# Patient Record
Sex: FEMALE | Race: WHITE
[De-identification: names, ages, dates, MRNs, and addresses within clinical notes are randomized per-mention and may not be internally consistent; named-entity substitution may affect disease eponyms.]

---

## 2017-03-07 ENCOUNTER — HOSPITAL ENCOUNTER (OUTPATIENT)
Dept: HOSPITAL 23 - CCSC | Age: 52
Discharge: HOME | End: 2017-03-07
Payer: MEDICARE

## 2017-03-07 DIAGNOSIS — E11.9: ICD-10-CM

## 2017-03-07 DIAGNOSIS — I10: ICD-10-CM

## 2017-03-07 DIAGNOSIS — M51.06: ICD-10-CM

## 2017-03-07 DIAGNOSIS — Z98.1: ICD-10-CM

## 2017-03-07 DIAGNOSIS — Z79.4: ICD-10-CM

## 2017-03-07 DIAGNOSIS — M96.1: Primary | ICD-10-CM

## 2017-03-07 DIAGNOSIS — M51.16: ICD-10-CM

## 2017-03-14 ENCOUNTER — HOSPITAL ENCOUNTER (OUTPATIENT)
Dept: HOSPITAL 23 - CCSC | Age: 52
Discharge: HOME | End: 2017-03-14
Payer: MEDICARE

## 2017-03-14 DIAGNOSIS — M96.1: Primary | ICD-10-CM

## 2017-03-14 DIAGNOSIS — M51.06: ICD-10-CM

## 2017-03-14 DIAGNOSIS — Z79.4: ICD-10-CM

## 2017-03-14 DIAGNOSIS — M51.16: ICD-10-CM

## 2017-03-14 DIAGNOSIS — Z98.1: ICD-10-CM

## 2017-03-14 DIAGNOSIS — I10: ICD-10-CM

## 2017-03-14 DIAGNOSIS — E11.9: ICD-10-CM

## 2017-03-29 ENCOUNTER — LAB (OUTPATIENT)
Dept: ONCOLOGY | Facility: CLINIC | Age: 52
End: 2017-03-29

## 2017-03-29 ENCOUNTER — OFFICE VISIT (OUTPATIENT)
Dept: ONCOLOGY | Facility: CLINIC | Age: 52
End: 2017-03-29

## 2017-03-29 VITALS
SYSTOLIC BLOOD PRESSURE: 128 MMHG | OXYGEN SATURATION: 97 % | HEART RATE: 68 BPM | RESPIRATION RATE: 18 BRPM | WEIGHT: 145.2 LBS | TEMPERATURE: 98.1 F | DIASTOLIC BLOOD PRESSURE: 53 MMHG

## 2017-03-29 DIAGNOSIS — R11.0 NAUSEA: ICD-10-CM

## 2017-03-29 DIAGNOSIS — C91.10 CLL (CHRONIC LYMPHOCYTIC LEUKEMIA) (HCC): Primary | ICD-10-CM

## 2017-03-29 DIAGNOSIS — K59.03 DRUG-INDUCED CONSTIPATION: ICD-10-CM

## 2017-03-29 DIAGNOSIS — C91.10 LEUKEMIA, CHRONIC LYMPHOCYTIC (HCC): ICD-10-CM

## 2017-03-29 LAB
ALBUMIN SERPL-MCNC: 4.6 G/DL (ref 3.5–5)
ALBUMIN/GLOB SERPL: 2 G/DL (ref 1.5–2.5)
ALP SERPL-CCNC: 97 U/L (ref 35–104)
ALT SERPL W P-5'-P-CCNC: 21 U/L (ref 10–36)
ANION GAP SERPL CALCULATED.3IONS-SCNC: 4.4 MMOL/L (ref 3.6–11.2)
AST SERPL-CCNC: 16 U/L (ref 10–30)
BASOPHILS # BLD AUTO: 0.01 10*3/MM3 (ref 0–0.3)
BASOPHILS NFR BLD AUTO: 0.2 % (ref 0–2)
BILIRUB SERPL-MCNC: 0.4 MG/DL (ref 0.2–1.8)
BUN BLD-MCNC: 12 MG/DL (ref 7–21)
BUN/CREAT SERPL: 16.4 (ref 7–25)
CALCIUM SPEC-SCNC: 9.2 MG/DL (ref 7.7–10)
CHLORIDE SERPL-SCNC: 106 MMOL/L (ref 99–112)
CO2 SERPL-SCNC: 26.6 MMOL/L (ref 24.3–31.9)
CREAT BLD-MCNC: 0.73 MG/DL (ref 0.43–1.29)
DEPRECATED RDW RBC AUTO: 42.3 FL (ref 37–54)
EOSINOPHIL # BLD AUTO: 0.05 10*3/MM3 (ref 0–0.7)
EOSINOPHIL NFR BLD AUTO: 0.8 % (ref 0–5)
ERYTHROCYTE [DISTWIDTH] IN BLOOD BY AUTOMATED COUNT: 12 % (ref 11.5–14.5)
GFR SERPL CREATININE-BSD FRML MDRD: 83 ML/MIN/1.73
GLOBULIN UR ELPH-MCNC: 2.3 GM/DL
GLUCOSE BLD-MCNC: 257 MG/DL (ref 70–110)
HCT VFR BLD AUTO: 45.6 % (ref 37–47)
HGB BLD-MCNC: 15.5 G/DL (ref 12–16)
IMM GRANULOCYTES # BLD: 0.01 10*3/MM3 (ref 0–0.03)
IMM GRANULOCYTES NFR BLD: 0.2 % (ref 0–0.5)
LDH SERPL-CCNC: 136 U/L (ref 135–225)
LYMPHOCYTES # BLD AUTO: 1.87 10*3/MM3 (ref 1–3)
LYMPHOCYTES NFR BLD AUTO: 30.5 % (ref 21–51)
MCH RBC QN AUTO: 33.4 PG (ref 27–33)
MCHC RBC AUTO-ENTMCNC: 34 G/DL (ref 33–37)
MCV RBC AUTO: 98.3 FL (ref 80–94)
MONOCYTES # BLD AUTO: 0.2 10*3/MM3 (ref 0.1–0.9)
MONOCYTES NFR BLD AUTO: 3.3 % (ref 0–10)
NEUTROPHILS # BLD AUTO: 3.99 10*3/MM3 (ref 1.4–6.5)
NEUTROPHILS NFR BLD AUTO: 65 % (ref 30–70)
OSMOLALITY SERPL CALC.SUM OF ELEC: 282.4 MOSM/KG (ref 273–305)
PLATELET # BLD AUTO: 168 10*3/MM3 (ref 130–400)
PMV BLD AUTO: 9.8 FL (ref 6–10)
POTASSIUM BLD-SCNC: 4.4 MMOL/L (ref 3.5–5.3)
PROT SERPL-MCNC: 6.9 G/DL (ref 6–8)
RBC # BLD AUTO: 4.64 10*6/MM3 (ref 4.2–5.4)
SODIUM BLD-SCNC: 137 MMOL/L (ref 135–153)
WBC NRBC COR # BLD: 6.13 10*3/MM3 (ref 4.5–12.5)

## 2017-03-29 PROCEDURE — 85025 COMPLETE CBC W/AUTO DIFF WBC: CPT | Performed by: INTERNAL MEDICINE

## 2017-03-29 PROCEDURE — 80053 COMPREHEN METABOLIC PANEL: CPT | Performed by: INTERNAL MEDICINE

## 2017-03-29 PROCEDURE — 99214 OFFICE O/P EST MOD 30 MIN: CPT | Performed by: INTERNAL MEDICINE

## 2017-03-29 PROCEDURE — 36415 COLL VENOUS BLD VENIPUNCTURE: CPT | Performed by: INTERNAL MEDICINE

## 2017-03-29 PROCEDURE — 83615 LACTATE (LD) (LDH) ENZYME: CPT | Performed by: INTERNAL MEDICINE

## 2017-03-29 RX ORDER — OXYCODONE AND ACETAMINOPHEN 10; 325 MG/1; MG/1
1 TABLET ORAL EVERY 4 HOURS PRN
COMMUNITY
Start: 2017-02-27 | End: 2018-02-27

## 2017-03-29 RX ORDER — LEVETIRACETAM 1000 MG/1
1000 TABLET ORAL 2 TIMES DAILY
COMMUNITY
End: 2020-06-11

## 2017-03-29 NOTE — PROGRESS NOTES
DATE:  3/29/2017    DIAGNOSIS:  SLL - initially diagnosed with [place] Stage II Disease in May 2012, based on excisiounal biopsy of a L supraclavicular LN. Bone marrow was involved.     CHIEF COMPLAINT:  CLL / SLL      TREATMENT HISTORY:  1. Received 6 cycles of Treanda without Rituxan between 10-24-12 and 3-21-13 because of anaphylactic reaction to Rituximab with her 1st cycle of treatment . Received Neulasta support. (Delivered by Dr. Koehler)       HISTORY OF PRESENT ILLNESS:   Ms. Mae was referred by Gillian Harrison for evaluation and treatment of CLL / SLL. She was reportedly diagnosed in May of 2012 when she saw Dr. Carbajal for biopsy of a L supraclavicular LN. At that time she had excessive fatigue & a 40 lb weight loss. She saw Dr. Koehler at  at time who performed bone marrow exam (bone marrow was involved) and started treatment with Bendamustine / Rituximab. Unfortunately with her 1st treatment, she had an anaphylactic reaction to Rituximab so this was not given with subsequent cycles of therapy. Her last cycle of treatment was 3-21-13. Per Dr. Koehler' s records, she had a complete response to treatment. She never had a significant leukocytosis or lymphocytosis, but had predominantly a lymphomatous presentation of her disease.       INTERVAL HISTORY:  Ms. Mae is here for follow up of CLL/SLL. She reports fatigue at baseline and pain to her back and legs. She is being seen by a physician in [place] who gave 2 injections intended for pain relief. She is not sure of the name, but she doesn't t feel they were helpful. She also reports chronic cold intolerance and hot flashes, but denies fevers, recent infections or abnormal weight loss. She is curious to hear the outcome of her testing. She sometimes gets pain in the RLQ of her abdomen.  She says this has been present for about 7-8 months.  She is s/p complete hysterectomy.  She denies fever or chills.  She denies diarrhea.  She struggles chronically  with constipation for which she takes Lactulose.   Finally, she asks for a prescription for Zofran because she says she struggles with nausea for unknown reasons.  She says her doctor thought Metformin was to blame but this has been discontinued and her nausea remains.  She is otherwise well and without complaint.        PAST MEDICAL HISTORY:  Past Medical History:   Diagnosis Date   • Asthma    • Chronic back pain    • CLL (chronic lymphocytic leukemia)     OCTOBER OF 2012   • Degenerative arthritis    • Depression    • Diabetes mellitus     type 2   • Hyperlipidemia    • Hypertension    • Lymphadenopathy        PAST SURGICAL HISTORY:  Past Surgical History:   Procedure Laterality Date   • APPENDECTOMY     • BACK SURGERY      2002 (work accident) c-spine surgery 4/14   • CHOLECYSTECTOMY     • HYSTERECTOMY      for endometriosis/ovarian bleed   • OTHER SURGICAL HISTORY      pac insertion and removal   • TONSILLECTOMY         FAMILY HISTORY:  Family History   Problem Relation Age of Onset   • Lung cancer Father 72   • Other Brother 46     MI   • Breast cancer Paternal Aunt    • Colon cancer Maternal Grandfather    • Other Cousin      CLL (dx 46 yo)     Social History     Social History   • Marital status:      Spouse name: N/A   • Number of children: N/A   • Years of education: N/A     Occupational History   • Not on file.     Social History Main Topics   • Smoking status: Current Every Day Smoker   • Smokeless tobacco: Not on file   • Alcohol use No   • Drug use: No   • Sexual activity: Not on file     Other Topics Concern   • Not on file     Social History Narrative     REVIEW OF SYSTEMS:    A comprehensive 14 point review of systems was performed and was negative except as mentioned    MEDICATIONS:  The current medication list was reviewed in the EMR    Current Outpatient Prescriptions:   •  ALBUTEROL IN, Inhale as needed., Disp: , Rfl:   •  ALPRAZolam (XANAX) 0.25 MG tablet, Take 0.25 mg by mouth 3  (three) times a day as needed for anxiety., Disp: , Rfl:   •  Fluticasone-Salmeterol (ADVAIR HFA IN), Inhale as needed., Disp: , Rfl:   •  gabapentin (NEURONTIN) 600 MG tablet, Take 600 mg by mouth daily., Disp: , Rfl:   •  Hydrocodone-Acetaminophen (NORCO PO), Take 10 mg by mouth daily., Disp: , Rfl:   •  Ibuprofen (ADVIL PO), Take  by mouth as needed., Disp: , Rfl:   •  insulin aspart (NovoLOG) 100 UNIT/ML injection, Inject 10 Units under the skin 3 (three) times a day., Disp: , Rfl:   •  Insulin Human powder, as needed., Disp: , Rfl:   •  lisinopril (PRINIVIL,ZESTRIL) 20 MG tablet, Take 20 mg by mouth daily., Disp: , Rfl:   •  metFORMIN (GLUCOPHAGE) 850 MG tablet, Take 850 mg by mouth 2 (two) times a day., Disp: , Rfl:   •  Mirtazapine (REMERON PO), Take 7.5 mg by mouth every evening., Disp: , Rfl:   •  Ondansetron HCl (ZOFRAN PO), Take  by mouth 2 (two) times a day., Disp: , Rfl:   •  OxyCODONE HCl ER (OxyCONTIN) 30 MG tablet extended-release 12 hour, Take 30 mg by mouth 2 (two) times a day., Disp: , Rfl:   •  Promethazine HCl (PHENERGAN PO), Take  by mouth as needed., Disp: , Rfl:   •  Sertraline HCl (ZOLOFT PO), Take 150 mg by mouth., Disp: , Rfl:     ALLERGIES:    Allergies   Allergen Reactions   • Codeine    • Other      PCN   • Rituxan [Rituximab]        PHYSICAL EXAM:  /53  Pulse 68  Temp 98.1 °F (36.7 °C) (Oral)   Resp 18  Wt 145 lb 3.2 oz (65.9 kg)  SpO2 97%   General: Alert and oriented.  Appears well   HEENT: Pupils are equal, round and reactive to light and accommodation, Extraocular movements full, oropharynx clear   Neck: no jvd or thyromegaly   Cardiovascular: regular rate and rhythm without murmurs, rubs or gallops   Pulmonary: clear to auscultation bilaterally   Abdomen: soft, non-tender, non-distended, normal active bowel sounds present, no organomegaly   Extremities: No clubbing, cyanosis or edema   Lymph: + cj post cervical LN present R>L (These are sl larger than previously with  the largest LN measuring about 2 cm, but most remain small 1-1.5 cm), and + R axillary LN (approx 1.5 cm ). She also has shoddy carl inguinal adenopathy (LNs approx 1.5)   Neurologic: Mental status as above, CN II-XII intact, grossly non-focal exam     PATHOLOGY:  5-25-12 L Supraclavicular LN Biopsy:   - Extensive small lymphocytic lymphoma / chronic lymphocytic leukemia involvement.   Flow cytometry shows monoclonal Kappa B-cell population which coexpresses CD5 and CD23 representing 30% of gated cells. There is dim surface light chain and dim CD20 expression. No CD38 expression. Neoplastic cells are positive for CD20 (dim), PAX-5, CCD5, CD23, CD43. B cells are + for CD3. BCL1 stain negative.    BM Biopsy 6-29-12 ([facility])   - Limited BM specimen with scattered hematopoietic cells.   - Flow cytometry revealed peripheral blood with minute CLL/SLL population (0.5%) with normal  FISH studies.     Cervical Disectomy 04-18-14:   - Fragments of fibrohyaline cartilage, no acute inflammation or metastatic disease identified.     IMAGING:  PET-CT 6-19-12:   - Mild to moderate bilateral axillary LAD and mediastinal LAD with minimal associated hypermetabolism.     PET-CT 10-09-12:   - Multiple areas of abnormal hypermetabolism in the neck bilaterally, new from the prior study. R posterior trangle focus with SUV 3.1. L supraclavicular focus SUV 5.0 (LN 1.8 cm, prior 1.2 cm)   - Carl axillary LAD - L axilla 2.8 cm LN (prior 1.6 cm) with SUV 3.6, R axillary ln new 2.0 cm, SUV 2.6   - Mediastinal LAD noted. R prevascular LN 1.5 cm with SUV 2.1   - Carl external iliac LNs present ( R 3.2 cm with SUV 3.1)   - Overall worsening areas of hypermetabolism corresponding to enlarging nodes c/w worsening neoplastic involvement.     PET-CT 1-16-13:   - No PET-CT findings of active disease related to the patient's lymphoma. Interval resolution of hypermetabolic adenopathy described on previous examination. Spleen is normal.     PET-CT 1-21-14:    - No abnormal hypermetabolism to suggest neoplastic involvement. No mass or adenopathy noted. Spleen is normal.     04-03-14 MRI Spine:   - Cervical: Degenerative disk disease in the cervical disk spaces at the level of C4-C7. At 4-5, there is a disk herniation associated with bulging. There was spinal stenosis at C5-6 but no disk herniation. At C6-7, the disk bulging is not felt to be significant. The postcontrasted scans in the cervical spine showed no areas of abnormal enhancement.   - Thoracic: negative MRI examination of the thoracic spine. A source of the patient's t horacic spine pain was not demonstrated.     Chest Xray 04-17-15:   - The lungs are clear, the heart is normal. There is no active disease in the chest.     CT CAP/neck w/ contrast 05-13-14:   - No neck lymphadenopathy by CT size criteria   - No intrathoracic or abdominal LAD     CTAP, neck w/ contrast 03-04-15:   - No enlarged lymph nodes were demonstrated. The bile ducts in the liver and the extrahepatic biliary tree is slightly dilated.   - Clinical correlation with laboratory evaluation for obstruction suggested. This, however, is not significantly changed from a previous CT from 05/2014. No retroperitoneal lymphadenopathy is identified.   - Negative CT of the of the soft tissues of the neck. No abnormally enlarged lymph nodes are demonstrated.     CT Neck, CAP 3-15-16:   - There is a change in the CT scan. There are prominent lymph nodes in the neck bilaterally. The largest are in the supraclavicular area and measure greater than a centimeter. Most of the nodes are in the 1cm size range.   - In the chest, most of the nodes are in the subcen timeter size range. The largest nodes are in the pretacheal area and measure 15 mm. There were no enlarged hilar nodes. On the lung windows, there are no pleural effusions. No pulmonary or parenchymal lung nodules or masses were demonstrated. .   There are m ore prominent lymph nodes in the  retroperitoneum some of which are enlarged today consistent with recurrence. Again, most of these are in the 1 cm size range, but a few measure up to 17-18 mm. No mesenteric adenopathy demonstrated.     CTCAP, Neck 05-10-16:   - Chest: Persistent stable adenopathy in the mediastinum and axillary regions. The lungs remain clear.   - A/P: The lymph nodes are stable in comparing with the previous CT done in March.   - Neck: Persistent but stable adenopathy throughout the jugular lymph node chains in both sides of the neck.     CTCAP neck 08-16-16:  - Stable mediastinal enlarged lymph nodes including an AP window lymph node measuring 9 mm  - A left axillary region lymph node measures 1.9 cm and was 1.9 cm. Stable right axillary region lymph adenopathy.   - Stable retroperitoneal and mesenteric lymphadenopathy   - persistent lymphadenopathy throughout the neck that is stable in size.     RECENT LABS:  Lab Results   Component Value Date    WBC 6.13 03/29/2017    HGB 15.5 03/29/2017    HCT 45.6 03/29/2017    MCV 98.3 (H) 03/29/2017    RDW 12.0 03/29/2017     03/29/2017    NEUTRORELPCT 65.0 03/29/2017    LYMPHORELPCT 30.5 03/29/2017    MONORELPCT 3.3 03/29/2017    EOSRELPCT 0.8 03/29/2017    BASORELPCT 0.2 03/29/2017    NEUTROABS 3.99 03/29/2017    LYMPHSABS 1.87 03/29/2017       Lab Results   Component Value Date     03/29/2017    K 4.4 03/29/2017    CO2 26.6 03/29/2017     03/29/2017    BUN 12 03/29/2017    CREATININE 0.73 03/29/2017    GLUCOSE 257 (H) 03/29/2017    CALCIUM 9.2 03/29/2017    ALKPHOS 97 03/29/2017    AST 16 03/29/2017    ALT 21 03/29/2017    BILITOT 0.4 03/29/2017    ALBUMIN 4.60 03/29/2017    PROTEINTOT 6.9 03/29/2017       Lab Results   Component Value Date     (L) 08/29/2016    URICACID 3.9 06/25/2015     Date  SPEP/MONE Mspike Free K Free L  K/L Ratio   02-12-15 MONE normal Not observed   06-25-15 MONE normal Not observed 22.10  14.93  1.48  11-17-15 MONE normal Not  observed 17.17  14.41  1.19    Date  IgG IgA IgM  02-12-15 744 110 43  06-25-15 776 94 39  11-17-15 812 99 41  03-29-17 pending      ASSESSMENT & PLAN:  Ms. Mae is a 52 y.o.  female with a history of Stage II SLL diagnosed in May of 2012 by L supraclavicular LN biopsy.     1. Small Lymphocytic Lymphoma:   - Treated as above with 6 cycles of Bendamustine (after anaphylactic reaction to Rituximab with 1st cycle of treatment) with complete radiographic response.   - She does have shoddy adenopathy in the cj cervical, R axillary and cj inguinal areas c/w CLL/SLL. Prior CT imaging also demonstrated small mediastinal LNs and enlarged retroperitoneal LNS, but no evidence of bulky disease.  She has no concerning exam findings and no symptoms related to her disease at this time.   -  Will plan for repeat exam in 3 months with labwork and repeat CT Neck, CAP prior.  She will call in the interim if she should have needs prior.     2.  Nausea:  -  Uncertain etiology.  I recommended to Ms. Mae that she return to her PCP for further evaluation as to the etiology of her nausea, because I am concerned that treating the symptom without evaluating its etiology could lead to worsening underlying disease.    3.  Intermittent RLQ pain:  She has no tenderness, even with very deep palpation on exam.  She is s/p hysterectomy.  She denies dysuria or hematuria.  She denies diarrhea. She does struggle with chronic constipation for which she has to take Lactulose.  Perhaps constipation is to blame.  Recommended she stop taking Zofran which can be constipating.      This note was scribed for Thea Sorto MD by Violeta Fong RN.    I, Thea Sorto MD, personally performed the services described in this documentation as scribed by the above named individual in my presence, and it is both accurate and complete.  03/29/17 6:13 PM     I spent 25 minutes with Ms. Mae today. More than 50% of the time was spent in  counseling / coordination of care for the above mentioned problems.       Electronically Signed by: Thea Sorto MD      CC:   BRANDON Panchal Dr.

## 2017-03-30 LAB
IGA SERPL-MCNC: 103 MG/DL (ref 87–352)
IGG SERPL-MCNC: 771 MG/DL (ref 700–1600)
IGM SERPL-MCNC: 34 MG/DL (ref 26–217)

## 2017-06-07 ENCOUNTER — HOSPITAL ENCOUNTER (OUTPATIENT)
Dept: CT IMAGING | Facility: HOSPITAL | Age: 52
Discharge: HOME | End: 2017-06-07
Attending: INTERNAL MEDICINE

## 2017-06-07 ENCOUNTER — HOSPITAL ENCOUNTER (OUTPATIENT)
Dept: CT IMAGING | Facility: HOSPITAL | Age: 52
Discharge: HOME | End: 2017-06-07
Attending: INTERNAL MEDICINE | Admitting: INTERNAL MEDICINE

## 2017-06-07 DIAGNOSIS — C91.10 CLL (CHRONIC LYMPHOCYTIC LEUKEMIA) (HCC): ICD-10-CM

## 2017-06-07 DIAGNOSIS — C91.10 CLL (CHRONIC LYMPHOCYTIC LEUKEMIA) (HCC): Primary | ICD-10-CM

## 2017-06-07 LAB — CREAT BLDA-MCNC: 0.7 MG/DL (ref 0.6–1.3)

## 2017-06-07 PROCEDURE — 0 IOPAMIDOL 61 % SOLUTION: Performed by: INTERNAL MEDICINE

## 2017-06-07 PROCEDURE — 74177 CT ABD & PELVIS W/CONTRAST: CPT

## 2017-06-07 PROCEDURE — 71260 CT THORAX DX C+: CPT | Performed by: RADIOLOGY

## 2017-06-07 PROCEDURE — 70491 CT SOFT TISSUE NECK W/DYE: CPT

## 2017-06-07 PROCEDURE — 82565 ASSAY OF CREATININE: CPT

## 2017-06-07 PROCEDURE — 70491 CT SOFT TISSUE NECK W/DYE: CPT | Performed by: RADIOLOGY

## 2017-06-07 PROCEDURE — 71260 CT THORAX DX C+: CPT

## 2017-06-07 PROCEDURE — 74177 CT ABD & PELVIS W/CONTRAST: CPT | Performed by: RADIOLOGY

## 2017-06-07 RX ADMIN — IOPAMIDOL 100 ML: 612 INJECTION, SOLUTION INTRAVENOUS at 13:47

## 2017-06-20 DIAGNOSIS — I99.9 VASCULAR ABNORMALITY: Primary | ICD-10-CM

## 2017-07-06 ENCOUNTER — OFFICE VISIT (OUTPATIENT)
Dept: SURGERY | Facility: CLINIC | Age: 52
End: 2017-07-06

## 2017-07-06 VITALS
HEIGHT: 65 IN | WEIGHT: 144.2 LBS | BODY MASS INDEX: 24.03 KG/M2 | DIASTOLIC BLOOD PRESSURE: 56 MMHG | HEART RATE: 66 BPM | TEMPERATURE: 98.2 F | SYSTOLIC BLOOD PRESSURE: 123 MMHG | RESPIRATION RATE: 18 BRPM

## 2017-07-06 DIAGNOSIS — Z71.9 ENCOUNTER FOR CONSULTATION: ICD-10-CM

## 2017-07-06 DIAGNOSIS — R09.89 BILATERAL CAROTID BRUITS: ICD-10-CM

## 2017-07-06 DIAGNOSIS — I73.9 PERIPHERAL VASCULAR DISEASE OF EXTREMITY (HCC): Primary | ICD-10-CM

## 2017-07-06 PROCEDURE — 99205 OFFICE O/P NEW HI 60 MIN: CPT | Performed by: SURGERY

## 2017-07-06 RX ORDER — INSULIN ASPART 100 [IU]/ML
INJECTION, SOLUTION INTRAVENOUS; SUBCUTANEOUS
Status: ON HOLD | COMMUNITY
Start: 2017-06-13 | End: 2017-08-23 | Stop reason: SDUPTHER

## 2017-07-06 RX ORDER — QUETIAPINE FUMARATE 50 MG/1
TABLET, FILM COATED ORAL
Status: ON HOLD | COMMUNITY
Start: 2017-06-13 | End: 2017-08-23 | Stop reason: ALTCHOICE

## 2017-07-06 RX ORDER — SERTRALINE HYDROCHLORIDE 100 MG/1
100 TABLET, FILM COATED ORAL NIGHTLY
COMMUNITY
Start: 2017-06-13

## 2017-07-06 RX ORDER — FLURBIPROFEN SODIUM 0.3 MG/ML
SOLUTION/ DROPS OPHTHALMIC
COMMUNITY
Start: 2017-06-14 | End: 2021-09-15

## 2017-07-06 NOTE — PROGRESS NOTES
7/6/2017    Patient Information  Melania Mae  [address] [place] [id number]  1965  [phone] (home)     Chief Complaint   Patient presents with   • Circulatory Problem     Vascular Abnormality         HPI  Patient is a 52-year-old white female who has chronic lymphocytic leukemia and non-Hodgkin's lymphoma in remission.  As part of her workup she had a CAT scan performed recently.  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  Patient says both feet feel numb at times.  She can only walk approximately 50 yards before she has severe pain in her legs.  Father had severe peripheral vascular disease as well as coronary artery disease.  She is also diabetic.    ROS reviewed and confirmed.        Review of Systems:    General: fever, chills  Integumentary: negative  Eyes: eyesight problems, glasses  ENT: negative  Respiratory: shortness of breath and wheezing  Gastrointestinal: nausea/vomiting  Cardiovascular: chest pain and palpitations  Neurological: numbness  Psychiatric: anxiety, depression and insomnia  Hematologic/Lymphatic: swollen glands and history of blood clots  Genitourinary: frequent urination  Musculoskeletal: sore muscles and back pain  Endocrine: hot flashes  Breasts: negative      There is no problem list on file for this patient.        Past Medical History:   Diagnosis Date   • Asthma    • Chronic back pain    • CLL (chronic lymphocytic leukemia)     OCTOBER OF 2012   • COPD (chronic obstructive pulmonary disease)    • Degenerative arthritis    • Depression    • Diabetes mellitus     type 2   • Hyperlipidemia    • Hypertension    • Lymphadenopathy    • Non Hodgkin's lymphoma          Past Surgical History:   Procedure Laterality Date   • APPENDECTOMY     • BACK SURGERY      2002 (work accident) c-spine surgery 4/14   • CHOLECYSTECTOMY     • HYSTERECTOMY      for endometriosis/ovarian bleed   • NECK SURGERY     • OTHER SURGICAL HISTORY      pac insertion and removal   •  TONSILLECTOMY           Family History   Problem Relation Age of Onset   • Lung cancer Father 72   • Hypertension Father    • Heart disease Father    • Cancer Father    • Diabetes Father    • Other Brother 46     MI   • Heart disease Brother    • Breast cancer Paternal Aunt    • Colon cancer Maternal Grandfather    • Other Cousin      CLL (dx 46 yo)   • Hypertension Mother          Social History   Substance Use Topics   • Smoking status: Current Every Day Smoker   • Smokeless tobacco: Not on file   • Alcohol use No       Current Outpatient Prescriptions   Medication Sig Dispense Refill   • ALBUTEROL IN Inhale as needed.     • ALPRAZolam (XANAX) 0.25 MG tablet Take 0.25 mg by mouth 3 (three) times a day as needed for anxiety.     • B-D UF III MINI PEN NEEDLES 31G X 5 MM misc      • Fluticasone-Salmeterol (ADVAIR HFA IN) Inhale as needed.     • gabapentin (NEURONTIN) 600 MG tablet Take 800 mg by mouth 4 (Four) Times a Day.     • Ibuprofen (ADVIL PO) Take  by mouth 4 (Four) Times a Day.     • insulin aspart (NovoLOG) 100 UNIT/ML injection Inject 10 Units under the skin 3 (three) times a day.     • insulin detemir (LEVEMIR) 100 UNIT/ML injection Inject 10 Units under the skin Every Night.     • levETIRAcetam (KEPPRA) 1000 MG tablet Take 1,000 mg by mouth 2 (Two) Times a Day.     • lisinopril (PRINIVIL,ZESTRIL) 20 MG tablet Take 20 mg by mouth daily.     • NOVOLOG FLEXPEN 100 UNIT/ML solution pen-injector sc pen      • Ondansetron HCl (ZOFRAN PO) Take  by mouth 2 (two) times a day.     • oxyCODONE-acetaminophen (PERCOCET)  MG per tablet      • Promethazine HCl (PHENERGAN PO) Take  by mouth as needed.     • QUEtiapine (SEROquel) 50 MG tablet      • sertraline (ZOLOFT) 100 MG tablet      • Sertraline HCl (ZOLOFT PO) Take 50 mg by mouth.       No current facility-administered medications for this visit.          Allergies  Codeine; Other; and Rituxan [rituximab]      Physical Exam   Constitutional: She is oriented to  "person, place, and time. She appears well-developed and well-nourished. No distress.   HENT:   Head: Normocephalic.   Right Ear: External ear normal.   Left Ear: External ear normal.   Nose: Nose normal.   Mouth/Throat: Oropharynx is clear and moist.   Eyes: Conjunctivae and EOM are normal. Right eye exhibits no discharge. Left eye exhibits no discharge.   Neck: Normal range of motion. No JVD present. No tracheal deviation present. No thyromegaly present.   Cardiovascular: Normal rate, regular rhythm, normal heart sounds and intact distal pulses.  Exam reveals no gallop and no friction rub.    No murmur heard.  Very weak femoral pulses.  No palpable popliteal, dorsalis pedis, posterior tibial pulses bilaterally.    Bilateral carotid bruits.   Pulmonary/Chest: Effort normal and breath sounds normal. No stridor. No respiratory distress. She has no wheezes. She has no rales. She exhibits no tenderness.   Abdominal: Soft. Bowel sounds are normal. She exhibits no distension and no mass. There is no tenderness. There is no rebound and no guarding. No hernia.   Genitourinary: Rectal exam shows guaiac negative stool.   Musculoskeletal: Normal range of motion. She exhibits no edema, tenderness or deformity.   Lymphadenopathy:     She has no cervical adenopathy.   Neurological: She is alert and oriented to person, place, and time. She has normal reflexes. She displays normal reflexes. No cranial nerve deficit. She exhibits normal muscle tone. Coordination normal.   Skin: Skin is warm and dry. No rash noted. She is not diaphoretic. No erythema. No pallor.   Psychiatric: She has a normal mood and affect. Her behavior is normal. Judgment and thought content normal.   Nursing note and vitals reviewed.        /56  Pulse 66  Temp 98.2 °F (36.8 °C)  Resp 18  Ht 65\" (165.1 cm)  Wt 144 lb 3.2 oz (65.4 kg)  BMI 24 kg/m2      ASSESSMENT  Significant peripheral vascular disease with possible carotid occlusive disease as " well.        PLAN CTA and ankle-brachial indexes and go from there.  I'll also get carotid Dopplers studies    Benefits large amount time discussing pathophysiology of peripheral vascular disease.  Anatomical illustrations were used to explain the disease and how bypass procedures are performed if indeed this is what she needs.        Kobi Amaral MD

## 2017-07-13 ENCOUNTER — HOSPITAL ENCOUNTER (OUTPATIENT)
Dept: CARDIOLOGY | Facility: HOSPITAL | Age: 52
Discharge: HOME | End: 2017-07-13
Attending: SURGERY

## 2017-07-13 ENCOUNTER — HOSPITAL ENCOUNTER (OUTPATIENT)
Dept: CT IMAGING | Facility: HOSPITAL | Age: 52
Discharge: HOME | End: 2017-07-13
Attending: SURGERY | Admitting: SURGERY

## 2017-07-13 DIAGNOSIS — Z71.9 ENCOUNTER FOR CONSULTATION: ICD-10-CM

## 2017-07-13 DIAGNOSIS — I73.9 PERIPHERAL VASCULAR DISEASE OF EXTREMITY (HCC): ICD-10-CM

## 2017-07-13 DIAGNOSIS — R09.89 BILATERAL CAROTID BRUITS: ICD-10-CM

## 2017-07-13 PROCEDURE — 93880 EXTRACRANIAL BILAT STUDY: CPT | Performed by: RADIOLOGY

## 2017-07-13 PROCEDURE — 82565 ASSAY OF CREATININE: CPT

## 2017-07-13 PROCEDURE — 75635 CT ANGIO ABDOMINAL ARTERIES: CPT | Performed by: RADIOLOGY

## 2017-07-13 PROCEDURE — 0 IOPAMIDOL 61 % SOLUTION: Performed by: SURGERY

## 2017-07-13 PROCEDURE — 75635 CT ANGIO ABDOMINAL ARTERIES: CPT

## 2017-07-13 PROCEDURE — 93880 EXTRACRANIAL BILAT STUDY: CPT

## 2017-07-13 RX ADMIN — IOPAMIDOL 100 ML: 612 INJECTION, SOLUTION INTRAVENOUS at 13:51

## 2017-07-17 LAB — CREAT BLDA-MCNC: 0.7 MG/DL (ref 0.6–1.3)

## 2017-07-18 ENCOUNTER — OFFICE VISIT (OUTPATIENT)
Dept: SURGERY | Facility: CLINIC | Age: 52
End: 2017-07-18

## 2017-07-18 VITALS
DIASTOLIC BLOOD PRESSURE: 72 MMHG | SYSTOLIC BLOOD PRESSURE: 128 MMHG | HEART RATE: 63 BPM | TEMPERATURE: 97.7 F | BODY MASS INDEX: 24.26 KG/M2 | RESPIRATION RATE: 18 BRPM | HEIGHT: 65 IN | WEIGHT: 145.6 LBS

## 2017-07-18 DIAGNOSIS — I73.9 PERIPHERAL VASCULAR DISEASE OF EXTREMITY (HCC): Primary | ICD-10-CM

## 2017-07-18 DIAGNOSIS — R09.89 BILATERAL CAROTID BRUITS: ICD-10-CM

## 2017-07-18 DIAGNOSIS — I77.9 BILATERAL CAROTID ARTERY DISEASE (HCC): ICD-10-CM

## 2017-07-18 PROCEDURE — 99214 OFFICE O/P EST MOD 30 MIN: CPT | Performed by: SURGERY

## 2017-07-18 NOTE — PROGRESS NOTES
7/18/2017    Patient Information  Melania Mae  [address] [place] [id number]  1965  [phone] (home)     Chief Complaint   Patient presents with   • Follow-up     FU CTA         HPI  Patient is a 51-year-old white female here for follow-up for CTA for peripheral vascular disease and carotid ultrasounds for bilateral bruits.  Her CTA shows stenoses in her aorta and iliacs but they are not hemodynamically significant.  Carotid Doppler study of her carotids significant for a 70-99% stenosis of right internal carotid artery.     ROS reviewed and confirmed.   Review of Systems:    General: fever, chills  Integumentary: negative  Eyes: eyesight problems, glasses  ENT: negative  Respiratory: shortness of breath and wheezing  Gastrointestinal: nausea/vomiting  Cardiovascular: chest pain and palpitations  Neurological: numbness  Psychiatric: anxiety, depression and insomnia  Hematologic/Lymphatic: swollen glands and history of blood clots  Genitourinary: frequent urination  Musculoskeletal: sore muscles and back pain  Endocrine: hot flashes  Breasts: negative    Patient Active Problem List   Diagnosis   • Encounter for consultation         Past Medical History:   Diagnosis Date   • Asthma    • Chronic back pain    • CLL (chronic lymphocytic leukemia)     OCTOBER OF 2012   • COPD (chronic obstructive pulmonary disease)    • Degenerative arthritis    • Depression    • Diabetes mellitus     type 2   • Hyperlipidemia    • Hypertension    • Lymphadenopathy    • Non Hodgkin's lymphoma          Past Surgical History:   Procedure Laterality Date   • APPENDECTOMY     • BACK SURGERY      2002 (work accident) c-spine surgery 4/14   • CHOLECYSTECTOMY     • HYSTERECTOMY      for endometriosis/ovarian bleed   • NECK SURGERY     • OTHER SURGICAL HISTORY      pac insertion and removal   • TONSILLECTOMY           Family History   Problem Relation Age of Onset   • Lung cancer Father 72   • Hypertension Father    •  "Heart disease Father    • Cancer Father    • Diabetes Father    • Other Brother 46     MI   • Heart disease Brother    • Breast cancer Paternal Aunt    • Colon cancer Maternal Grandfather    • Other Cousin      CLL (dx 48 yo)   • Hypertension Mother          Social History   Substance Use Topics   • Smoking status: Current Every Day Smoker   • Smokeless tobacco: Not on file   • Alcohol use No       Current Outpatient Prescriptions   Medication Sig Dispense Refill   • ALBUTEROL IN Inhale as needed.     • ALPRAZolam (XANAX) 0.25 MG tablet Take 0.25 mg by mouth 3 (three) times a day as needed for anxiety.     • B-D UF III MINI PEN NEEDLES 31G X 5 MM misc      • Fluticasone-Salmeterol (ADVAIR HFA IN) Inhale as needed.     • gabapentin (NEURONTIN) 600 MG tablet Take 800 mg by mouth 4 (Four) Times a Day.     • Ibuprofen (ADVIL PO) Take  by mouth 4 (Four) Times a Day.     • insulin aspart (NovoLOG) 100 UNIT/ML injection Inject 10 Units under the skin 3 (three) times a day.     • insulin detemir (LEVEMIR) 100 UNIT/ML injection Inject 10 Units under the skin Every Night.     • levETIRAcetam (KEPPRA) 1000 MG tablet Take 1,000 mg by mouth 2 (Two) Times a Day.     • lisinopril (PRINIVIL,ZESTRIL) 20 MG tablet Take 20 mg by mouth daily.     • NOVOLOG FLEXPEN 100 UNIT/ML solution pen-injector sc pen      • Ondansetron HCl (ZOFRAN PO) Take  by mouth 2 (two) times a day.     • oxyCODONE-acetaminophen (PERCOCET)  MG per tablet      • Promethazine HCl (PHENERGAN PO) Take  by mouth as needed.     • QUEtiapine (SEROquel) 50 MG tablet      • sertraline (ZOLOFT) 100 MG tablet      • Sertraline HCl (ZOLOFT PO) Take 50 mg by mouth.       No current facility-administered medications for this visit.          Allergies  Codeine; Other; and Rituxan [rituximab]      Physical Exam  Still not able to palpate pedal pulses    /72  Pulse 63  Temp 97.7 °F (36.5 °C)  Resp 18  Ht 65\" (165.1 cm)  Wt 145 lb 9.6 oz (66 kg)  BMI 24.23 " kg/m2      ASSESSMENT  Significant aortoiliac disease with hemodynamic significant occlusion of right internal carotid artery        PLAN    We will get a CTA of carotids and get ankle-brachial indexes.  Reviewed x-rays in detail with patient and daughter and actually showed him where the issues are on the computer.  Also counseled her on stop smoking.     Kobi Amaral MD

## 2017-07-25 ENCOUNTER — TELEPHONE (OUTPATIENT)
Dept: SURGERY | Facility: CLINIC | Age: 52
End: 2017-07-25

## 2017-07-25 DIAGNOSIS — R93.89 ABNORMAL CAROTID ULTRASOUND: Primary | ICD-10-CM

## 2017-07-25 DIAGNOSIS — R09.89 OTHER SPECIFIED SYMPTOMS AND SIGNS INVOLVING THE CIRCULATORY AND RESPIRATORY SYSTEMS: ICD-10-CM

## 2017-07-25 NOTE — TELEPHONE ENCOUNTER
I spoke to the patient's boyfriend. The patient was in the background and he is on the HIP[employer]. I told them she can call the hospital and ask for scheduling and they can go ahead and schedule these tests since no auth will be required. He asked how many more tests she will have to get and why we cant just fix this. I told him I do not know as to if there are anymore tests she will have to get and what the plans are for her. I told him these are questions he will need to ask Dr. Amaral at the follow up appointment.

## 2017-07-28 ENCOUNTER — HOSPITAL ENCOUNTER (OUTPATIENT)
Dept: GENERAL RADIOLOGY | Facility: HOSPITAL | Age: 52
Discharge: HOME | End: 2017-07-28
Admitting: NURSE PRACTITIONER

## 2017-07-28 ENCOUNTER — HOSPITAL ENCOUNTER (OUTPATIENT)
Dept: CT IMAGING | Facility: HOSPITAL | Age: 52
Discharge: HOME | End: 2017-07-28
Attending: SURGERY | Admitting: SURGERY

## 2017-07-28 ENCOUNTER — HOSPITAL ENCOUNTER (OUTPATIENT)
Dept: ULTRASOUND IMAGING | Facility: HOSPITAL | Age: 52
Discharge: HOME | End: 2017-07-28
Attending: SURGERY

## 2017-07-28 ENCOUNTER — TRANSCRIBE ORDERS (OUTPATIENT)
Dept: ADMINISTRATIVE | Facility: HOSPITAL | Age: 52
End: 2017-07-28

## 2017-07-28 ENCOUNTER — LAB (OUTPATIENT)
Dept: LAB | Facility: HOSPITAL | Age: 52
End: 2017-07-28

## 2017-07-28 DIAGNOSIS — R53.83 FATIGUE, UNSPECIFIED TYPE: ICD-10-CM

## 2017-07-28 DIAGNOSIS — R93.89 ABNORMAL CAROTID ULTRASOUND: ICD-10-CM

## 2017-07-28 DIAGNOSIS — M06.361: ICD-10-CM

## 2017-07-28 DIAGNOSIS — R09.89 OTHER SPECIFIED SYMPTOMS AND SIGNS INVOLVING THE CIRCULATORY AND RESPIRATORY SYSTEMS: ICD-10-CM

## 2017-07-28 DIAGNOSIS — E78.5 HYPERLIPIDEMIA, UNSPECIFIED HYPERLIPIDEMIA TYPE: ICD-10-CM

## 2017-07-28 DIAGNOSIS — E11.9 DIABETES MELLITUS WITHOUT COMPLICATION (HCC): Primary | ICD-10-CM

## 2017-07-28 DIAGNOSIS — E11.9 DIABETES MELLITUS WITHOUT COMPLICATION (HCC): ICD-10-CM

## 2017-07-28 LAB
ALBUMIN SERPL-MCNC: 4.4 G/DL (ref 3.5–5)
ALBUMIN UR-MCNC: 1 MG/L
ALBUMIN/GLOB SERPL: 1.6 G/DL (ref 1.5–2.5)
ALP SERPL-CCNC: 103 U/L (ref 35–104)
ALT SERPL W P-5'-P-CCNC: 33 U/L (ref 10–36)
ANION GAP SERPL CALCULATED.3IONS-SCNC: 1.7 MMOL/L (ref 3.6–11.2)
AST SERPL-CCNC: 22 U/L (ref 10–30)
BASOPHILS # BLD AUTO: 0.01 10*3/MM3 (ref 0–0.3)
BASOPHILS NFR BLD AUTO: 0.2 % (ref 0–2)
BILIRUB SERPL-MCNC: 0.5 MG/DL (ref 0.2–1.8)
BILIRUB UR QL STRIP: NEGATIVE
BUN BLD-MCNC: 8 MG/DL (ref 7–21)
BUN/CREAT SERPL: 11 (ref 7–25)
CALCIUM SPEC-SCNC: 9.3 MG/DL (ref 7.7–10)
CHLORIDE SERPL-SCNC: 109 MMOL/L (ref 99–112)
CHOLEST SERPL-MCNC: 217 MG/DL (ref 0–200)
CLARITY UR: CLEAR
CO2 SERPL-SCNC: 27.3 MMOL/L (ref 24.3–31.9)
COLOR UR: YELLOW
CREAT BLD-MCNC: 0.73 MG/DL (ref 0.43–1.29)
CRP SERPL-MCNC: 2.35 MG/DL (ref 0–0.99)
DEPRECATED RDW RBC AUTO: 42.8 FL (ref 37–54)
EOSINOPHIL # BLD AUTO: 0.06 10*3/MM3 (ref 0–0.7)
EOSINOPHIL NFR BLD AUTO: 1 % (ref 0–5)
ERYTHROCYTE [DISTWIDTH] IN BLOOD BY AUTOMATED COUNT: 11.9 % (ref 11.5–14.5)
ERYTHROCYTE [SEDIMENTATION RATE] IN BLOOD: 5 MM/HR (ref 0–30)
GFR SERPL CREATININE-BSD FRML MDRD: 83 ML/MIN/1.73
GLOBULIN UR ELPH-MCNC: 2.7 GM/DL
GLUCOSE BLD-MCNC: 117 MG/DL (ref 70–110)
GLUCOSE UR STRIP-MCNC: NEGATIVE MG/DL
HBA1C MFR BLD: 6.8 % (ref 4.5–5.7)
HCT VFR BLD AUTO: 40.9 % (ref 37–47)
HDLC SERPL-MCNC: 48 MG/DL (ref 60–100)
HGB BLD-MCNC: 13.9 G/DL (ref 12–16)
HGB UR QL STRIP.AUTO: ABNORMAL
IMM GRANULOCYTES # BLD: 0.01 10*3/MM3 (ref 0–0.03)
IMM GRANULOCYTES NFR BLD: 0.2 % (ref 0–0.5)
KETONES UR QL STRIP: NEGATIVE
LDLC SERPL CALC-MCNC: 146 MG/DL (ref 0–100)
LDLC/HDLC SERPL: 3.05 {RATIO}
LEUKOCYTE ESTERASE UR QL STRIP.AUTO: NEGATIVE
LYMPHOCYTES # BLD AUTO: 2.57 10*3/MM3 (ref 1–3)
LYMPHOCYTES NFR BLD AUTO: 42.5 % (ref 21–51)
MCH RBC QN AUTO: 33.9 PG (ref 27–33)
MCHC RBC AUTO-ENTMCNC: 34 G/DL (ref 33–37)
MCV RBC AUTO: 99.8 FL (ref 80–94)
MONOCYTES # BLD AUTO: 0.17 10*3/MM3 (ref 0.1–0.9)
MONOCYTES NFR BLD AUTO: 2.8 % (ref 0–10)
NEUTROPHILS # BLD AUTO: 3.22 10*3/MM3 (ref 1.4–6.5)
NEUTROPHILS NFR BLD AUTO: 53.3 % (ref 30–70)
NITRITE UR QL STRIP: NEGATIVE
OSMOLALITY SERPL CALC.SUM OF ELEC: 275 MOSM/KG (ref 273–305)
PH UR STRIP.AUTO: <=5 [PH] (ref 5–8)
PLATELET # BLD AUTO: 134 10*3/MM3 (ref 130–400)
PMV BLD AUTO: 10 FL (ref 6–10)
POTASSIUM BLD-SCNC: 4.2 MMOL/L (ref 3.5–5.3)
PROT SERPL-MCNC: 7.1 G/DL (ref 6–8)
PROT UR QL STRIP: NEGATIVE
RBC # BLD AUTO: 4.1 10*6/MM3 (ref 4.2–5.4)
SODIUM BLD-SCNC: 138 MMOL/L (ref 135–153)
SP GR UR STRIP: 1.01 (ref 1–1.03)
T3RU NFR SERPL: 37.1 % (ref 22.5–37)
T4 SERPL-MCNC: 8.4 MCG/DL (ref 4.5–10.9)
TRIGL SERPL-MCNC: 113 MG/DL (ref 0–150)
TSH SERPL DL<=0.05 MIU/L-ACNC: 1.31 MIU/ML (ref 0.55–4.78)
UROBILINOGEN UR QL STRIP: ABNORMAL
VLDLC SERPL-MCNC: 22.6 MG/DL
WBC NRBC COR # BLD: 6.04 10*3/MM3 (ref 4.5–12.5)

## 2017-07-28 PROCEDURE — 80061 LIPID PANEL: CPT | Performed by: NURSE PRACTITIONER

## 2017-07-28 PROCEDURE — 93922 UPR/L XTREMITY ART 2 LEVELS: CPT

## 2017-07-28 PROCEDURE — 85652 RBC SED RATE AUTOMATED: CPT | Performed by: NURSE PRACTITIONER

## 2017-07-28 PROCEDURE — 93922 UPR/L XTREMITY ART 2 LEVELS: CPT | Performed by: RADIOLOGY

## 2017-07-28 PROCEDURE — 85025 COMPLETE CBC W/AUTO DIFF WBC: CPT | Performed by: NURSE PRACTITIONER

## 2017-07-28 PROCEDURE — 83036 HEMOGLOBIN GLYCOSYLATED A1C: CPT | Performed by: NURSE PRACTITIONER

## 2017-07-28 PROCEDURE — 81003 URINALYSIS AUTO W/O SCOPE: CPT | Performed by: NURSE PRACTITIONER

## 2017-07-28 PROCEDURE — 84443 ASSAY THYROID STIM HORMONE: CPT | Performed by: NURSE PRACTITIONER

## 2017-07-28 PROCEDURE — 73564 X-RAY EXAM KNEE 4 OR MORE: CPT

## 2017-07-28 PROCEDURE — 0 IOPAMIDOL PER 1 ML

## 2017-07-28 PROCEDURE — 70498 CT ANGIOGRAPHY NECK: CPT | Performed by: RADIOLOGY

## 2017-07-28 PROCEDURE — 36415 COLL VENOUS BLD VENIPUNCTURE: CPT

## 2017-07-28 PROCEDURE — 84479 ASSAY OF THYROID (T3 OR T4): CPT | Performed by: NURSE PRACTITIONER

## 2017-07-28 PROCEDURE — 84436 ASSAY OF TOTAL THYROXINE: CPT | Performed by: NURSE PRACTITIONER

## 2017-07-28 PROCEDURE — 70498 CT ANGIOGRAPHY NECK: CPT

## 2017-07-28 PROCEDURE — 73564 X-RAY EXAM KNEE 4 OR MORE: CPT | Performed by: RADIOLOGY

## 2017-07-28 PROCEDURE — 80053 COMPREHEN METABOLIC PANEL: CPT | Performed by: NURSE PRACTITIONER

## 2017-07-28 PROCEDURE — 86140 C-REACTIVE PROTEIN: CPT | Performed by: NURSE PRACTITIONER

## 2017-07-28 PROCEDURE — 82043 UR ALBUMIN QUANTITATIVE: CPT | Performed by: NURSE PRACTITIONER

## 2017-08-03 ENCOUNTER — TELEPHONE (OUTPATIENT)
Dept: SURGERY | Facility: CLINIC | Age: 52
End: 2017-08-03

## 2017-08-03 ENCOUNTER — OFFICE VISIT (OUTPATIENT)
Dept: SURGERY | Facility: CLINIC | Age: 52
End: 2017-08-03

## 2017-08-03 VITALS
SYSTOLIC BLOOD PRESSURE: 168 MMHG | DIASTOLIC BLOOD PRESSURE: 82 MMHG | TEMPERATURE: 97.8 F | WEIGHT: 148.2 LBS | HEART RATE: 65 BPM | HEIGHT: 65 IN | RESPIRATION RATE: 18 BRPM | BODY MASS INDEX: 24.69 KG/M2

## 2017-08-03 DIAGNOSIS — R93.89 ABNORMAL CAROTID ULTRASOUND: Primary | ICD-10-CM

## 2017-08-03 DIAGNOSIS — I77.9 BILATERAL CAROTID ARTERY DISEASE (HCC): ICD-10-CM

## 2017-08-03 DIAGNOSIS — I67.1 SUPRACLINOID CAROTID ARTERY ANEURYSM, SMALL: ICD-10-CM

## 2017-08-03 DIAGNOSIS — I73.9 PERIPHERAL VASCULAR DISEASE OF EXTREMITY (HCC): ICD-10-CM

## 2017-08-03 DIAGNOSIS — R09.89 BILATERAL CAROTID BRUITS: ICD-10-CM

## 2017-08-03 PROCEDURE — 99215 OFFICE O/P EST HI 40 MIN: CPT | Performed by: SURGERY

## 2017-08-03 RX ORDER — LORATADINE 10 MG/1
10 TABLET ORAL NIGHTLY
COMMUNITY

## 2017-08-03 NOTE — PROGRESS NOTES
8/3/2017    Patient Information  Melania Mae  [address] [place] [id number]  1965  [phone] (home)     Chief Complaint   Patient presents with   • Follow-up     FU CTA/ABIs       HPI   patient is a 52-year-old white female here for follow-up on CTA of her carotids and ankle-brachial indexes.  She has known claudication, short distance.  CTA shows evidence of aorto iliac occlusive disease.  ABIs bilaterally are 0.6.  She is not having any rest pain or nonhealing wounds on her lower extremities.  However the CTA shows evidence of an 80%is in her right carotid artery and a 5.4 mm left clinoid carotid artery in the left aspect of the sella.    Review of Systems:  The Review of Systems has been reviewed and confirmed.    General: fever, chills  Integumentary: negative  Eyes: eyesight problems, glasses  ENT: negative  Respiratory: shortness of breath and wheezing  Gastrointestinal: nausea/vomiting  Cardiovascular: chest pain and palpitations  Neurological: numbness  Psychiatric: anxiety, depression and insomnia  Hematologic/Lymphatic: swollen glands and history of blood clots  Genitourinary: frequent urination  Musculoskeletal: sore muscles and back pain  Endocrine: hot flashes  Breasts: negative    Patient Active Problem List   Diagnosis   • Encounter for consultation         Past Medical History:   Diagnosis Date   • Asthma    • Chronic back pain    • CLL (chronic lymphocytic leukemia)     OCTOBER OF 2012   • COPD (chronic obstructive pulmonary disease)    • Degenerative arthritis    • Depression    • Diabetes mellitus     type 2   • Hyperlipidemia    • Hypertension    • Lymphadenopathy    • Non Hodgkin's lymphoma          Past Surgical History:   Procedure Laterality Date   • APPENDECTOMY     • BACK SURGERY      2002 (work accident) c-spine surgery 4/14   • CHOLECYSTECTOMY     • HYSTERECTOMY      for endometriosis/ovarian bleed   • NECK SURGERY     • OTHER SURGICAL HISTORY      pac insertion  and removal   • TONSILLECTOMY           Family History   Problem Relation Age of Onset   • Lung cancer Father 72   • Hypertension Father    • Heart disease Father    • Cancer Father    • Diabetes Father    • Other Brother 46     MI   • Heart disease Brother    • Breast cancer Paternal Aunt    • Colon cancer Maternal Grandfather    • Other Cousin      CLL (dx 48 yo)   • Hypertension Mother          Social History   Substance Use Topics   • Smoking status: Current Every Day Smoker   • Smokeless tobacco: Not on file   • Alcohol use No       Current Outpatient Prescriptions   Medication Sig Dispense Refill   • ALBUTEROL IN Inhale as needed.     • ALPRAZolam (XANAX) 0.25 MG tablet Take 0.25 mg by mouth 3 (three) times a day as needed for anxiety.     • B-D UF III MINI PEN NEEDLES 31G X 5 MM misc      • Fluticasone-Salmeterol (ADVAIR HFA IN) Inhale as needed.     • gabapentin (NEURONTIN) 600 MG tablet Take 800 mg by mouth 4 (Four) Times a Day.     • Ibuprofen (ADVIL PO) Take  by mouth 4 (Four) Times a Day.     • insulin aspart (NovoLOG) 100 UNIT/ML injection Inject 10 Units under the skin 3 (three) times a day.     • insulin detemir (LEVEMIR) 100 UNIT/ML injection Inject 10 Units under the skin Every Night.     • levETIRAcetam (KEPPRA) 1000 MG tablet Take 1,000 mg by mouth 2 (Two) Times a Day.     • lisinopril (PRINIVIL,ZESTRIL) 20 MG tablet Take 20 mg by mouth daily.     • NOVOLOG FLEXPEN 100 UNIT/ML solution pen-injector sc pen      • Ondansetron HCl (ZOFRAN PO) Take  by mouth 2 (two) times a day.     • oxyCODONE-acetaminophen (PERCOCET)  MG per tablet      • Promethazine HCl (PHENERGAN PO) Take  by mouth as needed.     • QUEtiapine (SEROquel) 50 MG tablet      • sertraline (ZOLOFT) 100 MG tablet      • Sertraline HCl (ZOLOFT PO) Take 50 mg by mouth.       No current facility-administered medications for this visit.          Allergies  Codeine; Other; and Rituxan [rituximab]      Physical Exam   Gen. 52-year-old  "white female in no distress  HEENT unremarkable  Chest clear  Heart regular rate and rhythm  Abdomen soft  Extremities both are cool    /82  Pulse 65  Temp 97.8 °F (36.6 °C)  Resp 18  Ht 65\" (165.1 cm)  Wt 148 lb 3.2 oz (67.2 kg)  BMI 24.66 kg/m2        ASSESSMENT    Right carotid 80% stenotic lesion.  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  Severe aortoiliac occlusive disease with severe claudication.  Coronary artery disease.        PLAN      I have made arrangements for Dr. Xie, neurosurgeon [place] to see this patient for the carotid stenosis and carotid aneurysm.  I have instructed the patient to stop smoking.  She may need a aorto bifemoral bypass graft the future.  Kobi Amaral MD    "

## 2017-08-03 NOTE — TELEPHONE ENCOUNTER
Called to let patient know that I have her an appointment with Dr. William Cornejo in [place] on Aug 8 at 2:00 but that she needs to be there at 1:30 to fill out paper work. I also let her know that she will need to go by the [facility] center here in [place] and  her CTA results on a disk that I have already called Anthony about and he was going to go ahead and put it on a CD for her. She said if she had any problems she would give us a call back and let us know. All paperwork will be faxed to Dr. William Cornejo's office as soon as Dr. Amaral has finished with her note from her appointment that she had today.

## 2017-08-04 ENCOUNTER — LAB (OUTPATIENT)
Dept: ONCOLOGY | Facility: CLINIC | Age: 52
End: 2017-08-04

## 2017-08-04 ENCOUNTER — OFFICE VISIT (OUTPATIENT)
Dept: ONCOLOGY | Facility: CLINIC | Age: 52
End: 2017-08-04

## 2017-08-04 VITALS
OXYGEN SATURATION: 99 % | WEIGHT: 146.3 LBS | DIASTOLIC BLOOD PRESSURE: 70 MMHG | BODY MASS INDEX: 24.37 KG/M2 | TEMPERATURE: 97.7 F | HEIGHT: 65 IN | RESPIRATION RATE: 20 BRPM | HEART RATE: 67 BPM | SYSTOLIC BLOOD PRESSURE: 150 MMHG

## 2017-08-04 DIAGNOSIS — C91.10 CLL (CHRONIC LYMPHOCYTIC LEUKEMIA) (HCC): ICD-10-CM

## 2017-08-04 DIAGNOSIS — I72.9 ANEURYSM (HCC): ICD-10-CM

## 2017-08-04 DIAGNOSIS — I99.9 VASCULAR ABNORMALITY: ICD-10-CM

## 2017-08-04 DIAGNOSIS — C91.10 CLL (CHRONIC LYMPHOCYTIC LEUKEMIA) (HCC): Primary | ICD-10-CM

## 2017-08-04 LAB
ALBUMIN SERPL-MCNC: 4.7 G/DL (ref 3.5–5)
ALBUMIN/GLOB SERPL: 1.9 G/DL (ref 1.5–2.5)
ALP SERPL-CCNC: 102 U/L (ref 35–104)
ALT SERPL W P-5'-P-CCNC: 29 U/L (ref 10–36)
ANION GAP SERPL CALCULATED.3IONS-SCNC: 5.2 MMOL/L (ref 3.6–11.2)
AST SERPL-CCNC: 19 U/L (ref 10–30)
BASOPHILS # BLD AUTO: 0.01 10*3/MM3 (ref 0–0.3)
BASOPHILS NFR BLD AUTO: 0.2 % (ref 0–2)
BILIRUB SERPL-MCNC: 0.3 MG/DL (ref 0.2–1.8)
BUN BLD-MCNC: 10 MG/DL (ref 7–21)
BUN/CREAT SERPL: 13.2 (ref 7–25)
CALCIUM SPEC-SCNC: 9.5 MG/DL (ref 7.7–10)
CHLORIDE SERPL-SCNC: 107 MMOL/L (ref 99–112)
CO2 SERPL-SCNC: 26.8 MMOL/L (ref 24.3–31.9)
CREAT BLD-MCNC: 0.76 MG/DL (ref 0.43–1.29)
DEPRECATED RDW RBC AUTO: 43.6 FL (ref 37–54)
EOSINOPHIL # BLD AUTO: 0.07 10*3/MM3 (ref 0–0.7)
EOSINOPHIL NFR BLD AUTO: 1.5 % (ref 0–5)
ERYTHROCYTE [DISTWIDTH] IN BLOOD BY AUTOMATED COUNT: 12 % (ref 11.5–14.5)
GFR SERPL CREATININE-BSD FRML MDRD: 80 ML/MIN/1.73
GLOBULIN UR ELPH-MCNC: 2.5 GM/DL
GLUCOSE BLD-MCNC: 131 MG/DL (ref 70–110)
HCT VFR BLD AUTO: 44.8 % (ref 37–47)
HGB BLD-MCNC: 15.1 G/DL (ref 12–16)
IMM GRANULOCYTES # BLD: 0.01 10*3/MM3 (ref 0–0.03)
IMM GRANULOCYTES NFR BLD: 0.2 % (ref 0–0.5)
LDH SERPL-CCNC: 201 U/L (ref 135–225)
LYMPHOCYTES # BLD AUTO: 1.91 10*3/MM3 (ref 1–3)
LYMPHOCYTES NFR BLD AUTO: 40 % (ref 21–51)
MCH RBC QN AUTO: 34 PG (ref 27–33)
MCHC RBC AUTO-ENTMCNC: 33.7 G/DL (ref 33–37)
MCV RBC AUTO: 100.9 FL (ref 80–94)
MONOCYTES # BLD AUTO: 0.17 10*3/MM3 (ref 0.1–0.9)
MONOCYTES NFR BLD AUTO: 3.6 % (ref 0–10)
NEUTROPHILS # BLD AUTO: 2.61 10*3/MM3 (ref 1.4–6.5)
NEUTROPHILS NFR BLD AUTO: 54.5 % (ref 30–70)
OSMOLALITY SERPL CALC.SUM OF ELEC: 278.4 MOSM/KG (ref 273–305)
PLATELET # BLD AUTO: 134 10*3/MM3 (ref 130–400)
PMV BLD AUTO: 10.1 FL (ref 6–10)
POTASSIUM BLD-SCNC: 4.4 MMOL/L (ref 3.5–5.3)
PROT SERPL-MCNC: 7.2 G/DL (ref 6–8)
RBC # BLD AUTO: 4.44 10*6/MM3 (ref 4.2–5.4)
SODIUM BLD-SCNC: 139 MMOL/L (ref 135–153)
WBC NRBC COR # BLD: 4.78 10*3/MM3 (ref 4.5–12.5)

## 2017-08-04 PROCEDURE — 99213 OFFICE O/P EST LOW 20 MIN: CPT | Performed by: INTERNAL MEDICINE

## 2017-08-04 PROCEDURE — 80053 COMPREHEN METABOLIC PANEL: CPT | Performed by: INTERNAL MEDICINE

## 2017-08-04 PROCEDURE — 83615 LACTATE (LD) (LDH) ENZYME: CPT | Performed by: INTERNAL MEDICINE

## 2017-08-04 PROCEDURE — 85025 COMPLETE CBC W/AUTO DIFF WBC: CPT | Performed by: INTERNAL MEDICINE

## 2017-08-04 NOTE — PROGRESS NOTES
DATE:  8/4/2017    DIAGNOSIS:  SLL - initially diagnosed with [place] Stage II Disease in May 2012, based on excisiounal biopsy of a L supraclavicular LN. Bone marrow was involved.     CHIEF COMPLAINT:  CLL / SLL  Concern over vascular disease, aneurysm      TREATMENT HISTORY:  1. Received 6 cycles of Treanda without Rituxan between 10-24-12 and 3-21-13 because of anaphylactic reaction to Rituximab with her 1st cycle of treatment . Received Neulasta support. (Delivered by Dr. Koehler)     HISTORY OF PRESENT ILLNESS:   Ms. Mae was referred by Gillian Harrison for evaluation and treatment of CLL / SLL. She was reportedly diagnosed in May of 2012 when she saw Dr. Carbajal for biopsy of a L supraclavicular LN. At that time she had excessive fatigue & a 40 lb weight loss. She saw Dr. Koehler at  at time who performed bone marrow exam (bone marrow was involved) and started treatment with Bendamustine / Rituximab. Unfortunately with her 1st treatment, she had an anaphylactic reaction to Rituximab so this was not given with subsequent cycles of therapy. Her last cycle of treatment was 3-21-13. Per Dr. Koehler' s records, she had a complete response to treatment. She never had a significant leukocytosis or lymphocytosis, but had predominantly a lymphomatous presentation of her disease.       INTERVAL HISTORY:  Ms. Mae is here for follow up of CLL/SLL. Since her last visit, she has been seen by Dr Amaral for vascular problems.  After discussion of what sounds like aortobifemoral bypass, she had evaluation of her carotid arteries and was found to have 80% blockage.  Following this, she was found to have a brain aneurysm and is scheduled to see Dr. Mercado next week for this. She is understandably anxious regarding the new diagnosis.       PAST MEDICAL HISTORY:  Past Medical History:   Diagnosis Date   • Asthma    • Chronic back pain    • CLL (chronic lymphocytic leukemia)     OCTOBER OF 2012   • COPD (chronic obstructive  pulmonary disease)    • Degenerative arthritis    • Depression    • Diabetes mellitus     type 2   • Hyperlipidemia    • Hypertension    • Lymphadenopathy    • Non Hodgkin's lymphoma        PAST SURGICAL HISTORY:  Past Surgical History:   Procedure Laterality Date   • APPENDECTOMY     • BACK SURGERY      2002 (work accident) c-spine surgery 4/14   • CHOLECYSTECTOMY     • HYSTERECTOMY      for endometriosis/ovarian bleed   • NECK SURGERY     • OTHER SURGICAL HISTORY      pac insertion and removal   • TONSILLECTOMY         FAMILY HISTORY:  Family History   Problem Relation Age of Onset   • Lung cancer Father 72   • Hypertension Father    • Heart disease Father    • Cancer Father    • Diabetes Father    • Other Brother 46     MI   • Heart disease Brother    • Breast cancer Paternal Aunt    • Colon cancer Maternal Grandfather    • Other Cousin      CLL (dx 46 yo)   • Hypertension Mother      Social History     Social History   • Marital status:      Spouse name: N/A   • Number of children: N/A   • Years of education: N/A     Occupational History   • Not on file.     Social History Main Topics   • Smoking status: Current Every Day Smoker   • Smokeless tobacco: Not on file   • Alcohol use No   • Drug use: No   • Sexual activity: Not on file     Other Topics Concern   • Not on file     Social History Narrative     REVIEW OF SYSTEMS:    A comprehensive 14 point review of systems was performed and was negative except as mentioned    MEDICATIONS:  The current medication list was reviewed in the EMR    Current Outpatient Prescriptions:   •  ALBUTEROL IN, Inhale as needed., Disp: , Rfl:   •  ALPRAZolam (XANAX) 0.25 MG tablet, Take 0.25 mg by mouth 3 (three) times a day as needed for anxiety., Disp: , Rfl:   •  B-D UF III MINI PEN NEEDLES 31G X 5 MM misc, , Disp: , Rfl:   •  Fluticasone-Salmeterol (ADVAIR HFA IN), Inhale as needed., Disp: , Rfl:   •  gabapentin (NEURONTIN) 600 MG tablet, Take 800 mg by mouth 4 (Four)  "Times a Day., Disp: , Rfl:   •  Ibuprofen (ADVIL PO), Take  by mouth 4 (Four) Times a Day., Disp: , Rfl:   •  insulin aspart (NovoLOG) 100 UNIT/ML injection, Inject 10 Units under the skin 3 (three) times a day., Disp: , Rfl:   •  insulin detemir (LEVEMIR) 100 UNIT/ML injection, Inject 10 Units under the skin Every Night., Disp: , Rfl:   •  levETIRAcetam (KEPPRA) 1000 MG tablet, Take 1,000 mg by mouth 2 (Two) Times a Day., Disp: , Rfl:   •  lisinopril (PRINIVIL,ZESTRIL) 20 MG tablet, Take 20 mg by mouth daily., Disp: , Rfl:   •  loratadine (CLARITIN) 10 MG tablet, Take 10 mg by mouth Daily., Disp: , Rfl:   •  NOVOLOG FLEXPEN 100 UNIT/ML solution pen-injector sc pen, , Disp: , Rfl:   •  Ondansetron HCl (ZOFRAN PO), Take  by mouth 2 (two) times a day., Disp: , Rfl:   •  oxyCODONE-acetaminophen (PERCOCET)  MG per tablet, , Disp: , Rfl:   •  Promethazine HCl (PHENERGAN PO), Take  by mouth as needed., Disp: , Rfl:   •  QUEtiapine (SEROquel) 50 MG tablet, , Disp: , Rfl:   •  sertraline (ZOLOFT) 100 MG tablet, , Disp: , Rfl:   •  Sertraline HCl (ZOLOFT PO), Take 50 mg by mouth., Disp: , Rfl:     ALLERGIES:    Allergies   Allergen Reactions   • Codeine    • Other      PCN   • Rituxan [Rituximab]        PHYSICAL EXAM:  Visit Vitals   • /70   • Pulse 67   • Temp 97.7 °F (36.5 °C) (Oral)   • Resp 20   • Ht 65\" (165.1 cm)   • Wt 146 lb 4.8 oz (66.4 kg)   • SpO2 99%   • BMI 24.35 kg/m2   General: Alert and oriented.  Appears anxious but otherwise well  HEENT: Pupils are equal, round and reactive to light and accommodation, Extraocular movements full, oropharynx clear   Neck: no jvd or thyromegaly   Cardiovascular: regular rate and rhythm without murmurs, rubs or gallops   Pulmonary: clear to auscultation bilaterally   Abdomen: soft, non-tender, non-distended, normal active bowel sounds present, no organomegaly   Extremities: No clubbing, cyanosis or edema   Lymph: + cj post cervical LN present R>L (These are sl " larger than previously with the largest LN measuring 1.5- 2 cm, but most remain small 1-1.5 cm), and + R axillary LN (approx 1.5 cm ).   Neurologic: Mental status as above, CN II-XII intact, grossly non-focal exam     PATHOLOGY:  5-25-12 L Supraclavicular LN Biopsy:   - Extensive small lymphocytic lymphoma / chronic lymphocytic leukemia involvement.   Flow cytometry shows monoclonal Kappa B-cell population which coexpresses CD5 and CD23 representing 30% of gated cells. There is dim surface light chain and dim CD20 expression. No CD38 expression. Neoplastic cells are positive for CD20 (dim), PAX-5, CCD5, CD23, CD43. B cells are + for CD3. BCL1 stain negative.    BM Bi[facility] 6-29-12 ([facility])   - Limited BM specimen with scattered hematopoietic cells.   - Flow cytometry revealed peripheral blood with minute CLL/SLL population (0.5%) with normal  FISH studies.     Cervical Disectomy 04-18-14:   - Fragments of fibrohyaline cartilage, no acute inflammation or metastatic disease identified.     IMAGING:  PET-CT 6-19-12:   - Mild to moderate bilateral axillary LAD and mediastinal LAD with minimal associated hypermetabolism.     PET-CT 10-09-12:   - Multiple areas of abnormal hypermetabolism in the neck bilaterally, new from the prior study. R posterior trangle focus with SUV 3.1. L supraclavicular focus SUV 5.0 (LN 1.8 cm, prior 1.2 cm)   - Carl axillary LAD - L axilla 2.8 cm LN (prior 1.6 cm) with SUV 3.6, R axillary ln new 2.0 cm, SUV 2.6   - Mediastinal LAD noted. R prevascular LN 1.5 cm with SUV 2.1   - Carl external iliac LNs present ( R 3.2 cm with SUV 3.1)   - Overall worsening areas of hypermetabolism corresponding to enlarging nodes c/w worsening neoplastic involvement.     PET-CT 1-16-13:   - No PET-CT findings of active disease related to the patient's lymphoma. Interval resolution of hypermetabolic adenopathy described on previous examination. Spleen is normal.     PET-CT 1-21-14:   - No abnormal hypermetabolism to  suggest neoplastic involvement. No mass or adenopathy noted. Spleen is normal.     04-03-14 MRI Spine:   - Cervical: Degenerative disk disease in the cervical disk spaces at the level of C4-C7. At 4-5, there is a disk herniation associated with bulging. There was spinal stenosis at C5-6 but no disk herniation. At C6-7, the disk bulging is not felt to be significant. The postcontrasted scans in the cervical spine showed no areas of abnormal enhancement.   - Thoracic: negative MRI examination of the thoracic spine. A source of the patient's t horacic spine pain was not demonstrated.     Chest Xray 04-17-15:   - The lungs are clear, the heart is normal. There is no active disease in the chest.     CT CAP/neck w/ contrast 05-13-14:   - No neck lymphadenopathy by CT size criteria   - No intrathoracic or abdominal LAD     CTAP, neck w/ contrast 03-04-15:   - No enlarged lymph nodes were demonstrated. The bile ducts in the liver and the extrahepatic biliary tree is slightly dilated.   - Clinical correlation with laboratory evaluation for obstruction suggested. This, however, is not significantly changed from a previous CT from 05/2014. No retroperitoneal lymphadenopathy is identified.   - Negative CT of the of the soft tissues of the neck. No abnormally enlarged lymph nodes are demonstrated.     CT Neck, CAP 3-15-16:   - There is a change in the CT scan. There are prominent lymph nodes in the neck bilaterally. The largest are in the supraclavicular area and measure greater than a centimeter. Most of the nodes are in the 1cm size range.   - In the chest, most of the nodes are in the subcen timeter size range. The largest nodes are in the pretacheal area and measure 15 mm. There were no enlarged hilar nodes. On the lung windows, there are no pleural effusions. No pulmonary or parenchymal lung nodules or masses were demonstrated. .   There are m ore prominent lymph nodes in the retroperitoneum some of which are enlarged today  consistent with recurrence. Again, most of these are in the 1 cm size range, but a few measure up to 17-18 mm. No mesenteric adenopathy demonstrated.     CTCAP, Neck 05-10-16:   - Chest: Persistent stable adenopathy in the mediastinum and axillary regions. The lungs remain clear.   - A/P: The lymph nodes are stable in comparing with the previous CT done in March.   - Neck: Persistent but stable adenopathy throughout the jugular lymph node chains in both sides of the neck.     CTCAP neck 08-16-16:  - Stable mediastinal enlarged lymph nodes including an AP window lymph node measuring 9 mm  - A left axillary region lymph node measures 1.9 cm and was 1.9 cm. Stable right axillary region lymph adenopathy.   - Stable retroperitoneal and mesenteric lymphadenopathy   - persistent lymphadenopathy throughout the neck that is stable in size.     CTCAP 06-07-17:  - Stable bilateral jugular chain lymphadenopathy.  - Stable bilateral axillary and mediastinal region lymphadenopathy.  - Stable retroperitoneal lymphadenopathy.      RECENT LABS:  Lab Results   Component Value Date    WBC 4.78 08/04/2017    HGB 15.1 08/04/2017    HCT 44.8 08/04/2017    .9 (H) 08/04/2017    RDW 12.0 08/04/2017     08/04/2017    NEUTRORELPCT 54.5 08/04/2017    LYMPHORELPCT 40.0 08/04/2017    MONORELPCT 3.6 08/04/2017    EOSRELPCT 1.5 08/04/2017    BASORELPCT 0.2 08/04/2017    NEUTROABS 2.61 08/04/2017    LYMPHSABS 1.91 08/04/2017       Lab Results   Component Value Date     08/04/2017    K 4.4 08/04/2017    CO2 26.8 08/04/2017     08/04/2017    BUN 10 08/04/2017    CREATININE 0.76 08/04/2017    GLUCOSE 131 (H) 08/04/2017    CALCIUM 9.5 08/04/2017    ALKPHOS 102 08/04/2017    AST 19 08/04/2017    ALT 29 08/04/2017    BILITOT 0.3 08/04/2017    ALBUMIN 4.70 08/04/2017    PROTEINTOT 7.2 08/04/2017       Lab Results   Component Value Date     08/04/2017    URICACID 3.9 06/25/2015     Date  SPEP/MONE Mspike Free K Free L  K/L  Ratio   02-12-15 MONE normal Not observed   06-25-15 MONE normal Not observed 22.10  14.93  1.48  11-17-15 MNOE normal Not observed 17.17  14.41  1.19    Date  IgG IgA IgM  02-12-15 744 110 43  06-25-15 776 94 39  11-17-15 812 99 41  03-29-17 771 103 34  08-04-17 pending    ASSESSMENT & PLAN:  Ms. Mae is a 52 y.o.  female with a history of Stage II SLL diagnosed in May of 2012 by L supraclavicular LN biopsy.     1. Small Lymphocytic Lymphoma:   - Treated as above with 6 cycles of Bendamustine (after anaphylactic reaction to Rituximab with 1st cycle of treatment) with complete radiographic response.   - She does have shoddy adenopathy in the cj cervical, R axillary and cj inguinal areas c/w CLL/SLL. Prior CT imaging also demonstrated small mediastinal LNs and enlarged retroperitoneal LNS, but no evidence of bulky disease.  She has no concerning exam findings and no symptoms related to her disease at this time. She had repeat imaging as above with stable disease radiographically as well.  -  Will plan for repeat exam in 3 months with labwork at that time.  She will call in the interim if she should have needs prior.     2.  Signficant vascular disease:  -  She has seen Dr. Amaral who is planning on work to correct blockages, but she is going to see Dr. Mercado next week first for evaluation of cerebral aneurysm.    This note was scribed for Thea Sorto MD by Violeta Fong RN.    I, Thea Sorto MD, personally performed the services described in this documentation as scribed by the above named individual in my presence, and it is both accurate and complete.  08/04/2017       I spent 15 minutes with Ms. Mae today. More than 50% of the time was spent in counseling / coordination of care for the above mentioned problems.       Electronically Signed by: Thea Sorto MD      CC:   BRANDON Panchal Dr., Dr.

## 2017-08-05 LAB
IGA SERPL-MCNC: 94 MG/DL (ref 87–352)
IGG SERPL-MCNC: 831 MG/DL (ref 700–1600)
IGM SERPL-MCNC: 27 MG/DL (ref 26–217)

## 2017-08-14 ENCOUNTER — OFFICE VISIT (OUTPATIENT)
Dept: NEUROSURGERY | Facility: CLINIC | Age: 52
End: 2017-08-14

## 2017-08-14 VITALS
DIASTOLIC BLOOD PRESSURE: 92 MMHG | SYSTOLIC BLOOD PRESSURE: 172 MMHG | HEIGHT: 65 IN | WEIGHT: 149 LBS | BODY MASS INDEX: 24.83 KG/M2 | TEMPERATURE: 97.3 F

## 2017-08-14 DIAGNOSIS — I72.9 ANEURYSM (HCC): ICD-10-CM

## 2017-08-14 DIAGNOSIS — I63.9 CEREBROVASCULAR ACCIDENT (CVA), UNSPECIFIED MECHANISM (HCC): Primary | ICD-10-CM

## 2017-08-14 PROCEDURE — 99214 OFFICE O/P EST MOD 30 MIN: CPT | Performed by: NEUROLOGICAL SURGERY

## 2017-08-14 NOTE — PROGRESS NOTES
"Melania C Tu  1965  [id number]      Chief Complaint   Patient presents with   • Cerebral Aneurysm       HISTORY OF PRESENT ILLNESS:  This is a 52-year-old female who presents for evaluation of abnormalities noted on carotid duplex and CTA of the extra and intracranial vasculature or.  She apparently was found to have \"blockage in her abdominal aorta.  In the course of her evaluation further studies were recommended to include a carotid duplex examination and CTA of both extra and intracranial vasculature work.  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  These are asymptomatic.    She has stroke risk factors namely that of hypertension, diabetes and continuing of tobacco abuse.    She is been under my care in the past for treatment of both cervical and lumbar degenerative disc.  She has undergone anterior cervical decompression and fusion at C4-C6 and done very well as a consequence of that.  Her studies showed high-grade spinal cord compression and abnormal signal.  Post surgery she did well with alleviation of her symptoms referable to spinal cord and nerve root compression.  Unfortunately she has continued with tobacco abuse.  She was recently seen (10/2015) with symptoms of degenerative disc disease in the lumbar area.  She previously had discectomy in 2002 at L4-L5 and L5-S1.    She is been treated for chronic lymphocytic leukemia.  This apparently is stable.    Past Medical History:   Diagnosis Date   • Asthma    • Chronic back pain    • CLL (chronic lymphocytic leukemia)     OCTOBER OF 2012   • COPD (chronic obstructive pulmonary disease)    • Degenerative arthritis    • Depression    • Diabetes mellitus     type 2   • Hyperlipidemia    • Hypertension    • Lymphadenopathy    • Non Hodgkin's lymphoma        Past Surgical History:   Procedure Laterality Date   • APPENDECTOMY     • BACK SURGERY   "    2002 (work accident) c-spine surgery 4/14   • CHOLECYSTECTOMY     • HYSTERECTOMY      for endometriosis/ovarian bleed   • NECK SURGERY     • OTHER SURGICAL HISTORY      pac insertion and removal   • TONSILLECTOMY         Family History   Problem Relation Age of Onset   • Lung cancer Father 72   • Hypertension Father    • Heart disease Father    • Cancer Father    • Diabetes Father    • Other Brother 46     MI   • Heart disease Brother    • Breast cancer Paternal Aunt    • Colon cancer Maternal Grandfather    • Other Cousin      CLL (dx 48 yo)   • Hypertension Mother        Social History     Social History   • Marital status:      Spouse name: N/A   • Number of children: N/A   • Years of education: N/A     Occupational History   • Not on file.     Social History Main Topics   • Smoking status: Current Every Day Smoker   • Smokeless tobacco: Not on file   • Alcohol use No   • Drug use: No   • Sexual activity: Not on file     Other Topics Concern   • Not on file     Social History Narrative       Allergies   Allergen Reactions   • Codeine    • Other      PCN   • Rituxan [Rituximab]          Current Outpatient Prescriptions:   •  ALBUTEROL IN, Inhale as needed., Disp: , Rfl:   •  ALPRAZolam (XANAX) 0.25 MG tablet, Take 0.25 mg by mouth 3 (three) times a day as needed for anxiety., Disp: , Rfl:   •  B-D UF III MINI PEN NEEDLES 31G X 5 MM misc, , Disp: , Rfl:   •  Fluticasone-Salmeterol (ADVAIR HFA IN), Inhale as needed., Disp: , Rfl:   •  gabapentin (NEURONTIN) 600 MG tablet, Take 800 mg by mouth 4 (Four) Times a Day., Disp: , Rfl:   •  Ibuprofen (ADVIL PO), Take  by mouth 4 (Four) Times a Day., Disp: , Rfl:   •  insulin aspart (NovoLOG) 100 UNIT/ML injection, Inject 10 Units under the skin 3 (three) times a day., Disp: , Rfl:   •  insulin detemir (LEVEMIR) 100 UNIT/ML injection, Inject 10 Units under the skin Every Night., Disp: , Rfl:   •  levETIRAcetam (KEPPRA) 1000 MG tablet, Take 1,000 mg by mouth 2  (Two) Times a Day., Disp: , Rfl:   •  lisinopril (PRINIVIL,ZESTRIL) 20 MG tablet, Take 20 mg by mouth daily., Disp: , Rfl:   •  loratadine (CLARITIN) 10 MG tablet, Take 10 mg by mouth Daily., Disp: , Rfl:   •  NOVOLOG FLEXPEN 100 UNIT/ML solution pen-injector sc pen, , Disp: , Rfl:   •  Ondansetron HCl (ZOFRAN PO), Take  by mouth 2 (two) times a day., Disp: , Rfl:   •  oxyCODONE-acetaminophen (PERCOCET)  MG per tablet, 5 (Five) Times a Day., Disp: , Rfl:   •  Promethazine HCl (PHENERGAN PO), Take  by mouth as needed., Disp: , Rfl:   •  QUEtiapine (SEROquel) 50 MG tablet, , Disp: , Rfl:   •  sertraline (ZOLOFT) 100 MG tablet, , Disp: , Rfl:     Review of Systems   Constitutional: Positive for activity change, appetite change, chills, diaphoresis and fatigue. Negative for fever and unexpected weight change.   HENT: Positive for tinnitus. Negative for congestion, dental problem, drooling, ear discharge, ear pain, facial swelling, hearing loss, mouth sores, nosebleeds, postnasal drip, rhinorrhea, sinus pressure, sneezing, sore throat, trouble swallowing and voice change.    Eyes: Positive for photophobia. Negative for pain, discharge, redness, itching and visual disturbance.   Respiratory: Positive for chest tightness and shortness of breath. Negative for apnea, cough, choking, wheezing and stridor.    Cardiovascular: Positive for chest pain, palpitations and leg swelling.   Gastrointestinal: Positive for constipation. Negative for abdominal distention, abdominal pain, anal bleeding, blood in stool, diarrhea, nausea, rectal pain and vomiting.   Endocrine: Positive for cold intolerance, heat intolerance, polydipsia, polyphagia and polyuria.   Genitourinary: Positive for difficulty urinating, dysuria, frequency and urgency. Negative for decreased urine volume, enuresis, flank pain, genital sores and hematuria.   Musculoskeletal: Positive for arthralgias, back pain, gait problem, joint swelling, myalgias, neck pain  "and neck stiffness.   Skin: Negative for color change, pallor, rash and wound.   Allergic/Immunologic: Negative for environmental allergies, food allergies and immunocompromised state.   Neurological: Positive for dizziness, weakness, light-headedness, numbness and headaches. Negative for tremors, seizures, syncope, facial asymmetry and speech difficulty.   Hematological: Negative for adenopathy. Does not bruise/bleed easily.   Psychiatric/Behavioral: Positive for agitation and dysphoric mood. Negative for behavioral problems, confusion, decreased concentration, hallucinations, self-injury, sleep disturbance and suicidal ideas. The patient is nervous/anxious. The patient is not hyperactive.        Vitals:    08/14/17 1707   BP: 172/92   BP Location: Right arm   Patient Position: Sitting   Temp: 97.3 °F (36.3 °C)   TempSrc: Temporal Artery    Weight: 149 lb (67.6 kg)   Height: 65\" (165.1 cm)       Neurological Examination:    Mental status/speech: The patient is alert and oriented.  Speech is clear without aphysia or dysarthria.  No overt cognitive deficits.    Cranial nerve examination:    Olfaction: Smell is intact.  Vision: Vision is intact without visual field abnormalities.  Funduscopic examination is normal.  No pupillary irregularity.  Ocular motor examination: The extraocular muscles are intact.  There is no diplopia.  The pupil is round and reactive to both light and accommodation.  There is no nystagmus.  Facial movement/sensation: There is no facial weakness.  Sensation is intact in the first, second, and third divisions of the trigeminal nerve.  The corneal reflex is intact.  Auditory: Hearing is intact to finger rub bilaterally.  Cranial nerves IX, X, XI, XII: Phonation is normal.  No dysphagia.  Tongue is protruded in the midline without atrophy.  The gag reflex is intact.  Shoulder shrug is normal.    Musculoligamentous ligamentous examination: She had decreased range of motion of cervical and lumbar " "spine.  However there is no weakness sensory loss or reflex asymmetry.  Her gait is normal.  No Babinski German or clonus.  Straight leg raising, Edmondson and flip test are negative.    Medical Decision Making:     Diagnostic Data Set:  The data set is a bit problematic.  Clearly shows he has she has increased velocities on her duplex exam.  The CT of the neck shows no masses.  CT of the abdomen and pelvis show extensive vascular calcification with near complete occlusion of the distal aorta.  Over a CT angiogram is interpreted as showing \"nonhemodynamically significant stenotic segments involving the SMA origin in the left renal artery origin\".  It adds further no aortic occlusion is done a fine.  The retroperitoneal abdominal pelvic lymphadenopathy has remained stable showing no progression.  The CTA of the carotid arteries show no stenosis of the L ICA; a \"80% high-grade stenosis  0.5 cm in length involving the origin of the SANJEEV Carotid\". The duplex studies dictated a left ICA/CCA ratio of 1.5 to: A right ICA/CCA ratio of 4.95.  The CTA also has been interpreted as showing a 5.6 mm aneurysm involving \"the left clinoid carotid artery\"        Assessment:  [These data set suggests that \"formal\" angiography is Warranted.  I've scheduled this to be done by Dr. Ackerman.  If indeed she does have high-grade stenosis of the SANJEEV she certainly has stroke risk factors therefore the best appropriate management is somewhat problematic.  The presence of the aneurysm likewise warrants consideration.  Both of these are particularly true in view of the fact that she continues to smoke. ]          Recommendations:  [Cerebral angiography and intervention as indicated. ]        I greatly appreciate the opportunity to see and evaluate this individual.  If you have questions or concerns regarding issues that I may have overlooked please call me at any time: [phone].  Emeterio Huynh M.D.  [facility]  [address] [place] [address] " Saman.  [facility]  [id number]    Scribed for Adolph Huynh MD by Ernie Cooper CMA. 8/14/2017  5:28 PM     I have read and concur with the information provided by the scribe.  Adolph Huynh MD

## 2017-08-14 NOTE — PATIENT INSTRUCTIONS
Call Dr. Huynh on a Monday or Tuesday.   Ask for Catia,  and leave a message for  Dr. Huynh.  He will call you back at the end of the day as soon as he can.     [phone]

## 2017-08-23 ENCOUNTER — HOSPITAL ENCOUNTER (INPATIENT)
Facility: HOSPITAL | Age: 52
LOS: 1 days | Discharge: HOME | End: 2017-08-24
Attending: RADIOLOGY | Admitting: RADIOLOGY

## 2017-08-23 DIAGNOSIS — I72.9 ANEURYSM (HCC): ICD-10-CM

## 2017-08-23 DIAGNOSIS — I63.9 CEREBROVASCULAR ACCIDENT (CVA), UNSPECIFIED MECHANISM (HCC): ICD-10-CM

## 2017-08-23 PROBLEM — E11.9 DM (DIABETES MELLITUS): Status: ACTIVE | Noted: 2017-08-23

## 2017-08-23 PROBLEM — E78.5 HLD (HYPERLIPIDEMIA): Status: ACTIVE | Noted: 2017-08-23

## 2017-08-23 PROBLEM — I10 HTN (HYPERTENSION): Status: ACTIVE | Noted: 2017-08-23

## 2017-08-23 PROBLEM — I65.21 CAROTID STENOSIS, RIGHT: Status: ACTIVE | Noted: 2017-08-23

## 2017-08-23 LAB
ACT BLD: 257 SECONDS (ref 82–152)
GLUCOSE BLDC GLUCOMTR-MCNC: 117 MG/DL (ref 70–130)
GLUCOSE BLDC GLUCOMTR-MCNC: 125 MG/DL (ref 70–130)
GLUCOSE BLDC GLUCOMTR-MCNC: 135 MG/DL (ref 70–130)
GLUCOSE BLDC GLUCOMTR-MCNC: 148 MG/DL (ref 70–130)
HBA1C MFR BLD: 7.1 % (ref 4.8–5.6)
HCT VFR BLD AUTO: 30.8 % (ref 34.5–44)
HCT VFR BLD AUTO: 38 % (ref 34.5–44)
HCT VFR BLD AUTO: 38.1 % (ref 34.5–44)
HGB BLD-MCNC: 10.8 G/DL (ref 11.5–15.5)
HGB BLD-MCNC: 12.9 G/DL (ref 11.5–15.5)
HGB BLD-MCNC: 13.4 G/DL (ref 11.5–15.5)

## 2017-08-23 PROCEDURE — 94640 AIRWAY INHALATION TREATMENT: CPT

## 2017-08-23 PROCEDURE — 0 IODIXANOL PER 1 ML: Performed by: RADIOLOGY

## 2017-08-23 PROCEDURE — B312YZZ FLUOROSCOPY OF LEFT SUBCLAVIAN ARTERY USING OTHER CONTRAST: ICD-10-PCS | Performed by: RADIOLOGY

## 2017-08-23 PROCEDURE — C1725 CATH, TRANSLUMIN NON-LASER: HCPCS | Performed by: RADIOLOGY

## 2017-08-23 PROCEDURE — 94760 N-INVAS EAR/PLS OXIMETRY 1: CPT

## 2017-08-23 PROCEDURE — 25010000002 MIDAZOLAM PER 1 MG: Performed by: RADIOLOGY

## 2017-08-23 PROCEDURE — 37215 TRANSCATH STENT CCA W/EPS: CPT | Performed by: RADIOLOGY

## 2017-08-23 PROCEDURE — 25010000002 HEPARIN (PORCINE) PER 1000 UNITS: Performed by: RADIOLOGY

## 2017-08-23 PROCEDURE — C1894 INTRO/SHEATH, NON-LASER: HCPCS | Performed by: RADIOLOGY

## 2017-08-23 PROCEDURE — C1766 INTRO/SHEATH,STRBLE,NON-PEEL: HCPCS | Performed by: RADIOLOGY

## 2017-08-23 PROCEDURE — B315YZZ FLUOROSCOPY OF BILATERAL COMMON CAROTID ARTERIES USING OTHER CONTRAST: ICD-10-PCS | Performed by: RADIOLOGY

## 2017-08-23 PROCEDURE — 25010000002 FENTANYL CITRATE (PF) 100 MCG/2ML SOLUTION: Performed by: RADIOLOGY

## 2017-08-23 PROCEDURE — 83036 HEMOGLOBIN GLYCOSYLATED A1C: CPT | Performed by: NURSE PRACTITIONER

## 2017-08-23 PROCEDURE — 85018 HEMOGLOBIN: CPT | Performed by: RADIOLOGY

## 2017-08-23 PROCEDURE — 037K3DZ DILATION OF RIGHT INTERNAL CAROTID ARTERY WITH INTRALUMINAL DEVICE, PERCUTANEOUS APPROACH: ICD-10-PCS | Performed by: RADIOLOGY

## 2017-08-23 PROCEDURE — 75630 X-RAY AORTA LEG ARTERIES: CPT | Performed by: RADIOLOGY

## 2017-08-23 PROCEDURE — B31FYZZ FLUOROSCOPY OF LEFT VERTEBRAL ARTERY USING OTHER CONTRAST: ICD-10-PCS | Performed by: RADIOLOGY

## 2017-08-23 PROCEDURE — 82962 GLUCOSE BLOOD TEST: CPT

## 2017-08-23 PROCEDURE — 85014 HEMATOCRIT: CPT | Performed by: RADIOLOGY

## 2017-08-23 PROCEDURE — 85014 HEMATOCRIT: CPT | Performed by: NURSE PRACTITIONER

## 2017-08-23 PROCEDURE — C1876 STENT, NON-COA/NON-COV W/DEL: HCPCS | Performed by: RADIOLOGY

## 2017-08-23 PROCEDURE — C1884 EMBOLIZATION PROTECT SYST: HCPCS | Performed by: RADIOLOGY

## 2017-08-23 PROCEDURE — 36226 PLACE CATH VERTEBRAL ART: CPT | Performed by: RADIOLOGY

## 2017-08-23 PROCEDURE — 85347 COAGULATION TIME ACTIVATED: CPT

## 2017-08-23 PROCEDURE — 85018 HEMOGLOBIN: CPT | Performed by: NURSE PRACTITIONER

## 2017-08-23 PROCEDURE — C1769 GUIDE WIRE: HCPCS | Performed by: RADIOLOGY

## 2017-08-23 PROCEDURE — 36223 PLACE CATH CAROTID/INOM ART: CPT | Performed by: RADIOLOGY

## 2017-08-23 PROCEDURE — 63710000001 INSULIN DETEMIR PER 5 UNITS: Performed by: NURSE PRACTITIONER

## 2017-08-23 PROCEDURE — B410YZZ FLUOROSCOPY OF ABDOMINAL AORTA USING OTHER CONTRAST: ICD-10-PCS | Performed by: RADIOLOGY

## 2017-08-23 PROCEDURE — 99232 SBSQ HOSP IP/OBS MODERATE 35: CPT | Performed by: INTERNAL MEDICINE

## 2017-08-23 DEVICE — IMPLANTABLE DEVICE: Type: IMPLANTABLE DEVICE | Status: FUNCTIONAL

## 2017-08-23 RX ORDER — OXYCODONE AND ACETAMINOPHEN 10; 325 MG/1; MG/1
1 TABLET ORAL
Status: DISCONTINUED | OUTPATIENT
Start: 2017-08-23 | End: 2017-08-24 | Stop reason: HOSPADM

## 2017-08-23 RX ORDER — ASPIRIN 81 MG/1
81 TABLET ORAL DAILY
Status: DISCONTINUED | OUTPATIENT
Start: 2017-08-24 | End: 2017-08-24 | Stop reason: HOSPADM

## 2017-08-23 RX ORDER — GABAPENTIN 800 MG/1
800 TABLET ORAL 4 TIMES DAILY
COMMUNITY

## 2017-08-23 RX ORDER — MIDAZOLAM HYDROCHLORIDE 1 MG/ML
INJECTION INTRAMUSCULAR; INTRAVENOUS AS NEEDED
Status: DISCONTINUED | OUTPATIENT
Start: 2017-08-23 | End: 2017-08-23 | Stop reason: HOSPADM

## 2017-08-23 RX ORDER — CETIRIZINE HYDROCHLORIDE 10 MG/1
10 TABLET ORAL DAILY
Status: DISCONTINUED | OUTPATIENT
Start: 2017-08-23 | End: 2017-08-24 | Stop reason: HOSPADM

## 2017-08-23 RX ORDER — ALPRAZOLAM 0.5 MG/1
0.5 TABLET ORAL 3 TIMES DAILY PRN
COMMUNITY

## 2017-08-23 RX ORDER — ALBUTEROL SULFATE 90 UG/1
2 AEROSOL, METERED RESPIRATORY (INHALATION) AS NEEDED
COMMUNITY

## 2017-08-23 RX ORDER — IODIXANOL 320 MG/ML
INJECTION, SOLUTION INTRAVASCULAR AS NEEDED
Status: DISCONTINUED | OUTPATIENT
Start: 2017-08-23 | End: 2017-08-23 | Stop reason: HOSPADM

## 2017-08-23 RX ORDER — ALPRAZOLAM 0.5 MG/1
0.5 TABLET ORAL 3 TIMES DAILY PRN
Status: DISCONTINUED | OUTPATIENT
Start: 2017-08-23 | End: 2017-08-24 | Stop reason: HOSPADM

## 2017-08-23 RX ORDER — BUDESONIDE AND FORMOTEROL FUMARATE DIHYDRATE 80; 4.5 UG/1; UG/1
2 AEROSOL RESPIRATORY (INHALATION)
Status: DISCONTINUED | OUTPATIENT
Start: 2017-08-23 | End: 2017-08-24 | Stop reason: HOSPADM

## 2017-08-23 RX ORDER — ATROPINE SULFATE 1 MG/ML
INJECTION, SOLUTION INTRAMUSCULAR; INTRAVENOUS; SUBCUTANEOUS AS NEEDED
Status: DISCONTINUED | OUTPATIENT
Start: 2017-08-23 | End: 2017-08-23 | Stop reason: HOSPADM

## 2017-08-23 RX ORDER — PHENYLEPHRINE HCL IN 0.9% NACL 0.5 MG/5ML
.5-3 SYRINGE (ML) INTRAVENOUS
Status: DISCONTINUED | OUTPATIENT
Start: 2017-08-23 | End: 2017-08-24 | Stop reason: HOSPADM

## 2017-08-23 RX ORDER — CLOPIDOGREL BISULFATE 75 MG/1
75 TABLET ORAL DAILY
Status: DISCONTINUED | OUTPATIENT
Start: 2017-08-24 | End: 2017-08-24 | Stop reason: HOSPADM

## 2017-08-23 RX ORDER — IBUPROFEN 400 MG/1
400 TABLET ORAL 3 TIMES DAILY
Status: DISCONTINUED | OUTPATIENT
Start: 2017-08-23 | End: 2017-08-24 | Stop reason: HOSPADM

## 2017-08-23 RX ORDER — LISINOPRIL 20 MG/1
20 TABLET ORAL EVERY MORNING
Status: DISCONTINUED | OUTPATIENT
Start: 2017-08-23 | End: 2017-08-24 | Stop reason: HOSPADM

## 2017-08-23 RX ORDER — GABAPENTIN 400 MG/1
800 CAPSULE ORAL 4 TIMES DAILY
Status: DISCONTINUED | OUTPATIENT
Start: 2017-08-23 | End: 2017-08-24 | Stop reason: HOSPADM

## 2017-08-23 RX ORDER — PROMETHAZINE HYDROCHLORIDE 25 MG/ML
12.5 INJECTION, SOLUTION INTRAMUSCULAR; INTRAVENOUS ONCE AS NEEDED
Status: DISCONTINUED | OUTPATIENT
Start: 2017-08-23 | End: 2017-08-24 | Stop reason: HOSPADM

## 2017-08-23 RX ORDER — DEXTROSE MONOHYDRATE 25 G/50ML
25 INJECTION, SOLUTION INTRAVENOUS
Status: DISCONTINUED | OUTPATIENT
Start: 2017-08-23 | End: 2017-08-24 | Stop reason: HOSPADM

## 2017-08-23 RX ORDER — SODIUM CHLORIDE 9 MG/ML
75 INJECTION, SOLUTION INTRAVENOUS CONTINUOUS
Status: DISCONTINUED | OUTPATIENT
Start: 2017-08-23 | End: 2017-08-24 | Stop reason: HOSPADM

## 2017-08-23 RX ORDER — PHENYLEPHRINE HCL IN 0.9% NACL 0.5 MG/5ML
SYRINGE (ML) INTRAVENOUS
Status: DISPENSED
Start: 2017-08-23 | End: 2017-08-24

## 2017-08-23 RX ORDER — SODIUM CHLORIDE 9 MG/ML
INJECTION, SOLUTION INTRAVENOUS CONTINUOUS PRN
Status: DISCONTINUED | OUTPATIENT
Start: 2017-08-23 | End: 2017-08-23 | Stop reason: HOSPADM

## 2017-08-23 RX ORDER — CLOPIDOGREL BISULFATE 75 MG/1
600 TABLET ORAL ONCE
Status: COMPLETED | OUTPATIENT
Start: 2017-08-23 | End: 2017-08-23

## 2017-08-23 RX ORDER — ASPIRIN 81 MG/1
81 TABLET, CHEWABLE ORAL DAILY
Status: DISCONTINUED | OUTPATIENT
Start: 2017-08-23 | End: 2017-08-23 | Stop reason: SDUPTHER

## 2017-08-23 RX ORDER — NICOTINE 21 MG/24HR
1 PATCH, TRANSDERMAL 24 HOURS TRANSDERMAL EVERY 24 HOURS
Status: DISCONTINUED | OUTPATIENT
Start: 2017-08-23 | End: 2017-08-23

## 2017-08-23 RX ORDER — LEVETIRACETAM 500 MG/1
1000 TABLET ORAL 2 TIMES DAILY
Status: DISCONTINUED | OUTPATIENT
Start: 2017-08-23 | End: 2017-08-24 | Stop reason: HOSPADM

## 2017-08-23 RX ORDER — HEPARIN SODIUM 1000 [USP'U]/ML
INJECTION, SOLUTION INTRAVENOUS; SUBCUTANEOUS AS NEEDED
Status: DISCONTINUED | OUTPATIENT
Start: 2017-08-23 | End: 2017-08-23 | Stop reason: HOSPADM

## 2017-08-23 RX ORDER — NICOTINE POLACRILEX 4 MG
15 LOZENGE BUCCAL
Status: DISCONTINUED | OUTPATIENT
Start: 2017-08-23 | End: 2017-08-24 | Stop reason: HOSPADM

## 2017-08-23 RX ORDER — NICOTINE 21 MG/24HR
1 PATCH, TRANSDERMAL 24 HOURS TRANSDERMAL EVERY 24 HOURS
Status: DISCONTINUED | OUTPATIENT
Start: 2017-08-23 | End: 2017-08-24 | Stop reason: HOSPADM

## 2017-08-23 RX ORDER — SODIUM CHLORIDE 0.9 % (FLUSH) 0.9 %
1-10 SYRINGE (ML) INJECTION AS NEEDED
Status: DISCONTINUED | OUTPATIENT
Start: 2017-08-23 | End: 2017-08-24 | Stop reason: HOSPADM

## 2017-08-23 RX ORDER — LIDOCAINE HYDROCHLORIDE 10 MG/ML
INJECTION, SOLUTION INFILTRATION; PERINEURAL AS NEEDED
Status: DISCONTINUED | OUTPATIENT
Start: 2017-08-23 | End: 2017-08-23 | Stop reason: HOSPADM

## 2017-08-23 RX ORDER — ASPIRIN 325 MG
325 TABLET, DELAYED RELEASE (ENTERIC COATED) ORAL ONCE
Status: COMPLETED | OUTPATIENT
Start: 2017-08-23 | End: 2017-08-23

## 2017-08-23 RX ORDER — SODIUM CHLORIDE 9 MG/ML
50 INJECTION, SOLUTION INTRAVENOUS CONTINUOUS
Status: ACTIVE | OUTPATIENT
Start: 2017-08-23 | End: 2017-08-23

## 2017-08-23 RX ORDER — FENTANYL CITRATE 50 UG/ML
INJECTION, SOLUTION INTRAMUSCULAR; INTRAVENOUS AS NEEDED
Status: DISCONTINUED | OUTPATIENT
Start: 2017-08-23 | End: 2017-08-23 | Stop reason: HOSPADM

## 2017-08-23 RX ORDER — SERTRALINE HYDROCHLORIDE 100 MG/1
100 TABLET, FILM COATED ORAL NIGHTLY
Status: DISCONTINUED | OUTPATIENT
Start: 2017-08-23 | End: 2017-08-24 | Stop reason: HOSPADM

## 2017-08-23 RX ORDER — ONDANSETRON 4 MG/1
4 TABLET, FILM COATED ORAL EVERY 6 HOURS PRN
Status: DISCONTINUED | OUTPATIENT
Start: 2017-08-23 | End: 2017-08-24 | Stop reason: HOSPADM

## 2017-08-23 RX ADMIN — NICOTINE 1 PATCH: 14 PATCH TRANSDERMAL at 13:47

## 2017-08-23 RX ADMIN — SODIUM CHLORIDE 500 ML: 9 INJECTION, SOLUTION INTRAVENOUS at 19:54

## 2017-08-23 RX ADMIN — CLOPIDOGREL BISULFATE 600 MG: 75 TABLET ORAL at 09:01

## 2017-08-23 RX ADMIN — ASPIRIN 325 MG: 325 TABLET, DELAYED RELEASE ORAL at 09:01

## 2017-08-23 RX ADMIN — SODIUM CHLORIDE 75 ML/HR: 9 INJECTION, SOLUTION INTRAVENOUS at 09:01

## 2017-08-23 RX ADMIN — LEVETIRACETAM 1000 MG: 500 TABLET, FILM COATED ORAL at 18:25

## 2017-08-23 RX ADMIN — BUDESONIDE AND FORMOTEROL FUMARATE DIHYDRATE 2 PUFF: 80; 4.5 AEROSOL RESPIRATORY (INHALATION) at 21:37

## 2017-08-23 RX ADMIN — OXYCODONE HYDROCHLORIDE AND ACETAMINOPHEN 1 TABLET: 10; 325 TABLET ORAL at 13:47

## 2017-08-23 RX ADMIN — IBUPROFEN 400 MG: 400 TABLET ORAL at 20:12

## 2017-08-23 RX ADMIN — CETIRIZINE HYDROCHLORIDE 10 MG: 10 TABLET, FILM COATED ORAL at 13:47

## 2017-08-23 RX ADMIN — GABAPENTIN 800 MG: 400 CAPSULE ORAL at 18:32

## 2017-08-23 RX ADMIN — SODIUM CHLORIDE 50 ML/HR: 9 INJECTION, SOLUTION INTRAVENOUS at 13:50

## 2017-08-23 RX ADMIN — SERTRALINE HYDROCHLORIDE 100 MG: 100 TABLET ORAL at 20:12

## 2017-08-23 RX ADMIN — SODIUM CHLORIDE 75 ML/HR: 9 INJECTION, SOLUTION INTRAVENOUS at 20:21

## 2017-08-23 RX ADMIN — SODIUM CHLORIDE 1000 ML: 9 INJECTION, SOLUTION INTRAVENOUS at 13:49

## 2017-08-23 RX ADMIN — OXYCODONE HYDROCHLORIDE AND ACETAMINOPHEN 1 TABLET: 10; 325 TABLET ORAL at 23:15

## 2017-08-23 RX ADMIN — GABAPENTIN 800 MG: 400 CAPSULE ORAL at 20:12

## 2017-08-23 RX ADMIN — IBUPROFEN 400 MG: 400 TABLET ORAL at 16:31

## 2017-08-23 RX ADMIN — GABAPENTIN 800 MG: 400 CAPSULE ORAL at 13:47

## 2017-08-23 RX ADMIN — INSULIN DETEMIR 10 UNITS: 100 INJECTION, SOLUTION SUBCUTANEOUS at 20:14

## 2017-08-23 RX ADMIN — SODIUM CHLORIDE 500 ML: 9 INJECTION, SOLUTION INTRAVENOUS at 21:10

## 2017-08-24 VITALS
RESPIRATION RATE: 18 BRPM | DIASTOLIC BLOOD PRESSURE: 67 MMHG | BODY MASS INDEX: 24.72 KG/M2 | HEART RATE: 50 BPM | SYSTOLIC BLOOD PRESSURE: 152 MMHG | HEIGHT: 65 IN | OXYGEN SATURATION: 98 % | WEIGHT: 148.37 LBS | TEMPERATURE: 97.5 F

## 2017-08-24 LAB
ACT BLD: 114 SECONDS (ref 82–152)
ACT BLD: 169 SECONDS (ref 82–152)
ANION GAP SERPL CALCULATED.3IONS-SCNC: -2 MMOL/L (ref 3–11)
BASOPHILS # BLD AUTO: 0.01 10*3/MM3 (ref 0–0.2)
BASOPHILS NFR BLD AUTO: 0.2 % (ref 0–1)
BUN BLD-MCNC: 10 MG/DL (ref 9–23)
BUN/CREAT SERPL: 16.7 (ref 7–25)
CALCIUM SPEC-SCNC: 8.6 MG/DL (ref 8.7–10.4)
CHLORIDE SERPL-SCNC: 112 MMOL/L (ref 99–109)
CO2 SERPL-SCNC: 23 MMOL/L (ref 20–31)
CREAT BLD-MCNC: 0.6 MG/DL (ref 0.6–1.3)
DEPRECATED RDW RBC AUTO: 44.7 FL (ref 37–54)
EOSINOPHIL # BLD AUTO: 0.05 10*3/MM3 (ref 0–0.3)
EOSINOPHIL NFR BLD AUTO: 1.2 % (ref 0–3)
ERYTHROCYTE [DISTWIDTH] IN BLOOD BY AUTOMATED COUNT: 12.1 % (ref 11.3–14.5)
GFR SERPL CREATININE-BSD FRML MDRD: 105 ML/MIN/1.73
GLUCOSE BLD-MCNC: 84 MG/DL (ref 70–100)
GLUCOSE BLDC GLUCOMTR-MCNC: 102 MG/DL (ref 70–130)
HCT VFR BLD AUTO: 32.8 % (ref 34.5–44)
HGB BLD-MCNC: 11.2 G/DL (ref 11.5–15.5)
IMM GRANULOCYTES # BLD: 0 10*3/MM3 (ref 0–0.03)
IMM GRANULOCYTES NFR BLD: 0 % (ref 0–0.6)
LYMPHOCYTES # BLD AUTO: 1.69 10*3/MM3 (ref 0.6–4.8)
LYMPHOCYTES NFR BLD AUTO: 40.3 % (ref 24–44)
MCH RBC QN AUTO: 34.3 PG (ref 27–31)
MCHC RBC AUTO-ENTMCNC: 34.1 G/DL (ref 32–36)
MCV RBC AUTO: 100.3 FL (ref 80–99)
MONOCYTES # BLD AUTO: 0.16 10*3/MM3 (ref 0–1)
MONOCYTES NFR BLD AUTO: 3.8 % (ref 0–12)
NEUTROPHILS # BLD AUTO: 2.28 10*3/MM3 (ref 1.5–8.3)
NEUTROPHILS NFR BLD AUTO: 54.5 % (ref 41–71)
PLATELET # BLD AUTO: 117 10*3/MM3 (ref 150–450)
PMV BLD AUTO: 10.1 FL (ref 6–12)
POTASSIUM BLD-SCNC: 4.4 MMOL/L (ref 3.5–5.5)
RBC # BLD AUTO: 3.27 10*6/MM3 (ref 3.89–5.14)
SODIUM BLD-SCNC: 133 MMOL/L (ref 132–146)
WBC NRBC COR # BLD: 4.19 10*3/MM3 (ref 3.5–10.8)

## 2017-08-24 PROCEDURE — 82962 GLUCOSE BLOOD TEST: CPT

## 2017-08-24 PROCEDURE — 80048 BASIC METABOLIC PNL TOTAL CA: CPT | Performed by: RADIOLOGY

## 2017-08-24 PROCEDURE — 99024 POSTOP FOLLOW-UP VISIT: CPT | Performed by: RADIOLOGY

## 2017-08-24 PROCEDURE — 85025 COMPLETE CBC W/AUTO DIFF WBC: CPT | Performed by: RADIOLOGY

## 2017-08-24 RX ORDER — CLOPIDOGREL BISULFATE 75 MG/1
75 TABLET ORAL DAILY
Qty: 30 TABLET | Refills: 5 | Status: SHIPPED | OUTPATIENT
Start: 2017-08-24 | End: 2018-07-17 | Stop reason: SDUPTHER

## 2017-08-24 RX ORDER — ASPIRIN 81 MG/1
81 TABLET ORAL DAILY
Qty: 30 TABLET | Refills: 5 | Status: SHIPPED | OUTPATIENT
Start: 2017-08-24

## 2017-08-24 RX ADMIN — ASPIRIN 81 MG: 81 TABLET, COATED ORAL at 08:01

## 2017-08-24 RX ADMIN — LEVETIRACETAM 1000 MG: 500 TABLET, FILM COATED ORAL at 08:00

## 2017-08-24 RX ADMIN — CLOPIDOGREL BISULFATE 75 MG: 75 TABLET ORAL at 08:01

## 2017-08-24 RX ADMIN — CETIRIZINE HYDROCHLORIDE 10 MG: 10 TABLET, FILM COATED ORAL at 08:00

## 2017-08-24 RX ADMIN — IBUPROFEN 400 MG: 400 TABLET ORAL at 08:01

## 2017-08-24 RX ADMIN — GABAPENTIN 800 MG: 400 CAPSULE ORAL at 08:00

## 2017-08-24 RX ADMIN — OXYCODONE HYDROCHLORIDE AND ACETAMINOPHEN 1 TABLET: 10; 325 TABLET ORAL at 06:47

## 2017-09-27 ENCOUNTER — OFFICE VISIT (OUTPATIENT)
Dept: NEUROSURGERY | Facility: CLINIC | Age: 52
End: 2017-09-27

## 2017-09-27 VITALS — WEIGHT: 147 LBS | HEIGHT: 65 IN | HEART RATE: 98 BPM | TEMPERATURE: 97.6 F | BODY MASS INDEX: 24.49 KG/M2

## 2017-09-27 DIAGNOSIS — I65.21 CAROTID STENOSIS, RIGHT: Primary | ICD-10-CM

## 2017-09-27 DIAGNOSIS — I67.1 CEREBRAL ANEURYSM, NONRUPTURED: ICD-10-CM

## 2017-09-27 PROCEDURE — 99024 POSTOP FOLLOW-UP VISIT: CPT | Performed by: RADIOLOGY

## 2017-09-27 NOTE — PROGRESS NOTES
"NAME: KALI STILL   DOS: 2017  : 1965  PCP: BRANDON Panchal    Chief Complaint:    Chief Complaint   Patient presents with   • Carotid Artery Disease     s/p Right ICA stent 17       History of Present Illness:  52 y.o. female known to the neuro interventional service, having recently undergone right carotid angioplasty/stent placement on 2017 for asymptomatic, high-grade carotid stenosis.  Her past medical history is notable for prior multilevel ACDF/neck surgery, as well as leukemia, and thus she was deemed a \"high surgical risk\" for endarterectomy.  She presented on 2017 and underwent diagnostic angiography, confirming the presence of a high-grade (greater than 80%) right carotid stenosis.  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  Abdominal aortogram demonstrated a \"small aorta\" syndrome with diffuse narrowing of the distal aorta and iliofemoral vessels, but without focal lesion.  She underwent angioplasty/stent placement for her right carotid stenosis, restoring a near normal caliber lumen and resulting in improved perfusion to the right cerebral hemisphere.  She made an unremarkable recovery in the intensive care unit and was discharged home on 2017 at her neurologic baseline.    From a neurologic standpoint, she is done very well following her hospitalization, with no focal neurologic deficit.  Specifically, no unilateral weakness/numbness, no speech or vision changes.  She has had increasing shortness of breath over the past week or so, and easily fatigues.  She does have ongoing tobacco use.    Past Medical History:  Past Medical History:   Diagnosis Date   • Aneurysm     Cavernous left ICA aneurysm   • Asthma    • Carotid artery occlusion    • Chronic back pain    • CLL (chronic lymphocytic leukemia)     2012   • COPD (chronic obstructive pulmonary disease)    • " Degenerative arthritis    • Depression    • Diabetes mellitus     type 2   • Hyperlipidemia    • Hypertension    • Lymphadenopathy    • Non Hodgkin's lymphoma        Past Surgical History:  Past Surgical History:   Procedure Laterality Date   • APPENDECTOMY     • BACK SURGERY      2002 (work accident) c-spine surgery 4/14   • CAROTID STENT Right 08/23/2017   • CEREBRAL ANGIOGRAM N/A 8/23/2017    Diagnostic Carotid/Cerebral Angiogram   • CHOLECYSTECTOMY     • HYSTERECTOMY      for endometriosis/ovarian bleed   • NECK SURGERY     • OTHER SURGICAL HISTORY      pac insertion and removal   • TONSILLECTOMY         Review of Systems:        Review of Systems   Constitutional: Positive for fatigue.   HENT: Positive for tinnitus.    Respiratory: Positive for shortness of breath.    Cardiovascular: Positive for chest pain and leg swelling.   Musculoskeletal: Positive for back pain, myalgias, neck pain and neck stiffness.   Neurological: Positive for light-headedness, numbness and headaches.   Hematological: Positive for adenopathy. Bruises/bleeds easily.   Psychiatric/Behavioral: Positive for dysphoric mood and sleep disturbance. The patient is nervous/anxious.         Medications    Current Outpatient Prescriptions:   •  albuterol (PROVENTIL HFA;VENTOLIN HFA) 108 (90 Base) MCG/ACT inhaler, Inhale 2 puffs As Needed for Wheezing., Disp: , Rfl:   •  ALPRAZolam (XANAX) 0.5 MG tablet, Take 0.5 mg by mouth 3 (Three) Times a Day As Needed for Anxiety., Disp: , Rfl:   •  aspirin 81 MG EC tablet, Take 1 tablet by mouth Daily., Disp: 30 tablet, Rfl: 5  •  B-D UF III MINI PEN NEEDLES 31G X 5 MM misc, , Disp: , Rfl:   •  clopidogrel (PLAVIX) 75 MG tablet, Take 1 tablet by mouth Daily., Disp: 30 tablet, Rfl: 5  •  fluticasone-salmeterol (ADVAIR) 100-50 MCG/DOSE DISKUS, Inhale 1 puff As Needed., Disp: , Rfl:   •  gabapentin (NEURONTIN) 800 MG tablet, Take 800 mg by mouth 4 (Four) Times a Day., Disp: , Rfl:   •  Ibuprofen (ADVIL PO), Take  "400 mg by mouth 3 (Three) Times a Day. LIQUIGELS, Disp: , Rfl:   •  Insulin Aspart (NOVOLOG FLEXPEN SC), Inject 8-12 Units under the skin 3 (Three) Times a Day Before Meals. PER SLIDING SCALE, Disp: , Rfl:   •  Insulin Detemir (LEVEMIR FLEXPEN SC), Inject 8-12 Units under the skin Every Night. SLIDING SCALE, Disp: , Rfl:   •  levETIRAcetam (KEPPRA) 1000 MG tablet, Take 1,000 mg by mouth 2 (Two) Times a Day., Disp: , Rfl:   •  lisinopril (PRINIVIL,ZESTRIL) 20 MG tablet, Take 20 mg by mouth Every Morning., Disp: , Rfl:   •  loratadine (CLARITIN) 10 MG tablet, Take 10 mg by mouth Every Night., Disp: , Rfl:   •  Ondansetron HCl (ZOFRAN PO), Take 4-8 mg by mouth As Needed., Disp: , Rfl:   •  oxyCODONE-acetaminophen (PERCOCET)  MG per tablet, Take 1 tablet by mouth Every 4 (Four) Hours As Needed., Disp: , Rfl:   •  Promethazine HCl (PHENERGAN PO), Take 25 mg by mouth As Needed., Disp: , Rfl:   •  sertraline (ZOLOFT) 100 MG tablet, Take 100 mg by mouth Every Night., Disp: , Rfl:     Allergies:  Allergies   Allergen Reactions   • Codeine Other (See Comments)     \"UNKNOW  CHILDHOOD REACTION\"   • Penicillins Other (See Comments)     \"UNKNOWN CHILDHOOD ALLERGY\"   • Rituxan [Rituximab] Other (See Comments)     \"THROAT RAWNESS; FELT LIKE MY THROAT WAS CLOSING UP\"       Social Hx:  Social History   Substance Use Topics   • Smoking status: Current Every Day Smoker     Packs/day: 0.50     Years: 35.00     Types: Cigarettes   • Smokeless tobacco: Never Used      Comment: 0.5-1 PPD   • Alcohol use No       Family Hx:  Family History   Problem Relation Age of Onset   • Lung cancer Father 72   • Hypertension Father    • Heart disease Father    • Cancer Father    • Diabetes Father    • Other Brother 46     MI   • Heart disease Brother    • Breast cancer Paternal Aunt    • Colon cancer Maternal Grandfather    • Other Cousin      CLL (dx 48 yo)   • Hypertension Mother        Review of Imaging:  Catheter angiogram from 8/23/2017 " demonstrates advanced atherosclerotic plaque formation involving the right internal carotid artery origin, with a focal area of high-grade (greater than 80%) stenosis utilizing NASCET criteria.  This lesion was treated with angioplasty/stent placement, restoring a near normal caliber lumen and resulting in improved perfusion to the right cerebral hemisphere.  The left common carotid artery and carotid bifurcation demonstrates advanced atherosclerotic plaque formation, but without hemodynamically significant lesion.  Intracranially, there is an approximately 6-7 millimeter wide necked, medially directed cavernous segment right internal carotid cerebral aneurysm.  No additional aneurysm or vascular malformation.    Physical Examination:  Vitals:    09/27/17 1344   Pulse: 98   Temp: 97.6 °F (36.4 °C)     Blood pressure is 165/85 in the right upper extremity.    General Appearance:   Well developed, well nourished, well groomed, alert, and cooperative.  Cardiovascular: Regular rate and rhythm. Soft bilateral (right greater than left) carotid bruits.      Neurological examination:  Neurologic Exam     Mental Status   Oriented to person, place, and time.   Speech: speech is normal   Level of consciousness: alert    Cranial Nerves   Cranial nerves II through XII intact.     Motor Exam     Strength   Strength 5/5 throughout.     Sensory Exam   Light touch normal.     Gait, Coordination, and Reflexes     Gait  Gait: normal      Diagnoses/Plan:    Ms. Mae is a 52 y.o. female status post right carotid angioplasty/stent placement, for asymptomatic high-grade stenosis.  She's done well from a neurologic standpoint, with no focal deficits or complicating feature.  She does have advanced peripheral vascular disease, as well as nonhemodynamically significant left carotid disease, and thus I think she would benefit from continued dual antiplatelet regimen.  I plan on seeing her back in 6 months time with a carotid duplex, as well  as CT angiogram of the head, to ensure stability and exclude any progression of disease or aneurysm growth that might necessitate further treatment/intervention.    In regards to her progressing shortness of breath, this most likely represents worsening COPD (ongoing tobacco use), but I have also advised her to follow-up with her cardiologist to ensure that she is not having any cardiac issues.  If her shortness of breath worsens or she has any chest pain, advised her to go to the emergency department or call 911.    The importance of smoking cessation was discussed at length with the patient/patient's family.  Specifically, the risk of progression and recurrence of cerebrovascular disease (i.e. stroke, aneurysm) despite medical therapy/intervention with continued smoking. The patient/patient's family expressed an understanding of this risk.

## 2017-10-19 ENCOUNTER — OFFICE VISIT (OUTPATIENT)
Dept: SURGERY | Facility: CLINIC | Age: 52
End: 2017-10-19

## 2017-10-19 VITALS
TEMPERATURE: 98.7 F | SYSTOLIC BLOOD PRESSURE: 127 MMHG | HEIGHT: 65 IN | HEART RATE: 78 BPM | WEIGHT: 146 LBS | DIASTOLIC BLOOD PRESSURE: 74 MMHG | RESPIRATION RATE: 18 BRPM | BODY MASS INDEX: 24.32 KG/M2

## 2017-10-19 DIAGNOSIS — R09.89 BILATERAL CAROTID BRUITS: ICD-10-CM

## 2017-10-19 DIAGNOSIS — R93.89 ABNORMAL CAROTID ULTRASOUND: Primary | ICD-10-CM

## 2017-10-19 DIAGNOSIS — I77.9 BILATERAL CAROTID ARTERY DISEASE (HCC): ICD-10-CM

## 2017-10-19 DIAGNOSIS — I72.9 ANEURYSM (HCC): ICD-10-CM

## 2017-10-19 DIAGNOSIS — Z71.9 ENCOUNTER FOR CONSULTATION: ICD-10-CM

## 2017-10-19 DIAGNOSIS — I73.9 PERIPHERAL VASCULAR DISEASE OF EXTREMITY (HCC): ICD-10-CM

## 2017-10-19 DIAGNOSIS — I73.9 PVD (PERIPHERAL VASCULAR DISEASE) (HCC): Primary | ICD-10-CM

## 2017-10-19 DIAGNOSIS — I67.1 SUPRACLINOID CAROTID ARTERY ANEURYSM, SMALL: ICD-10-CM

## 2017-10-19 DIAGNOSIS — I10 ESSENTIAL HYPERTENSION: ICD-10-CM

## 2017-10-19 DIAGNOSIS — R09.89 OTHER SPECIFIED SYMPTOMS AND SIGNS INVOLVING THE CIRCULATORY AND RESPIRATORY SYSTEMS: ICD-10-CM

## 2017-10-19 DIAGNOSIS — C91.10 CLL (CHRONIC LYMPHOCYTIC LEUKEMIA) (HCC): ICD-10-CM

## 2017-10-19 PROCEDURE — 99215 OFFICE O/P EST HI 40 MIN: CPT | Performed by: SURGERY

## 2017-10-19 RX ORDER — HYDROCODONE BITARTRATE AND ACETAMINOPHEN 10; 325 MG/1; MG/1
1 TABLET ORAL EVERY 6 HOURS PRN
COMMUNITY
End: 2018-07-25

## 2017-10-19 NOTE — PROGRESS NOTES
10/19/2017    Patient Information  Melania Mae  [address] [facility] [place] [address]  1965  [phone] (home)     Chief Complaint   Patient presents with   • Follow-up     FU to Discuss Surgery       HPI  Patient's 52-year-old white female who is return for follow-up because of bilateral lower extremity claudication.  Patient has very complicated history.  Significantly she has aortoiliac occlusive disease secondary to small aorta syndrome.  She recently underwent a right carotid stenting in [place].  She had 90% stenosis of the right internal carotid artery which was asymptomatic.  She also has evidence of a small intracranial aneurysm.  The neurosurgeons have elected to observe this for this time.  Patient complains of short distance claudication.  Unfortunately she continues to smoke.  She also has diabetes, hypertension, chronic lymphocytic leukemia, hyperlipidemia, and COPD.  Ankle-brachial indexes performed 3 months ago were 0.6 bilaterally.  Review of Systems:  The Review of Systems has been reviewed and confirmed.    General: fever, chills  Integumentary: negative  Eyes: eyesight problems, glasses  ENT: negative  Respiratory: shortness of breath and wheezing  Gastrointestinal: nausea/vomiting  Cardiovascular: chest pain and palpitations  Neurological: numbness  Psychiatric: anxiety, depression and insomnia  Hematologic/Lymphatic: swollen glands and history of blood clots  Genitourinary: frequent urination  Musculoskeletal: sore muscles and back pain  Endocrine: hot flashes  Breasts: negative    Patient Active Problem List   Diagnosis   • Encounter for consultation   • CLL (chronic lymphocytic leukemia)   • Cerebrovascular accident (CVA)   • Aneurysm   • Carotid stenosis, right, s/p stent 8/23/17   • DM (diabetes mellitus)   • HTN (hypertension)   • HLD (hyperlipidemia)         Past Medical History:   Diagnosis Date   • Aneurysm 8/223/17    Cavernous left ICA aneurysm   • Asthma    • Carotid artery  occlusion    • Chronic back pain    • CLL (chronic lymphocytic leukemia)     OCTOBER OF 2012   • COPD (chronic obstructive pulmonary disease)    • Degenerative arthritis    • Depression    • Diabetes mellitus     type 2   • Hyperlipidemia    • Hypertension    • Lymphadenopathy    • Non Hodgkin's lymphoma          Past Surgical History:   Procedure Laterality Date   • APPENDECTOMY     • BACK SURGERY      2002 (work accident) c-spine surgery 4/14   • CAROTID STENT Right 08/23/2017   • CEREBRAL ANGIOGRAM N/A 8/23/2017    Diagnostic Carotid/Cerebral Angiogram   • CHOLECYSTECTOMY     • HYSTERECTOMY      for endometriosis/ovarian bleed   • NECK SURGERY     • OTHER SURGICAL HISTORY      pac insertion and removal   • TONSILLECTOMY           Family History   Problem Relation Age of Onset   • Lung cancer Father 72   • Hypertension Father    • Heart disease Father    • Cancer Father    • Diabetes Father    • Other Brother 46     MI   • Heart disease Brother    • Breast cancer Paternal Aunt    • Colon cancer Maternal Grandfather    • Other Cousin      CLL (dx 46 yo)   • Hypertension Mother          Social History   Substance Use Topics   • Smoking status: Current Every Day Smoker     Packs/day: 0.50     Years: 35.00     Types: Cigarettes   • Smokeless tobacco: Never Used      Comment: 0.5-1 PPD   • Alcohol use No       Current Outpatient Prescriptions   Medication Sig Dispense Refill   • HYDROcodone-acetaminophen (NORCO)  MG per tablet Take 1 tablet by mouth Every 6 (Six) Hours As Needed for Moderate Pain .     • albuterol (PROVENTIL HFA;VENTOLIN HFA) 108 (90 Base) MCG/ACT inhaler Inhale 2 puffs As Needed for Wheezing.     • ALPRAZolam (XANAX) 0.5 MG tablet Take 0.5 mg by mouth 3 (Three) Times a Day As Needed for Anxiety.     • aspirin 81 MG EC tablet Take 1 tablet by mouth Daily. 30 tablet 5   • B-D UF III MINI PEN NEEDLES 31G X 5 MM misc      • clopidogrel (PLAVIX) 75 MG tablet Take 1 tablet by mouth Daily. 30 tablet 5    • fluticasone-salmeterol (ADVAIR) 100-50 MCG/DOSE DISKUS Inhale 1 puff As Needed.     • gabapentin (NEURONTIN) 800 MG tablet Take 800 mg by mouth 4 (Four) Times a Day.     • Ibuprofen (ADVIL PO) Take 400 mg by mouth 3 (Three) Times a Day. LIQUIGELS     • Insulin Aspart (NOVOLOG FLEXPEN SC) Inject 8-12 Units under the skin 3 (Three) Times a Day Before Meals. PER SLIDING SCALE     • Insulin Detemir (LEVEMIR FLEXPEN SC) Inject 8-12 Units under the skin Every Night. SLIDING SCALE     • levETIRAcetam (KEPPRA) 1000 MG tablet Take 1,000 mg by mouth 2 (Two) Times a Day.     • lisinopril (PRINIVIL,ZESTRIL) 20 MG tablet Take 20 mg by mouth Every Morning.     • loratadine (CLARITIN) 10 MG tablet Take 10 mg by mouth Every Night.     • Ondansetron HCl (ZOFRAN PO) Take 4-8 mg by mouth As Needed.     • oxyCODONE-acetaminophen (PERCOCET)  MG per tablet Take 1 tablet by mouth Every 4 (Four) Hours As Needed.     • Promethazine HCl (PHENERGAN PO) Take 25 mg by mouth As Needed.     • sertraline (ZOLOFT) 100 MG tablet Take 100 mg by mouth Every Night.       No current facility-administered medications for this visit.          Allergies  Codeine; Penicillins; and Rituxan [rituximab]      Physical Exam   Constitutional: She is oriented to person, place, and time. She appears well-developed and well-nourished. No distress.   HENT:   Head: Normocephalic.   Right Ear: External ear normal.   Left Ear: External ear normal.   Nose: Nose normal.   Mouth/Throat: Oropharynx is clear and moist.   Eyes: Conjunctivae and EOM are normal. Right eye exhibits no discharge. Left eye exhibits no discharge.   Neck: Normal range of motion. No JVD present. No tracheal deviation present. No thyromegaly present.   Cardiovascular: Normal rate, regular rhythm, normal heart sounds and intact distal pulses.  Exam reveals no gallop and no friction rub.    No murmur heard.  She has no palpable or pulses distal in either leg.  Both legs are cool but with  "capillary refill.   Pulmonary/Chest: Effort normal and breath sounds normal. No stridor. No respiratory distress. She has no wheezes. She has no rales. She exhibits no tenderness.   Abdominal: Soft. Bowel sounds are normal. She exhibits no distension and no mass. There is no tenderness. There is no rebound and no guarding. No hernia.   Genitourinary: Rectal exam shows guaiac negative stool.   Musculoskeletal: Normal range of motion. She exhibits no edema, tenderness or deformity.   Lymphadenopathy:     She has no cervical adenopathy.   Neurological: She is alert and oriented to person, place, and time. She has normal reflexes. She displays normal reflexes. No cranial nerve deficit. She exhibits normal muscle tone. Coordination normal.   Skin: Skin is warm and dry. No rash noted. She is not diaphoretic. No erythema. No pallor.   Psychiatric: She has a normal mood and affect. Her behavior is normal. Judgment and thought content normal.   Nursing note and vitals reviewed.        /74  Pulse 78  Temp 98.7 °F (37.1 °C)  Resp 18  Ht 65\" (165.1 cm)  Wt 146 lb (66.2 kg)  BMI 24.3 kg/m2        ASSESSMENT  Aortoiliac occlusive disease, small aorta syndrome.  She does not have rest pain or nonhealing wounds.  Unfortunately she continues to smoke        PLAN    We'll repeat her ABIs.        Kobi Amaral MD    "

## 2017-10-23 ENCOUNTER — APPOINTMENT (OUTPATIENT)
Dept: ULTRASOUND IMAGING | Facility: HOSPITAL | Age: 52
End: 2017-10-23
Attending: SURGERY

## 2017-10-24 ENCOUNTER — APPOINTMENT (OUTPATIENT)
Dept: ULTRASOUND IMAGING | Facility: HOSPITAL | Age: 52
End: 2017-10-24
Attending: SURGERY

## 2017-10-26 ENCOUNTER — HOSPITAL ENCOUNTER (OUTPATIENT)
Dept: ULTRASOUND IMAGING | Facility: HOSPITAL | Age: 52
End: 2017-10-26
Attending: SURGERY

## 2017-11-02 ENCOUNTER — HOSPITAL ENCOUNTER (OUTPATIENT)
Dept: ULTRASOUND IMAGING | Facility: HOSPITAL | Age: 52
Discharge: HOME | End: 2017-11-02
Attending: SURGERY | Admitting: SURGERY

## 2017-11-02 DIAGNOSIS — I73.9 PVD (PERIPHERAL VASCULAR DISEASE) (HCC): ICD-10-CM

## 2017-11-02 PROCEDURE — 93922 UPR/L XTREMITY ART 2 LEVELS: CPT

## 2017-11-02 PROCEDURE — 93922 UPR/L XTREMITY ART 2 LEVELS: CPT | Performed by: RADIOLOGY

## 2017-11-13 ENCOUNTER — LAB (OUTPATIENT)
Dept: ONCOLOGY | Facility: CLINIC | Age: 52
End: 2017-11-13

## 2017-11-13 ENCOUNTER — OFFICE VISIT (OUTPATIENT)
Dept: ONCOLOGY | Facility: CLINIC | Age: 52
End: 2017-11-13

## 2017-11-13 VITALS
BODY MASS INDEX: 25.24 KG/M2 | HEART RATE: 70 BPM | WEIGHT: 151.7 LBS | DIASTOLIC BLOOD PRESSURE: 77 MMHG | SYSTOLIC BLOOD PRESSURE: 179 MMHG | RESPIRATION RATE: 18 BRPM | OXYGEN SATURATION: 99 % | TEMPERATURE: 97.2 F

## 2017-11-13 DIAGNOSIS — E08.8 DIABETES MELLITUS DUE TO UNDERLYING CONDITION WITH COMPLICATION, WITHOUT LONG-TERM CURRENT USE OF INSULIN (HCC): Primary | ICD-10-CM

## 2017-11-13 DIAGNOSIS — R53.83 FATIGUE, UNSPECIFIED TYPE: ICD-10-CM

## 2017-11-13 DIAGNOSIS — Z72.0 TOBACCO ABUSE: ICD-10-CM

## 2017-11-13 DIAGNOSIS — Z23 IMMUNIZATION DUE: ICD-10-CM

## 2017-11-13 DIAGNOSIS — I73.9 PERIPHERAL VASCULAR DISEASE (HCC): ICD-10-CM

## 2017-11-13 DIAGNOSIS — C91.10 CLL (CHRONIC LYMPHOCYTIC LEUKEMIA) (HCC): Primary | ICD-10-CM

## 2017-11-13 DIAGNOSIS — C91.10 CLL (CHRONIC LYMPHOCYTIC LEUKEMIA) (HCC): ICD-10-CM

## 2017-11-13 LAB
ALBUMIN SERPL-MCNC: 4.3 G/DL (ref 3.5–5)
ALBUMIN UR-MCNC: 5 MG/L
ALBUMIN/GLOB SERPL: 1.6 G/DL (ref 1.5–2.5)
ALP SERPL-CCNC: 89 U/L (ref 35–104)
ALT SERPL W P-5'-P-CCNC: 30 U/L (ref 10–36)
ANION GAP SERPL CALCULATED.3IONS-SCNC: 1.6 MMOL/L (ref 3.6–11.2)
AST SERPL-CCNC: 34 U/L (ref 10–30)
BASOPHILS # BLD AUTO: 0 10*3/MM3 (ref 0–0.3)
BASOPHILS NFR BLD AUTO: 0 % (ref 0–2)
BILIRUB SERPL-MCNC: 0.4 MG/DL (ref 0.2–1.8)
BUN BLD-MCNC: 8 MG/DL (ref 7–21)
BUN/CREAT SERPL: 9.8 (ref 7–25)
CALCIUM SPEC-SCNC: 9 MG/DL (ref 7.7–10)
CHLORIDE SERPL-SCNC: 109 MMOL/L (ref 99–112)
CHOLEST SERPL-MCNC: 251 MG/DL (ref 0–200)
CO2 SERPL-SCNC: 24.4 MMOL/L (ref 24.3–31.9)
CREAT BLD-MCNC: 0.82 MG/DL (ref 0.43–1.29)
DEPRECATED RDW RBC AUTO: 44.2 FL (ref 37–54)
EOSINOPHIL # BLD AUTO: 0.04 10*3/MM3 (ref 0–0.7)
EOSINOPHIL NFR BLD AUTO: 1.1 % (ref 0–5)
ERYTHROCYTE [DISTWIDTH] IN BLOOD BY AUTOMATED COUNT: 12 % (ref 11.5–14.5)
GFR SERPL CREATININE-BSD FRML MDRD: 73 ML/MIN/1.73
GLOBULIN UR ELPH-MCNC: 2.7 GM/DL
GLUCOSE BLD-MCNC: 203 MG/DL (ref 70–110)
HBA1C MFR BLD: 6.8 % (ref 4.5–5.7)
HCT VFR BLD AUTO: 40.8 % (ref 37–47)
HDLC SERPL-MCNC: 45 MG/DL (ref 60–100)
HGB BLD-MCNC: 14.1 G/DL (ref 12–16)
IMM GRANULOCYTES # BLD: 0.01 10*3/MM3 (ref 0–0.03)
IMM GRANULOCYTES NFR BLD: 0.3 % (ref 0–0.5)
LDLC SERPL CALC-MCNC: 170 MG/DL (ref 0–100)
LDLC/HDLC SERPL: 3.78 {RATIO}
LYMPHOCYTES # BLD AUTO: 1.72 10*3/MM3 (ref 1–3)
LYMPHOCYTES NFR BLD AUTO: 45.7 % (ref 21–51)
MCH RBC QN AUTO: 35.2 PG (ref 27–33)
MCHC RBC AUTO-ENTMCNC: 34.6 G/DL (ref 33–37)
MCV RBC AUTO: 101.7 FL (ref 80–94)
MONOCYTES # BLD AUTO: 0.16 10*3/MM3 (ref 0.1–0.9)
MONOCYTES NFR BLD AUTO: 4.3 % (ref 0–10)
NEUTROPHILS # BLD AUTO: 1.83 10*3/MM3 (ref 1.4–6.5)
NEUTROPHILS NFR BLD AUTO: 48.6 % (ref 30–70)
OSMOLALITY SERPL CALC.SUM OF ELEC: 274.2 MOSM/KG (ref 273–305)
PLATELET # BLD AUTO: 126 10*3/MM3 (ref 130–400)
PMV BLD AUTO: 9.7 FL (ref 6–10)
POTASSIUM BLD-SCNC: 4.8 MMOL/L (ref 3.5–5.3)
PROT SERPL-MCNC: 7 G/DL (ref 6–8)
RBC # BLD AUTO: 4.01 10*6/MM3 (ref 4.2–5.4)
SODIUM BLD-SCNC: 135 MMOL/L (ref 135–153)
T3FREE SERPL-MCNC: 2.7 PG/ML (ref 2.3–4.2)
T4 SERPL-MCNC: 7.4 MCG/DL (ref 4.5–10.9)
TRIGL SERPL-MCNC: 179 MG/DL (ref 0–150)
TSH SERPL DL<=0.05 MIU/L-ACNC: 1.04 MIU/ML (ref 0.55–4.78)
VLDLC SERPL-MCNC: 35.8 MG/DL
WBC NRBC COR # BLD: 3.76 10*3/MM3 (ref 4.5–12.5)

## 2017-11-13 PROCEDURE — 85025 COMPLETE CBC W/AUTO DIFF WBC: CPT | Performed by: INTERNAL MEDICINE

## 2017-11-13 PROCEDURE — 90670 PCV13 VACCINE IM: CPT | Performed by: INTERNAL MEDICINE

## 2017-11-13 PROCEDURE — 80061 LIPID PANEL: CPT | Performed by: NURSE PRACTITIONER

## 2017-11-13 PROCEDURE — 84481 FREE ASSAY (FT-3): CPT | Performed by: NURSE PRACTITIONER

## 2017-11-13 PROCEDURE — 36415 COLL VENOUS BLD VENIPUNCTURE: CPT

## 2017-11-13 PROCEDURE — G0009 ADMIN PNEUMOCOCCAL VACCINE: HCPCS | Performed by: INTERNAL MEDICINE

## 2017-11-13 PROCEDURE — 83036 HEMOGLOBIN GLYCOSYLATED A1C: CPT | Performed by: NURSE PRACTITIONER

## 2017-11-13 PROCEDURE — G0008 ADMIN INFLUENZA VIRUS VAC: HCPCS | Performed by: INTERNAL MEDICINE

## 2017-11-13 PROCEDURE — 99214 OFFICE O/P EST MOD 30 MIN: CPT | Performed by: INTERNAL MEDICINE

## 2017-11-13 PROCEDURE — 84443 ASSAY THYROID STIM HORMONE: CPT | Performed by: NURSE PRACTITIONER

## 2017-11-13 PROCEDURE — 90686 IIV4 VACC NO PRSV 0.5 ML IM: CPT | Performed by: INTERNAL MEDICINE

## 2017-11-13 PROCEDURE — 84436 ASSAY OF TOTAL THYROXINE: CPT | Performed by: NURSE PRACTITIONER

## 2017-11-13 PROCEDURE — 80053 COMPREHEN METABOLIC PANEL: CPT | Performed by: INTERNAL MEDICINE

## 2017-11-13 PROCEDURE — 96372 THER/PROPH/DIAG INJ SC/IM: CPT | Performed by: INTERNAL MEDICINE

## 2017-11-13 PROCEDURE — 82043 UR ALBUMIN QUANTITATIVE: CPT | Performed by: NURSE PRACTITIONER

## 2017-11-13 RX ORDER — NICOTINE 21-14-7MG
KIT TRANSDERMAL
Qty: 56 EACH | Refills: 0 | Status: SHIPPED | OUTPATIENT
Start: 2017-11-13 | End: 2018-02-27

## 2017-11-13 NOTE — PROGRESS NOTES
DATE:  11/13/2017    DIAGNOSIS:  SLL - initially diagnosed with [place] Stage II Disease in May 2012, based on excisiounal biopsy of a L supraclavicular LN. Bone marrow was involved.     CHIEF COMPLAINT:  CLL / SLL  Concern over vascular disease       TREATMENT HISTORY:  1. Received 6 cycles of Treanda without Rituxan between 10-24-12 and 3-21-13 because of anaphylactic reaction to Rituximab with her 1st cycle of treatment . Received Neulasta support. (Delivered by Dr. Koehler)       HISTORY OF PRESENT ILLNESS:   Ms. Mae was referred by Gillian Harrison for evaluation and treatment of CLL / SLL. She was reportedly diagnosed in May of 2012 when she saw Dr. Carbajal for biopsy of a L supraclavicular LN. At that time she had excessive fatigue & a 40 lb weight loss. She saw Dr. Koehler at  at time who performed bone marrow exam (bone marrow was involved) and started treatment with Bendamustine / Rituximab. Unfortunately with her 1st treatment, she had an anaphylactic reaction to Rituximab so this was not given with subsequent cycles of therapy. Her last cycle of treatment was 3-21-13. Per Dr. Koehler' s records, she had a complete response to treatment. She never had a significant leukocytosis or lymphocytosis, but had predominantly a lymphomatous presentation of her disease.     INTERVAL HISTORY:  Ms. Mae is here for follow up of CLL/SLL.  Since she was here last, she saw Dr. Rodriguez who felt her cerebral aneurysm was very superficial and not in need of intervention.  He placed R carotid stent successfully.  She has considerable lower extremity pain and has been discussing what sounds like aortobifemoral bypass with Dr. Amaral.  She denies fevers or chills.  She hasn't had weight loss.  She has noticed the L cervical LAD is a little more prominent but denies other symptoms / problems.       PAST MEDICAL HISTORY:  Past Medical History:   Diagnosis Date   • Aneurysm 8/223/17    Cavernous left ICA aneurysm   • Asthma    •  Carotid artery occlusion    • Chronic back pain    • CLL (chronic lymphocytic leukemia)     OCTOBER OF 2012   • COPD (chronic obstructive pulmonary disease)    • Degenerative arthritis    • Depression    • Diabetes mellitus     type 2   • Hyperlipidemia    • Hypertension    • Lymphadenopathy    • Non Hodgkin's lymphoma        PAST SURGICAL HISTORY:  Past Surgical History:   Procedure Laterality Date   • APPENDECTOMY     • BACK SURGERY      2002 (work accident) c-spine surgery 4/14   • CAROTID STENT Right 08/23/2017   • CEREBRAL ANGIOGRAM N/A 8/23/2017    Diagnostic Carotid/Cerebral Angiogram   • CHOLECYSTECTOMY     • HYSTERECTOMY      for endometriosis/ovarian bleed   • NECK SURGERY     • OTHER SURGICAL HISTORY      pac insertion and removal   • TONSILLECTOMY         FAMILY HISTORY:  Family History   Problem Relation Age of Onset   • Lung cancer Father 72   • Hypertension Father    • Heart disease Father    • Cancer Father    • Diabetes Father    • Other Brother 46     MI   • Heart disease Brother    • Breast cancer Paternal Aunt    • Colon cancer Maternal Grandfather    • Other Cousin      CLL (dx 46 yo)   • Hypertension Mother      Social History     Social History   • Marital status:      Spouse name: N/A   • Number of children: N/A   • Years of education: N/A     Occupational History   • Not on file.     Social History Main Topics   • Smoking status: Current Every Day Smoker     Packs/day: 0.50     Years: 35.00     Types: Cigarettes   • Smokeless tobacco: Never Used      Comment: 0.5-1 PPD   • Alcohol use No   • Drug use: No   • Sexual activity: Defer     Other Topics Concern   • Not on file     Social History Narrative     REVIEW OF SYSTEMS:    A comprehensive 14 point review of systems was performed and was negative except as mentioned    MEDICATIONS:  The current medication list was reviewed in the EMR    Current Outpatient Prescriptions:   •  albuterol (PROVENTIL HFA;VENTOLIN HFA) 108 (90 Base)  "MCG/ACT inhaler, Inhale 2 puffs As Needed for Wheezing., Disp: , Rfl:   •  ALPRAZolam (XANAX) 0.5 MG tablet, Take 0.5 mg by mouth 3 (Three) Times a Day As Needed for Anxiety., Disp: , Rfl:   •  aspirin 81 MG EC tablet, Take 1 tablet by mouth Daily., Disp: 30 tablet, Rfl: 5  •  B-D UF III MINI PEN NEEDLES 31G X 5 MM misc, , Disp: , Rfl:   •  clopidogrel (PLAVIX) 75 MG tablet, Take 1 tablet by mouth Daily., Disp: 30 tablet, Rfl: 5  •  fluticasone-salmeterol (ADVAIR) 100-50 MCG/DOSE DISKUS, Inhale 1 puff As Needed., Disp: , Rfl:   •  gabapentin (NEURONTIN) 800 MG tablet, Take 800 mg by mouth 4 (Four) Times a Day., Disp: , Rfl:   •  HYDROcodone-acetaminophen (NORCO)  MG per tablet, Take 1 tablet by mouth Every 6 (Six) Hours As Needed for Moderate Pain ., Disp: , Rfl:   •  Ibuprofen (ADVIL PO), Take 400 mg by mouth 3 (Three) Times a Day. LIQUIGELS, Disp: , Rfl:   •  Insulin Aspart (NOVOLOG FLEXPEN SC), Inject 8-12 Units under the skin 3 (Three) Times a Day Before Meals. PER SLIDING SCALE, Disp: , Rfl:   •  Insulin Detemir (LEVEMIR FLEXPEN SC), Inject 8-12 Units under the skin Every Night. SLIDING SCALE, Disp: , Rfl:   •  levETIRAcetam (KEPPRA) 1000 MG tablet, Take 1,000 mg by mouth 2 (Two) Times a Day., Disp: , Rfl:   •  lisinopril (PRINIVIL,ZESTRIL) 20 MG tablet, Take 20 mg by mouth Every Morning., Disp: , Rfl:   •  loratadine (CLARITIN) 10 MG tablet, Take 10 mg by mouth Every Night., Disp: , Rfl:   •  Ondansetron HCl (ZOFRAN PO), Take 4-8 mg by mouth As Needed., Disp: , Rfl:   •  oxyCODONE-acetaminophen (PERCOCET)  MG per tablet, Take 1 tablet by mouth Every 4 (Four) Hours As Needed., Disp: , Rfl:   •  Promethazine HCl (PHENERGAN PO), Take 25 mg by mouth As Needed., Disp: , Rfl:   •  sertraline (ZOLOFT) 100 MG tablet, Take 100 mg by mouth Every Night., Disp: , Rfl:     ALLERGIES:    Allergies   Allergen Reactions   • Codeine Other (See Comments)     \"UNKNOW  CHILDHOOD REACTION\"   • Penicillins Other (See " "Comments)     \"UNKNOWN CHILDHOOD ALLERGY\"   • Rituxan [Rituximab] Other (See Comments)     \"THROAT RAWNESS; FELT LIKE MY THROAT WAS CLOSING UP\"       PHYSICAL EXAM:  Visit Vitals   • /77   • Pulse 70   • Temp 97.2 °F (36.2 °C) (Oral)   • Resp 18   • Wt 151 lb 11.2 oz (68.8 kg)   • SpO2 99%   • BMI 25.24 kg/m2   General: Alert and oriented.  Appears well  HEENT: Pupils are equal, round and reactive to light and accommodation, Extraocular movements full, oropharynx clear   Neck: no jvd or thyromegaly   Cardiovascular: regular rate and rhythm without murmurs, rubs or gallops   Pulmonary: clear to auscultation bilaterally   Abdomen: soft, non-tender, non-distended, normal active bowel sounds present, no organomegaly   Extremities: No clubbing, cyanosis or edema   Lymph: +L supraclavicular and L>R cervical adenopathy sl bigger than prior. Largest is maybe 2 cm. She has small (2 cm) cj cervical LN and shoddy cj inguinal adenlopathy   Neurologic: Mental status as above, CN II-XII intact, grossly non-focal exam     PATHOLOGY:  5-25-12 L Supraclavicular LN Biopsy:   - Extensive small lymphocytic lymphoma / chronic lymphocytic leukemia involvement.   Flow cytometry shows monoclonal Kappa B-cell population which coexpresses CD5 and CD23 representing 30% of gated cells. There is dim surface light chain and dim CD20 expression. No CD38 expression. Neoplastic cells are positive for CD20 (dim), PAX-5, CCD5, CD23, CD43. B cells are + for CD3. BCL1 stain negative.    BM Biopsy 6-29-12 ([facility])   - Limited BM specimen with scattered hematopoietic cells.   - Flow cytometry revealed peripheral blood with minute CLL/SLL population (0.5%) with normal  FISH studies.     Cervical Disectomy 04-18-14:   - Fragments of fibrohyaline cartilage, no acute inflammation or metastatic disease identified.     IMAGING:  PET-CT 6-19-12:   - Mild to moderate bilateral axillary LAD and mediastinal LAD with minimal associated hypermetabolism. "     PET-CT 10-09-12:   - Multiple areas of abnormal hypermetabolism in the neck bilaterally, new from the prior study. R posterior trangle focus with SUV 3.1. L supraclavicular focus SUV 5.0 (LN 1.8 cm, prior 1.2 cm)   - Carl axillary LAD - L axilla 2.8 cm LN (prior 1.6 cm) with SUV 3.6, R axillary ln new 2.0 cm, SUV 2.6   - Mediastinal LAD noted. R prevascular LN 1.5 cm with SUV 2.1   - Carl external iliac LNs present ( R 3.2 cm with SUV 3.1)   - Overall worsening areas of hypermetabolism corresponding to enlarging nodes c/w worsening neoplastic involvement.     PET-CT 1-16-13:   - No PET-CT findings of active disease related to the patient's lymphoma. Interval resolution of hypermetabolic adenopathy described on previous examination. Spleen is normal.     PET-CT 1-21-14:   - No abnormal hypermetabolism to suggest neoplastic involvement. No mass or adenopathy noted. Spleen is normal.     04-03-14 MRI Spine:   - Cervical: Degenerative disk disease in the cervical disk spaces at the level of C4-C7. At 4-5, there is a disk herniation associated with bulging. There was spinal stenosis at C5-6 but no disk herniation. At C6-7, the disk bulging is not felt to be significant. The postcontrasted scans in the cervical spine showed no areas of abnormal enhancement.   - Thoracic: negative MRI examination of the thoracic spine. A source of the patient's t horacic spine pain was not demonstrated.     Chest Xray 04-17-15:   - The lungs are clear, the heart is normal. There is no active disease in the chest.     CT CAP/neck w/ contrast 05-13-14:   - No neck lymphadenopathy by CT size criteria   - No intrathoracic or abdominal LAD     CTAP, neck w/ contrast 03-04-15:   - No enlarged lymph nodes were demonstrated. The bile ducts in the liver and the extrahepatic biliary tree is slightly dilated.   - Clinical correlation with laboratory evaluation for obstruction suggested. This, however, is not significantly changed from a previous CT  from 05/2014. No retroperitoneal lymphadenopathy is identified.   - Negative CT of the of the soft tissues of the neck. No abnormally enlarged lymph nodes are demonstrated.     CT Neck, CAP 3-15-16:   - There is a change in the CT scan. There are prominent lymph nodes in the neck bilaterally. The largest are in the supraclavicular area and measure greater than a centimeter. Most of the nodes are in the 1cm size range.   - In the chest, most of the nodes are in the subcen timeter size range. The largest nodes are in the pretacheal area and measure 15 mm. There were no enlarged hilar nodes. On the lung windows, there are no pleural effusions. No pulmonary or parenchymal lung nodules or masses were demonstrated. .   There are m ore prominent lymph nodes in the retroperitoneum some of which are enlarged today consistent with recurrence. Again, most of these are in the 1 cm size range, but a few measure up to 17-18 mm. No mesenteric adenopathy demonstrated.     [facility]   - Chest: Persistent stable adenopathy in the mediastinum and axillary regions. The lungs remain clear.   - A/P: The lymph nodes are stable in comparing with the previous CT done in March.   - Neck: Persistent but stable adenopathy throughout the jugular lymph node chains in both sides of the neck.     [facility] neck 08-16-16:  - Stable mediastinal enlarged lymph nodes including an AP window lymph node measuring 9 mm  - A left axillary region lymph node measures 1.9 cm and was 1.9 cm. Stable right axillary region lymph adenopathy.   - Stable retroperitoneal and mesenteric lymphadenopathy   - persistent lymphadenopathy throughout the neck that is stable in size.     [facility] 06-07-17:  - Stable bilateral jugular chain lymphadenopathy.  - Stable bilateral axillary and mediastinal region lymphadenopathy.  - Stable retroperitoneal lymphadenopathy.      RECENT LABS:  Lab Results   Component Value Date    WBC 3.76 (L) 11/13/2017    HGB 14.1 11/13/2017     HCT 40.8 11/13/2017    .7 (H) 11/13/2017    RDW 12.0 11/13/2017     (L) 11/13/2017    NEUTRORELPCT 48.6 11/13/2017    LYMPHORELPCT 45.7 11/13/2017    MONORELPCT 4.3 11/13/2017    EOSRELPCT 1.1 11/13/2017    BASORELPCT 0.0 11/13/2017    NEUTROABS 1.83 11/13/2017    LYMPHSABS 1.72 11/13/2017       Lab Results   Component Value Date     11/13/2017    K 4.8 11/13/2017    CO2 24.4 11/13/2017     11/13/2017    BUN 8 11/13/2017    CREATININE 0.82 11/13/2017    GLUCOSE 203 (H) 11/13/2017    CALCIUM 9.0 11/13/2017    ALKPHOS 89 11/13/2017    AST 34 (H) 11/13/2017    ALT 30 11/13/2017    BILITOT 0.4 11/13/2017    ALBUMIN 4.30 11/13/2017    PROTEINTOT 7.0 11/13/2017       Lab Results   Component Value Date     08/04/2017    URICACID 3.9 06/25/2015     Date  SPEP/MONE Mspike Free K Free L  K/L Ratio   02-12-15 MONE normal Not observed   06-25-15 MONE normal Not observed 22.10  14.93  1.48  11-17-15 MONE normal Not observed 17.17  14.41  1.19    Date  IgG IgA IgM  02-12-15 744 110 43  06-25-15 776 94 39  11-17-15 812 99 41  03-29-17 771 103 34  08-04-17 831 94 27      ASSESSMENT & PLAN:  Ms. Mae is a 52 y.o.  female with a history of Stage II SLL diagnosed in May of 2012 by L supraclavicular LN biopsy.     1. Small Lymphocytic Lymphoma:   - Treated as above with 6 cycles of Bendamustine (after anaphylactic reaction to Rituximab with 1st cycle of treatment) with complete radiographic response.   - She does have adenopathy in the cj cervical, cj axillary and cj inguinal areas c/w CLL/SLL. Prior CT imaging also demonstrated small mediastinal LNs and enlarged retroperitoneal LNS, but no evidence of bulky disease.  The L cervical LN may be a little more prominent, but not drastically so.  She has no concerning exam findings and no symptoms related to her disease at this time.  -  Will plan for repeat exam in 3 months with labwork at that time and CT Neck, CAP prior.  She will call in the  interim if she should have needs prior.     2.  Signficant vascular disease:  -  She has seen Dr. Amaral who is planning on work to correct blockages,     3.  Tobacco use:  She is working to quit.  I applauded her efforts.  She is interested in patch.  Will order these today.  Advised her not to smoke with patch in place.    4.  Prophylaxis:  Will give influenza and Prevnar vaccines today.    This note was scribed for Thea Sorto MD by Violeta Fong RN.    I, Thea Sorto MD, personally performed the services described in this documentation as scribed by the above named individual in my presence, and it is both accurate and complete.  11/13/2017       I spent 25 minutes with Ms. Mae today. More than 50% of the time was spent in counseling / coordination of care for the above mentioned problems.       Electronically Signed by: Thea Sorto MD      CC:   BRANDON Panchal Dr.

## 2017-11-14 LAB
IGA SERPL-MCNC: 95 MG/DL (ref 87–352)
IGG SERPL-MCNC: 791 MG/DL (ref 700–1600)
IGM SERPL-MCNC: 21 MG/DL (ref 26–217)

## 2017-11-16 ENCOUNTER — OFFICE VISIT (OUTPATIENT)
Dept: SURGERY | Facility: CLINIC | Age: 52
End: 2017-11-16

## 2017-11-16 VITALS
WEIGHT: 146 LBS | TEMPERATURE: 98.6 F | RESPIRATION RATE: 17 BRPM | HEIGHT: 65 IN | DIASTOLIC BLOOD PRESSURE: 75 MMHG | SYSTOLIC BLOOD PRESSURE: 125 MMHG | BODY MASS INDEX: 24.32 KG/M2 | HEART RATE: 77 BPM

## 2017-11-16 DIAGNOSIS — R09.89 BILATERAL CAROTID BRUITS: ICD-10-CM

## 2017-11-16 DIAGNOSIS — I77.9 BILATERAL CAROTID ARTERY DISEASE (HCC): ICD-10-CM

## 2017-11-16 DIAGNOSIS — I10 ESSENTIAL HYPERTENSION: ICD-10-CM

## 2017-11-16 DIAGNOSIS — Z79.4 TYPE 2 DIABETES MELLITUS WITH COMPLICATION, WITH LONG-TERM CURRENT USE OF INSULIN (HCC): ICD-10-CM

## 2017-11-16 DIAGNOSIS — I73.9 PVD (PERIPHERAL VASCULAR DISEASE) (HCC): Primary | ICD-10-CM

## 2017-11-16 DIAGNOSIS — E11.8 TYPE 2 DIABETES MELLITUS WITH COMPLICATION, WITH LONG-TERM CURRENT USE OF INSULIN (HCC): ICD-10-CM

## 2017-11-16 PROCEDURE — 99213 OFFICE O/P EST LOW 20 MIN: CPT | Performed by: SURGERY

## 2017-11-16 NOTE — PROGRESS NOTES
11/16/2017    Patient Information  Melania Mae  [address] [facility], [place] [address]  1965  [phone] (home)     Chief Complaint   Patient presents with   • Follow-up     FU ABIs       HPI  Patient's 52-year-old white female who is return for follow-up because of bilateral lower extremity claudication.  Patient has very complicated history.  Significantly she has aortoiliac occlusive disease secondary to small aorta syndrome.  She recently underwent a right carotid stenting in [place].  She had 90% stenosis of the right internal carotid artery which was asymptomatic.  She also has evidence of a small intracranial aneurysm.  The neurosurgeons have elected to observe this for this time.  Patient complains of short distance claudication.  Unfortunately she continues to smoke.  She also has diabetes, hypertension, chronic lymphocytic leukemia, hyperlipidemia, and COPD.  Ankle-brachial indexes performed 3 months ago were 0.6 bilaterally.She is back now for follow-up on repeat ABIs.  Bilateral ABIs are now 0.79 and 0.75 with triphasic flow.  Patient is on aspirin and Plavix    Review of Systems:  The Review of Systems has been reviewed and confirmed.    General: fever, chills  Integumentary: negative  Eyes: eyesight problems, glasses  ENT: negative  Respiratory: shortness of breath and wheezing  Gastrointestinal: nausea/vomiting  Cardiovascular: chest pain and palpitations  Neurological: numbness  Psychiatric: anxiety, depression and insomnia  Hematologic/Lymphatic: swollen glands and history of blood clots  Genitourinary: frequent urination  Musculoskeletal: sore muscles and back pain  Endocrine: hot flashes  Breasts: negative    Patient Active Problem List   Diagnosis   • Encounter for consultation   • CLL (chronic lymphocytic leukemia)   • Cerebrovascular accident (CVA)   • Aneurysm   • Carotid stenosis, right, s/p stent 8/23/17   • DM (diabetes mellitus)   • HTN (hypertension)   • HLD (hyperlipidemia)          Past Medical History:   Diagnosis Date   • Aneurysm 8/223/17    Cavernous left ICA aneurysm   • Asthma    • Carotid artery occlusion    • Chronic back pain    • CLL (chronic lymphocytic leukemia)     OCTOBER OF 2012   • COPD (chronic obstructive pulmonary disease)    • Degenerative arthritis    • Depression    • Diabetes mellitus     type 2   • Hyperlipidemia    • Hypertension    • Lymphadenopathy    • Non Hodgkin's lymphoma          Past Surgical History:   Procedure Laterality Date   • APPENDECTOMY     • BACK SURGERY      2002 (work accident) c-spine surgery 4/14   • CAROTID STENT Right 08/23/2017   • CEREBRAL ANGIOGRAM N/A 8/23/2017    Diagnostic Carotid/Cerebral Angiogram   • CHOLECYSTECTOMY     • HYSTERECTOMY      for endometriosis/ovarian bleed   • NECK SURGERY     • OTHER SURGICAL HISTORY      pac insertion and removal   • TONSILLECTOMY           Family History   Problem Relation Age of Onset   • Lung cancer Father 72   • Hypertension Father    • Heart disease Father    • Cancer Father    • Diabetes Father    • Other Brother 46     MI   • Heart disease Brother    • Breast cancer Paternal Aunt    • Colon cancer Maternal Grandfather    • Other Cousin      CLL (dx 48 yo)   • Hypertension Mother          Social History   Substance Use Topics   • Smoking status: Current Every Day Smoker     Packs/day: 0.50     Years: 35.00     Types: Cigarettes   • Smokeless tobacco: Never Used      Comment: 0.5-1 PPD   • Alcohol use No       Current Outpatient Prescriptions   Medication Sig Dispense Refill   • albuterol (PROVENTIL HFA;VENTOLIN HFA) 108 (90 Base) MCG/ACT inhaler Inhale 2 puffs As Needed for Wheezing.     • ALPRAZolam (XANAX) 0.5 MG tablet Take 0.5 mg by mouth 3 (Three) Times a Day As Needed for Anxiety.     • aspirin 81 MG EC tablet Take 1 tablet by mouth Daily. 30 tablet 5   • B-D UF III MINI PEN NEEDLES 31G X 5 MM misc      • clopidogrel (PLAVIX) 75 MG tablet Take 1 tablet by mouth Daily. 30 tablet 5   •  "fluticasone-salmeterol (ADVAIR) 100-50 MCG/DOSE DISKUS Inhale 1 puff As Needed.     • gabapentin (NEURONTIN) 800 MG tablet Take 800 mg by mouth 4 (Four) Times a Day.     • HYDROcodone-acetaminophen (NORCO)  MG per tablet Take 1 tablet by mouth Every 6 (Six) Hours As Needed for Moderate Pain .     • Ibuprofen (ADVIL PO) Take 400 mg by mouth 3 (Three) Times a Day. LIQUIGELS     • Insulin Aspart (NOVOLOG FLEXPEN SC) Inject 8-12 Units under the skin 3 (Three) Times a Day Before Meals. PER SLIDING SCALE     • Insulin Detemir (LEVEMIR FLEXPEN SC) Inject 8-12 Units under the skin Every Night. SLIDING SCALE     • levETIRAcetam (KEPPRA) 1000 MG tablet Take 1,000 mg by mouth 2 (Two) Times a Day.     • lisinopril (PRINIVIL,ZESTRIL) 20 MG tablet Take 20 mg by mouth Every Morning.     • loratadine (CLARITIN) 10 MG tablet Take 10 mg by mouth Every Night.     • Nicotine 21-14-7 MG/24HR kit Use as directed 56 each 0   • Ondansetron HCl (ZOFRAN PO) Take 4-8 mg by mouth As Needed.     • oxyCODONE-acetaminophen (PERCOCET)  MG per tablet Take 1 tablet by mouth Every 4 (Four) Hours As Needed.     • Promethazine HCl (PHENERGAN PO) Take 25 mg by mouth As Needed.     • sertraline (ZOLOFT) 100 MG tablet Take 100 mg by mouth Every Night.       No current facility-administered medications for this visit.          Allergies  Codeine; Penicillins; and Rituxan [rituximab]    /75  Pulse 77  Temp 98.6 °F (37 °C)  Resp 17  Ht 65\" (165.1 cm)  Wt 146 lb (66.2 kg)  BMI 24.3 kg/m2     Physical Exam  Gen. 49-year-old white female in no distress            ASSESSMENT  Peripheral vascular disease with claudication.        PLAN    Return if symptoms worsen.        Kobi Amaral MD    "

## 2018-02-20 DIAGNOSIS — C91.10 CLL (CHRONIC LYMPHOCYTIC LEUKEMIA) (HCC): Primary | ICD-10-CM

## 2018-02-27 ENCOUNTER — OFFICE VISIT (OUTPATIENT)
Dept: ONCOLOGY | Facility: CLINIC | Age: 53
End: 2018-02-27

## 2018-02-27 ENCOUNTER — HOSPITAL ENCOUNTER (OUTPATIENT)
Dept: CT IMAGING | Facility: HOSPITAL | Age: 53
Discharge: HOME | End: 2018-02-27
Attending: INTERNAL MEDICINE

## 2018-02-27 ENCOUNTER — HOSPITAL ENCOUNTER (OUTPATIENT)
Dept: CT IMAGING | Facility: HOSPITAL | Age: 53
Discharge: HOME | End: 2018-02-27
Attending: INTERNAL MEDICINE | Admitting: INTERNAL MEDICINE

## 2018-02-27 ENCOUNTER — LAB (OUTPATIENT)
Dept: ONCOLOGY | Facility: CLINIC | Age: 53
End: 2018-02-27

## 2018-02-27 VITALS
DIASTOLIC BLOOD PRESSURE: 69 MMHG | BODY MASS INDEX: 23.68 KG/M2 | WEIGHT: 142.3 LBS | HEART RATE: 73 BPM | SYSTOLIC BLOOD PRESSURE: 152 MMHG | RESPIRATION RATE: 20 BRPM | TEMPERATURE: 98.3 F | OXYGEN SATURATION: 98 %

## 2018-02-27 DIAGNOSIS — Z13.71 ENCOUNTER FOR NONPROCREATIVE SCREENING FOR GENETIC DISEASE CARRIER STATUS: ICD-10-CM

## 2018-02-27 DIAGNOSIS — C91.10 CLL (CHRONIC LYMPHOCYTIC LEUKEMIA) (HCC): ICD-10-CM

## 2018-02-27 DIAGNOSIS — Z72.0 TOBACCO ABUSE: Primary | ICD-10-CM

## 2018-02-27 LAB
ALBUMIN SERPL-MCNC: 4.3 G/DL (ref 3.5–5)
ALBUMIN/GLOB SERPL: 1.7 G/DL (ref 1.5–2.5)
ALP SERPL-CCNC: 107 U/L (ref 35–104)
ALT SERPL W P-5'-P-CCNC: 21 U/L (ref 10–36)
ANION GAP SERPL CALCULATED.3IONS-SCNC: 4 MMOL/L (ref 3.6–11.2)
AST SERPL-CCNC: 17 U/L (ref 10–30)
BASOPHILS # BLD AUTO: 0.01 10*3/MM3 (ref 0–0.3)
BASOPHILS NFR BLD AUTO: 0.2 % (ref 0–2)
BILIRUB SERPL-MCNC: 0.5 MG/DL (ref 0.2–1.8)
BUN BLD-MCNC: 11 MG/DL (ref 7–21)
BUN/CREAT SERPL: 13.6 (ref 7–25)
CALCIUM SPEC-SCNC: 8.8 MG/DL (ref 7.7–10)
CHLORIDE SERPL-SCNC: 101 MMOL/L (ref 99–112)
CO2 SERPL-SCNC: 25 MMOL/L (ref 24.3–31.9)
CREAT BLD-MCNC: 0.81 MG/DL (ref 0.43–1.29)
DEPRECATED RDW RBC AUTO: 47.8 FL (ref 37–54)
EOSINOPHIL # BLD AUTO: 0.04 10*3/MM3 (ref 0–0.7)
EOSINOPHIL NFR BLD AUTO: 1 % (ref 0–5)
ERYTHROCYTE [DISTWIDTH] IN BLOOD BY AUTOMATED COUNT: 13 % (ref 11.5–14.5)
GFR SERPL CREATININE-BSD FRML MDRD: 74 ML/MIN/1.73
GLOBULIN UR ELPH-MCNC: 2.6 GM/DL
GLUCOSE BLD-MCNC: 454 MG/DL (ref 70–110)
HCT VFR BLD AUTO: 41.9 % (ref 37–47)
HGB BLD-MCNC: 14.1 G/DL (ref 12–16)
IMM GRANULOCYTES # BLD: 0 10*3/MM3 (ref 0–0.03)
IMM GRANULOCYTES NFR BLD: 0 % (ref 0–0.5)
LDH SERPL-CCNC: 194 U/L (ref 135–225)
LYMPHOCYTES # BLD AUTO: 1.78 10*3/MM3 (ref 1–3)
LYMPHOCYTES NFR BLD AUTO: 44.2 % (ref 21–51)
MCH RBC QN AUTO: 33.9 PG (ref 27–33)
MCHC RBC AUTO-ENTMCNC: 33.7 G/DL (ref 33–37)
MCV RBC AUTO: 100.7 FL (ref 80–94)
MONOCYTES # BLD AUTO: 0.12 10*3/MM3 (ref 0.1–0.9)
MONOCYTES NFR BLD AUTO: 3 % (ref 0–10)
NEUTROPHILS # BLD AUTO: 2.08 10*3/MM3 (ref 1.4–6.5)
NEUTROPHILS NFR BLD AUTO: 51.6 % (ref 30–70)
OSMOLALITY SERPL CALC.SUM OF ELEC: 280 MOSM/KG (ref 273–305)
PLATELET # BLD AUTO: 136 10*3/MM3 (ref 130–400)
PMV BLD AUTO: 10.7 FL (ref 6–10)
POTASSIUM BLD-SCNC: 4.9 MMOL/L (ref 3.5–5.3)
PROT SERPL-MCNC: 6.9 G/DL (ref 6–8)
RBC # BLD AUTO: 4.16 10*6/MM3 (ref 4.2–5.4)
SODIUM BLD-SCNC: 130 MMOL/L (ref 135–153)
WBC NRBC COR # BLD: 4.03 10*3/MM3 (ref 4.5–12.5)

## 2018-02-27 PROCEDURE — 82565 ASSAY OF CREATININE: CPT

## 2018-02-27 PROCEDURE — 71260 CT THORAX DX C+: CPT

## 2018-02-27 PROCEDURE — 74177 CT ABD & PELVIS W/CONTRAST: CPT

## 2018-02-27 PROCEDURE — 82784 ASSAY IGA/IGD/IGG/IGM EACH: CPT | Performed by: INTERNAL MEDICINE

## 2018-02-27 PROCEDURE — 70491 CT SOFT TISSUE NECK W/DYE: CPT | Performed by: RADIOLOGY

## 2018-02-27 PROCEDURE — 80053 COMPREHEN METABOLIC PANEL: CPT | Performed by: INTERNAL MEDICINE

## 2018-02-27 PROCEDURE — 99406 BEHAV CHNG SMOKING 3-10 MIN: CPT | Performed by: INTERNAL MEDICINE

## 2018-02-27 PROCEDURE — 70491 CT SOFT TISSUE NECK W/DYE: CPT

## 2018-02-27 PROCEDURE — 0 IOPAMIDOL 61 % SOLUTION: Performed by: INTERNAL MEDICINE

## 2018-02-27 PROCEDURE — 71260 CT THORAX DX C+: CPT | Performed by: RADIOLOGY

## 2018-02-27 PROCEDURE — 83615 LACTATE (LD) (LDH) ENZYME: CPT | Performed by: INTERNAL MEDICINE

## 2018-02-27 PROCEDURE — 85025 COMPLETE CBC W/AUTO DIFF WBC: CPT | Performed by: INTERNAL MEDICINE

## 2018-02-27 PROCEDURE — 99214 OFFICE O/P EST MOD 30 MIN: CPT | Performed by: INTERNAL MEDICINE

## 2018-02-27 PROCEDURE — 74177 CT ABD & PELVIS W/CONTRAST: CPT | Performed by: RADIOLOGY

## 2018-02-27 RX ORDER — NICOTINE 21 MG/24HR
1 PATCH, TRANSDERMAL 24 HOURS TRANSDERMAL EVERY 24 HOURS
Qty: 21 PATCH | Refills: 1 | Status: SHIPPED | OUTPATIENT
Start: 2018-02-27 | End: 2019-01-21 | Stop reason: SDUPTHER

## 2018-02-27 RX ORDER — ONDANSETRON HYDROCHLORIDE 8 MG/1
8 TABLET, FILM COATED ORAL EVERY 8 HOURS PRN
Qty: 90 TABLET | Refills: 6 | Status: SHIPPED | OUTPATIENT
Start: 2018-02-27 | End: 2020-10-16

## 2018-02-27 RX ADMIN — IOPAMIDOL 100 ML: 612 INJECTION, SOLUTION INTRAVENOUS at 12:15

## 2018-02-27 NOTE — PROGRESS NOTES
DATE:  2/27/2018    DIAGNOSIS:  SLL - initially diagnosed with [place] Stage II Disease in May 2012, based on excisional biopsy of a L supraclavicular LN. Bone marrow was involved.     CHIEF COMPLAINT:  CLL / SLL      TREATMENT HISTORY:  1. Received 6 cycles of Treanda without Rituxan between 10-24-12 and 3-21-13 because of anaphylactic reaction to Rituximab with her 1st cycle of treatment . Received Neulasta support. (Delivered by Dr. Koehler)       HISTORY OF PRESENT ILLNESS:   Ms. Mae was referred by Gillian Harrison for evaluation and treatment of CLL / SLL. She was reportedly diagnosed in May of 2012 when she saw Dr. Carbajal for biopsy of a L supraclavicular LN. At that time she had excessive fatigue & a 40 lb weight loss. She saw Dr. Koehler at  at time who performed bone marrow exam (bone marrow was involved) and started treatment with Bendamustine / Rituximab. Unfortunately with her 1st treatment, she had an anaphylactic reaction to Rituximab so this was not given with subsequent cycles of therapy. Her last cycle of treatment was 3-21-13. Per Dr. Koehler' s records, she had a complete response to treatment. She never had a significant leukocytosis or lymphocytosis, but had predominantly a lymphomatous presentation of her disease.     INTERVAL HISTORY:  Ms. Mae is here with her daughter for follow up of CLL/SLL.  Since she was here last, she has been well. She has been helping care for her daughter who was recently diagnosed with colorectal cancer. She reports feeling fatigued all the time. Her blood sugar is elevated today to almost 500, but she says up until the last 2 weeks, she says it has been between 130-140. She denies fevers or chills.  She hasn't had weight loss. She is anxious to hear the outcome of her testing.      PAST MEDICAL HISTORY:  Past Medical History:   Diagnosis Date   • Aneurysm 8/223/17    Cavernous left ICA aneurysm   • Asthma    • Carotid artery occlusion    • Chronic back pain    •  CLL (chronic lymphocytic leukemia)     OCTOBER OF 2012   • COPD (chronic obstructive pulmonary disease)    • Degenerative arthritis    • Depression    • Diabetes mellitus     type 2   • Hyperlipidemia    • Hypertension    • Lymphadenopathy    • Non Hodgkin's lymphoma        PAST SURGICAL HISTORY:  Past Surgical History:   Procedure Laterality Date   • APPENDECTOMY     • BACK SURGERY      2002 (work accident) c-spine surgery 4/14   • CAROTID STENT Right 08/23/2017   • CEREBRAL ANGIOGRAM N/A 8/23/2017    Diagnostic Carotid/Cerebral Angiogram   • CHOLECYSTECTOMY     • HYSTERECTOMY      for endometriosis/ovarian bleed   • NECK SURGERY     • OTHER SURGICAL HISTORY      pac insertion and removal   • TONSILLECTOMY         FAMILY HISTORY:  Family History   Problem Relation Age of Onset   • Lung cancer Father 72   • Hypertension Father    • Heart disease Father    • Cancer Father    • Diabetes Father    • Other Brother 46     MI   • Heart disease Brother    • Breast cancer Paternal Aunt    • Colon cancer Maternal Grandfather    • Other Cousin      CLL (dx 48 yo)   • Hypertension Mother      Social History     Social History   • Marital status:      Spouse name: N/A   • Number of children: N/A   • Years of education: N/A     Occupational History   • Not on file.     Social History Main Topics   • Smoking status: Current Every Day Smoker     Packs/day: 0.50     Years: 35.00     Types: Cigarettes   • Smokeless tobacco: Never Used      Comment: 0.5-1 PPD   • Alcohol use No   • Drug use: No   • Sexual activity: Defer     Other Topics Concern   • Not on file     Social History Narrative     REVIEW OF SYSTEMS:    A comprehensive 14 point review of systems was performed and was negative except as mentioned    MEDICATIONS:  The current medication list was reviewed in the EMR    Current Outpatient Prescriptions:   •  albuterol (PROVENTIL HFA;VENTOLIN HFA) 108 (90 Base) MCG/ACT inhaler, Inhale 2 puffs As Needed for Wheezing.,  "Disp: , Rfl:   •  ALPRAZolam (XANAX) 0.5 MG tablet, Take 0.5 mg by mouth 3 (Three) Times a Day As Needed for Anxiety., Disp: , Rfl:   •  aspirin 81 MG EC tablet, Take 1 tablet by mouth Daily., Disp: 30 tablet, Rfl: 5  •  Brexpiprazole (REXULTI) 1 MG tablet, Take  by mouth Daily., Disp: , Rfl:   •  clopidogrel (PLAVIX) 75 MG tablet, Take 1 tablet by mouth Daily., Disp: 30 tablet, Rfl: 5  •  fluticasone-salmeterol (ADVAIR) 100-50 MCG/DOSE DISKUS, Inhale 1 puff As Needed., Disp: , Rfl:   •  gabapentin (NEURONTIN) 800 MG tablet, Take 800 mg by mouth 4 (Four) Times a Day., Disp: , Rfl:   •  HYDROcodone-acetaminophen (NORCO)  MG per tablet, Take 1 tablet by mouth Every 6 (Six) Hours As Needed for Moderate Pain ., Disp: , Rfl:   •  Ibuprofen (ADVIL PO), Take 400 mg by mouth 3 (Three) Times a Day. LIQUIGELS, Disp: , Rfl:   •  Insulin Aspart (NOVOLOG FLEXPEN SC), Inject 8-12 Units under the skin 3 (Three) Times a Day Before Meals. PER SLIDING SCALE, Disp: , Rfl:   •  Insulin Detemir (LEVEMIR FLEXPEN SC), Inject 8-12 Units under the skin Every Night. SLIDING SCALE, Disp: , Rfl:   •  levETIRAcetam (KEPPRA) 1000 MG tablet, Take 1,000 mg by mouth 2 (Two) Times a Day., Disp: , Rfl:   •  lisinopril (PRINIVIL,ZESTRIL) 20 MG tablet, Take 20 mg by mouth Every Morning., Disp: , Rfl:   •  loratadine (CLARITIN) 10 MG tablet, Take 10 mg by mouth Every Night., Disp: , Rfl:   •  sertraline (ZOLOFT) 100 MG tablet, Take 100 mg by mouth Every Night., Disp: , Rfl:   •  B-D UF III MINI PEN NEEDLES 31G X 5 MM misc, , Disp: , Rfl:   No current facility-administered medications for this visit.     ALLERGIES:    Allergies   Allergen Reactions   • Codeine Other (See Comments)     \"UNKNOW  CHILDHOOD REACTION\"   • Penicillins Other (See Comments)     \"UNKNOWN CHILDHOOD ALLERGY\"   • Rituxan [Rituximab] Other (See Comments)     \"THROAT RAWNESS; FELT LIKE MY THROAT WAS CLOSING UP\"       PHYSICAL EXAM:  Visit Vitals   • /69   • Pulse 73   • " Temp 98.3 °F (36.8 °C) (Oral)   • Resp 20   • Wt 64.5 kg (142 lb 4.8 oz)   • SpO2 98%   • BMI 23.68 kg/m2   General: Alert and oriented.  Appears well  HEENT: Pupils are equal, round and reactive to light and accommodation, Extraocular movements full, oropharynx clear   Neck: no jvd or thyromegaly   Cardiovascular: regular rate and rhythm without murmurs, rubs or gallops   Pulmonary: clear to auscultation bilaterally   Abdomen: soft, non-tender, non-distended, normal active bowel sounds present, no organomegaly   Extremities: No clubbing, cyanosis or edema   Lymph: +L supraclavicular and L>R cervical adenopathy (approx 1 -2 cm) . She has small (1.5 cm) cj cervical LN, R axillary LAD (4.5 cm) and L axillary LAD (approx 2.5 cm) and shoddy cj inguinal adenlopathy   Neurologic: Mental status as above, CN II-XII intact, grossly non-focal exam     PATHOLOGY:  5-25-12 L Supraclavicular LN Biopsy:   - Extensive small lymphocytic lymphoma / chronic lymphocytic leukemia involvement.   Flow cytometry shows monoclonal Kappa B-cell population which coexpresses CD5 and CD23 representing 30% of gated cells. There is dim surface light chain and dim CD20 expression. No CD38 expression. Neoplastic cells are positive for CD20 (dim), PAX-5, CCD5, CD23, CD43. B cells are + for CD3. BCL1 stain negative.    BM Biopsy 6-29-12 ([facility])   - Limited BM specimen with scattered hematopoietic cells.   - Flow cytometry revealed peripheral blood with minute CLL/SLL population (0.5%) with normal  FISH studies.     Cervical Disectomy 04-18-14:   - Fragments of fibrohyaline cartilage, no acute inflammation or metastatic disease identified.     IMAGING:  PET-CT 6-19-12:   - Mild to moderate bilateral axillary LAD and mediastinal LAD with minimal associated hypermetabolism.     PET-CT 10-09-12:   - Multiple areas of abnormal hypermetabolism in the neck bilaterally, new from the prior study. R posterior trangle focus with SUV 3.1. L supraclavicular  focus SUV 5.0 (LN 1.8 cm, prior 1.2 cm)   - Carl axillary LAD - L axilla 2.8 cm LN (prior 1.6 cm) with SUV 3.6, R axillary ln new 2.0 cm, SUV 2.6   - Mediastinal LAD noted. R prevascular LN 1.5 cm with SUV 2.1   - Carl external iliac LNs present ( R 3.2 cm with SUV 3.1)   - Overall worsening areas of hypermetabolism corresponding to enlarging nodes c/w worsening neoplastic involvement.     PET-CT 1-16-13:   - No PET-CT findings of active disease related to the patient's lymphoma. Interval resolution of hypermetabolic adenopathy described on previous examination. Spleen is normal.     PET-CT 1-21-14:   - No abnormal hypermetabolism to suggest neoplastic involvement. No mass or adenopathy noted. Spleen is normal.     04-03-14 MRI Spine:   - Cervical: Degenerative disk disease in the cervical disk spaces at the level of C4-C7. At 4-5, there is a disk herniation associated with bulging. There was spinal stenosis at C5-6 but no disk herniation. At C6-7, the disk bulging is not felt to be significant. The postcontrasted scans in the cervical spine showed no areas of abnormal enhancement.   - Thoracic: negative MRI examination of the thoracic spine. A source of the patient's t horacic spine pain was not demonstrated.     Chest Xray 04-17-15:   - The lungs are clear, the heart is normal. There is no active disease in the chest.     CT CAP/neck w/ contrast 05-13-14:   - No neck lymphadenopathy by CT size criteria   - No intrathoracic or abdominal LAD     CTAP, neck w/ contrast 03-04-15:   - No enlarged lymph nodes were demonstrated. The bile ducts in the liver and the extrahepatic biliary tree is slightly dilated.   - Clinical correlation with laboratory evaluation for obstruction suggested. This, however, is not significantly changed from a previous CT from 05/2014. No retroperitoneal lymphadenopathy is identified.   - Negative CT of the of the soft tissues of the neck. No abnormally enlarged lymph nodes are demonstrated.      CT Neck, CAP 3-15-16:   - There is a change in the CT scan. There are prominent lymph nodes in the neck bilaterally. The largest are in the supraclavicular area and measure greater than a centimeter. Most of the nodes are in the 1cm size range.   - In the chest, most of the nodes are in the subcen timeter size range. The largest nodes are in the pretacheal area and measure 15 mm. There were no enlarged hilar nodes. On the lung windows, there are no pleural effusions. No pulmonary or parenchymal lung nodules or masses were demonstrated. .   There are m ore prominent lymph nodes in the retroperitoneum some of which are enlarged today consistent with recurrence. Again, most of these are in the 1 cm size range, but a few measure up to 17-18 mm. No mesenteric adenopathy demonstrated.     [facility], Neck 05-10-16:   - Chest: Persistent stable adenopathy in the mediastinum and axillary regions. The lungs remain clear.   - A/P: The lymph nodes are stable in comparing with the previous CT done in March.   - Neck: Persistent but stable adenopathy throughout the jugular lymph node chains in both sides of the neck.     [facility] neck 08-16-16:  - Stable mediastinal enlarged lymph nodes including an AP window lymph node measuring 9 mm  - A left axillary region lymph node measures 1.9 cm and was 1.9 cm. Stable right axillary region lymph adenopathy.   - Stable retroperitoneal and mesenteric lymphadenopathy   - persistent lymphadenopathy throughout the neck that is stable in size.     [facility]  [facility] Stable bilateral jugular chain lymphadenopathy.  - Stable bilateral axillary and mediastinal region lymphadenopathy.  - Stable retroperitoneal lymphadenopathy.    [facility], Neck 02-27-18:  - Grossly stable bilateral jugular chain lymphadenopathy.  - LUNGS: A NEW RIGHT UPPER LOBE PULMONARY NODULE MEASURES 8.8 MM. RIGHT MIDDLE LOBE PULMONARY PERIBRONCHIAL VASCULAR NODULARITY MAY BE INFECTIOUS OR INFLAMMATORY  - MEDIASTINUM: AGAIN THERE IS  MEDIASTINAL BORDERLINE LYMPHADENOPATHY  WITHIN AP WINDOW LYMPH NODE NOW MEASURING 1 CM AND WAS 8 MM WITH SIMILAR  INCREASE IN SIZE OF MULTIPLE BILATERAL AXILLARY LYMPH NODES WITH THE  LARGEST RIGHT MEASURING 4.4 CM OF THE CONGLOMERATE THAT WAS ABOUT 3 CM  AND A LEFT AXILLARY REGION LYMPH NODE MEASURING 2.7 CM THAT WAS 2.2 CM.  - SPLEEN: SPLENOMEGALY IS AGAIN NOTED.  -LYMPH NODES: Extensive retroperitoneal lymphadenopathy with a right para-aortic lymph node measuring about 4.5 cm and was 2.5 cm.    RECENT LABS:  Lab Results   Component Value Date    WBC 4.03 (L) 02/27/2018    HGB 14.1 02/27/2018    HCT 41.9 02/27/2018    .7 (H) 02/27/2018    RDW 13.0 02/27/2018     02/27/2018    NEUTRORELPCT 51.6 02/27/2018    LYMPHORELPCT 44.2 02/27/2018    MONORELPCT 3.0 02/27/2018    EOSRELPCT 1.0 02/27/2018    BASORELPCT 0.2 02/27/2018    NEUTROABS 2.08 02/27/2018    LYMPHSABS 1.78 02/27/2018       Lab Results   Component Value Date     (L) 02/27/2018    K 4.9 02/27/2018    CO2 25.0 02/27/2018     02/27/2018    BUN 11 02/27/2018    CREATININE 0.81 02/27/2018    GLUCOSE 454 (H) 02/27/2018    CALCIUM 8.8 02/27/2018    ALKPHOS 107 (H) 02/27/2018    AST 17 02/27/2018    ALT 21 02/27/2018    BILITOT 0.5 02/27/2018    ALBUMIN 4.30 02/27/2018    PROTEINTOT 6.9 02/27/2018       Lab Results   Component Value Date     02/27/2018    URICACID 3.9 06/25/2015     Date  SPEP/MONE Mspike Free K Free L  K/L Ratio   02-12-15 MONE normal Not observed   06-25-15 MONE normal Not observed 22.10  14.93  1.48  11-17-15 MONE normal Not observed 17.17  14.41  1.19    Date  IgG IgA IgM  02-12-15 744 110 43  06-25-15 776 94 39  11-17-15 812 99 41  03-29-17 771 103 34  08-04-17 831 94 27  11-13-17 791 95 21       ASSESSMENT & PLAN:  Ms. Mae is a 53 y.o.  female with a history of Stage II SLL diagnosed in May of 2012 by L supraclavicular LN biopsy.     1. Small Lymphocytic Lymphoma:   - Treated as above with 6 cycles  of Bendamustine (after anaphylactic reaction to Rituximab with 1st cycle of treatment) with complete radiographic response.   - She does have adenopathy in the cj cervical, supraclavicular, axillary and cj inguinal areas c/w CLL/SLL. She also has small mediastinal nodes and retroperitoneal LAD.  She has had growth of the lymph nodes, but no significant symptoms or bulky disease as of yet.  I suspect that at some point this may change.  Will plan to check CLL FISH panel and to check for TP53.  Will watch for now but plan for short interval CT imaging after 2 months.   -  Will plan for repeat exam in 2 months with labwork at that time and CT Neck, CAP prior.  She will call in the interim if she should have needs prior.     2.  Signficant vascular disease:  -  She has seen Dr. Amaral who watching her for now.    3.  Tobacco use:  She tried to quit but restarted.  Her daughter is planning to quit and I suggested they try to quit together.  She had some success with patches in the past.  We discussed methods to help her and she says she would like to try.      4.  Prophylaxis:  She received influenza and Prevnar vaccines 11-13-17.    5. ACO Quality measures  - Melania Mae does currently use tobacco products.  - I advised the patient of the risks in continuing to use tobacco, and I provided this patient with smoking cessation educational materials. She is agreeable to try to quit with her daughter and prefers to try nicotine patches. Will send a prescription for this to her pharmacy.   During this visit, I spent > 3-10 minutes counseling the patient regarding smoking cessation.  - Patient's BMI is within normal parameters. No follow-up required.  - Current outpatient and discharge medications have been reconciled for the patient by Thea Sorto MD    I spent 25 minutes with Ms. Mae today. More than 50% of the time was spent in counseling / coordination of care for the above mentioned problems.        Electronically Signed by: Thea Sorto MD      CC:   BRANDON Panchal Dr.

## 2018-02-28 LAB
IGA SERPL-MCNC: 109 MG/DL (ref 87–352)
IGG SERPL-MCNC: 879 MG/DL (ref 700–1600)
IGM SERPL-MCNC: 25 MG/DL (ref 26–217)

## 2018-03-02 LAB — CREAT BLDA-MCNC: 0.5 MG/DL (ref 0.6–1.3)

## 2018-04-12 ENCOUNTER — HOSPITAL ENCOUNTER (OUTPATIENT)
Dept: CT IMAGING | Facility: HOSPITAL | Age: 53
Discharge: HOME | End: 2018-04-12
Attending: INTERNAL MEDICINE | Admitting: INTERNAL MEDICINE

## 2018-04-12 ENCOUNTER — HOSPITAL ENCOUNTER (OUTPATIENT)
Dept: CT IMAGING | Facility: HOSPITAL | Age: 53
Discharge: HOME | End: 2018-04-12
Attending: INTERNAL MEDICINE

## 2018-04-12 DIAGNOSIS — C91.10 CLL (CHRONIC LYMPHOCYTIC LEUKEMIA) (HCC): ICD-10-CM

## 2018-04-12 LAB — CREAT BLDA-MCNC: 0.6 MG/DL (ref 0.6–1.3)

## 2018-04-12 PROCEDURE — 74177 CT ABD & PELVIS W/CONTRAST: CPT

## 2018-04-12 PROCEDURE — 82565 ASSAY OF CREATININE: CPT

## 2018-04-12 PROCEDURE — 71260 CT THORAX DX C+: CPT

## 2018-04-12 PROCEDURE — 25010000002 IOPAMIDOL 61 % SOLUTION: Performed by: INTERNAL MEDICINE

## 2018-04-12 PROCEDURE — 71260 CT THORAX DX C+: CPT | Performed by: RADIOLOGY

## 2018-04-12 PROCEDURE — 74177 CT ABD & PELVIS W/CONTRAST: CPT | Performed by: RADIOLOGY

## 2018-04-12 RX ADMIN — IOPAMIDOL 100 ML: 612 INJECTION, SOLUTION INTRAVENOUS at 14:15

## 2018-04-24 DIAGNOSIS — C91.10 CLL (CHRONIC LYMPHOCYTIC LEUKEMIA) (HCC): Primary | ICD-10-CM

## 2018-04-27 ENCOUNTER — OFFICE VISIT (OUTPATIENT)
Dept: ONCOLOGY | Facility: CLINIC | Age: 53
End: 2018-04-27

## 2018-04-27 ENCOUNTER — LAB (OUTPATIENT)
Dept: ONCOLOGY | Facility: CLINIC | Age: 53
End: 2018-04-27

## 2018-04-27 VITALS
BODY MASS INDEX: 23 KG/M2 | HEART RATE: 87 BPM | DIASTOLIC BLOOD PRESSURE: 69 MMHG | WEIGHT: 138.21 LBS | SYSTOLIC BLOOD PRESSURE: 121 MMHG | RESPIRATION RATE: 18 BRPM | TEMPERATURE: 97.1 F | OXYGEN SATURATION: 98 %

## 2018-04-27 DIAGNOSIS — Z11.59 ENCOUNTER FOR SCREENING FOR OTHER VIRAL DISEASES (CODE): ICD-10-CM

## 2018-04-27 DIAGNOSIS — C91.10 CLL (CHRONIC LYMPHOCYTIC LEUKEMIA) (HCC): Primary | ICD-10-CM

## 2018-04-27 DIAGNOSIS — Z11.4 ENCOUNTER FOR SCREENING FOR HIV: ICD-10-CM

## 2018-04-27 DIAGNOSIS — C91.10 CLL (CHRONIC LYMPHOCYTIC LEUKEMIA) (HCC): ICD-10-CM

## 2018-04-27 DIAGNOSIS — D72.0 GENETIC ANOMALIES OF LEUKOCYTES (HCC): ICD-10-CM

## 2018-04-27 LAB
ALBUMIN SERPL-MCNC: 4 G/DL (ref 3.5–5)
ALBUMIN/GLOB SERPL: 1.4 G/DL (ref 1.5–2.5)
ALP SERPL-CCNC: 115 U/L (ref 35–104)
ALT SERPL W P-5'-P-CCNC: 26 U/L (ref 10–36)
ANION GAP SERPL CALCULATED.3IONS-SCNC: 3 MMOL/L (ref 3.6–11.2)
AST SERPL-CCNC: 16 U/L (ref 10–30)
BASOPHILS # BLD AUTO: 0.01 10*3/MM3 (ref 0–0.3)
BASOPHILS NFR BLD AUTO: 0.3 % (ref 0–2)
BILIRUB SERPL-MCNC: 0.5 MG/DL (ref 0.2–1.8)
BUN BLD-MCNC: 10 MG/DL (ref 7–21)
BUN/CREAT SERPL: 12.3 (ref 7–25)
CALCIUM SPEC-SCNC: 9.2 MG/DL (ref 7.7–10)
CHLORIDE SERPL-SCNC: 110 MMOL/L (ref 99–112)
CO2 SERPL-SCNC: 23 MMOL/L (ref 24.3–31.9)
CREAT BLD-MCNC: 0.81 MG/DL (ref 0.43–1.29)
DEPRECATED RDW RBC AUTO: 45.2 FL (ref 37–54)
EOSINOPHIL # BLD AUTO: 0.02 10*3/MM3 (ref 0–0.7)
EOSINOPHIL NFR BLD AUTO: 0.5 % (ref 0–5)
ERYTHROCYTE [DISTWIDTH] IN BLOOD BY AUTOMATED COUNT: 12.3 % (ref 11.5–14.5)
GFR SERPL CREATININE-BSD FRML MDRD: 74 ML/MIN/1.73
GLOBULIN UR ELPH-MCNC: 2.9 GM/DL
GLUCOSE BLD-MCNC: 351 MG/DL (ref 70–110)
HBV CORE IGM SERPL QL IA: NORMAL
HBV SURFACE AB SER RIA-ACNC: NORMAL
HBV SURFACE AG SERPL QL IA: NORMAL
HCT VFR BLD AUTO: 41.9 % (ref 37–47)
HCV AB SER DONR QL: NORMAL
HGB BLD-MCNC: 14.6 G/DL (ref 12–16)
HIV1+2 AB SER QL: NORMAL
IMM GRANULOCYTES # BLD: 0 10*3/MM3 (ref 0–0.03)
IMM GRANULOCYTES NFR BLD: 0 % (ref 0–0.5)
LYMPHOCYTES # BLD AUTO: 2.29 10*3/MM3 (ref 1–3)
LYMPHOCYTES NFR BLD AUTO: 61.9 % (ref 21–51)
MCH RBC QN AUTO: 35.6 PG (ref 27–33)
MCHC RBC AUTO-ENTMCNC: 34.8 G/DL (ref 33–37)
MCV RBC AUTO: 102.2 FL (ref 80–94)
MONOCYTES # BLD AUTO: 0.13 10*3/MM3 (ref 0.1–0.9)
MONOCYTES NFR BLD AUTO: 3.5 % (ref 0–10)
NEUTROPHILS # BLD AUTO: 1.25 10*3/MM3 (ref 1.4–6.5)
NEUTROPHILS NFR BLD AUTO: 33.8 % (ref 30–70)
OSMOLALITY SERPL CALC.SUM OF ELEC: 285 MOSM/KG (ref 273–305)
PLAT MORPH BLD: NORMAL
PLATELET # BLD AUTO: 100 10*3/MM3 (ref 130–400)
PMV BLD AUTO: 10.3 FL (ref 6–10)
POTASSIUM BLD-SCNC: 4.6 MMOL/L (ref 3.5–5.3)
PROT SERPL-MCNC: 6.9 G/DL (ref 6–8)
RBC # BLD AUTO: 4.1 10*6/MM3 (ref 4.2–5.4)
RBC MORPH BLD: NORMAL
SODIUM BLD-SCNC: 136 MMOL/L (ref 135–153)
WBC NRBC COR # BLD: 3.7 10*3/MM3 (ref 4.5–12.5)

## 2018-04-27 PROCEDURE — 85025 COMPLETE CBC W/AUTO DIFF WBC: CPT | Performed by: INTERNAL MEDICINE

## 2018-04-27 PROCEDURE — 86706 HEP B SURFACE ANTIBODY: CPT | Performed by: INTERNAL MEDICINE

## 2018-04-27 PROCEDURE — 82784 ASSAY IGA/IGD/IGG/IGM EACH: CPT | Performed by: INTERNAL MEDICINE

## 2018-04-27 PROCEDURE — G0432 EIA HIV-1/HIV-2 SCREEN: HCPCS | Performed by: INTERNAL MEDICINE

## 2018-04-27 PROCEDURE — 85007 BL SMEAR W/DIFF WBC COUNT: CPT | Performed by: INTERNAL MEDICINE

## 2018-04-27 PROCEDURE — 88271 CYTOGENETICS DNA PROBE: CPT | Performed by: INTERNAL MEDICINE

## 2018-04-27 PROCEDURE — 99214 OFFICE O/P EST MOD 30 MIN: CPT | Performed by: INTERNAL MEDICINE

## 2018-04-27 PROCEDURE — 88275 CYTOGENETICS 100-300: CPT | Performed by: INTERNAL MEDICINE

## 2018-04-27 PROCEDURE — 80053 COMPREHEN METABOLIC PANEL: CPT | Performed by: INTERNAL MEDICINE

## 2018-04-27 PROCEDURE — 80074 ACUTE HEPATITIS PANEL: CPT | Performed by: INTERNAL MEDICINE

## 2018-04-27 RX ORDER — SULFAMETHOXAZOLE AND TRIMETHOPRIM 400; 80 MG/1; MG/1
1 TABLET ORAL DAILY
Qty: 30 TABLET | Refills: 3 | Status: SHIPPED | OUTPATIENT
Start: 2018-04-27 | End: 2018-09-04 | Stop reason: SDUPTHER

## 2018-04-27 RX ORDER — VALACYCLOVIR HYDROCHLORIDE 500 MG/1
500 TABLET, FILM COATED ORAL DAILY
Qty: 30 TABLET | Refills: 3 | Status: SHIPPED | OUTPATIENT
Start: 2018-04-27 | End: 2018-09-04 | Stop reason: SDUPTHER

## 2018-04-27 NOTE — PROGRESS NOTES
DATE:  4/27/2018    DIAGNOSIS:  SLL - initially diagnosed with [place] Stage II Disease in May 2012, based on excisional biopsy of a L supraclavicular LN. Bone marrow was involved.     CHIEF COMPLAINT:  CLL / SLL      TREATMENT HISTORY:  1. Received 6 cycles of Treanda without Rituxan between 10-24-12 and 3-21-13 because of anaphylactic reaction to Rituximab with her 1st cycle of treatment . Received Neulasta support. (Delivered by Dr. Koehler)       HISTORY OF PRESENT ILLNESS:   Ms. Mae was referred by Gillian Harrison for evaluation and treatment of CLL / SLL. She was reportedly diagnosed in May of 2012 when she saw Dr. Carbajal for biopsy of a L supraclavicular LN. At that time she had excessive fatigue & a 40 lb weight loss. She saw Dr. Koehler at  at time who performed bone marrow exam (bone marrow was involved) and started treatment with Bendamustine / Rituximab. Unfortunately with her 1st treatment, she had an anaphylactic reaction to Rituximab so this was not given with subsequent cycles of therapy. Her last cycle of treatment was 3-21-13. Per Dr. Koehler' s records, she had a complete response to treatment. She never had a significant leukocytosis or lymphocytosis, but had predominantly a lymphomatous presentation of her disease.     INTERVAL HISTORY:  Ms. Mae is here with her daughter for follow up of CLL/SLL.  Since she was here last, she hasn't been feeling well. She has been helping care for her daughter who was recently diagnosed with colorectal cancer. She reports fatigue, lower back and bilateral neuropathic leg pain. She states the pain in her left leg is more severe than the right. She also reports nausea as well as insomnia related to the uncontrolled pain. She also says her blood sugars have been more elevated than usual due to the stress of caring for her daughter. She continues to have night sweats that are no worse in severity for frequency as well as a weight loss of approximately 15 pounds  over the last 6 months. She is otherwise well and without complaint. She is currently wearing a heart monitor Dr. Shabbir topete because of palpitations.      PAST MEDICAL HISTORY:  Past Medical History:   Diagnosis Date   • Aneurysm 8/223/17    Cavernous left ICA aneurysm   • Asthma    • Carotid artery occlusion    • Chronic back pain    • CLL (chronic lymphocytic leukemia)     OCTOBER OF 2012   • COPD (chronic obstructive pulmonary disease)    • Degenerative arthritis    • Depression    • Diabetes mellitus     type 2   • Hyperlipidemia    • Hypertension    • Lymphadenopathy    • Non Hodgkin's lymphoma        PAST SURGICAL HISTORY:  Past Surgical History:   Procedure Laterality Date   • APPENDECTOMY     • BACK SURGERY      2002 (work accident) c-spine surgery 4/14   • CAROTID STENT Right 08/23/2017   • CEREBRAL ANGIOGRAM N/A 8/23/2017    Diagnostic Carotid/Cerebral Angiogram   • CHOLECYSTECTOMY     • HYSTERECTOMY      for endometriosis/ovarian bleed   • NECK SURGERY     • OTHER SURGICAL HISTORY      pac insertion and removal   • TONSILLECTOMY         FAMILY HISTORY:  Family History   Problem Relation Age of Onset   • Lung cancer Father 72   • Hypertension Father    • Heart disease Father    • Cancer Father    • Diabetes Father    • Other Brother 46     MI   • Heart disease Brother    • Breast cancer Paternal Aunt    • Colon cancer Maternal Grandfather    • Other Cousin      CLL (dx 48 yo)   • Hypertension Mother      Social History     Social History   • Marital status:      Spouse name: N/A   • Number of children: N/A   • Years of education: N/A     Occupational History   • Not on file.     Social History Main Topics   • Smoking status: Current Every Day Smoker     Packs/day: 0.50     Years: 35.00     Types: Cigarettes   • Smokeless tobacco: Never Used      Comment: 0.5-1 PPD   • Alcohol use No   • Drug use: No   • Sexual activity: Defer     Other Topics Concern   • Not on file     Social History  Narrative   • No narrative on file     REVIEW OF SYSTEMS:    A comprehensive 14 point review of systems was performed and was negative except as mentioned    MEDICATIONS:  The current medication list was reviewed in the EMR    Current Outpatient Prescriptions:   •  albuterol (PROVENTIL HFA;VENTOLIN HFA) 108 (90 Base) MCG/ACT inhaler, Inhale 2 puffs As Needed for Wheezing., Disp: , Rfl:   •  ALPRAZolam (XANAX) 0.5 MG tablet, Take 0.5 mg by mouth 3 (Three) Times a Day As Needed for Anxiety., Disp: , Rfl:   •  aspirin 81 MG EC tablet, Take 1 tablet by mouth Daily., Disp: 30 tablet, Rfl: 5  •  B-D UF III MINI PEN NEEDLES 31G X 5 MM misc, , Disp: , Rfl:   •  Brexpiprazole (REXULTI) 1 MG tablet, Take  by mouth Daily., Disp: , Rfl:   •  clopidogrel (PLAVIX) 75 MG tablet, Take 1 tablet by mouth Daily., Disp: 30 tablet, Rfl: 5  •  fluticasone-salmeterol (ADVAIR) 100-50 MCG/DOSE DISKUS, Inhale 1 puff As Needed., Disp: , Rfl:   •  gabapentin (NEURONTIN) 800 MG tablet, Take 800 mg by mouth 4 (Four) Times a Day., Disp: , Rfl:   •  HYDROcodone-acetaminophen (NORCO)  MG per tablet, Take 1 tablet by mouth Every 6 (Six) Hours As Needed for Moderate Pain ., Disp: , Rfl:   •  Ibuprofen (ADVIL PO), Take 400 mg by mouth 3 (Three) Times a Day. LIQUIGELS, Disp: , Rfl:   •  Insulin Aspart (NOVOLOG FLEXPEN SC), Inject 8-12 Units under the skin 3 (Three) Times a Day Before Meals. PER SLIDING SCALE, Disp: , Rfl:   •  Insulin Detemir (LEVEMIR FLEXPEN SC), Inject 8-12 Units under the skin Every Night. SLIDING SCALE, Disp: , Rfl:   •  levETIRAcetam (KEPPRA) 1000 MG tablet, Take 1,000 mg by mouth 2 (Two) Times a Day., Disp: , Rfl:   •  lisinopril (PRINIVIL,ZESTRIL) 20 MG tablet, Take 20 mg by mouth Every Morning., Disp: , Rfl:   •  loratadine (CLARITIN) 10 MG tablet, Take 10 mg by mouth Every Night., Disp: , Rfl:   •  nicotine (NICODERM CQ) 14 MG/24HR patch, Place 1 patch on the skin Daily., Disp: 21 patch, Rfl: 1  •  ondansetron (ZOFRAN) 8  "MG tablet, Take 1 tablet by mouth Every 8 (Eight) Hours As Needed for Nausea or Vomiting., Disp: 90 tablet, Rfl: 6  •  sertraline (ZOLOFT) 100 MG tablet, Take 100 mg by mouth Every Night., Disp: , Rfl:     ALLERGIES:    Allergies   Allergen Reactions   • Codeine Other (See Comments)     \"UNKNOW  CHILDHOOD REACTION\"   • Penicillins Other (See Comments)     \"UNKNOWN CHILDHOOD ALLERGY\"   • Rituxan [Rituximab] Other (See Comments)     \"THROAT RAWNESS; FELT LIKE MY THROAT WAS CLOSING UP\"       PHYSICAL EXAM:  Visit Vitals  /69   Pulse 87   Temp 97.1 °F (36.2 °C) (Oral)   Resp 18   Wt 62.7 kg (138 lb 3.4 oz)   SpO2 98%   BMI 23.00 kg/m²   General: Alert and oriented.  Appears tired today.  HEENT: Pupils are equal, round and reactive to light and accommodation, Extraocular movements full, oropharynx clear   Neck: no jvd or thyromegaly   Cardiovascular: regular rate and rhythm without murmurs, rubs or gallops. Heart monitor in place.   Pulmonary: clear to auscultation bilaterally   Abdomen: soft, non-tender, non-distended, normal active bowel sounds present, no organomegaly   Extremities: No clubbing, cyanosis or edema   Lymph: +L supraclavicular and cj cervical adenopathy (approx 2 cm) . She has R axillary LAD (4.5 cm) and L axillary LAD (approx 4 cm) and shoddy cj inguinal adenlopathy   Neurologic: Mental status as above, CN II-XII intact, grossly non-focal exam     PATHOLOGY:  5-25-12 L Supraclavicular LN Biopsy:   - Extensive small lymphocytic lymphoma / chronic lymphocytic leukemia involvement.   Flow cytometry shows monoclonal Kappa B-cell population which coexpresses CD5 and CD23 representing 30% of gated cells. There is dim surface light chain and dim CD20 expression. No CD38 expression. Neoplastic cells are positive for CD20 (dim), PAX-5, CCD5, CD23, CD43. B cells are + for CD3. BCL1 stain negative.    BM Biopsy 6-29-12 ([facility])   - Limited BM specimen with scattered hematopoietic cells.   - Flow cytometry " revealed peripheral blood with minute CLL/SLL population (0.5%) with normal  FISH studies.     Cervical Disectomy 04-18-14:   - Fragments of fibrohyaline cartilage, no acute inflammation or metastatic disease identified.     IMAGING:  PET-CT 6-19-12:   - Mild to moderate bilateral axillary LAD and mediastinal LAD with minimal associated hypermetabolism.     PET-CT 10-09-12:   - Multiple areas of abnormal hypermetabolism in the neck bilaterally, new from the prior study. R posterior trangle focus with SUV 3.1. L supraclavicular focus SUV 5.0 (LN 1.8 cm, prior 1.2 cm)   - Carl axillary LAD - L axilla 2.8 cm LN (prior 1.6 cm) with SUV 3.6, R axillary ln new 2.0 cm, SUV 2.6   - Mediastinal LAD noted. R prevascular LN 1.5 cm with SUV 2.1   - Carl external iliac LNs present ( R 3.2 cm with SUV 3.1)   - Overall worsening areas of hypermetabolism corresponding to enlarging nodes c/w worsening neoplastic involvement.     PET-CT 1-16-13:   - No PET-CT findings of active disease related to the patient's lymphoma. Interval resolution of hypermetabolic adenopathy described on previous examination. Spleen is normal.     PET-CT 1-21-14:   - No abnormal hypermetabolism to suggest neoplastic involvement. No mass or adenopathy noted. Spleen is normal.     04-03-14 MRI Spine:   - Cervical: Degenerative disk disease in the cervical disk spaces at the level of C4-C7. At 4-5, there is a disk herniation associated with bulging. There was spinal stenosis at C5-6 but no disk herniation. At C6-7, the disk bulging is not felt to be significant. The postcontrasted scans in the cervical spine showed no areas of abnormal enhancement.   - Thoracic: negative MRI examination of the thoracic spine. A source of the patient's t horacic spine pain was not demonstrated.     Chest Xray 04-17-15:   - The lungs are clear, the heart is normal. There is no active disease in the chest.     CT CAP/neck w/ contrast 05-13-14:   - No neck lymphadenopathy by CT size  criteria   - No intrathoracic or abdominal LAD     CTAP, neck w/ contrast 03-04-15:   - No enlarged lymph nodes were demonstrated. The bile ducts in the liver and the extrahepatic biliary tree is slightly dilated.   - Clinical correlation with laboratory evaluation for obstruction suggested. This, however, is not significantly changed from a previous CT from 05/2014. No retroperitoneal lymphadenopathy is identified.   - Negative CT of the of the soft tissues of the neck. No abnormally enlarged lymph nodes are demonstrated.     CT Neck, CAP 3-15-16:   - There is a change in the CT scan. There are prominent lymph nodes in the neck bilaterally. The largest are in the supraclavicular area and measure greater than a centimeter. Most of the nodes are in the 1cm size range.   - In the chest, most of the nodes are in the subcen timeter size range. The largest nodes are in the pretacheal area and measure 15 mm. There were no enlarged hilar nodes. On the lung windows, there are no pleural effusions. No pulmonary or parenchymal lung nodules or masses were demonstrated. .   There are m ore prominent lymph nodes in the retroperitoneum some of which are enlarged today consistent with recurrence. Again, most of these are in the 1 cm size range, but a few measure up to 17-18 mm. No mesenteric adenopathy demonstrated.     CTCAP, Neck 05-10-16:   - Chest: Persistent stable adenopathy in the mediastinum and axillary regions. The lungs remain clear.   - A/P: The lymph nodes are stable in comparing with the previous CT done in March.   - Neck: Persistent but stable adenopathy throughout the jugular lymph node chains in both sides of the neck.     CTCAP neck 08-16-16:  - Stable mediastinal enlarged lymph nodes including an AP window lymph node measuring 9 mm  - A left axillary region lymph node measures 1.9 cm and was 1.9 cm. Stable right axillary region lymph adenopathy.   - Stable retroperitoneal and mesenteric lymphadenopathy   -  persistent lymphadenopathy throughout the neck that is stable in size.     CTCAP 06-07-17:  - Stable bilateral jugular chain lymphadenopathy.  - Stable bilateral axillary and mediastinal region lymphadenopathy.  - Stable retroperitoneal lymphadenopathy.    CTCAP, Neck 02-27-18:  - Grossly stable bilateral jugular chain lymphadenopathy.  - LUNGS: A NEW RIGHT UPPER LOBE PULMONARY NODULE MEASURES 8.8 MM. RIGHT MIDDLE LOBE PULMONARY PERIBRONCHIAL VASCULAR NODULARITY MAY BE INFECTIOUS OR INFLAMMATORY  - MEDIASTINUM: AGAIN THERE IS MEDIASTINAL BORDERLINE LYMPHADENOPATHY  WITHIN AP WINDOW LYMPH NODE NOW MEASURING 1 CM AND WAS 8 MM WITH SIMILAR  INCREASE IN SIZE OF MULTIPLE BILATERAL AXILLARY LYMPH NODES WITH THE  LARGEST RIGHT MEASURING 4.4 CM OF THE CONGLOMERATE THAT WAS ABOUT 3 CM  AND A LEFT AXILLARY REGION LYMPH NODE MEASURING 2.7 CM THAT WAS 2.2 CM.  - SPLEEN: SPLENOMEGALY IS AGAIN NOTED.  -LYMPH NODES: Extensive retroperitoneal lymphadenopathy with a right para-aortic lymph node measuring about 4.5 cm and was 2.5 cm.    CTCAP 04-12-18:  - There are numerous enlarged supraclavicular lymph nodes.  - These have progressed in size in comparing with the earlier CT. Several of the lymph nodes now measure up to 3 cm in diameter. The lymph nodes extend into the axillary regions. There are some lymph nodes in the mediastinum that are relatively stable in comparing with the earlier exam. The lungs are both well aerated and clear.  - The liver was normal in size and shape. There is mild dilatation of the bile ducts within the liver. The gallbladder is surgically absent. The common hepatic duct was also dilated in the pancreatic head, this is unchanged from the earlier exam. The spleen is homogeneous in density and is stable in size. There continues to be fairly marked adenopathy in the retroperitoneum. The lymph node size continues to increase slightly by a few millimeters. The largest bulk of lymph nodes is in the  gastrohepatic ligament region. There are also lymph nodes along the aorta. These are larger in size as well. Some of the periaortic lymph nodes now measure up to 3 cm in diameter.   - There are enlarged lymph nodes in the mesentery. There are enlarged lymph nodes along the iliac lymph node chains and some borderline enlarged lymph nodes in both groins. The bowel shows no evidence of obstruction, the kidneys enhance appropriately.    RECENT LABS:  Lab Results   Component Value Date    WBC 3.70 (L) 04/27/2018    HGB 14.6 04/27/2018    HCT 41.9 04/27/2018    .2 (H) 04/27/2018    RDW 12.3 04/27/2018     (L) 04/27/2018    NEUTRORELPCT 33.8 04/27/2018    LYMPHORELPCT 61.9 (H) 04/27/2018    MONORELPCT 3.5 04/27/2018    EOSRELPCT 0.5 04/27/2018    BASORELPCT 0.3 04/27/2018    NEUTROABS 1.25 (L) 04/27/2018    LYMPHSABS 2.29 04/27/2018       Lab Results   Component Value Date     04/27/2018    K 4.6 04/27/2018    CO2 23.0 (L) 04/27/2018     04/27/2018    BUN 10 04/27/2018    CREATININE 0.81 04/27/2018    GLUCOSE 351 (H) 04/27/2018    CALCIUM 9.2 04/27/2018    ALKPHOS 115 (H) 04/27/2018    AST 16 04/27/2018    ALT 26 04/27/2018    BILITOT 0.5 04/27/2018    ALBUMIN 4.00 04/27/2018    PROTEINTOT 6.9 04/27/2018       Lab Results   Component Value Date     02/27/2018    URICACID 3.9 06/25/2015     Date  SPEP/MONE Mspike Free K Free L  K/L Ratio   02-12-15 MONE normal Not observed   06-25-15 MONE normal Not observed 22.10  14.93  1.48  11-17-15 MONE normal Not observed 17.17  14.41  1.19    Date  IgG IgA IgM  02-12-15 744 110 43  06-25-15 776 94 39  11-17-15 812 99 41  03-29-17 771 103 34  08-04-17 831 94 27  11-13-17 791 95 21  02-27-18 879 109 25       ASSESSMENT & PLAN:  Ms. Mae is a 53 y.o.  female with a history of Stage II SLL diagnosed in May of 2012 by L supraclavicular LN biopsy.     1. Small Lymphocytic Lymphoma:   - Treated as above with 6 cycles of Bendamustine (after  anaphylactic reaction to Rituximab with 1st cycle of treatment) with complete radiographic response.   - She does have adenopathy in the cj cervical, supraclavicular, axillary and cj inguinal areas c/w CLL/SLL. She also has small mediastinal nodes and retroperitoneal LAD.  She has had continued growth of the lymph nodes, and has been losing weight.  She has some night sweats which may or may not be related.  -  I told her I thought it might be wise to consider treatment given worsening disease.  -  Given rituxan allergy, I recommended treatment with Imbruvica 420 mg daily with Bactrim SS daily and Valtrex 500 mg daily for prophylaxis.  -  Will send CLL FISH profile as this wasn't done previously and will check LDH/ Uric acid and hepatitis serologies.  -  Given possibility for Imbruvica to cause atrial fibrillation or even possibly ventricular arrhythmias and that she is wearing a heart monitor, I called  to discuss this.  He said he felt it would be safe for her to take and suggested that she go ahead and start as soon as it can be approved so that she can be monitored while on the medication.   -  Recommended Shingrix vaccine.      2.  Signficant vascular disease:  -  She has seen Dr. Amaral who has been watching her.  She says they are investigating lower vasculature blockages.    3.  Tobacco use:  She tried to quit but restarted.  Her daughter is planning to quit and I suggested they try to quit together.  She had some success with patches in the past.  We discussed methods to help her and she says she would like to try.  She is still working on this.    4.  Prophylaxis:  She received influenza and Prevnar vaccines 11-13-17.  Recommended Shingrix vaccine.    5. ACO Quality measures  - Melania Mae does currently use tobacco products.  - I advised the patient of the risks in continuing to use tobacco, and I provided this patient with smoking cessation educational materials. She is agreeable to try  to quit with her daughter and prefers to try nicotine patches. Will send a prescription for this to her pharmacy.   During this visit, I spent < 3 minutes counseling the patient regarding smoking cessation.  - Patient's BMI is within normal parameters. No follow-up required.  - Current outpatient and discharge medications have been reconciled for the patient by Thea Sorto MD    This note was scribed for Thea Sorto MD by Violeta Fong RN.    I, Thea Sorto MD, personally performed the services described in this documentation as scribed by the above named individual in my presence, and it is both accurate and complete.  04/27/2018     I spent 30 minutes with Ms. Mae today. More than 50% of the time was spent in counseling / coordination of care for the above mentioned problems.       Electronically Signed by: Thea Sorto MD      CC:   BRANDON Panchal Dr.

## 2018-04-28 LAB
IGA SERPL-MCNC: 117 MG/DL (ref 87–352)
IGG SERPL-MCNC: 998 MG/DL (ref 700–1600)
IGM SERPL-MCNC: 33 MG/DL (ref 26–217)

## 2018-04-30 ENCOUNTER — SPECIALTY PHARMACY (OUTPATIENT)
Dept: PHARMACY | Facility: HOSPITAL | Age: 53
End: 2018-04-30

## 2018-04-30 DIAGNOSIS — C91.10 CLL (CHRONIC LYMPHOCYTIC LEUKEMIA) (HCC): Primary | ICD-10-CM

## 2018-05-02 ENCOUNTER — OFFICE VISIT (OUTPATIENT)
Dept: ONCOLOGY | Facility: CLINIC | Age: 53
End: 2018-05-02

## 2018-05-02 ENCOUNTER — HOSPITAL ENCOUNTER (OUTPATIENT)
Dept: ULTRASOUND IMAGING | Facility: HOSPITAL | Age: 53
Discharge: HOME | End: 2018-05-02
Attending: INTERNAL MEDICINE | Admitting: INTERNAL MEDICINE

## 2018-05-02 VITALS
SYSTOLIC BLOOD PRESSURE: 148 MMHG | DIASTOLIC BLOOD PRESSURE: 65 MMHG | BODY MASS INDEX: 23.88 KG/M2 | HEART RATE: 71 BPM | TEMPERATURE: 97.5 F | OXYGEN SATURATION: 98 % | WEIGHT: 143.5 LBS | RESPIRATION RATE: 18 BRPM

## 2018-05-02 DIAGNOSIS — C91.10 CLL (CHRONIC LYMPHOCYTIC LEUKEMIA) (HCC): ICD-10-CM

## 2018-05-02 DIAGNOSIS — C91.10 CLL (CHRONIC LYMPHOCYTIC LEUKEMIA) (HCC): Primary | ICD-10-CM

## 2018-05-02 DIAGNOSIS — L02.91 ABSCESS: Primary | ICD-10-CM

## 2018-05-02 DIAGNOSIS — R19.00 PELVIC MASS: ICD-10-CM

## 2018-05-02 PROCEDURE — 76857 US EXAM PELVIC LIMITED: CPT | Performed by: RADIOLOGY

## 2018-05-02 PROCEDURE — 99213 OFFICE O/P EST LOW 20 MIN: CPT | Performed by: INTERNAL MEDICINE

## 2018-05-02 PROCEDURE — 76857 US EXAM PELVIC LIMITED: CPT

## 2018-05-02 RX ORDER — FLUCONAZOLE 100 MG/1
TABLET ORAL
Qty: 6 TABLET | Refills: 0 | Status: SHIPPED | OUTPATIENT
Start: 2018-05-02 | End: 2019-09-13

## 2018-05-02 RX ORDER — SULFAMETHOXAZOLE AND TRIMETHOPRIM 800; 160 MG/1; MG/1
1 TABLET ORAL 2 TIMES DAILY
Qty: 20 TABLET | Refills: 0 | Status: SHIPPED | OUTPATIENT
Start: 2018-05-02 | End: 2018-05-12

## 2018-05-02 NOTE — PROGRESS NOTES
"      Specialty Pharmacy Note      Name:  Melania Mae  :  1965  Date:  2018       Past Medical History:   Diagnosis Date   • Aneurysm     Cavernous left ICA aneurysm   • Asthma    • Carotid artery occlusion    • Chronic back pain    • CLL (chronic lymphocytic leukemia)     2012   • COPD (chronic obstructive pulmonary disease)    • Degenerative arthritis    • Depression    • Diabetes mellitus     type 2   • Hyperlipidemia    • Hypertension    • Lymphadenopathy    • Non Hodgkin's lymphoma        Past Surgical History:   Procedure Laterality Date   • APPENDECTOMY     • BACK SURGERY       (work accident) c-spine surgery    • CAROTID STENT Right 2017   • CEREBRAL ANGIOGRAM N/A 2017    Diagnostic Carotid/Cerebral Angiogram   • CHOLECYSTECTOMY     • HYSTERECTOMY      for endometriosis/ovarian bleed   • NECK SURGERY     • OTHER SURGICAL HISTORY      pac insertion and removal   • TONSILLECTOMY         Social History     Social History   • Marital status:      Spouse name: N/A   • Number of children: N/A   • Years of education: N/A     Occupational History   • Not on file.     Social History Main Topics   • Smoking status: Current Every Day Smoker     Packs/day: 0.50     Years: 35.00     Types: Cigarettes   • Smokeless tobacco: Never Used      Comment: 0.5-1 PPD   • Alcohol use No   • Drug use: No   • Sexual activity: Defer     Other Topics Concern   • Not on file     Social History Narrative   • No narrative on file       Family History   Problem Relation Age of Onset   • Lung cancer Father 72   • Hypertension Father    • Heart disease Father    • Cancer Father    • Diabetes Father    • Other Brother 46     MI   • Heart disease Brother    • Breast cancer Paternal Aunt    • Colon cancer Maternal Grandfather    • Other Cousin      CLL (dx 48 yo)   • Hypertension Mother        Allergies   Allergen Reactions   • Codeine Other (See Comments)     \"UNKNOW  CHILDHOOD " "REACTION\"   • Penicillins Other (See Comments)     \"UNKNOWN CHILDHOOD ALLERGY\"   • Rituxan [Rituximab] Other (See Comments)     \"THROAT RAWNESS; FELT LIKE MY THROAT WAS CLOSING UP\"       Current Outpatient Prescriptions   Medication Sig Dispense Refill   • albuterol (PROVENTIL HFA;VENTOLIN HFA) 108 (90 Base) MCG/ACT inhaler Inhale 2 puffs As Needed for Wheezing.     • ALPRAZolam (XANAX) 0.5 MG tablet Take 0.5 mg by mouth 3 (Three) Times a Day As Needed for Anxiety.     • aspirin 81 MG EC tablet Take 1 tablet by mouth Daily. 30 tablet 5   • B-D UF III MINI PEN NEEDLES 31G X 5 MM misc      • Brexpiprazole (REXULTI) 1 MG tablet Take  by mouth Daily.     • clopidogrel (PLAVIX) 75 MG tablet Take 1 tablet by mouth Daily. 30 tablet 5   • fluconazole (DIFLUCAN) 100 MG tablet Take 2 tabs on day 1 then 1 tab for days 2-5, then stop 6 tablet 0   • fluticasone-salmeterol (ADVAIR) 100-50 MCG/DOSE DISKUS Inhale 1 puff As Needed.     • gabapentin (NEURONTIN) 800 MG tablet Take 800 mg by mouth 4 (Four) Times a Day.     • HYDROcodone-acetaminophen (NORCO)  MG per tablet Take 1 tablet by mouth Every 6 (Six) Hours As Needed for Moderate Pain .     • ibrutinib (IMBRUVICA) 420 MG tablet tablet Take 1 tablet by mouth Daily. 28 tablet 3   • Ibuprofen (ADVIL PO) Take 400 mg by mouth 3 (Three) Times a Day. LIQUIGELS     • Insulin Aspart (NOVOLOG FLEXPEN SC) Inject 8-12 Units under the skin 3 (Three) Times a Day Before Meals. PER SLIDING SCALE     • Insulin Detemir (LEVEMIR FLEXPEN SC) Inject 8-12 Units under the skin Every Night. SLIDING SCALE     • levETIRAcetam (KEPPRA) 1000 MG tablet Take 1,000 mg by mouth 2 (Two) Times a Day.     • lisinopril (PRINIVIL,ZESTRIL) 20 MG tablet Take 20 mg by mouth Every Morning.     • loratadine (CLARITIN) 10 MG tablet Take 10 mg by mouth Every Night.     • nicotine (NICODERM CQ) 14 MG/24HR patch Place 1 patch on the skin Daily. 21 patch 1   • ondansetron (ZOFRAN) 8 MG tablet Take 1 tablet by mouth " Every 8 (Eight) Hours As Needed for Nausea or Vomiting. 90 tablet 6   • sertraline (ZOLOFT) 100 MG tablet Take 100 mg by mouth Every Night.     • sulfamethoxazole-trimethoprim (BACTRIM DS) 800-160 MG per tablet Take 1 tablet by mouth 2 (Two) Times a Day for 10 days. 20 tablet 0   • sulfamethoxazole-trimethoprim (BACTRIM) 400-80 MG tablet Take 1 tablet by mouth Daily. 30 tablet 3   • valACYclovir (VALTREX) 500 MG tablet Take 1 tablet by mouth Daily. 30 tablet 3     No current facility-administered medications for this visit.          LABORATORY:    Lab Results   Component Value Date    TSH 1.036 11/13/2017    PROTIME 10.2 04/17/2014    INR 0.96 04/17/2014     Lab Results   Component Value Date    PROTIME 10.2 04/17/2014    INR 0.96 04/17/2014     No results found for: HAV, HCVQUANT, KYRDAT00, HCVINFO, HCVGENOTYPE  Lab Results   Component Value Date    HEPBSAB Non-Reactive 04/27/2018     Last Urine Toxicity     There is no flowsheet data to display.          Evaluation and Follow Up:  Specialty pharmacy is evaluating patient due to new treatment start of CLL with Imbruvica. Prescription was obtained and sent to [facility]. Prior authorization was required and was obtained. It was processed with no issues. Patient was notified and dispensed to patient on 5/2/2018. Medication guide given to patient. She was counseled on administration information, common/serious side effects, etc. She had all questions answered and knows to contact clinic/pharmacy with any questions/concerns. She has a provider appointment and lab appointment in 1 week. Will schedule next specialty pharmacy refill visit on 5/28/2018.

## 2018-05-02 NOTE — PROGRESS NOTES
"Date: 2018    Patient Name: Melania Mae   : 1965   ID: [id number]    Complaint:  \"I have a lump the size of a tangerine between my belly button and my pelvic bone.\"      VS:   /65   Pulse 71   Temp 97.5 °F (36.4 °C) (Oral)   Resp 18   Wt 65.1 kg (143 lb 8 oz)   SpO2 98%   BMI 23.88 kg/m²        Labs:       Lab Results   Component Value Date    WBC 3.70 (L) 2018    HGB 14.6 2018    HCT 41.9 2018    .2 (H) 2018    RDW 12.3 2018     (L) 2018    NEUTRORELPCT 33.8 2018    LYMPHORELPCT 61.9 (H) 2018    MONORELPCT 3.5 2018    EOSRELPCT 0.5 2018    BASORELPCT 0.3 2018    NEUTROABS 1.25 (L) 2018    LYMPHSABS 2.29 2018       Lab Results   Component Value Date     2018    K 4.6 2018    CO2 23.0 (L) 2018     2018    BUN 10 2018    CREATININE 0.81 2018    GLUCOSE 351 (H) 2018    CALCIUM 9.2 2018    ALKPHOS 115 (H) 2018    AST 16 2018    ALT 26 2018    BILITOT 0.5 2018    ALBUMIN 4.00 2018    PROTEINTOT 6.9 2018       Lab Results   Component Value Date     2018    URICACID 3.9 2015        Imaging Results (last 24 hours)     ** No results found for the last 24 hours. **          Nurse: Violeta Fong RN    Physician Notes:   Melania Mae is seen today for an acute visit for evaluation of a lump she says came up the day after she last saw me (18).  She says the lump came up over her mons area and is swollen and tender.  She recently shaved the area but hasn't had any trauma.  She denies fever or chills.  The lump has been growing rapidly and becoming increasingly uncomfortable.    On exam, VS as above  Gen:  Alert and oriented, appears well, non-toxic appearing.  HEENT:  PERRLA, EOM full, OP clear  CV:  rrr without m/r/g  Resp:  CTA bilaterally  Abd:  Soft, Non-tender, non-distended, " bowel sounds intact  Pelvis:  Over the mons pubis, there is a palpable, tender mass, approximately 3..5 cm in maximal dimension.  The mass is smooth and round.  It is quite tender. It is not near the surface and there is no associated cellulitis but it is most c/w an abscess.    Recent labs as above.    A/P:  52 yo F with probable abscess.  -  Will start Bactrim DS I po BID  -  Will obtain U/S to see if the area is drainable.    I spent 15 minutes with Melania Mae today.  More than 50% of the time was spent in counseling / coordination of care for the above problems.     Electronically Signed By: Thea Sorto MD    Date Signed: 05/02/18

## 2018-05-07 LAB
CELLS ANALYZED: NORMAL
CELLS COUNTED: NORMAL
INTERPRETATION: NORMAL
LAB DIRECTOR NAME PROVIDER: NORMAL
MICRODELETION SYND BLD/T FISH: NORMAL
SPECIMEN SOURCE: NORMAL

## 2018-05-09 ENCOUNTER — LAB (OUTPATIENT)
Dept: ONCOLOGY | Facility: CLINIC | Age: 53
End: 2018-05-09

## 2018-05-09 VITALS
DIASTOLIC BLOOD PRESSURE: 62 MMHG | OXYGEN SATURATION: 98 % | TEMPERATURE: 97.6 F | RESPIRATION RATE: 20 BRPM | SYSTOLIC BLOOD PRESSURE: 120 MMHG | HEART RATE: 67 BPM

## 2018-05-09 DIAGNOSIS — C91.10 CLL (CHRONIC LYMPHOCYTIC LEUKEMIA) (HCC): ICD-10-CM

## 2018-05-09 LAB
ALBUMIN SERPL-MCNC: 4.3 G/DL (ref 3.5–5)
ALBUMIN/GLOB SERPL: 1.5 G/DL (ref 1.5–2.5)
ALP SERPL-CCNC: 93 U/L (ref 35–104)
ALT SERPL W P-5'-P-CCNC: 16 U/L (ref 10–36)
ANION GAP SERPL CALCULATED.3IONS-SCNC: 0.9 MMOL/L (ref 3.6–11.2)
AST SERPL-CCNC: 13 U/L (ref 10–30)
BILIRUB SERPL-MCNC: 0.3 MG/DL (ref 0.2–1.8)
BUN BLD-MCNC: 14 MG/DL (ref 7–21)
BUN/CREAT SERPL: 16.1 (ref 7–25)
CALCIUM SPEC-SCNC: 8.8 MG/DL (ref 7.7–10)
CHLORIDE SERPL-SCNC: 99 MMOL/L (ref 99–112)
CO2 SERPL-SCNC: 22.1 MMOL/L (ref 24.3–31.9)
CREAT BLD-MCNC: 0.87 MG/DL (ref 0.43–1.29)
DEPRECATED RDW RBC AUTO: 43.8 FL (ref 37–54)
EOSINOPHIL # BLD MANUAL: 0.04 10*3/MM3 (ref 0–0.7)
EOSINOPHIL NFR BLD MANUAL: 1 % (ref 0–5)
ERYTHROCYTE [DISTWIDTH] IN BLOOD BY AUTOMATED COUNT: 12.2 % (ref 11.5–14.5)
GFR SERPL CREATININE-BSD FRML MDRD: 68 ML/MIN/1.73
GLOBULIN UR ELPH-MCNC: 2.8 GM/DL
GLUCOSE BLD-MCNC: 164 MG/DL (ref 70–110)
HCT VFR BLD AUTO: 39.2 % (ref 37–47)
HGB BLD-MCNC: 13.8 G/DL (ref 12–16)
LARGE PLATELETS: ABNORMAL
LYMPHOCYTES # BLD MANUAL: 2.08 10*3/MM3 (ref 1–3)
LYMPHOCYTES NFR BLD MANUAL: 2 % (ref 0–10)
LYMPHOCYTES NFR BLD MANUAL: 56 % (ref 21–51)
MACROCYTES BLD QL SMEAR: ABNORMAL
MCH RBC QN AUTO: 35.3 PG (ref 27–33)
MCHC RBC AUTO-ENTMCNC: 35.2 G/DL (ref 33–37)
MCV RBC AUTO: 100.3 FL (ref 80–94)
MONOCYTES # BLD AUTO: 0.07 10*3/MM3 (ref 0.1–0.9)
NEUTROPHILS # BLD AUTO: 1.52 10*3/MM3 (ref 1.4–6.5)
NEUTROPHILS NFR BLD MANUAL: 41 % (ref 30–70)
OSMOLALITY SERPL CALC.SUM OF ELEC: 250 MOSM/KG (ref 273–305)
PLATELET # BLD AUTO: 103 10*3/MM3 (ref 130–400)
PMV BLD AUTO: 11 FL (ref 6–10)
POIKILOCYTOSIS BLD QL SMEAR: ABNORMAL
POTASSIUM BLD-SCNC: 5.1 MMOL/L (ref 3.5–5.3)
PROT SERPL-MCNC: 7.1 G/DL (ref 6–8)
RBC # BLD AUTO: 3.91 10*6/MM3 (ref 4.2–5.4)
SCAN SLIDE: NORMAL
SMALL PLATELETS BLD QL SMEAR: ABNORMAL
SODIUM BLD-SCNC: 122 MMOL/L (ref 135–153)
URATE SERPL-MCNC: 4.1 MG/DL (ref 2.4–5.7)
WBC NRBC COR # BLD: 3.71 10*3/MM3 (ref 4.5–12.5)

## 2018-05-09 PROCEDURE — 80053 COMPREHEN METABOLIC PANEL: CPT | Performed by: INTERNAL MEDICINE

## 2018-05-09 PROCEDURE — 85025 COMPLETE CBC W/AUTO DIFF WBC: CPT | Performed by: INTERNAL MEDICINE

## 2018-05-09 PROCEDURE — 84550 ASSAY OF BLOOD/URIC ACID: CPT | Performed by: INTERNAL MEDICINE

## 2018-05-09 PROCEDURE — 85007 BL SMEAR W/DIFF WBC COUNT: CPT | Performed by: INTERNAL MEDICINE

## 2018-05-11 DIAGNOSIS — C91.10 CLL (CHRONIC LYMPHOCYTIC LEUKEMIA) (HCC): Primary | ICD-10-CM

## 2018-05-16 ENCOUNTER — LAB (OUTPATIENT)
Dept: ONCOLOGY | Facility: CLINIC | Age: 53
End: 2018-05-16

## 2018-05-16 ENCOUNTER — OFFICE VISIT (OUTPATIENT)
Dept: ONCOLOGY | Facility: CLINIC | Age: 53
End: 2018-05-16

## 2018-05-16 VITALS
RESPIRATION RATE: 20 BRPM | BODY MASS INDEX: 23.3 KG/M2 | OXYGEN SATURATION: 100 % | TEMPERATURE: 97.3 F | DIASTOLIC BLOOD PRESSURE: 63 MMHG | HEART RATE: 81 BPM | SYSTOLIC BLOOD PRESSURE: 155 MMHG | WEIGHT: 140 LBS

## 2018-05-16 DIAGNOSIS — C83.00 LYMPHOMA, SMALL LYMPHOCYTIC (HCC): ICD-10-CM

## 2018-05-16 DIAGNOSIS — L02.91 ABSCESS: Primary | ICD-10-CM

## 2018-05-16 DIAGNOSIS — Z72.0 TOBACCO USE: ICD-10-CM

## 2018-05-16 DIAGNOSIS — I99.9 VASCULAR DISEASE: ICD-10-CM

## 2018-05-16 DIAGNOSIS — C91.10 CLL (CHRONIC LYMPHOCYTIC LEUKEMIA) (HCC): ICD-10-CM

## 2018-05-16 DIAGNOSIS — R11.0 NAUSEA: ICD-10-CM

## 2018-05-16 LAB
ALBUMIN SERPL-MCNC: 3.9 G/DL (ref 3.5–5)
ALBUMIN/GLOB SERPL: 1.5 G/DL (ref 1.5–2.5)
ALP SERPL-CCNC: 74 U/L (ref 35–104)
ALT SERPL W P-5'-P-CCNC: 13 U/L (ref 10–36)
ANION GAP SERPL CALCULATED.3IONS-SCNC: 1.4 MMOL/L (ref 3.6–11.2)
AST SERPL-CCNC: 11 U/L (ref 10–30)
BASOPHILS # BLD AUTO: 0.01 10*3/MM3 (ref 0–0.3)
BASOPHILS NFR BLD AUTO: 0.3 % (ref 0–2)
BILIRUB SERPL-MCNC: 0.4 MG/DL (ref 0.2–1.8)
BUN BLD-MCNC: 8 MG/DL (ref 7–21)
BUN/CREAT SERPL: 12.7 (ref 7–25)
CALCIUM SPEC-SCNC: 9.1 MG/DL (ref 7.7–10)
CHLORIDE SERPL-SCNC: 103 MMOL/L (ref 99–112)
CO2 SERPL-SCNC: 25.6 MMOL/L (ref 24.3–31.9)
CREAT BLD-MCNC: 0.63 MG/DL (ref 0.43–1.29)
DEPRECATED RDW RBC AUTO: 44.8 FL (ref 37–54)
EOSINOPHIL # BLD AUTO: 0.01 10*3/MM3 (ref 0–0.7)
EOSINOPHIL NFR BLD AUTO: 0.3 % (ref 0–5)
ERYTHROCYTE [DISTWIDTH] IN BLOOD BY AUTOMATED COUNT: 12.3 % (ref 11.5–14.5)
GFR SERPL CREATININE-BSD FRML MDRD: 98 ML/MIN/1.73
GLOBULIN UR ELPH-MCNC: 2.6 GM/DL
GLUCOSE BLD-MCNC: 214 MG/DL (ref 70–110)
HCT VFR BLD AUTO: 33.9 % (ref 37–47)
HGB BLD-MCNC: 11.8 G/DL (ref 12–16)
IMM GRANULOCYTES # BLD: 0 10*3/MM3 (ref 0–0.03)
IMM GRANULOCYTES NFR BLD: 0 % (ref 0–0.5)
LDH SERPL-CCNC: 125 U/L (ref 135–225)
LYMPHOCYTES # BLD AUTO: 1.57 10*3/MM3 (ref 1–3)
LYMPHOCYTES NFR BLD AUTO: 43.5 % (ref 21–51)
MCH RBC QN AUTO: 35.9 PG (ref 27–33)
MCHC RBC AUTO-ENTMCNC: 34.8 G/DL (ref 33–37)
MCV RBC AUTO: 103 FL (ref 80–94)
MONOCYTES # BLD AUTO: 0.04 10*3/MM3 (ref 0.1–0.9)
MONOCYTES NFR BLD AUTO: 1.1 % (ref 0–10)
NEUTROPHILS # BLD AUTO: 1.98 10*3/MM3 (ref 1.4–6.5)
NEUTROPHILS NFR BLD AUTO: 54.8 % (ref 30–70)
OSMOLALITY SERPL CALC.SUM OF ELEC: 265.5 MOSM/KG (ref 273–305)
PLATELET # BLD AUTO: 119 10*3/MM3 (ref 130–400)
PMV BLD AUTO: 10 FL (ref 6–10)
POTASSIUM BLD-SCNC: 4.4 MMOL/L (ref 3.5–5.3)
PROT SERPL-MCNC: 6.5 G/DL (ref 6–8)
RBC # BLD AUTO: 3.29 10*6/MM3 (ref 4.2–5.4)
SODIUM BLD-SCNC: 130 MMOL/L (ref 135–153)
URATE SERPL-MCNC: 3 MG/DL (ref 2.4–5.7)
WBC NRBC COR # BLD: 3.61 10*3/MM3 (ref 4.5–12.5)

## 2018-05-16 PROCEDURE — 99214 OFFICE O/P EST MOD 30 MIN: CPT | Performed by: NURSE PRACTITIONER

## 2018-05-16 PROCEDURE — 83615 LACTATE (LD) (LDH) ENZYME: CPT | Performed by: INTERNAL MEDICINE

## 2018-05-16 PROCEDURE — 84550 ASSAY OF BLOOD/URIC ACID: CPT | Performed by: INTERNAL MEDICINE

## 2018-05-16 PROCEDURE — 85025 COMPLETE CBC W/AUTO DIFF WBC: CPT | Performed by: INTERNAL MEDICINE

## 2018-05-16 PROCEDURE — 80053 COMPREHEN METABOLIC PANEL: CPT | Performed by: INTERNAL MEDICINE

## 2018-05-16 RX ORDER — PROCHLORPERAZINE MALEATE 10 MG
10 TABLET ORAL EVERY 6 HOURS PRN
Qty: 60 TABLET | Refills: 3 | Status: SHIPPED | OUTPATIENT
Start: 2018-05-16 | End: 2020-05-27 | Stop reason: CLARIF

## 2018-05-16 NOTE — PROGRESS NOTES
DATE:  5/16/2018    DIAGNOSIS:  SLL - initially diagnosed with [place] Stage II Disease in May 2012, based on excisional biopsy of a L supraclavicular LN. Bone marrow was involved.     CHIEF COMPLAINT:  CLL / SLL    TREATMENT HISTORY:  1. Received 6 cycles of Treanda without Rituxan between 10-24-12 and 3-21-13 because of anaphylactic reaction to Rituximab with her 1st cycle of treatment . Received Neulasta support. (Delivered by Dr. Koehler)       HISTORY OF PRESENT ILLNESS:   Ms. Mae was referred by Gillian Harrison for evaluation and treatment of CLL / SLL. She was reportedly diagnosed in May of 2012 when she saw Dr. Carbajal for biopsy of a L supraclavicular LN. At that time she had excessive fatigue & a 40 lb weight loss. She saw Dr. Koehler at  at time who performed bone marrow exam (bone marrow was involved) and started treatment with Bendamustine / Rituximab. Unfortunately with her 1st treatment, she had an anaphylactic reaction to Rituximab so this was not given with subsequent cycles of therapy. Her last cycle of treatment was 3-21-13. Per Dr. Koehler' s records, she had a complete response to treatment. She never had a significant leukocytosis or lymphocytosis, but had predominantly a lymphomatous presentation of her disease.     INTERVAL HISTORY:  Ms. Mae is here with her daughter for follow up of CLL/SLL and toxicity check. She was recently started on Imbruvica and overall, she has been tolerating the medication well with mild nausea as her only noticeable side effect. She says she has been taking zofran prn which hasn't been very helpful and requests to try compazine. Her main complaint today is increasing pain from abscess in pelvic region. Previously obtained pelvic US to see if this area could be drained but at the time, it was small. Therefore, she was given a course of antibiotics which she says was not helpful. She says the area has now increased in size. She is crying during her office visit and  is unable to sit given increased pain. She is wearing a heart monitor for Dr. Shea who is monitoring her closely due to starting new treatment. She continues to smoke and has not picked up Rx for Nicotine patches. She continues to complain of chronic fatigue and hot flashes but denies any worsening. She denies any other specific complaints today.     PAST MEDICAL HISTORY:  Past Medical History:   Diagnosis Date   • Aneurysm 8/223/17    Cavernous left ICA aneurysm   • Asthma    • Carotid artery occlusion    • Chronic back pain    • CLL (chronic lymphocytic leukemia)     OCTOBER OF 2012   • COPD (chronic obstructive pulmonary disease)    • Degenerative arthritis    • Depression    • Diabetes mellitus     type 2   • Hyperlipidemia    • Hypertension    • Lymphadenopathy    • Non Hodgkin's lymphoma        PAST SURGICAL HISTORY:  Past Surgical History:   Procedure Laterality Date   • APPENDECTOMY     • BACK SURGERY      2002 (work accident) c-spine surgery 4/14   • CAROTID STENT Right 08/23/2017   • CEREBRAL ANGIOGRAM N/A 8/23/2017    Diagnostic Carotid/Cerebral Angiogram   • CHOLECYSTECTOMY     • HYSTERECTOMY      for endometriosis/ovarian bleed   • NECK SURGERY     • OTHER SURGICAL HISTORY      pac insertion and removal   • TONSILLECTOMY         FAMILY HISTORY:  Family History   Problem Relation Age of Onset   • Lung cancer Father 72   • Hypertension Father    • Heart disease Father    • Cancer Father    • Diabetes Father    • Other Brother 46        MI   • Heart disease Brother    • Breast cancer Paternal Aunt    • Colon cancer Maternal Grandfather    • Other Cousin         CLL (dx 48 yo)   • Hypertension Mother      Social History     Social History   • Marital status:      Spouse name: N/A   • Number of children: N/A   • Years of education: N/A     Occupational History   • Not on file.     Social History Main Topics   • Smoking status: Current Every Day Smoker     Packs/day: 0.50     Years: 35.00     Types:  Cigarettes   • Smokeless tobacco: Never Used      Comment: 0.5-1 PPD   • Alcohol use No   • Drug use: No   • Sexual activity: Defer     Other Topics Concern   • Not on file     Social History Narrative   • No narrative on file     REVIEW OF SYSTEMS:    A comprehensive 14 point review of systems was performed and was negative except as mentioned    MEDICATIONS:  The current medication list was reviewed in the EMR    Current Outpatient Prescriptions:   •  albuterol (PROVENTIL HFA;VENTOLIN HFA) 108 (90 Base) MCG/ACT inhaler, Inhale 2 puffs As Needed for Wheezing., Disp: , Rfl:   •  ALPRAZolam (XANAX) 0.5 MG tablet, Take 0.5 mg by mouth 3 (Three) Times a Day As Needed for Anxiety., Disp: , Rfl:   •  aspirin 81 MG EC tablet, Take 1 tablet by mouth Daily., Disp: 30 tablet, Rfl: 5  •  B-D UF III MINI PEN NEEDLES 31G X 5 MM misc, , Disp: , Rfl:   •  Brexpiprazole (REXULTI) 1 MG tablet, Take  by mouth Daily., Disp: , Rfl:   •  clopidogrel (PLAVIX) 75 MG tablet, Take 1 tablet by mouth Daily., Disp: 30 tablet, Rfl: 5  •  fluconazole (DIFLUCAN) 100 MG tablet, Take 2 tabs on day 1 then 1 tab for days 2-5, then stop, Disp: 6 tablet, Rfl: 0  •  fluticasone-salmeterol (ADVAIR) 100-50 MCG/DOSE DISKUS, Inhale 1 puff As Needed., Disp: , Rfl:   •  gabapentin (NEURONTIN) 800 MG tablet, Take 800 mg by mouth 4 (Four) Times a Day., Disp: , Rfl:   •  HYDROcodone-acetaminophen (NORCO)  MG per tablet, Take 1 tablet by mouth Every 6 (Six) Hours As Needed for Moderate Pain ., Disp: , Rfl:   •  ibrutinib (IMBRUVICA) 420 MG tablet tablet, Take 1 tablet by mouth Daily., Disp: 28 tablet, Rfl: 3  •  Ibuprofen (ADVIL PO), Take 400 mg by mouth 3 (Three) Times a Day. LIQUIGELS, Disp: , Rfl:   •  Insulin Aspart (NOVOLOG FLEXPEN SC), Inject 8-12 Units under the skin 3 (Three) Times a Day Before Meals. PER SLIDING SCALE, Disp: , Rfl:   •  Insulin Detemir (LEVEMIR FLEXPEN SC), Inject 8-12 Units under the skin Every Night. SLIDING SCALE, Disp: , Rfl:  "  •  levETIRAcetam (KEPPRA) 1000 MG tablet, Take 1,000 mg by mouth 2 (Two) Times a Day., Disp: , Rfl:   •  lisinopril (PRINIVIL,ZESTRIL) 20 MG tablet, Take 20 mg by mouth Every Morning., Disp: , Rfl:   •  loratadine (CLARITIN) 10 MG tablet, Take 10 mg by mouth Every Night., Disp: , Rfl:   •  nicotine (NICODERM CQ) 14 MG/24HR patch, Place 1 patch on the skin Daily., Disp: 21 patch, Rfl: 1  •  ondansetron (ZOFRAN) 8 MG tablet, Take 1 tablet by mouth Every 8 (Eight) Hours As Needed for Nausea or Vomiting., Disp: 90 tablet, Rfl: 6  •  sertraline (ZOLOFT) 100 MG tablet, Take 100 mg by mouth Every Night., Disp: , Rfl:   •  sulfamethoxazole-trimethoprim (BACTRIM) 400-80 MG tablet, Take 1 tablet by mouth Daily., Disp: 30 tablet, Rfl: 3  •  valACYclovir (VALTREX) 500 MG tablet, Take 1 tablet by mouth Daily., Disp: 30 tablet, Rfl: 3    ALLERGIES:    Allergies   Allergen Reactions   • Codeine Other (See Comments)     \"UNKNOW  CHILDHOOD REACTION\"   • Penicillins Other (See Comments)     \"UNKNOWN CHILDHOOD ALLERGY\"   • Rituxan [Rituximab] Other (See Comments)     \"THROAT RAWNESS; FELT LIKE MY THROAT WAS CLOSING UP\"       PHYSICAL EXAM:  Visit Vitals  /63   Pulse 81   Temp 97.3 °F (36.3 °C) (Oral)   Resp 20   Wt 63.5 kg (140 lb)   SpO2 100%   BMI 23.30 kg/m²   General: Alert and oriented. Crying and in pain from abscess  HEENT: Pupils are equal, round and reactive to light and accommodation, Extraocular movements full, oropharynx clear   Neck: no jvd or thyromegaly   Cardiovascular: regular rate and rhythm without murmurs, rubs or gallops. Heart monitor in place.   Pulmonary: clear to auscultation bilaterally   Abdomen: soft, non-tender, non-distended, normal active bowel sounds present, no organomegaly   Pelvis: Palpable, very tender mass measuring approximately 6 cm wide, 2.5-3 cm in height. It is not near the surface and there is no associated cellulitis but it is most c/w an abscess.  Extremities: No clubbing, cyanosis " or edema   Lymph: +L supraclavicular and carl cervical adenopathy (approx 2 cm) . She has R axillary LAD (4.5 cm) and L axillary LAD (approx 4 cm) and shoddy carl inguinal adenopathy   Neurologic: grossly non-focal exam     PATHOLOGY:  5-25-12 L Supraclavicular LN Biopsy:   - Extensive small lymphocytic lymphoma / chronic lymphocytic leukemia involvement.   Flow cytometry shows monoclonal Kappa B-cell population which coexpresses CD5 and CD23 representing 30% of gated cells. There is dim surface light chain and dim CD20 expression. No CD38 expression. Neoplastic cells are positive for CD20 (dim), PAX-5, CCD5, CD23, CD43. B cells are + for CD3. BCL1 stain negative.    BM Biopsy 6-29-12 ([facility])   - Limited BM specimen with scattered hematopoietic cells.   - Flow cytometry revealed peripheral blood with minute CLL/SLL population (0.5%) with normal  FISH studies.     Cervical Disectomy 04-18-14:   - Fragments of fibrohyaline cartilage, no acute inflammation or metastatic disease identified.     IMAGING:  PET-CT 6-19-12:   - Mild to moderate bilateral axillary LAD and mediastinal LAD with minimal associated hypermetabolism.     PET-CT 10-09-12:   - Multiple areas of abnormal hypermetabolism in the neck bilaterally, new from the prior study. R posterior trangle focus with SUV 3.1. L supraclavicular focus SUV 5.0 (LN 1.8 cm, prior 1.2 cm)   - Carl axillary LAD - L axilla 2.8 cm LN (prior 1.6 cm) with SUV 3.6, R axillary ln new 2.0 cm, SUV 2.6   - Mediastinal LAD noted. R prevascular LN 1.5 cm with SUV 2.1   - Carl external iliac LNs present ( R 3.2 cm with SUV 3.1)   - Overall worsening areas of hypermetabolism corresponding to enlarging nodes c/w worsening neoplastic involvement.     PET-CT 1-16-13:   - No PET-CT findings of active disease related to the patient's lymphoma. Interval resolution of hypermetabolic adenopathy described on previous examination. Spleen is normal.     PET-CT 1-21-14:   - No abnormal hypermetabolism  to suggest neoplastic involvement. No mass or adenopathy noted. Spleen is normal.     04-03-14 MRI Spine:   - Cervical: Degenerative disk disease in the cervical disk spaces at the level of C4-C7. At 4-5, there is a disk herniation associated with bulging. There was spinal stenosis at C5-6 but no disk herniation. At C6-7, the disk bulging is not felt to be significant. The postcontrasted scans in the cervical spine showed no areas of abnormal enhancement.   - Thoracic: negative MRI examination of the thoracic spine. A source of the patient's t horacic spine pain was not demonstrated.     Chest Xray 04-17-15:   - The lungs are clear, the heart is normal. There is no active disease in the chest.     CT CAP/neck w/ contrast 05-13-14:   - No neck lymphadenopathy by CT size criteria   - No intrathoracic or abdominal LAD     CTAP, neck w/ contrast 03-04-15:   - No enlarged lymph nodes were demonstrated. The bile ducts in the liver and the extrahepatic biliary tree is slightly dilated.   - Clinical correlation with laboratory evaluation for obstruction suggested. This, however, is not significantly changed from a previous CT from 05/2014. No retroperitoneal lymphadenopathy is identified.   - Negative CT of the of the soft tissues of the neck. No abnormally enlarged lymph nodes are demonstrated.     CT Neck, CAP 3-15-16:   - There is a change in the CT scan. There are prominent lymph nodes in the neck bilaterally. The largest are in the supraclavicular area and measure greater than a centimeter. Most of the nodes are in the 1cm size range.   - In the chest, most of the nodes are in the subcen timeter size range. The largest nodes are in the pretacheal area and measure 15 mm. There were no enlarged hilar nodes. On the lung windows, there are no pleural effusions. No pulmonary or parenchymal lung nodules or masses were demonstrated. .   There are m ore prominent lymph nodes in the retroperitoneum some of which are enlarged  today consistent with recurrence. Again, most of these are in the 1 cm size range, but a few measure up to 17-18 mm. No mesenteric adenopathy demonstrated.     CTC, Neck 05-10-16:   - Chest: Persistent stable adenopathy in the mediastinum and axillary regions. The lungs remain clear.   - A/P: The lymph nodes are stable in comparing with the previous CT done in March.   - Neck: Persistent but stable adenopathy throughout the jugular lymph node chains in both sides of the neck.     CTCAP neck 08-16-16:  - Stable mediastinal enlarged lymph nodes including an AP window lymph node measuring 9 mm  - A left axillary region lymph node measures 1.9 cm and was 1.9 cm. Stable right axillary region lymph adenopathy.   - Stable retroperitoneal and mesenteric lymphadenopathy   - persistent lymphadenopathy throughout the neck that is stable in size.     CTCAP 06-07-17:  - Stable bilateral jugular chain lymphadenopathy.  - Stable bilateral axillary and mediastinal region lymphadenopathy.  - Stable retroperitoneal lymphadenopathy.    CTCAP, Neck 02-27-18:  - Grossly stable bilateral jugular chain lymphadenopathy.  - LUNGS: A NEW RIGHT UPPER LOBE PULMONARY NODULE MEASURES 8.8 MM. RIGHT MIDDLE LOBE PULMONARY PERIBRONCHIAL VASCULAR NODULARITY MAY BE INFECTIOUS OR INFLAMMATORY  - MEDIASTINUM: AGAIN THERE IS MEDIASTINAL BORDERLINE LYMPHADENOPATHY  WITHIN AP WINDOW LYMPH NODE NOW MEASURING 1 CM AND WAS 8 MM WITH SIMILAR  INCREASE IN SIZE OF MULTIPLE BILATERAL AXILLARY LYMPH NODES WITH THE  LARGEST RIGHT MEASURING 4.4 CM OF THE CONGLOMERATE THAT WAS ABOUT 3 CM  AND A LEFT AXILLARY REGION LYMPH NODE MEASURING 2.7 CM THAT WAS 2.2 CM.  - SPLEEN: SPLENOMEGALY IS AGAIN NOTED.  -LYMPH NODES: Extensive retroperitoneal lymphadenopathy with a right para-aortic lymph node measuring about 4.5 cm and was 2.5 cm.    CTCAP 04-12-18:  - There are numerous enlarged supraclavicular lymph nodes.  - These have progressed in size in comparing with the  earlier CT. Several of the lymph nodes now measure up to 3 cm in diameter. The lymph nodes extend into the axillary regions. There are some lymph nodes in the mediastinum that are relatively stable in comparing with the earlier exam. The lungs are both well aerated and clear.  - The liver was normal in size and shape. There is mild dilatation of the bile ducts within the liver. The gallbladder is surgically absent. The common hepatic duct was also dilated in the pancreatic head, this is unchanged from the earlier exam. The spleen is homogeneous in density and is stable in size. There continues to be fairly marked adenopathy in the retroperitoneum. The lymph node size continues to increase slightly by a few millimeters. The largest bulk of lymph nodes is in the gastrohepatic ligament region. There are also lymph nodes along the aorta. These are larger in size as well. Some of the periaortic lymph nodes now measure up to 3 cm in diameter.   - There are enlarged lymph nodes in the mesentery. There are enlarged lymph nodes along the iliac lymph node chains and some borderline enlarged lymph nodes in both groins. The bowel shows no evidence of obstruction, the kidneys enhance appropriately.    US Pelvis, limited 5/2/18  IMPRESSION:  There is a 8 mm subcutaneous walled collection suggestive of  tiny abscess in the suprapubic region of interest.    RECENT LABS:  Lab Results   Component Value Date    WBC 3.61 (L) 05/16/2018    HGB 11.8 (L) 05/16/2018    HCT 33.9 (L) 05/16/2018    .0 (H) 05/16/2018    RDW 12.3 05/16/2018     (L) 05/16/2018    NEUTRORELPCT 54.8 05/16/2018    LYMPHORELPCT 43.5 05/16/2018    MONORELPCT 1.1 05/16/2018    EOSRELPCT 0.3 05/16/2018    BASORELPCT 0.3 05/16/2018    NEUTROABS 1.98 05/16/2018    LYMPHSABS 1.57 05/16/2018       Lab Results   Component Value Date     (L) 05/16/2018    K 4.4 05/16/2018    CO2 25.6 05/16/2018     05/16/2018    BUN 8 05/16/2018    CREATININE 0.63  05/16/2018    GLUCOSE 214 (H) 05/16/2018    CALCIUM 9.1 05/16/2018    ALKPHOS 74 05/16/2018    AST 11 05/16/2018    ALT 13 05/16/2018    BILITOT 0.4 05/16/2018    ALBUMIN 3.90 05/16/2018    PROTEINTOT 6.5 05/16/2018       Lab Results   Component Value Date     02/27/2018    URICACID 4.1 05/09/2018     Date  SPEP/MONE Mspike Free K Free L  K/L Ratio   02-12-15 MONE normal Not observed   06-25-15 MONE normal Not observed 22.10  14.93  1.48  11-17-15 MONE normal Not observed 17.17  14.41  1.19    Date  IgG IgA IgM  02-12-15 744 110 43  06-25-15 776 94 39  11-17-15 812 99 41  03-29-17 771 103 34  08-04-17 831 94 27  11-13-17 791 95 21  02-27-18 879 109 25     CLL profile, fish:  The CLL interphase fluorescence in situ hybridization   (FISH) panel analysis was normal. There were no cells with   CCND1-IGH fusion. No extra signals or deletions of OLAF,   chromosome 12, 13q, or TP53 were observed.       SPECIFIC FISH RESULTS:     CCND1/IGH: NORMAL   .         nuc kenzie 11q13(OXIJ8m5),14q32(IGHx2)[100]       OLAF: NORMAL         nuc kenzie 11q22.3(ATMx2)[100] .     12cen: NORMAL     .         nuc kenzie 12cen([id number])[100] .     13q: NORMAL  .         nuc kenzie 13q14.3(DLEUx2),13q34([id number])[100] .     TP53: NORMAL  .         nuc kenzie 17p13.1(TP53x2)[100]     Acute Hepatitis Panel: Negative    ASSESSMENT & PLAN:  Ms. Mae is a 53 y.o.  female with a history of Stage II SLL diagnosed in May of 2012 by L supraclavicular LN biopsy.     1. Small Lymphocytic Lymphoma:   - Treated as above with 6 cycles of Bendamustine (after anaphylactic reaction to Rituximab with 1st cycle of treatment) with complete radiographic response.   - She does have adenopathy in the cj cervical, supraclavicular, axillary and cj inguinal areas c/w CLL/SLL. She also has small mediastinal nodes and retroperitoneal LAD.  She has had continued growth of the lymph nodes, and has been losing weight.  She has some night sweats which may or may not be  related.  -  Recommended consideration of treatment given worsening disease.  -  Given rituxan allergy, recommended treatment with Imbruvica 420 mg daily with Bactrim SS daily and Valtrex 500 mg daily for prophylaxis.  -  Given possibility for Imbruvica to cause atrial fibrillation or even possibly ventricular arrhythmias and that she is wearing a heart monitor, Dr. Sorto spoke with  to discuss.  He said he felt it would be safe for her to take and suggested that she go ahead and start as soon as it can be approved so that she can be monitored while on the medication. She has been on this treatment just over a week and so far is tolerating it well. Labs from today in acceptable range. Therefore, advised her to continue with treatment as she is on. Will plan to follow up again in 3 weeks for another symptom/toxicity check and repeat labs.   -  Recommended Shingrix vaccine.    2.  Signficant vascular disease:  -  She has been evaluated by Dr. Amaral and says they are investigating lower vasculature blockages.    3.  Tobacco use:  She tried to quit but restarted. Previously discussed methods to help her and she wanted to try. Therefore, provided Rx for Nicotine patches but she says she hasn't picked them up. She continues to smoke but says she will try and quit.     4. Pelvic abscess:  Obtained pelvic US to see if this area could be drained but at the time, it was small. Therefore, she was given course of Bactrim DS which was not helpful. Abscess now increasing in size and very painful. Will refer to general surgery to further evaluate and possible I&D.     5. Nausea: Continue zofran prn. Will also give Rx for compazine prn.     6.  Prophylaxis:  She received influenza and Prevnar vaccines 11-13-17.  Recommended Shingrix vaccine.    7. ACO Quality measures  - Melania Mae does currently use tobacco products.  - I advised the patient of the risks in continuing to use tobacco.  During this visit, I spent <  3 minutes counseling the patient regarding smoking cessation.  - Patient's BMI is within normal parameters. No follow-up required.  - Current outpatient and discharge medications have been reconciled for the patient by BRANDON Golden    I spent 25 minutes with Ms. Mae today. More than 50% of the time was spent in counseling / coordination of care for the above mentioned problems.       Electronically Signed by: BRANDON Golden    CC:   BRANDON Panchal Dr.

## 2018-05-21 ENCOUNTER — OFFICE VISIT (OUTPATIENT)
Dept: SURGERY | Facility: CLINIC | Age: 53
End: 2018-05-21

## 2018-05-21 VITALS
HEART RATE: 71 BPM | BODY MASS INDEX: 21.22 KG/M2 | HEIGHT: 68 IN | WEIGHT: 140 LBS | DIASTOLIC BLOOD PRESSURE: 82 MMHG | SYSTOLIC BLOOD PRESSURE: 140 MMHG

## 2018-05-21 DIAGNOSIS — L02.211 CUTANEOUS ABSCESS OF ABDOMINAL WALL: Primary | ICD-10-CM

## 2018-05-21 PROCEDURE — 10061 I&D ABSCESS COMP/MULTIPLE: CPT | Performed by: SURGERY

## 2018-05-21 PROCEDURE — 99213 OFFICE O/P EST LOW 20 MIN: CPT | Performed by: SURGERY

## 2018-05-21 NOTE — PROGRESS NOTES
Roni Mae is a 53 y.o. female is being seen for consultation today at the request of No ref. provider found    53 y.o. female presenting for evaluation of skin lesion. Lesion on Suprapubic midline with patient's observations stated as increasing diameter, increasing thickness, pain, exam of this area shows skin abscess, features Firm indurated area with cellulitis expanding over a 4 cm area along her low midline incision, size 40 mm.    Past Medical History:   Diagnosis Date   • Aneurysm 8/223/17    Cavernous left ICA aneurysm   • Asthma    • Carotid artery occlusion    • Chronic back pain    • CLL (chronic lymphocytic leukemia)     OCTOBER OF 2012   • COPD (chronic obstructive pulmonary disease)    • Degenerative arthritis    • Depression    • Diabetes mellitus     type 2   • Hyperlipidemia    • Hypertension    • Lymphadenopathy    • Non Hodgkin's lymphoma        Family History   Problem Relation Age of Onset   • Lung cancer Father 72   • Hypertension Father    • Heart disease Father    • Cancer Father    • Diabetes Father    • Other Brother 46        MI   • Heart disease Brother    • Breast cancer Paternal Aunt    • Colon cancer Maternal Grandfather    • Other Cousin         CLL (dx 46 yo)   • Hypertension Mother        Social History     Social History   • Marital status:      Spouse name: N/A   • Number of children: N/A   • Years of education: N/A     Occupational History   • Not on file.     Social History Main Topics   • Smoking status: Current Every Day Smoker     Packs/day: 0.50     Years: 35.00     Types: Cigarettes   • Smokeless tobacco: Never Used      Comment: 0.5-1 PPD   • Alcohol use No   • Drug use: No   • Sexual activity: Defer     Other Topics Concern   • Not on file     Social History Narrative   • No narrative on file       Past Surgical History:   Procedure Laterality Date   • APPENDECTOMY     • BACK SURGERY      2002 (work accident) c-spine surgery 4/14   • CAROTID  "STENT Right 08/23/2017   • CEREBRAL ANGIOGRAM N/A 8/23/2017    Diagnostic Carotid/Cerebral Angiogram   • CHOLECYSTECTOMY     • HYSTERECTOMY      for endometriosis/ovarian bleed   • NECK SURGERY     • OTHER SURGICAL HISTORY      pac insertion and removal   • TONSILLECTOMY         Review of Systems   Constitutional: Negative for activity change, appetite change, chills and fever.   HENT: Negative for sore throat and trouble swallowing.    Eyes: Negative for visual disturbance.   Respiratory: Negative for cough and shortness of breath.    Cardiovascular: Negative for chest pain and palpitations.   Gastrointestinal: Negative for abdominal distention, abdominal pain, blood in stool, constipation, diarrhea, nausea and vomiting.   Endocrine: Negative for cold intolerance and heat intolerance.   Genitourinary: Negative for dysuria.   Musculoskeletal: Negative for joint swelling.   Skin: Negative for color change, rash and wound.   Allergic/Immunologic: Negative for immunocompromised state.   Neurological: Negative for dizziness, seizures, weakness and headaches.   Hematological: Negative for adenopathy. Does not bruise/bleed easily.   Psychiatric/Behavioral: Negative for agitation and confusion.         /82   Pulse 71   Ht 172.7 cm (68\")   Wt 63.5 kg (140 lb)   LMP  (LMP Unknown)   BMI 21.29 kg/m²   Objective   Physical Exam   Constitutional: She is oriented to person, place, and time. She appears well-developed.   HENT:   Head: Normocephalic and atraumatic.   Mouth/Throat: Mucous membranes are normal.   Eyes: Conjunctivae are normal. Pupils are equal, round, and reactive to light.   Neck: Neck supple. No JVD present. No tracheal deviation present. No thyromegaly present.   Cardiovascular: Normal rate and regular rhythm.  Exam reveals no gallop and no friction rub.    No murmur heard.  Pulmonary/Chest: Effort normal and breath sounds normal.   Abdominal: Soft. She exhibits no distension. There is no splenomegaly " or hepatomegaly. There is no tenderness. No hernia.   Musculoskeletal: Normal range of motion. She exhibits no deformity.   Neurological: She is alert and oriented to person, place, and time.   Skin: Skin is warm and dry.   In the suprapubic location there is a 4 cm area of cellulitis in the midline where there previous surgical scars.  Induration in the area is identified.  No purulent drainage.   Psychiatric: She has a normal mood and affect.     Incision and drainage 2 cm suprapubic abscess involving skin and subcutaneous fat    Suprapubic area prepped and draped in sterile fashion.  Timeout performed.  Local anesthetic and draped.  A stab incision was made and purulent pocket was entered.  The incision was extended along the length of the area of cellulitis.  Barr dissection was used to break up all loculations and all purulence was irrigated free.  The abscess wall skin and subcutaneous fat only.  The wound was then irrigated with peroxide and dressed with 4 x 4 packing and a bandage.  Patient tolerated the procedure well.    Specimens: None    Consultations: Sandee Velázquez was seen today for abscess of groin.    Diagnoses and all orders for this visit:    Cutaneous abscess of abdominal wall        Assessment     Melaniamadhav Mae is a 53 y.o. female with cutaneous abscess of the suprapubic location.  Incision and drainage performed in the office today.  Abscesses of both skin and subcutaneous fat.  The wound will be left open and packed twice daily with wet-to-dry gauze.  Follow-up in 2 weeks.    Patient's Body mass index is 21.29 kg/m². BMI is within normal parameters. No follow-up required.    I advised the patient of the risks in continuing to use tobacco, and I provided this patient with smoking cessation educational materials.    During this visit, I spent 3 minutes counseling the patient regarding smoking cessation.

## 2018-05-27 DIAGNOSIS — C91.10 CLL (CHRONIC LYMPHOCYTIC LEUKEMIA) (HCC): ICD-10-CM

## 2018-06-04 ENCOUNTER — OFFICE VISIT (OUTPATIENT)
Dept: SURGERY | Facility: CLINIC | Age: 53
End: 2018-06-04

## 2018-06-04 VITALS
BODY MASS INDEX: 21.22 KG/M2 | HEART RATE: 85 BPM | SYSTOLIC BLOOD PRESSURE: 140 MMHG | HEIGHT: 68 IN | DIASTOLIC BLOOD PRESSURE: 75 MMHG | WEIGHT: 140 LBS

## 2018-06-04 DIAGNOSIS — L02.211 CUTANEOUS ABSCESS OF ABDOMINAL WALL: Primary | ICD-10-CM

## 2018-06-04 PROCEDURE — 99212 OFFICE O/P EST SF 10 MIN: CPT | Performed by: SURGERY

## 2018-06-04 NOTE — PROGRESS NOTES
Subjective   Melania Mae is a 53 y.o. female  is here today for follow-up.         Melania Mae is a 53 y.o. female here for follow up after incision and drainage of suprapubic abscess.  Her wound is healing well with excellent granulation tissue with twice daily packing.  No evidence of residual cellulitis or infection.            Melania was seen today for s/p i&d of abdominal wall abscess.    Diagnoses and all orders for this visit:    Cutaneous abscess of abdominal wall        Assessment     Melania Mae is a 53 y.o. female status post incision and drainage of abdominal wall abscess.  Her wound is granulating nicely and she will follow-up as needed.  She'll continue current wound therapy until no longer able to pack the suprapubic wound.

## 2018-06-05 ENCOUNTER — LAB (OUTPATIENT)
Dept: ONCOLOGY | Facility: CLINIC | Age: 53
End: 2018-06-05

## 2018-06-05 ENCOUNTER — OFFICE VISIT (OUTPATIENT)
Dept: ONCOLOGY | Facility: CLINIC | Age: 53
End: 2018-06-05

## 2018-06-05 VITALS
HEART RATE: 75 BPM | TEMPERATURE: 98.6 F | WEIGHT: 140.6 LBS | DIASTOLIC BLOOD PRESSURE: 63 MMHG | SYSTOLIC BLOOD PRESSURE: 120 MMHG | BODY MASS INDEX: 21.38 KG/M2 | OXYGEN SATURATION: 100 % | RESPIRATION RATE: 18 BRPM

## 2018-06-05 DIAGNOSIS — C91.10 CLL (CHRONIC LYMPHOCYTIC LEUKEMIA) (HCC): Primary | ICD-10-CM

## 2018-06-05 DIAGNOSIS — Z72.0 TOBACCO USE: ICD-10-CM

## 2018-06-05 DIAGNOSIS — L02.91 ABSCESS: ICD-10-CM

## 2018-06-05 DIAGNOSIS — C91.10 CLL (CHRONIC LYMPHOCYTIC LEUKEMIA) (HCC): ICD-10-CM

## 2018-06-05 DIAGNOSIS — K04.7 DENTAL ABSCESS: ICD-10-CM

## 2018-06-05 LAB
ALBUMIN SERPL-MCNC: 4 G/DL (ref 3.5–5)
ALBUMIN/GLOB SERPL: 1.5 G/DL (ref 1.5–2.5)
ALP SERPL-CCNC: 64 U/L (ref 35–104)
ALT SERPL W P-5'-P-CCNC: 10 U/L (ref 10–36)
ANION GAP SERPL CALCULATED.3IONS-SCNC: 1.4 MMOL/L (ref 3.6–11.2)
AST SERPL-CCNC: 14 U/L (ref 10–30)
BASOPHILS # BLD AUTO: 0.01 10*3/MM3 (ref 0–0.3)
BASOPHILS NFR BLD AUTO: 0.2 % (ref 0–2)
BILIRUB SERPL-MCNC: 0.3 MG/DL (ref 0.2–1.8)
BUN BLD-MCNC: 8 MG/DL (ref 7–21)
BUN/CREAT SERPL: 12.9 (ref 7–25)
CALCIUM SPEC-SCNC: 9 MG/DL (ref 7.7–10)
CHLORIDE SERPL-SCNC: 109 MMOL/L (ref 99–112)
CO2 SERPL-SCNC: 25.6 MMOL/L (ref 24.3–31.9)
CREAT BLD-MCNC: 0.62 MG/DL (ref 0.43–1.29)
DEPRECATED RDW RBC AUTO: 53 FL (ref 37–54)
EOSINOPHIL # BLD AUTO: 0.02 10*3/MM3 (ref 0–0.7)
EOSINOPHIL NFR BLD AUTO: 0.4 % (ref 0–5)
ERYTHROCYTE [DISTWIDTH] IN BLOOD BY AUTOMATED COUNT: 13.9 % (ref 11.5–14.5)
GFR SERPL CREATININE-BSD FRML MDRD: 100 ML/MIN/1.73
GLOBULIN UR ELPH-MCNC: 2.6 GM/DL
GLUCOSE BLD-MCNC: 150 MG/DL (ref 70–110)
HCT VFR BLD AUTO: 36.1 % (ref 37–47)
HGB BLD-MCNC: 12.5 G/DL (ref 12–16)
IMM GRANULOCYTES # BLD: 0 10*3/MM3 (ref 0–0.03)
IMM GRANULOCYTES NFR BLD: 0 % (ref 0–0.5)
LDH SERPL-CCNC: 131 U/L (ref 135–225)
LYMPHOCYTES # BLD AUTO: 3.04 10*3/MM3 (ref 1–3)
LYMPHOCYTES NFR BLD AUTO: 58 % (ref 21–51)
MCH RBC QN AUTO: 36.9 PG (ref 27–33)
MCHC RBC AUTO-ENTMCNC: 34.6 G/DL (ref 33–37)
MCV RBC AUTO: 106.5 FL (ref 80–94)
MONOCYTES # BLD AUTO: 0.08 10*3/MM3 (ref 0.1–0.9)
MONOCYTES NFR BLD AUTO: 1.5 % (ref 0–10)
NEUTROPHILS # BLD AUTO: 2.09 10*3/MM3 (ref 1.4–6.5)
NEUTROPHILS NFR BLD AUTO: 39.9 % (ref 30–70)
OSMOLALITY SERPL CALC.SUM OF ELEC: 273.2 MOSM/KG (ref 273–305)
PLAT MORPH BLD: NORMAL
PLATELET # BLD AUTO: 164 10*3/MM3 (ref 130–400)
PMV BLD AUTO: 9.7 FL (ref 6–10)
POTASSIUM BLD-SCNC: 4 MMOL/L (ref 3.5–5.3)
PROT SERPL-MCNC: 6.6 G/DL (ref 6–8)
RBC # BLD AUTO: 3.39 10*6/MM3 (ref 4.2–5.4)
RBC MORPH BLD: NORMAL
SODIUM BLD-SCNC: 136 MMOL/L (ref 135–153)
URATE SERPL-MCNC: 3.7 MG/DL (ref 2.4–5.7)
WBC NRBC COR # BLD: 5.24 10*3/MM3 (ref 4.5–12.5)

## 2018-06-05 PROCEDURE — 85007 BL SMEAR W/DIFF WBC COUNT: CPT | Performed by: NURSE PRACTITIONER

## 2018-06-05 PROCEDURE — 83615 LACTATE (LD) (LDH) ENZYME: CPT | Performed by: NURSE PRACTITIONER

## 2018-06-05 PROCEDURE — 99214 OFFICE O/P EST MOD 30 MIN: CPT | Performed by: INTERNAL MEDICINE

## 2018-06-05 PROCEDURE — 80053 COMPREHEN METABOLIC PANEL: CPT | Performed by: NURSE PRACTITIONER

## 2018-06-05 PROCEDURE — 84550 ASSAY OF BLOOD/URIC ACID: CPT | Performed by: NURSE PRACTITIONER

## 2018-06-05 PROCEDURE — 85025 COMPLETE CBC W/AUTO DIFF WBC: CPT | Performed by: NURSE PRACTITIONER

## 2018-06-05 RX ORDER — CLINDAMYCIN HYDROCHLORIDE 300 MG/1
300 CAPSULE ORAL 3 TIMES DAILY
Qty: 21 CAPSULE | Refills: 0 | Status: SHIPPED | OUTPATIENT
Start: 2018-06-05 | End: 2018-06-12

## 2018-06-05 NOTE — PROGRESS NOTES
DATE:  6/5/2018    DIAGNOSIS:  SLL - initially diagnosed with [place] Stage II Disease in May 2012, based on excisional biopsy of a L supraclavicular LN. Bone marrow was involved.     CHIEF COMPLAINT:  CLL / SLL    TREATMENT HISTORY:  1. Received 6 cycles of Treanda without Rituxan between 10-24-12 and 3-21-13 because of anaphylactic reaction to Rituximab with her 1st cycle of treatment . Received Neulasta support. (Delivered by Dr. Koehler)     2. Imbruvica started 5-02-18      HISTORY OF PRESENT ILLNESS:   Ms. Mae was referred by Gillian Harrison for evaluation and treatment of CLL / SLL. She was reportedly diagnosed in May of 2012 when she saw Dr. Carbajal for biopsy of a L supraclavicular LN. At that time she had excessive fatigue & a 40 lb weight loss. She saw Dr. Koehler at  at time who performed bone marrow exam (bone marrow was involved) and started treatment with Bendamustine / Rituximab. Unfortunately with her 1st treatment, she had an anaphylactic reaction to Rituximab so this was not given with subsequent cycles of therapy. Her last cycle of treatment was 3-21-13. Per Dr. Koehler' s records, she had a complete response to treatment. She never had a significant leukocytosis or lymphocytosis, but had predominantly a lymphomatous presentation of her disease.     INTERVAL HISTORY:  Ms. Mae is here with her friend for follow up of CLL/SLL. She was recently started on Imbruvica and overall, she has been tolerating the medication well with mild nausea as her only noticeable side effect. She says she has been taking zofran or compazine prn (maybe 2-3x/week) with success.  She had suprapubic abscess I&D'd on 5-21-18 and says this is healing well.  It is about 1/2 the size that it started and is filling in / requiring less packing.  No fevers / chills.  She does complain of dental pain along her L jaw and plans to go to see her dentist.        PAST MEDICAL HISTORY:  Past Medical History:   Diagnosis Date   •  Aneurysm 8/223/17    Cavernous left ICA aneurysm   • Asthma    • Carotid artery occlusion    • Chronic back pain    • CLL (chronic lymphocytic leukemia)     OCTOBER OF 2012   • COPD (chronic obstructive pulmonary disease)    • Degenerative arthritis    • Depression    • Diabetes mellitus     type 2   • Hyperlipidemia    • Hypertension    • Lymphadenopathy    • Non Hodgkin's lymphoma        PAST SURGICAL HISTORY:  Past Surgical History:   Procedure Laterality Date   • APPENDECTOMY     • BACK SURGERY      2002 (work accident) c-spine surgery 4/14   • CAROTID STENT Right 08/23/2017   • CEREBRAL ANGIOGRAM N/A 8/23/2017    Diagnostic Carotid/Cerebral Angiogram   • CHOLECYSTECTOMY     • HYSTERECTOMY      for endometriosis/ovarian bleed   • NECK SURGERY     • OTHER SURGICAL HISTORY      pac insertion and removal   • TONSILLECTOMY         FAMILY HISTORY:  Family History   Problem Relation Age of Onset   • Lung cancer Father 72   • Hypertension Father    • Heart disease Father    • Cancer Father    • Diabetes Father    • Other Brother 46        MI   • Heart disease Brother    • Breast cancer Paternal Aunt    • Colon cancer Maternal Grandfather    • Other Cousin         CLL (dx 46 yo)   • Hypertension Mother      Social History     Social History   • Marital status:      Spouse name: N/A   • Number of children: N/A   • Years of education: N/A     Occupational History   • Not on file.     Social History Main Topics   • Smoking status: Current Every Day Smoker     Packs/day: 0.50     Years: 35.00     Types: Cigarettes   • Smokeless tobacco: Never Used      Comment: 0.5-1 PPD   • Alcohol use No   • Drug use: No   • Sexual activity: Defer     Other Topics Concern   • Not on file     Social History Narrative   • No narrative on file     REVIEW OF SYSTEMS:    A comprehensive 14 point review of systems was performed and was negative except as mentioned    MEDICATIONS:  The current medication list was reviewed in the  EMR    Current Outpatient Prescriptions:   •  albuterol (PROVENTIL HFA;VENTOLIN HFA) 108 (90 Base) MCG/ACT inhaler, Inhale 2 puffs As Needed for Wheezing., Disp: , Rfl:   •  ALPRAZolam (XANAX) 0.5 MG tablet, Take 0.5 mg by mouth 3 (Three) Times a Day As Needed for Anxiety., Disp: , Rfl:   •  aspirin 81 MG EC tablet, Take 1 tablet by mouth Daily., Disp: 30 tablet, Rfl: 5  •  B-D UF III MINI PEN NEEDLES 31G X 5 MM misc, , Disp: , Rfl:   •  Brexpiprazole (REXULTI) 1 MG tablet, Take  by mouth Daily., Disp: , Rfl:   •  clopidogrel (PLAVIX) 75 MG tablet, Take 1 tablet by mouth Daily., Disp: 30 tablet, Rfl: 5  •  fluconazole (DIFLUCAN) 100 MG tablet, Take 2 tabs on day 1 then 1 tab for days 2-5, then stop, Disp: 6 tablet, Rfl: 0  •  fluticasone-salmeterol (ADVAIR) 100-50 MCG/DOSE DISKUS, Inhale 1 puff As Needed., Disp: , Rfl:   •  gabapentin (NEURONTIN) 800 MG tablet, Take 800 mg by mouth 4 (Four) Times a Day., Disp: , Rfl:   •  HYDROcodone-acetaminophen (NORCO)  MG per tablet, Take 1 tablet by mouth Every 6 (Six) Hours As Needed for Moderate Pain ., Disp: , Rfl:   •  ibrutinib (IMBRUVICA) 420 MG tablet tablet, Take 1 tablet by mouth Daily., Disp: 28 tablet, Rfl: 3  •  Ibuprofen (ADVIL PO), Take 400 mg by mouth 3 (Three) Times a Day. LIQUIGELS, Disp: , Rfl:   •  Insulin Aspart (NOVOLOG FLEXPEN SC), Inject 8-12 Units under the skin 3 (Three) Times a Day Before Meals. PER SLIDING SCALE, Disp: , Rfl:   •  Insulin Detemir (LEVEMIR FLEXPEN SC), Inject 8-12 Units under the skin Every Night. SLIDING SCALE, Disp: , Rfl:   •  levETIRAcetam (KEPPRA) 1000 MG tablet, Take 1,000 mg by mouth 2 (Two) Times a Day., Disp: , Rfl:   •  lisinopril (PRINIVIL,ZESTRIL) 20 MG tablet, Take 20 mg by mouth Every Morning., Disp: , Rfl:   •  loratadine (CLARITIN) 10 MG tablet, Take 10 mg by mouth Every Night., Disp: , Rfl:   •  nicotine (NICODERM CQ) 14 MG/24HR patch, Place 1 patch on the skin Daily., Disp: 21 patch, Rfl: 1  •  ondansetron  "(ZOFRAN) 8 MG tablet, Take 1 tablet by mouth Every 8 (Eight) Hours As Needed for Nausea or Vomiting., Disp: 90 tablet, Rfl: 6  •  prochlorperazine (COMPAZINE) 10 MG tablet, Take 1 tablet by mouth Every 6 (Six) Hours As Needed for Nausea or Vomiting., Disp: 60 tablet, Rfl: 3  •  sertraline (ZOLOFT) 100 MG tablet, Take 100 mg by mouth Every Night., Disp: , Rfl:   •  sulfamethoxazole-trimethoprim (BACTRIM) 400-80 MG tablet, Take 1 tablet by mouth Daily., Disp: 30 tablet, Rfl: 3  •  valACYclovir (VALTREX) 500 MG tablet, Take 1 tablet by mouth Daily., Disp: 30 tablet, Rfl: 3    ALLERGIES:    Allergies   Allergen Reactions   • Codeine Other (See Comments)     \"UNKNOW  CHILDHOOD REACTION\"   • Penicillins Other (See Comments)     \"UNKNOWN CHILDHOOD ALLERGY\"   • Rituxan [Rituximab] Other (See Comments)     \"THROAT RAWNESS; FELT LIKE MY THROAT WAS CLOSING UP\"       PHYSICAL EXAM:  Visit Vitals  /63   Pulse 75   Temp 98.6 °F (37 °C) (Oral)   Resp 18   Wt 63.8 kg (140 lb 9.6 oz)   LMP  (LMP Unknown)   SpO2 100%   BMI 21.38 kg/m²   General: Alert and oriented. Appears well, in good spirits today  HEENT: Pupils are equal, round and reactive to light and accommodation, Extraocular movements full, oropharynx clear, tenderness along L lower jaw  Neck: no jvd or thyromegaly   Cardiovascular: regular rate and rhythm without murmurs, rubs or gallops.   Pulmonary: clear to auscultation bilaterally   Abdomen: soft, non-tender, non-distended, normal active bowel sounds present, no organomegaly   Pelvis: Suprapubic abscess s/p I&D, much smaller area involved, appears to be haling well.  Extremities: No clubbing, cyanosis or edema   Lymph: +significant improvement in L supraclavicular and cj cervical adenopathy (approx 2 cm --> 1-1.5 cm) . She has cj axillary LAD (4-5 cm --> 3 cm)   and shoddy cj inguinal adenopathy   Neurologic: grossly non-focal exam     PATHOLOGY:  5-25-12 L Supraclavicular LN Biopsy:   - Extensive small " lymphocytic lymphoma / chronic lymphocytic leukemia involvement.   Flow cytometry shows monoclonal Kappa B-cell population which coexpresses CD5 and CD23 representing 30% of gated cells. There is dim surface light chain and dim CD20 expression. No CD38 expression. Neoplastic cells are positive for CD20 (dim), PAX-5, CCD5, CD23, CD43. B cells are + for CD3. BCL1 stain negative.    BM Biopsy 6-29-12 ([facility])   - Limited BM specimen with scattered hematopoietic cells.   - Flow cytometry revealed peripheral blood with minute CLL/SLL population (0.5%) with normal  FISH studies.     Cervical Disectomy 04-18-14:   - Fragments of fibrohyaline cartilage, no acute inflammation or metastatic disease identified.     IMAGING:  PET-CT 6-19-12:   - Mild to moderate bilateral axillary LAD and mediastinal LAD with minimal associated hypermetabolism.     PET-CT 10-09-12:   - Multiple areas of abnormal hypermetabolism in the neck bilaterally, new from the prior study. R posterior trangle focus with SUV 3.1. L supraclavicular focus SUV 5.0 (LN 1.8 cm, prior 1.2 cm)   - Carl axillary LAD - L axilla 2.8 cm LN (prior 1.6 cm) with SUV 3.6, R axillary ln new 2.0 cm, SUV 2.6   - Mediastinal LAD noted. R prevascular LN 1.5 cm with SUV 2.1   - Carl external iliac LNs present ( R 3.2 cm with SUV 3.1)   - Overall worsening areas of hypermetabolism corresponding to enlarging nodes c/w worsening neoplastic involvement.     PET-CT 1-16-13:   - No PET-CT findings of active disease related to the patient's lymphoma. Interval resolution of hypermetabolic adenopathy described on previous examination. Spleen is normal.     PET-CT 1-21-14:   - No abnormal hypermetabolism to suggest neoplastic involvement. No mass or adenopathy noted. Spleen is normal.     04-03-14 MRI Spine:   - Cervical: Degenerative disk disease in the cervical disk spaces at the level of C4-C7. At 4-5, there is a disk herniation associated with bulging. There was spinal stenosis at C5-6  but no disk herniation. At C6-7, the disk bulging is not felt to be significant. The postcontrasted scans in the cervical spine showed no areas of abnormal enhancement.   - Thoracic: negative MRI examination of the thoracic spine. A source of the patient's t horacic spine pain was not demonstrated.     Chest Xray 04-17-15:   - The lungs are clear, the heart is normal. There is no active disease in the chest.     CT CAP/neck w/ contrast 05-13-14:   - No neck lymphadenopathy by CT size criteria   - No intrathoracic or abdominal LAD     CTAP, neck w/ contrast 03-04-15:   - No enlarged lymph nodes were demonstrated. The bile ducts in the liver and the extrahepatic biliary tree is slightly dilated.   - Clinical correlation with laboratory evaluation for obstruction suggested. This, however, is not significantly changed from a previous CT from 05/2014. No retroperitoneal lymphadenopathy is identified.   - Negative CT of the of the soft tissues of the neck. No abnormally enlarged lymph nodes are demonstrated.     CT Neck, CAP 3-15-16:   - There is a change in the CT scan. There are prominent lymph nodes in the neck bilaterally. The largest are in the supraclavicular area and measure greater than a centimeter. Most of the nodes are in the 1cm size range.   - In the chest, most of the nodes are in the subcen timeter size range. The largest nodes are in the pretacheal area and measure 15 mm. There were no enlarged hilar nodes. On the lung windows, there are no pleural effusions. No pulmonary or parenchymal lung nodules or masses were demonstrated. .   There are m ore prominent lymph nodes in the retroperitoneum some of which are enlarged today consistent with recurrence. Again, most of these are in the 1 cm size range, but a few measure up to 17-18 mm. No mesenteric adenopathy demonstrated.     CTCAP, Neck 05-10-16:   - Chest: Persistent stable adenopathy in the mediastinum and axillary regions. The lungs remain clear.   -  A/P: The lymph nodes are stable in comparing with the previous CT done in March.   - Neck: Persistent but stable adenopathy throughout the jugular lymph node chains in both sides of the neck.     [facility] neck 08-16-16:  - Stable mediastinal enlarged lymph nodes including an AP window lymph node measuring 9 mm  - A left axillary region lymph node measures 1.9 cm and was 1.9 cm. Stable right axillary region lymph adenopathy.   - Stable retroperitoneal and mesenteric lymphadenopathy   - persistent lymphadenopathy throughout the neck that is stable in size.     CTCAP 06-07-17:  - Stable bilateral jugular chain lymphadenopathy.  - Stable bilateral axillary and mediastinal region lymphadenopathy.  - Stable retroperitoneal lymphadenopathy.    [facility], Neck 02-27-18:  - Grossly stable bilateral jugular chain lymphadenopathy.  - LUNGS: A NEW RIGHT UPPER LOBE PULMONARY NODULE MEASURES 8.8 MM. RIGHT MIDDLE LOBE PULMONARY PERIBRONCHIAL VASCULAR NODULARITY MAY BE INFECTIOUS OR INFLAMMATORY  - MEDIASTINUM: AGAIN THERE IS MEDIASTINAL BORDERLINE LYMPHADENOPATHY  WITHIN AP WINDOW LYMPH NODE NOW MEASURING 1 CM AND WAS 8 MM WITH SIMILAR  INCREASE IN SIZE OF MULTIPLE BILATERAL AXILLARY LYMPH NODES WITH THE  LARGEST RIGHT MEASURING 4.4 CM OF THE CONGLOMERATE THAT WAS ABOUT 3 CM  AND A LEFT AXILLARY REGION LYMPH NODE MEASURING 2.7 CM THAT WAS 2.2 CM.  - SPLEEN: SPLENOMEGALY IS AGAIN NOTED.  -LYMPH NODES: Extensive retroperitoneal lymphadenopathy with a right para-aortic lymph node measuring about 4.5 cm and was 2.5 cm.    CTCAP 04-12-18:  - There are numerous enlarged supraclavicular lymph nodes.  - These have progressed in size in comparing with the earlier CT. Several of the lymph nodes now measure up to 3 cm in diameter. The lymph nodes extend into the axillary regions. There are some lymph nodes in the mediastinum that are relatively stable in comparing with the earlier exam. The lungs are both well aerated and clear.  - The liver was  normal in size and shape. There is mild dilatation of the bile ducts within the liver. The gallbladder is surgically absent. The common hepatic duct was also dilated in the pancreatic head, this is unchanged from the earlier exam. The spleen is homogeneous in density and is stable in size. There continues to be fairly marked adenopathy in the retroperitoneum. The lymph node size continues to increase slightly by a few millimeters. The largest bulk of lymph nodes is in the gastrohepatic ligament region. There are also lymph nodes along the aorta. These are larger in size as well. Some of the periaortic lymph nodes now measure up to 3 cm in diameter.   - There are enlarged lymph nodes in the mesentery. There are enlarged lymph nodes along the iliac lymph node chains and some borderline enlarged lymph nodes in both groins. The bowel shows no evidence of obstruction, the kidneys enhance appropriately.    US Pelvis, limited 5/2/18  IMPRESSION:  - There is a 8 mm subcutaneous walled collection suggestive of tiny abscess in the suprapubic region of interest.    RECENT LABS:  Lab Results   Component Value Date    WBC 5.24 06/05/2018    HGB 12.5 06/05/2018    HCT 36.1 (L) 06/05/2018    .5 (H) 06/05/2018    RDW 13.9 06/05/2018     06/05/2018    NEUTRORELPCT 39.9 06/05/2018    LYMPHORELPCT 58.0 (H) 06/05/2018    MONORELPCT 1.5 06/05/2018    EOSRELPCT 0.4 06/05/2018    BASORELPCT 0.2 06/05/2018    NEUTROABS 2.09 06/05/2018    LYMPHSABS 3.04 (H) 06/05/2018       Lab Results   Component Value Date     06/05/2018    K 4.0 06/05/2018    CO2 25.6 06/05/2018     06/05/2018    BUN 8 06/05/2018    CREATININE 0.62 06/05/2018    GLUCOSE 150 (H) 06/05/2018    CALCIUM 9.0 06/05/2018    ALKPHOS 64 06/05/2018    AST 14 06/05/2018    ALT 10 06/05/2018    BILITOT 0.3 06/05/2018    ALBUMIN 4.00 06/05/2018    PROTEINTOT 6.6 06/05/2018       Lab Results   Component Value Date     (L) 05/16/2018    URICACID 3.0  05/16/2018     Date  SPEP/MONE Mspike Free K Free L  K/L Ratio   02-12-15 MONE normal Not observed   06-25-15 MONE normal Not observed 22.10  14.93  1.48  11-17-15 MONE normal Not observed 17.17  14.41  1.19    Date  IgG IgA IgM  02-12-15 744 110 43  06-25-15 776 94 39  11-17-15 812 99 41  03-29-17 771 103 34  08-04-17 831 94 27  11-13-17 791 95 21  02-27-18 879 109 25     CLL profile, fish:  The CLL interphase fluorescence in situ hybridization   (FISH) panel analysis was normal. There were no cells with   CCND1-IGH fusion. No extra signals or deletions of OLAF,   chromosome 12, 13q, or TP53 were observed.       SPECIFIC FISH RESULTS:     CCND1/IGH: NORMAL   .         nuc kenzie 11q13(SNCY6l9),14q32(IGHx2)[100]       OLAF: NORMAL         nuc kenzie 11q22.3(ATMx2)[100] .     12cen: NORMAL     .         nuc kenzie 12cen(K56B7a9)[100] .     13q: NORMAL  .         nuc kenzie 13q14.3(DLEUx2),13q34(MAZR4h6)[100] .     TP53: NORMAL  .         nuc kenzie 17p13.1(TP53x2)[100]     Acute Hepatitis Panel: Negative    ASSESSMENT & PLAN:  Ms. Mae is a 53 y.o.  female with a history of Stage II SLL diagnosed in May of 2012 by L supraclavicular LN biopsy.     1. Small Lymphocytic Lymphoma:   - Treated as above with 6 cycles of Bendamustine (after anaphylactic reaction to Rituximab with 1st cycle of treatment) with complete radiographic response.   - She does have adenopathy in the cj cervical, supraclavicular, axillary and cj inguinal areas c/w CLL/SLL. She also has small mediastinal nodes and retroperitoneal LAD.  She has had continued growth of the lymph nodes, and has been losing weight.  She has some night sweats which may or may not be related.  -  Recommended consideration of treatment given worsening disease.  -  Given rituxan allergy, recommended treatment with Imbruvica 420 mg daily with Bactrim SS daily and Valtrex 500 mg daily for prophylaxis.  -  Given possibility for Imbruvica to cause atrial fibrillation or even possibly  ventricular arrhythmias and I spoke with Dr. Shea.  He said he felt it would be safe for her to take and suggested that she go ahead and start while on the heart monitor, which she did.     -  Recommended Shingrix vaccine.    2.  Signficant vascular disease:  -  She has been evaluated by Dr. Amaral and says they are investigating lower vasculature blockages.    3.  Tobacco use:  She tried to quit but restarted. Previously discussed methods to help her and she wanted to try. Therefore, provided Rx for Nicotine patches . She continues to smoke but says she will try and quit.     4. Pelvic abscess:  After failure of Bactrim DS, Dr. Mcqueen performed I&D.  It is healing well.    5. Nausea: Continue zofran, Compazine prn.     6.  Dental abscess:  Will treat with Clindamycin 300 mg TID.  F/u with Dentist as planned.    7.  Prophylaxis:  She received influenza and Prevnar vaccines 11-13-17.  Recommended Shingrix vaccine.    8.  ACO Quality measures  - Melania Mae does currently use tobacco products.  - I advised the patient of the risks in continuing to use tobacco.  During this visit, I spent < 3 minutes counseling the patient regarding smoking cessation.  - Patient's BMI is within normal parameters. No follow-up required.  - Current outpatient and discharge medications have been reconciled for the patient by Thea Sorto MD    This note was scribed for Thea Sorto MD by Violeta Fong RN.    I, Thea Sorto MD, personally performed the services described in this documentation as scribed by the above named individual in my presence, and it is both accurate and complete.  06/05/2018       I spent 25 minutes with Ms. Mae today. More than 50% of the time was spent in counseling /coordination of care for the above mentioned problems.       Electronically Signed by: Thea Sorto MD    CC:   BRANDON Panchal Dr.

## 2018-06-27 DIAGNOSIS — C91.10 CLL (CHRONIC LYMPHOCYTIC LEUKEMIA) (HCC): ICD-10-CM

## 2018-07-17 DIAGNOSIS — I65.21 CAROTID STENOSIS, RIGHT: Primary | ICD-10-CM

## 2018-07-17 RX ORDER — CLOPIDOGREL BISULFATE 75 MG/1
75 TABLET ORAL DAILY
Qty: 30 TABLET | Refills: 5 | Status: SHIPPED | OUTPATIENT
Start: 2018-07-17

## 2018-07-17 NOTE — TELEPHONE ENCOUNTER
Provider:  Given  Caller: pharmacy fax  Phone #:  [phone]  Last visit:  9/27/17   Next visit: none    Reason for call:         Refill request from pharmacy.

## 2018-07-23 DIAGNOSIS — C91.10 CLL (CHRONIC LYMPHOCYTIC LEUKEMIA) (HCC): ICD-10-CM

## 2018-07-25 ENCOUNTER — OFFICE VISIT (OUTPATIENT)
Dept: ONCOLOGY | Facility: CLINIC | Age: 53
End: 2018-07-25

## 2018-07-25 ENCOUNTER — LAB (OUTPATIENT)
Dept: ONCOLOGY | Facility: CLINIC | Age: 53
End: 2018-07-25

## 2018-07-25 VITALS
BODY MASS INDEX: 22.26 KG/M2 | WEIGHT: 146.4 LBS | HEART RATE: 67 BPM | RESPIRATION RATE: 18 BRPM | DIASTOLIC BLOOD PRESSURE: 61 MMHG | OXYGEN SATURATION: 98 % | SYSTOLIC BLOOD PRESSURE: 115 MMHG | TEMPERATURE: 97.2 F

## 2018-07-25 DIAGNOSIS — C91.10 CLL (CHRONIC LYMPHOCYTIC LEUKEMIA) (HCC): ICD-10-CM

## 2018-07-25 DIAGNOSIS — L02.211 CUTANEOUS ABSCESS OF ABDOMINAL WALL: ICD-10-CM

## 2018-07-25 LAB
ALBUMIN SERPL-MCNC: 4.1 G/DL (ref 3.5–5)
ALBUMIN/GLOB SERPL: 1.5 G/DL (ref 1.5–2.5)
ALP SERPL-CCNC: 64 U/L (ref 35–104)
ALT SERPL W P-5'-P-CCNC: 21 U/L (ref 10–36)
ANION GAP SERPL CALCULATED.3IONS-SCNC: 0.4 MMOL/L (ref 3.6–11.2)
AST SERPL-CCNC: 17 U/L (ref 10–30)
BASOPHILS # BLD AUTO: 0.01 10*3/MM3 (ref 0–0.3)
BASOPHILS NFR BLD AUTO: 0.2 % (ref 0–2)
BILIRUB SERPL-MCNC: 0.3 MG/DL (ref 0.2–1.8)
BUN BLD-MCNC: 8 MG/DL (ref 7–21)
BUN/CREAT SERPL: 12.3 (ref 7–25)
CALCIUM SPEC-SCNC: 8.9 MG/DL (ref 7.7–10)
CHLORIDE SERPL-SCNC: 109 MMOL/L (ref 99–112)
CO2 SERPL-SCNC: 22.6 MMOL/L (ref 24.3–31.9)
CREAT BLD-MCNC: 0.65 MG/DL (ref 0.43–1.29)
DEPRECATED RDW RBC AUTO: 43.2 FL (ref 37–54)
EOSINOPHIL # BLD AUTO: 0.04 10*3/MM3 (ref 0–0.7)
EOSINOPHIL NFR BLD AUTO: 0.7 % (ref 0–5)
ERYTHROCYTE [DISTWIDTH] IN BLOOD BY AUTOMATED COUNT: 11.5 % (ref 11.5–14.5)
GFR SERPL CREATININE-BSD FRML MDRD: 95 ML/MIN/1.73
GLOBULIN UR ELPH-MCNC: 2.7 GM/DL
GLUCOSE BLD-MCNC: 167 MG/DL (ref 70–110)
HCT VFR BLD AUTO: 39.2 % (ref 37–47)
HGB BLD-MCNC: 13.4 G/DL (ref 12–16)
IMM GRANULOCYTES # BLD: 0.01 10*3/MM3 (ref 0–0.03)
IMM GRANULOCYTES NFR BLD: 0.2 % (ref 0–0.5)
LYMPHOCYTES # BLD AUTO: 2.03 10*3/MM3 (ref 1–3)
LYMPHOCYTES NFR BLD AUTO: 37 % (ref 21–51)
MCH RBC QN AUTO: 35.7 PG (ref 27–33)
MCHC RBC AUTO-ENTMCNC: 34.2 G/DL (ref 33–37)
MCV RBC AUTO: 104.5 FL (ref 80–94)
MONOCYTES # BLD AUTO: 0.25 10*3/MM3 (ref 0.1–0.9)
MONOCYTES NFR BLD AUTO: 4.6 % (ref 0–10)
NEUTROPHILS # BLD AUTO: 3.14 10*3/MM3 (ref 1.4–6.5)
NEUTROPHILS NFR BLD AUTO: 57.3 % (ref 30–70)
OSMOLALITY SERPL CALC.SUM OF ELEC: 266.7 MOSM/KG (ref 273–305)
PLATELET # BLD AUTO: 165 10*3/MM3 (ref 130–400)
PMV BLD AUTO: 9.7 FL (ref 6–10)
POTASSIUM BLD-SCNC: 4.5 MMOL/L (ref 3.5–5.3)
PROT SERPL-MCNC: 6.8 G/DL (ref 6–8)
RBC # BLD AUTO: 3.75 10*6/MM3 (ref 4.2–5.4)
SODIUM BLD-SCNC: 132 MMOL/L (ref 135–153)
WBC NRBC COR # BLD: 5.48 10*3/MM3 (ref 4.5–12.5)

## 2018-07-25 PROCEDURE — 99214 OFFICE O/P EST MOD 30 MIN: CPT | Performed by: INTERNAL MEDICINE

## 2018-07-25 PROCEDURE — 85025 COMPLETE CBC W/AUTO DIFF WBC: CPT | Performed by: INTERNAL MEDICINE

## 2018-07-25 PROCEDURE — 80053 COMPREHEN METABOLIC PANEL: CPT | Performed by: INTERNAL MEDICINE

## 2018-07-25 PROCEDURE — 36415 COLL VENOUS BLD VENIPUNCTURE: CPT

## 2018-07-25 RX ORDER — OXYCODONE AND ACETAMINOPHEN 10; 325 MG/1; MG/1
1 TABLET ORAL EVERY 6 HOURS PRN
COMMUNITY

## 2018-07-25 NOTE — PROGRESS NOTES
DATE:  7/25/2018    DIAGNOSIS:  SLL - initially diagnosed with Jber Stage II Disease in May 2012, based on excisional biopsy of a L supraclavicular LN. Bone marrow was involved.     CHIEF COMPLAINT:  CLL / SLL    TREATMENT HISTORY:  1. Received 6 cycles of Treanda without Rituxan between 10-24-12 and 3-21-13 because of anaphylactic reaction to Rituximab with her 1st cycle of treatment . Received Neulasta support. (Delivered by Dr. Koehler)     2. Imbruvica started 5-02-18      HISTORY OF PRESENT ILLNESS:   Ms. Mae was referred by Gillian Harrison for evaluation and treatment of CLL / SLL. She was reportedly diagnosed in May of 2012 when she saw Dr. Carbajal for biopsy of a L supraclavicular LN. At that time she had excessive fatigue & a 40 lb weight loss. She saw Dr. Koehler at  at time who performed bone marrow exam (bone marrow was involved) and started treatment with Bendamustine / Rituximab. Unfortunately with her 1st treatment, she had an anaphylactic reaction to Rituximab so this was not given with subsequent cycles of therapy. Her last cycle of treatment was 3-21-13. Per Dr. Koehler' s records, she had a complete response to treatment. She never had a significant leukocytosis or lymphocytosis, but had predominantly a lymphomatous presentation of her disease.     INTERVAL HISTORY:  Ms. Mae is here for follow up of CLL/SLL. She is feeling poorly today. She describes a numbness to her legs (which she says is chronic, though recently worse and which she says is related to her back problems) and to her left neck, shoulder and breast (she says this is new). She reports prior neck surgery. She has been followed by Dr. Huynh who she says recommended surgical intervention, but she says she has been unable to pursue this option due to financial constraints. She is taking Imbruvica and tolerating it well. She reports consistent night sweats, but otherwise denies fevers, chills, or any other complaint related to her  medication.  She does feel like the suprapubic abscess she had to have drained previously is returning.    PAST MEDICAL HISTORY:  Past Medical History:   Diagnosis Date   • Aneurysm (CMS/HCC) 8/223/17    Cavernous left ICA aneurysm   • Asthma    • Carotid artery occlusion    • Chronic back pain    • CLL (chronic lymphocytic leukemia) (CMS/HCC)     OCTOBER OF 2012   • COPD (chronic obstructive pulmonary disease) (CMS/[facility])    • Degenerative arthritis    • Depression    • Diabetes mellitus (CMS/[facility])     type 2   • Hyperlipidemia    • Hypertension    • Lymphadenopathy    • Non Hodgkin's lymphoma (CMS/HCC)        PAST SURGICAL HISTORY:  Past Surgical History:   Procedure Laterality Date   • APPENDECTOMY     • BACK SURGERY      2002 (work accident) c-spine surgery 4/14   • CAROTID STENT Right 08/23/2017   • CEREBRAL ANGIOGRAM N/A 8/23/2017    Diagnostic Carotid/Cerebral Angiogram   • CHOLECYSTECTOMY     • HYSTERECTOMY      for endometriosis/ovarian bleed   • NECK SURGERY     • OTHER SURGICAL HISTORY      pac insertion and removal   • TONSILLECTOMY         FAMILY HISTORY:  Family History   Problem Relation Age of Onset   • Lung cancer Father 72   • Hypertension Father    • Heart disease Father    • Cancer Father    • Diabetes Father    • Other Brother 46        MI   • Heart disease Brother    • Breast cancer Paternal Aunt    • Colon cancer Maternal Grandfather    • Other Cousin         CLL (dx 48 yo)   • Hypertension Mother      Social History     Social History   • Marital status:      Spouse name: N/A   • Number of children: N/A   • Years of education: N/A     Occupational History   • Not on file.     Social History Main Topics   • Smoking status: Current Every Day Smoker     Packs/day: 0.50     Years: 35.00     Types: Cigarettes   • Smokeless tobacco: Never Used      Comment: 0.5-1 PPD   • Alcohol use No   • Drug use: No   • Sexual activity: Defer     Other Topics Concern   • Not on file     Social History  Narrative   • No narrative on file     REVIEW OF SYSTEMS:    A comprehensive 14 point review of systems was performed and was negative except as mentioned    MEDICATIONS:  The current medication list was reviewed in the EMR    Current Outpatient Prescriptions:   •  albuterol (PROVENTIL HFA;VENTOLIN HFA) 108 (90 Base) MCG/ACT inhaler, Inhale 2 puffs As Needed for Wheezing., Disp: , Rfl:   •  ALPRAZolam (XANAX) 0.5 MG tablet, Take 0.5 mg by mouth 3 (Three) Times a Day As Needed for Anxiety., Disp: , Rfl:   •  aspirin 81 MG EC tablet, Take 1 tablet by mouth Daily., Disp: 30 tablet, Rfl: 5  •  B-D UF III MINI PEN NEEDLES 31G X 5 MM misc, , Disp: , Rfl:   •  Brexpiprazole (REXULTI) 1 MG tablet, Take  by mouth Daily., Disp: , Rfl:   •  clopidogrel (PLAVIX) 75 MG tablet, Take 1 tablet by mouth Daily., Disp: 30 tablet, Rfl: 5  •  fluconazole (DIFLUCAN) 100 MG tablet, Take 2 tabs on day 1 then 1 tab for days 2-5, then stop, Disp: 6 tablet, Rfl: 0  •  fluticasone-salmeterol (ADVAIR) 100-50 MCG/DOSE DISKUS, Inhale 1 puff As Needed., Disp: , Rfl:   •  gabapentin (NEURONTIN) 800 MG tablet, Take 800 mg by mouth 4 (Four) Times a Day., Disp: , Rfl:   •  ibrutinib (IMBRUVICA) 420 MG tablet tablet, Take 1 tablet by mouth Daily., Disp: 28 tablet, Rfl: 3  •  Ibuprofen (ADVIL PO), Take 400 mg by mouth 3 (Three) Times a Day. LIQUIGELS, Disp: , Rfl:   •  Insulin Aspart (NOVOLOG FLEXPEN SC), Inject 8-12 Units under the skin 3 (Three) Times a Day Before Meals. PER SLIDING SCALE, Disp: , Rfl:   •  Insulin Detemir (LEVEMIR FLEXPEN SC), Inject 8-12 Units under the skin Every Night. SLIDING SCALE, Disp: , Rfl:   •  levETIRAcetam (KEPPRA) 1000 MG tablet, Take 1,000 mg by mouth 2 (Two) Times a Day., Disp: , Rfl:   •  lisinopril (PRINIVIL,ZESTRIL) 20 MG tablet, Take 20 mg by mouth Every Morning., Disp: , Rfl:   •  loratadine (CLARITIN) 10 MG tablet, Take 10 mg by mouth Every Night., Disp: , Rfl:   •  nicotine (NICODERM CQ) 14 MG/24HR patch, Place  "1 patch on the skin Daily., Disp: 21 patch, Rfl: 1  •  ondansetron (ZOFRAN) 8 MG tablet, Take 1 tablet by mouth Every 8 (Eight) Hours As Needed for Nausea or Vomiting., Disp: 90 tablet, Rfl: 6  •  oxyCODONE-acetaminophen (PERCOCET)  MG per tablet, Take 1 tablet by mouth Every 6 (Six) Hours As Needed for Moderate Pain ., Disp: , Rfl:   •  prochlorperazine (COMPAZINE) 10 MG tablet, Take 1 tablet by mouth Every 6 (Six) Hours As Needed for Nausea or Vomiting., Disp: 60 tablet, Rfl: 3  •  sertraline (ZOLOFT) 100 MG tablet, Take 100 mg by mouth Every Night., Disp: , Rfl:   •  sulfamethoxazole-trimethoprim (BACTRIM) 400-80 MG tablet, Take 1 tablet by mouth Daily., Disp: 30 tablet, Rfl: 3  •  valACYclovir (VALTREX) 500 MG tablet, Take 1 tablet by mouth Daily., Disp: 30 tablet, Rfl: 3    ALLERGIES:    Allergies   Allergen Reactions   • Codeine Other (See Comments)     \"UNKNOW  CHILDHOOD REACTION\"   • Penicillins Other (See Comments)     \"UNKNOWN CHILDHOOD ALLERGY\"   • Rituxan [Rituximab] Other (See Comments)     \"THROAT RAWNESS; FELT LIKE MY THROAT WAS CLOSING UP\"       PHYSICAL EXAM:  Visit Vitals  /61   Pulse 67   Temp 97.2 °F (36.2 °C) (Oral)   Resp 18   Wt 66.4 kg (146 lb 6.4 oz)   SpO2 98%   BMI 22.26 kg/m²   General: Alert and oriented. Appears tired but otw well  HEENT: Pupils are equal, round and reactive to light and accommodation, Extraocular movements full, oropharynx clear  Neck: no jvd or thyromegaly   Cardiovascular: regular rate and rhythm without murmurs, rubs or gallops.   Pulmonary: clear to auscultation bilaterally   Abdomen: soft, non-tender, non-distended, normal active bowel sounds present, no organomegaly   Pelvis: Suprapubic abscess s/p I&D, now completely healed.  Unfortunately, there is an area inferior to her scar which feels like she may be reforming an abscess.    No cellulitis / warmth/erythema, but there is an area of fluctuance / tenderness which is about 2.5 cm in " diameter.  Extremities: No clubbing, cyanosis or edema   Lymph: +significant improvement in L supraclavicular and carl cervical adenopathy which I sno longer palpable (prior approx 2 cm) . Markedly improved  carl axillary LAD (4-5 cm -->  2cm) and shoddy carl inguinal adenopathy (no longer palpable)   Neurologic: MS as above, CN II-XII intact. Strength 4+-5-/5 throughout BUE proximally and distally and in LLE.      PATHOLOGY:  5-25-12 L Supraclavicular LN Biopsy:   - Extensive small lymphocytic lymphoma / chronic lymphocytic leukemia involvement.   Flow cytometry shows monoclonal Kappa B-cell population which coexpresses CD5 and CD23 representing 30% of gated cells. There is dim surface light chain and dim CD20 expression. No CD38 expression. Neoplastic cells are positive for CD20 (dim), PAX-5, CCD5, CD23, CD43. B cells are + for CD3. BCL1 stain negative.    BM Biopsy 6-29-12 ([facility])   - Limited BM specimen with scattered hematopoietic cells.   - Flow cytometry revealed peripheral blood with minute CLL/SLL population (0.5%) with normal  FISH studies.     Cervical Disectomy 04-18-14:   - Fragments of fibrohyaline cartilage, no acute inflammation or metastatic disease identified.     IMAGING:  PET-CT 6-19-12:   - Mild to moderate bilateral axillary LAD and mediastinal LAD with minimal associated hypermetabolism.     PET-CT 10-09-12:   - Multiple areas of abnormal hypermetabolism in the neck bilaterally, new from the prior study. R posterior trangle focus with SUV 3.1. L supraclavicular focus SUV 5.0 (LN 1.8 cm, prior 1.2 cm)   - Carl axillary LAD - L axilla 2.8 cm LN (prior 1.6 cm) with SUV 3.6, R axillary ln new 2.0 cm, SUV 2.6   - Mediastinal LAD noted. R prevascular LN 1.5 cm with SUV 2.1   - Carl external iliac LNs present ( R 3.2 cm with SUV 3.1)   - Overall worsening areas of hypermetabolism corresponding to enlarging nodes c/w worsening neoplastic involvement.     PET-CT 1-16-13:   - No PET-CT findings of active  disease related to the patient's lymphoma. Interval resolution of hypermetabolic adenopathy described on previous examination. Spleen is normal.     PET-CT 1-21-14:   - No abnormal hypermetabolism to suggest neoplastic involvement. No mass or adenopathy noted. Spleen is normal.     04-03-14 MRI Spine:   - Cervical: Degenerative disk disease in the cervical disk spaces at the level of C4-C7. At 4-5, there is a disk herniation associated with bulging. There was spinal stenosis at C5-6 but no disk herniation. At C6-7, the disk bulging is not felt to be significant. The postcontrasted scans in the cervical spine showed no areas of abnormal enhancement.   - Thoracic: negative MRI examination of the thoracic spine. A source of the patient's t horacic spine pain was not demonstrated.     Chest Xray 04-17-15:   - The lungs are clear, the heart is normal. There is no active disease in the chest.     CT CAP/neck w/ contrast 05-13-14:   - No neck lymphadenopathy by CT size criteria   - No intrathoracic or abdominal LAD     CTAP, neck w/ contrast 03-04-15:   - No enlarged lymph nodes were demonstrated. The bile ducts in the liver and the extrahepatic biliary tree is slightly dilated.   - Clinical correlation with laboratory evaluation for obstruction suggested. This, however, is not significantly changed from a previous CT from 05/2014. No retroperitoneal lymphadenopathy is identified.   - Negative CT of the of the soft tissues of the neck. No abnormally enlarged lymph nodes are demonstrated.     CT Neck, CAP 3-15-16:   - There is a change in the CT scan. There are prominent lymph nodes in the neck bilaterally. The largest are in the supraclavicular area and measure greater than a centimeter. Most of the nodes are in the 1cm size range.   - In the chest, most of the nodes are in the subcen timeter size range. The largest nodes are in the pretacheal area and measure 15 mm. There were no enlarged hilar nodes. On the lung windows,  there are no pleural effusions. No pulmonary or parenchymal lung nodules or masses were demonstrated. .   There are m ore prominent lymph nodes in the retroperitoneum some of which are enlarged today consistent with recurrence. Again, most of these are in the 1 cm size range, but a few measure up to 17-18 mm. No mesenteric adenopathy demonstrated.     [facility], Neck 05-10-16:   - Chest: Persistent stable adenopathy in the mediastinum and axillary regions. The lungs remain clear.   - A/P: The lymph nodes are stable in comparing with the previous CT done in March.   - Neck: Persistent but stable adenopathy throughout the jugular lymph node chains in both sides of the neck.     [facility] neck 08-16-16:  - Stable mediastinal enlarged lymph nodes including an AP window lymph node measuring 9 mm  - A left axillary region lymph node measures 1.9 cm and was 1.9 cm. Stable right axillary region lymph adenopathy.   - Stable retroperitoneal and mesenteric lymphadenopathy   - persistent lymphadenopathy throughout the neck that is stable in size.     [facility]  [facility] Stable bilateral jugular chain lymphadenopathy.  - Stable bilateral axillary and mediastinal region lymphadenopathy.  - Stable retroperitoneal lymphadenopathy.    [facility], Neck 02-27-18:  - Grossly stable bilateral jugular chain lymphadenopathy.  - LUNGS: A NEW RIGHT UPPER LOBE PULMONARY NODULE MEASURES 8.8 MM. RIGHT MIDDLE LOBE PULMONARY PERIBRONCHIAL VASCULAR NODULARITY MAY BE INFECTIOUS OR INFLAMMATORY  - MEDIASTINUM: AGAIN THERE IS MEDIASTINAL BORDERLINE LYMPHADENOPATHY  WITHIN AP WINDOW LYMPH NODE NOW MEASURING 1 CM AND WAS 8 MM WITH SIMILAR  INCREASE IN SIZE OF MULTIPLE BILATERAL AXILLARY LYMPH NODES WITH THE  LARGEST RIGHT MEASURING 4.4 CM OF THE CONGLOMERATE THAT WAS ABOUT 3 CM  AND A LEFT AXILLARY REGION LYMPH NODE MEASURING 2.7 CM THAT WAS 2.2 CM.  - SPLEEN: SPLENOMEGALY IS AGAIN NOTED.  -LYMPH NODES: Extensive retroperitoneal lymphadenopathy with a right  para-aortic lymph node measuring about 4.5 cm and was 2.5 cm.    CTCAP 04-12-18:  - There are numerous enlarged supraclavicular lymph nodes.  - These have progressed in size in comparing with the earlier CT. Several of the lymph nodes now measure up to 3 cm in diameter. The lymph nodes extend into the axillary regions. There are some lymph nodes in the mediastinum that are relatively stable in comparing with the earlier exam. The lungs are both well aerated and clear.  - The liver was normal in size and shape. There is mild dilatation of the bile ducts within the liver. The gallbladder is surgically absent. The common hepatic duct was also dilated in the pancreatic head, this is unchanged from the earlier exam. The spleen is homogeneous in density and is stable in size. There continues to be fairly marked adenopathy in the retroperitoneum. The lymph node size continues to increase slightly by a few millimeters. The largest bulk of lymph nodes is in the gastrohepatic ligament region. There are also lymph nodes along the aorta. These are larger in size as well. Some of the periaortic lymph nodes now measure up to 3 cm in diameter.   - There are enlarged lymph nodes in the mesentery. There are enlarged lymph nodes along the iliac lymph node chains and some borderline enlarged lymph nodes in both groins. The bowel shows no evidence of obstruction, the kidneys enhance appropriately.    US Pelvis, limited 5/2/18  IMPRESSION:  - There is a 8 mm subcutaneous walled collection suggestive of tiny abscess in the suprapubic region of interest.    RECENT LABS:  Lab Results   Component Value Date    WBC 5.48 07/25/2018    HGB 13.4 07/25/2018    HCT 39.2 07/25/2018    .5 (H) 07/25/2018    RDW 11.5 07/25/2018     07/25/2018    NEUTRORELPCT 57.3 07/25/2018    LYMPHORELPCT 37.0 07/25/2018    MONORELPCT 4.6 07/25/2018    EOSRELPCT 0.7 07/25/2018    BASORELPCT 0.2 07/25/2018    NEUTROABS 3.14 07/25/2018    LYMPHSABS 2.03  07/25/2018       Lab Results   Component Value Date     06/05/2018    K 4.0 06/05/2018    CO2 25.6 06/05/2018     06/05/2018    BUN 8 06/05/2018    CREATININE 0.62 06/05/2018    GLUCOSE 150 (H) 06/05/2018    CALCIUM 9.0 06/05/2018    ALKPHOS 64 06/05/2018    AST 14 06/05/2018    ALT 10 06/05/2018    BILITOT 0.3 06/05/2018    ALBUMIN 4.00 06/05/2018    PROTEINTOT 6.6 06/05/2018       Lab Results   Component Value Date     (L) 06/05/2018    URICACID 3.7 06/05/2018     Date  SPEP/MONE Mspike Free K Free L  K/L Ratio   02-12-15 MONE normal Not observed   06-25-15 MONE normal Not observed 22.10  14.93  1.48  11-17-15 MONE normal Not observed 17.17  14.41  1.19    Date  IgG IgA IgM  02-12-15 744 110 43  06-25-15 776 94 39  11-17-15 812 99 41  03-29-17 771 103 34  08-04-17 831 94 27  11-13-17 791 95 21  02-27-18 879 109 25     CLL profile, FISH:  The CLL interphase fluorescence in situ hybridization   (FISH) panel analysis was normal. There were no cells with   CCND1-IGH fusion. No extra signals or deletions of OLAF,   chromosome 12, 13q, or TP53 were observed.       SPECIFIC FISH RESULTS:     CCND1/IGH: NORMAL   .         nuc kenzie 11q13(BFFE9i3),14q32(IGHx2)[100]       OLAF: NORMAL         nuc kenzie 11q22.3(ATMx2)[100] .     12cen: NORMAL     .         nuc kenzie 12cen(Q75V7a3)[100] .     13q: NORMAL  .         nuc kenzie 13q14.3(DLEUx2),13q34(FEDX7b2)[100] .     TP53: NORMAL  .         nuc kenzie 17p13.1(TP53x2)[100]     Acute Hepatitis Panel: Negative    ASSESSMENT & PLAN:  Ms. Mae is a 53 y.o.  female with a history of Stage II SLL diagnosed in May of 2012 by L supraclavicular LN biopsy.     1. Small Lymphocytic Lymphoma:   - Treated as above with 6 cycles of Bendamustine (after anaphylactic reaction to Rituximab with 1st cycle of treatment) with complete radiographic response.   - She does have adenopathy in the cj cervical, supraclavicular, axillary and cj inguinal areas c/w CLL/SLL. She also has small  mediastinal nodes and retroperitoneal LAD.  She has had continued growth of the lymph nodes, and has been losing weight.  She has some night sweats which may or may not be related.  -  Recommended consideration of treatment given worsening disease.  -  Given rituxan allergy, recommended treatment with Imbruvica 420 mg daily with Bactrim SS daily and Valtrex 500 mg daily for prophylaxis.  -  Given possibility for Imbruvica to cause atrial fibrillation or even possibly ventricular arrhythmias and I spoke with Dr. Shea.  He said he felt it would be safe for her to take and suggested that she go ahead and start while on the heart monitor, which she did.     -  Recommended Shingrix vaccine.  -  She has done very well with her treatment wtihout any significant side effects and with excellent clinical response.  Will repeat CT scans prior to her return.    2.  Signficant vascular disease:  -  She has been evaluated by Dr. Amaral and says they are investigating lower vasculature blockages.    3.  Tobacco use:  She tried to quit but restarted. Previously discussed methods to help her and she wanted to try. Therefore, provided Rx for Nicotine patches . She continues to smoke but says she will try and quit.     4. Pelvic abscess:  After failure of Bactrim DS, Dr. Mcqueen performed I&D.  It is well-healed, but unfortunately I think the abscess may be returning.  No superficial cellulitis.  She plans to return to Dr. Mcqueen.    5. Nausea: Continue zofran, Compazine prn.     6.  Degenerative disc disease with worsening LLE and BUE symptoms:  Recommended she return to Dr. Mcqueen.     7.  Prophylaxis:  She received influenza and Prevnar vaccines 11-13-17.  Recommended Shingrix vaccine.    8.  ACO Quality measures  - Melania Mae does currently use tobacco products.  - I advised the patient of the risks in continuing to use tobacco.  During this visit, I spent < 3 minutes counseling the patient regarding smoking cessation.  -  Patient's BMI is within normal parameters. No follow-up required.  - Current outpatient and discharge medications have been reconciled for the patient by Thea Sorto MD       I spent 25 minutes with Ms. Mae today. More than 50% of the time was spent in counseling /coordination of care for the above mentioned problems.       Electronically Signed by: Thea Sorto MD    CC:   BRANDON Panchal Dr.

## 2018-08-09 ENCOUNTER — TELEPHONE (OUTPATIENT)
Dept: ONCOLOGY | Facility: HOSPITAL | Age: 53
End: 2018-08-09

## 2018-08-09 RX ORDER — AZITHROMYCIN 250 MG/1
TABLET, FILM COATED ORAL
Qty: 6 TABLET | Refills: 0 | Status: SHIPPED | OUTPATIENT
Start: 2018-08-09 | End: 2019-09-13

## 2018-08-09 NOTE — TELEPHONE ENCOUNTER
----- Message from Melania Preston sent at 8/9/2018  9:43 AM EDT -----  Regarding: MEDICATION REQUEST  Contact: [phone]  The patient called stating that she has URI symptoms again and is requesting a prescription for Amoxacillin.

## 2018-08-09 NOTE — TELEPHONE ENCOUNTER
Pt called complaining of chest congestion, productive cough and low grade fever.  Dr. Villegas aware.  Z-pack ordered and sent to [facility] as ordered.  Pt aware.

## 2018-08-09 NOTE — TELEPHONE ENCOUNTER
----- Message from Melania Preston sent at 8/9/2018  9:43 AM EDT -----  Regarding: MEDICATION REQUEST  Contact: [phone]  The patient called stating that she has URI symptoms again and is requesting a prescription for Amoxacillin.

## 2018-08-13 ENCOUNTER — APPOINTMENT (OUTPATIENT)
Dept: PHARMACY | Facility: HOSPITAL | Age: 53
End: 2018-08-13

## 2018-08-15 ENCOUNTER — SPECIALTY PHARMACY (OUTPATIENT)
Dept: PHARMACY | Facility: HOSPITAL | Age: 53
End: 2018-08-15

## 2018-08-15 DIAGNOSIS — C91.10 CLL (CHRONIC LYMPHOCYTIC LEUKEMIA) (HCC): ICD-10-CM

## 2018-08-15 NOTE — PROGRESS NOTES
Specialty Pharmacy Note      Name:  Melania Mae  :  1965  Date:  8/15/2018         Past Medical History:   Diagnosis Date   • Aneurysm (CMS/HCC)     Cavernous left ICA aneurysm   • Asthma    • Carotid artery occlusion    • Chronic back pain    • CLL (chronic lymphocytic leukemia) (CMS/HCC)     2012   • COPD (chronic obstructive pulmonary disease) (CMS/HCC)    • Degenerative arthritis    • Depression    • Diabetes mellitus (CMS/HCC)     type 2   • Hyperlipidemia    • Hypertension    • Lymphadenopathy    • Non Hodgkin's lymphoma (CMS/HCC)        Past Surgical History:   Procedure Laterality Date   • APPENDECTOMY     • BACK SURGERY       (work accident) c-spine surgery    • CAROTID STENT Right 2017   • CEREBRAL ANGIOGRAM N/A 2017    Diagnostic Carotid/Cerebral Angiogram   • CHOLECYSTECTOMY     • HYSTERECTOMY      for endometriosis/ovarian bleed   • NECK SURGERY     • OTHER SURGICAL HISTORY      pac insertion and removal   • TONSILLECTOMY         Social History     Social History   • Marital status:      Spouse name: N/A   • Number of children: N/A   • Years of education: N/A     Occupational History   • Not on file.     Social History Main Topics   • Smoking status: Current Every Day Smoker     Packs/day: 0.50     Years: 35.00     Types: Cigarettes   • Smokeless tobacco: Never Used      Comment: 0.5-1 PPD   • Alcohol use No   • Drug use: No   • Sexual activity: Defer     Other Topics Concern   • Not on file     Social History Narrative   • No narrative on file       Family History   Problem Relation Age of Onset   • Lung cancer Father 72   • Hypertension Father    • Heart disease Father    • Cancer Father    • Diabetes Father    • Other Brother 46        MI   • Heart disease Brother    • Breast cancer Paternal Aunt    • Colon cancer Maternal Grandfather    • Other Cousin         CLL (dx 46 yo)   • Hypertension Mother        Allergies   Allergen  "Reactions   • Codeine Other (See Comments)     \"UNKNOW  CHILDHOOD REACTION\"   • Penicillins Other (See Comments)     \"UNKNOWN CHILDHOOD ALLERGY\"   • Rituxan [Rituximab] Other (See Comments)     \"THROAT RAWNESS; FELT LIKE MY THROAT WAS CLOSING UP\"       Current Outpatient Prescriptions   Medication Sig Dispense Refill   • albuterol (PROVENTIL HFA;VENTOLIN HFA) 108 (90 Base) MCG/ACT inhaler Inhale 2 puffs As Needed for Wheezing.     • ALPRAZolam (XANAX) 0.5 MG tablet Take 0.5 mg by mouth 3 (Three) Times a Day As Needed for Anxiety.     • aspirin 81 MG EC tablet Take 1 tablet by mouth Daily. 30 tablet 5   • azithromycin (ZITHROMAX) 250 MG tablet Take 2 tablets the first day, then 1 tablet daily for 4 days. 6 tablet 0   • B-D UF III MINI PEN NEEDLES 31G X 5 MM misc      • Brexpiprazole (REXULTI) 1 MG tablet Take  by mouth Daily.     • clopidogrel (PLAVIX) 75 MG tablet Take 1 tablet by mouth Daily. 30 tablet 5   • fluconazole (DIFLUCAN) 100 MG tablet Take 2 tabs on day 1 then 1 tab for days 2-5, then stop 6 tablet 0   • fluticasone-salmeterol (ADVAIR) 100-50 MCG/DOSE DISKUS Inhale 1 puff As Needed.     • gabapentin (NEURONTIN) 800 MG tablet Take 800 mg by mouth 4 (Four) Times a Day.     • ibrutinib (IMBRUVICA) 420 MG tablet tablet Take 1 tablet by mouth Daily. 28 tablet 5   • Ibuprofen (ADVIL PO) Take 400 mg by mouth 3 (Three) Times a Day. LIQUIGELS     • Insulin Aspart (NOVOLOG FLEXPEN SC) Inject 8-12 Units under the skin 3 (Three) Times a Day Before Meals. PER SLIDING SCALE     • Insulin Detemir (LEVEMIR FLEXPEN SC) Inject 8-12 Units under the skin Every Night. SLIDING SCALE     • levETIRAcetam (KEPPRA) 1000 MG tablet Take 1,000 mg by mouth 2 (Two) Times a Day.     • lisinopril (PRINIVIL,ZESTRIL) 20 MG tablet Take 20 mg by mouth Every Morning.     • loratadine (CLARITIN) 10 MG tablet Take 10 mg by mouth Every Night.     • nicotine (NICODERM CQ) 14 MG/24HR patch Place 1 patch on the skin Daily. 21 patch 1   • " ondansetron (ZOFRAN) 8 MG tablet Take 1 tablet by mouth Every 8 (Eight) Hours As Needed for Nausea or Vomiting. 90 tablet 6   • oxyCODONE-acetaminophen (PERCOCET)  MG per tablet Take 1 tablet by mouth Every 6 (Six) Hours As Needed for Moderate Pain .     • prochlorperazine (COMPAZINE) 10 MG tablet Take 1 tablet by mouth Every 6 (Six) Hours As Needed for Nausea or Vomiting. 60 tablet 3   • sertraline (ZOLOFT) 100 MG tablet Take 100 mg by mouth Every Night.     • sulfamethoxazole-trimethoprim (BACTRIM) 400-80 MG tablet Take 1 tablet by mouth Daily. 30 tablet 3   • valACYclovir (VALTREX) 500 MG tablet Take 1 tablet by mouth Daily. 30 tablet 3     No current facility-administered medications for this visit.          LABORATORY:    Lab Results   Component Value Date    TSH 1.036 11/13/2017    PROTIME 10.2 04/17/2014    INR 0.96 04/17/2014     Lab Results   Component Value Date    PROTIME 10.2 04/17/2014    INR 0.96 04/17/2014     No results found for: HAV, HCVQUANT, UOCDRK99, HCVINFO, HCVGENOTYPE  Lab Results   Component Value Date    HEPBSAB Non-Reactive 04/27/2018     Last Urine Toxicity     There is no flowsheet data to display.          ASSESSMENT/PLAN:    Medication(s): Imbruvica    Currently Taking Medication(s): Yes, she is compliant.    Experiencing Side Effects: No Significant Side Effect     Prior Authorization Status: Active     Financial Assistance Status: Not at this time    Any Issues Identified: Not at this time    Appropriate to Process Prescription(s): It is appropriate to fill at this time. Due to no refills, new RX was sent to [facility]    Counseling Offered: Counseled at initiation of therapy    Next Specialty Pharmacy Visit: 9/10/2018

## 2018-08-19 DIAGNOSIS — C91.10 CLL (CHRONIC LYMPHOCYTIC LEUKEMIA) (HCC): ICD-10-CM

## 2018-08-20 ENCOUNTER — APPOINTMENT (OUTPATIENT)
Dept: CT IMAGING | Facility: HOSPITAL | Age: 53
End: 2018-08-20
Attending: INTERNAL MEDICINE

## 2018-08-22 ENCOUNTER — TELEPHONE (OUTPATIENT)
Dept: NEUROSURGERY | Facility: CLINIC | Age: 53
End: 2018-08-22

## 2018-08-22 DIAGNOSIS — M47.892 OTHER OSTEOARTHRITIS OF SPINE, CERVICAL REGION: ICD-10-CM

## 2018-08-22 DIAGNOSIS — M50.30 DDD (DEGENERATIVE DISC DISEASE), CERVICAL: ICD-10-CM

## 2018-08-22 DIAGNOSIS — M48.02 SPINAL STENOSIS IN CERVICAL REGION: Primary | ICD-10-CM

## 2018-08-28 ENCOUNTER — HOSPITAL ENCOUNTER (OUTPATIENT)
Dept: CT IMAGING | Facility: HOSPITAL | Age: 53
Discharge: HOME | End: 2018-08-28
Attending: INTERNAL MEDICINE

## 2018-08-28 ENCOUNTER — HOSPITAL ENCOUNTER (OUTPATIENT)
Dept: CT IMAGING | Facility: HOSPITAL | Age: 53
Discharge: HOME | End: 2018-08-28
Attending: INTERNAL MEDICINE | Admitting: INTERNAL MEDICINE

## 2018-08-28 DIAGNOSIS — C91.10 CLL (CHRONIC LYMPHOCYTIC LEUKEMIA) (HCC): ICD-10-CM

## 2018-08-28 DIAGNOSIS — L02.211 CUTANEOUS ABSCESS OF ABDOMINAL WALL: ICD-10-CM

## 2018-08-28 LAB — CREAT BLDA-MCNC: 0.7 MG/DL (ref 0.6–1.3)

## 2018-08-28 PROCEDURE — 71260 CT THORAX DX C+: CPT

## 2018-08-28 PROCEDURE — 74177 CT ABD & PELVIS W/CONTRAST: CPT | Performed by: RADIOLOGY

## 2018-08-28 PROCEDURE — 25010000002 IOPAMIDOL 61 % SOLUTION: Performed by: INTERNAL MEDICINE

## 2018-08-28 PROCEDURE — 71260 CT THORAX DX C+: CPT | Performed by: RADIOLOGY

## 2018-08-28 PROCEDURE — 74177 CT ABD & PELVIS W/CONTRAST: CPT

## 2018-08-28 PROCEDURE — 82565 ASSAY OF CREATININE: CPT

## 2018-08-28 RX ADMIN — IOPAMIDOL 100 ML: 612 INJECTION, SOLUTION INTRAVENOUS at 10:01

## 2018-08-31 ENCOUNTER — OFFICE VISIT (OUTPATIENT)
Dept: ONCOLOGY | Facility: CLINIC | Age: 53
End: 2018-08-31

## 2018-08-31 ENCOUNTER — LAB (OUTPATIENT)
Dept: ONCOLOGY | Facility: CLINIC | Age: 53
End: 2018-08-31

## 2018-08-31 VITALS
BODY MASS INDEX: 22.43 KG/M2 | RESPIRATION RATE: 18 BRPM | SYSTOLIC BLOOD PRESSURE: 149 MMHG | WEIGHT: 147.5 LBS | DIASTOLIC BLOOD PRESSURE: 73 MMHG | HEART RATE: 67 BPM | TEMPERATURE: 97.8 F | OXYGEN SATURATION: 99 %

## 2018-08-31 DIAGNOSIS — C91.10 CLL (CHRONIC LYMPHOCYTIC LEUKEMIA) (HCC): ICD-10-CM

## 2018-08-31 DIAGNOSIS — Z87.39 H/O DEGENERATIVE DISC DISEASE: ICD-10-CM

## 2018-08-31 DIAGNOSIS — C91.10 CLL (CHRONIC LYMPHOCYTIC LEUKEMIA) (HCC): Primary | ICD-10-CM

## 2018-08-31 DIAGNOSIS — Z72.0 TOBACCO ABUSE: ICD-10-CM

## 2018-08-31 LAB
ALBUMIN SERPL-MCNC: 4.3 G/DL (ref 3.5–5)
ALBUMIN/GLOB SERPL: 1.7 G/DL (ref 1.5–2.5)
ALP SERPL-CCNC: 61 U/L (ref 35–104)
ALT SERPL W P-5'-P-CCNC: 16 U/L (ref 10–36)
ANION GAP SERPL CALCULATED.3IONS-SCNC: 1.5 MMOL/L (ref 3.6–11.2)
AST SERPL-CCNC: 9 U/L (ref 10–30)
BASOPHILS # BLD AUTO: 0.01 10*3/MM3 (ref 0–0.3)
BASOPHILS NFR BLD AUTO: 0.2 % (ref 0–2)
BILIRUB SERPL-MCNC: 0.4 MG/DL (ref 0.2–1.8)
BUN BLD-MCNC: 9 MG/DL (ref 7–21)
BUN/CREAT SERPL: 11.1 (ref 7–25)
CALCIUM SPEC-SCNC: 9.1 MG/DL (ref 7.7–10)
CHLORIDE SERPL-SCNC: 105 MMOL/L (ref 99–112)
CO2 SERPL-SCNC: 25.5 MMOL/L (ref 24.3–31.9)
CREAT BLD-MCNC: 0.81 MG/DL (ref 0.43–1.29)
DEPRECATED RDW RBC AUTO: 45.1 FL (ref 37–54)
EOSINOPHIL # BLD AUTO: 0.05 10*3/MM3 (ref 0–0.7)
EOSINOPHIL NFR BLD AUTO: 0.9 % (ref 0–5)
ERYTHROCYTE [DISTWIDTH] IN BLOOD BY AUTOMATED COUNT: 12.3 % (ref 11.5–14.5)
GFR SERPL CREATININE-BSD FRML MDRD: 74 ML/MIN/1.73
GLOBULIN UR ELPH-MCNC: 2.5 GM/DL
GLUCOSE BLD-MCNC: 135 MG/DL (ref 70–110)
HCT VFR BLD AUTO: 39.8 % (ref 37–47)
HGB BLD-MCNC: 13.5 G/DL (ref 12–16)
IMM GRANULOCYTES # BLD: 0.01 10*3/MM3 (ref 0–0.03)
IMM GRANULOCYTES NFR BLD: 0.2 % (ref 0–0.5)
LYMPHOCYTES # BLD AUTO: 2.48 10*3/MM3 (ref 1–3)
LYMPHOCYTES NFR BLD AUTO: 44.7 % (ref 21–51)
MCH RBC QN AUTO: 34.6 PG (ref 27–33)
MCHC RBC AUTO-ENTMCNC: 33.9 G/DL (ref 33–37)
MCV RBC AUTO: 102.1 FL (ref 80–94)
MONOCYTES # BLD AUTO: 0.3 10*3/MM3 (ref 0.1–0.9)
MONOCYTES NFR BLD AUTO: 5.4 % (ref 0–10)
NEUTROPHILS # BLD AUTO: 2.7 10*3/MM3 (ref 1.4–6.5)
NEUTROPHILS NFR BLD AUTO: 48.6 % (ref 30–70)
OSMOLALITY SERPL CALC.SUM OF ELEC: 265.2 MOSM/KG (ref 273–305)
PLATELET # BLD AUTO: 139 10*3/MM3 (ref 130–400)
PMV BLD AUTO: 10.3 FL (ref 6–10)
POTASSIUM BLD-SCNC: 4.4 MMOL/L (ref 3.5–5.3)
PROT SERPL-MCNC: 6.8 G/DL (ref 6–8)
RBC # BLD AUTO: 3.9 10*6/MM3 (ref 4.2–5.4)
SODIUM BLD-SCNC: 132 MMOL/L (ref 135–153)
WBC NRBC COR # BLD: 5.55 10*3/MM3 (ref 4.5–12.5)

## 2018-08-31 PROCEDURE — 36415 COLL VENOUS BLD VENIPUNCTURE: CPT

## 2018-08-31 PROCEDURE — 80053 COMPREHEN METABOLIC PANEL: CPT | Performed by: INTERNAL MEDICINE

## 2018-08-31 PROCEDURE — 99214 OFFICE O/P EST MOD 30 MIN: CPT | Performed by: INTERNAL MEDICINE

## 2018-08-31 PROCEDURE — 85025 COMPLETE CBC W/AUTO DIFF WBC: CPT | Performed by: INTERNAL MEDICINE

## 2018-09-04 ENCOUNTER — TELEPHONE (OUTPATIENT)
Dept: ONCOLOGY | Facility: HOSPITAL | Age: 53
End: 2018-09-04

## 2018-09-04 RX ORDER — VALACYCLOVIR HYDROCHLORIDE 500 MG/1
500 TABLET, FILM COATED ORAL DAILY
Qty: 30 TABLET | Refills: 3 | Status: SHIPPED | OUTPATIENT
Start: 2018-09-04 | End: 2019-01-16 | Stop reason: SDUPTHER

## 2018-09-04 RX ORDER — SULFAMETHOXAZOLE AND TRIMETHOPRIM 400; 80 MG/1; MG/1
1 TABLET ORAL DAILY
Qty: 30 TABLET | Refills: 3 | Status: SHIPPED | OUTPATIENT
Start: 2018-09-04 | End: 2019-01-16 | Stop reason: SDUPTHER

## 2018-09-04 NOTE — TELEPHONE ENCOUNTER
----- Message from Gwendolyn Lehman sent at 9/4/2018  9:46 AM EDT -----  Regarding: REFILL  [facility] sent refill request for sulfamethoxazole tmp ss tab and valacyelevir hcl 500mg tab.

## 2018-09-16 DIAGNOSIS — C91.10 CLL (CHRONIC LYMPHOCYTIC LEUKEMIA) (HCC): ICD-10-CM

## 2018-09-24 ENCOUNTER — APPOINTMENT (OUTPATIENT)
Dept: GENERAL RADIOLOGY | Facility: HOSPITAL | Age: 53
End: 2018-09-24

## 2018-09-24 ENCOUNTER — HOSPITAL ENCOUNTER (OUTPATIENT)
Dept: CARDIOLOGY | Facility: HOSPITAL | Age: 53
Discharge: HOME | End: 2018-09-24
Attending: RADIOLOGY | Admitting: RADIOLOGY

## 2018-09-24 ENCOUNTER — HOSPITAL ENCOUNTER (OUTPATIENT)
Dept: GENERAL RADIOLOGY | Facility: HOSPITAL | Age: 53
Discharge: HOME | End: 2018-09-24

## 2018-09-24 ENCOUNTER — HOSPITAL ENCOUNTER (OUTPATIENT)
Dept: CT IMAGING | Facility: HOSPITAL | Age: 53
Discharge: HOME | End: 2018-09-24
Attending: RADIOLOGY

## 2018-09-24 ENCOUNTER — HOSPITAL ENCOUNTER (EMERGENCY)
Facility: HOSPITAL | Age: 53
Discharge: LEFT BEFORE DISCHARGE | End: 2018-09-24
Attending: EMERGENCY MEDICINE | Admitting: EMERGENCY MEDICINE

## 2018-09-24 ENCOUNTER — OFFICE VISIT (OUTPATIENT)
Dept: NEUROSURGERY | Facility: CLINIC | Age: 53
End: 2018-09-24

## 2018-09-24 VITALS
RESPIRATION RATE: 16 BRPM | OXYGEN SATURATION: 97 % | WEIGHT: 147 LBS | HEIGHT: 65 IN | BODY MASS INDEX: 24.49 KG/M2 | HEART RATE: 87 BPM | TEMPERATURE: 97.8 F | DIASTOLIC BLOOD PRESSURE: 76 MMHG | SYSTOLIC BLOOD PRESSURE: 129 MMHG

## 2018-09-24 VITALS
TEMPERATURE: 98 F | WEIGHT: 148 LBS | HEIGHT: 68 IN | DIASTOLIC BLOOD PRESSURE: 80 MMHG | BODY MASS INDEX: 22.43 KG/M2 | SYSTOLIC BLOOD PRESSURE: 140 MMHG

## 2018-09-24 DIAGNOSIS — M47.892 OTHER OSTEOARTHRITIS OF SPINE, CERVICAL REGION: ICD-10-CM

## 2018-09-24 DIAGNOSIS — I67.1 CEREBRAL ANEURYSM, NONRUPTURED: ICD-10-CM

## 2018-09-24 DIAGNOSIS — M50.30 DDD (DEGENERATIVE DISC DISEASE), CERVICAL: ICD-10-CM

## 2018-09-24 DIAGNOSIS — I73.9 PERIPHERAL ARTERY DISEASE (HCC): ICD-10-CM

## 2018-09-24 DIAGNOSIS — I65.21 CAROTID STENOSIS, RIGHT: ICD-10-CM

## 2018-09-24 DIAGNOSIS — I20.9 ANGINA PECTORIS (HCC): Primary | ICD-10-CM

## 2018-09-24 DIAGNOSIS — M48.02 SPINAL STENOSIS IN CERVICAL REGION: ICD-10-CM

## 2018-09-24 DIAGNOSIS — I65.22 CAROTID STENOSIS, ASYMPTOMATIC, LEFT: Primary | ICD-10-CM

## 2018-09-24 LAB
ALBUMIN SERPL-MCNC: 4.47 G/DL (ref 3.2–4.8)
ALBUMIN/GLOB SERPL: 1.8 G/DL (ref 1.5–2.5)
ALP SERPL-CCNC: 60 U/L (ref 25–100)
ALT SERPL W P-5'-P-CCNC: 27 U/L (ref 7–40)
ANION GAP SERPL CALCULATED.3IONS-SCNC: -2 MMOL/L (ref 3–11)
AST SERPL-CCNC: 19 U/L (ref 0–33)
BASOPHILS # BLD AUTO: 0.01 10*3/MM3 (ref 0–0.2)
BASOPHILS NFR BLD AUTO: 0.2 % (ref 0–1)
BILIRUB SERPL-MCNC: 0.5 MG/DL (ref 0.3–1.2)
BNP SERPL-MCNC: 28 PG/ML (ref 0–100)
BUN BLD-MCNC: 5 MG/DL (ref 9–23)
BUN/CREAT SERPL: 6.8 (ref 7–25)
CALCIUM SPEC-SCNC: 9.5 MG/DL (ref 8.7–10.4)
CHLORIDE SERPL-SCNC: 107 MMOL/L (ref 99–109)
CO2 SERPL-SCNC: 27 MMOL/L (ref 20–31)
CREAT BLD-MCNC: 0.73 MG/DL (ref 0.6–1.3)
DEPRECATED RDW RBC AUTO: 48 FL (ref 37–54)
EOSINOPHIL # BLD AUTO: 0.04 10*3/MM3 (ref 0–0.3)
EOSINOPHIL NFR BLD AUTO: 0.7 % (ref 0–3)
ERYTHROCYTE [DISTWIDTH] IN BLOOD BY AUTOMATED COUNT: 12.6 % (ref 11.3–14.5)
GFR SERPL CREATININE-BSD FRML MDRD: 83 ML/MIN/1.73
GLOBULIN UR ELPH-MCNC: 2.4 GM/DL
GLUCOSE BLD-MCNC: 132 MG/DL (ref 70–100)
HCT VFR BLD AUTO: 43 % (ref 34.5–44)
HGB BLD-MCNC: 14 G/DL (ref 11.5–15.5)
HOLD SPECIMEN: NORMAL
HOLD SPECIMEN: NORMAL
IMM GRANULOCYTES # BLD: 0.02 10*3/MM3 (ref 0–0.03)
IMM GRANULOCYTES NFR BLD: 0.4 % (ref 0–0.6)
LIPASE SERPL-CCNC: 21 U/L (ref 6–51)
LYMPHOCYTES # BLD AUTO: 2.66 10*3/MM3 (ref 0.6–4.8)
LYMPHOCYTES NFR BLD AUTO: 46.7 % (ref 24–44)
MCH RBC QN AUTO: 33.9 PG (ref 27–31)
MCHC RBC AUTO-ENTMCNC: 32.6 G/DL (ref 32–36)
MCV RBC AUTO: 104.1 FL (ref 80–99)
MONOCYTES # BLD AUTO: 0.24 10*3/MM3 (ref 0–1)
MONOCYTES NFR BLD AUTO: 4.2 % (ref 0–12)
NEUTROPHILS # BLD AUTO: 2.72 10*3/MM3 (ref 1.5–8.3)
NEUTROPHILS NFR BLD AUTO: 47.8 % (ref 41–71)
PLATELET # BLD AUTO: 142 10*3/MM3 (ref 150–450)
PMV BLD AUTO: 10.6 FL (ref 6–12)
POTASSIUM BLD-SCNC: 4.1 MMOL/L (ref 3.5–5.5)
PROT SERPL-MCNC: 6.9 G/DL (ref 5.7–8.2)
RBC # BLD AUTO: 4.13 10*6/MM3 (ref 3.89–5.14)
SODIUM BLD-SCNC: 132 MMOL/L (ref 132–146)
TROPONIN I SERPL-MCNC: 0 NG/ML (ref 0–0.07)
TROPONIN I SERPL-MCNC: 0 NG/ML (ref 0–0.07)
WBC NRBC COR # BLD: 5.69 10*3/MM3 (ref 3.5–10.8)
WHOLE BLOOD HOLD SPECIMEN: NORMAL
WHOLE BLOOD HOLD SPECIMEN: NORMAL

## 2018-09-24 PROCEDURE — 83880 ASSAY OF NATRIURETIC PEPTIDE: CPT | Performed by: EMERGENCY MEDICINE

## 2018-09-24 PROCEDURE — 84484 ASSAY OF TROPONIN QUANT: CPT

## 2018-09-24 PROCEDURE — 83690 ASSAY OF LIPASE: CPT | Performed by: EMERGENCY MEDICINE

## 2018-09-24 PROCEDURE — 93880 EXTRACRANIAL BILAT STUDY: CPT

## 2018-09-24 PROCEDURE — 72052 X-RAY EXAM NECK SPINE 6/>VWS: CPT

## 2018-09-24 PROCEDURE — 93005 ELECTROCARDIOGRAM TRACING: CPT | Performed by: EMERGENCY MEDICINE

## 2018-09-24 PROCEDURE — 99284 EMERGENCY DEPT VISIT MOD MDM: CPT

## 2018-09-24 PROCEDURE — 0 IOPAMIDOL PER 1 ML: Performed by: RADIOLOGY

## 2018-09-24 PROCEDURE — 71045 X-RAY EXAM CHEST 1 VIEW: CPT

## 2018-09-24 PROCEDURE — 85025 COMPLETE CBC W/AUTO DIFF WBC: CPT | Performed by: EMERGENCY MEDICINE

## 2018-09-24 PROCEDURE — 70496 CT ANGIOGRAPHY HEAD: CPT

## 2018-09-24 PROCEDURE — 80053 COMPREHEN METABOLIC PANEL: CPT | Performed by: EMERGENCY MEDICINE

## 2018-09-24 PROCEDURE — 99214 OFFICE O/P EST MOD 30 MIN: CPT | Performed by: RADIOLOGY

## 2018-09-24 PROCEDURE — 93005 ELECTROCARDIOGRAM TRACING: CPT

## 2018-09-24 PROCEDURE — 36415 COLL VENOUS BLD VENIPUNCTURE: CPT

## 2018-09-24 RX ORDER — SODIUM CHLORIDE 0.9 % (FLUSH) 0.9 %
10 SYRINGE (ML) INJECTION AS NEEDED
Status: DISCONTINUED | OUTPATIENT
Start: 2018-09-24 | End: 2018-09-24 | Stop reason: HOSPADM

## 2018-09-24 RX ORDER — ASPIRIN 81 MG/1
81 TABLET, CHEWABLE ORAL ONCE
Status: COMPLETED | OUTPATIENT
Start: 2018-09-24 | End: 2018-09-24

## 2018-09-24 RX ADMIN — NITROGLYCERIN 1 INCH: 20 OINTMENT TOPICAL at 15:42

## 2018-09-24 RX ADMIN — IOPAMIDOL 75 ML: 755 INJECTION, SOLUTION INTRAVENOUS at 11:28

## 2018-09-24 RX ADMIN — ASPIRIN 81 MG 81 MG: 81 TABLET ORAL at 15:42

## 2018-09-24 NOTE — DISCHARGE INSTRUCTIONS
Remove the nitroglycerin paste before you go to bed tonight.    If you change your mind about being admitted, return to the ER anytime.    Return to the ER at anytime you would like. It is very important you follow up with Dr. Shea as soon as possible.

## 2018-09-24 NOTE — ED PROVIDER NOTES
Subjective   Melania Mae is a 53 y.o.female who presents to the ED with complaints of chest pain. The patient was visiting Dr. Rodriguez earlier today for evaluation of her carotid stenosis which she has had a stent placed for. She states this morning she had substernal chest pain that worsened while she was at his office and he referred her to the ED. She describes the pain as a pressure with intermittent sharp pains. Her pain is waxing and waning in nature. Nothing worsens or relieves her pain. She says that her walk to Dr. Rodriguez office and here did not worsen it. She did have to stop occasionally secondary to her pain. She has associated diaphoresis and shortness of breath. She also endorses pain in her mid-left back. She has chronic pain in her arm related to her complications with her neck, but she denies any worsened pain in her arms associated with the onset of her chest pain. She denies any neck or shoulder pain. She denies any nausea. She has a history of Non hodgkins lymphoma and Leukemia which she is taking oral chemotherapy for. She smokes half of a pack of cigarettes a day. She has HTN and diabetes and currently takes medication for her cholesterol. Her father had a CABG. She has never had a heart test done. She takes aspirin 81 and Plavix daily. There are no other acute complaints at this time.                     History provided by:  Patient  Chest Pain   Pain location:  Substernal area  Pain quality: pressure and sharp    Pain radiates to:  Does not radiate  Pain severity:  Moderate  Onset quality:  Gradual  Duration:  7 hours  Timing:  Intermittent  Progression:  Waxing and waning  Chronicity:  New  Relieved by:  Nothing  Worsened by:  Nothing  Associated symptoms: back pain, diaphoresis and shortness of breath    Associated symptoms: no nausea    Risk factors: diabetes mellitus, high cholesterol, hypertension and smoking    Risk factors: not male        Review of Systems   Constitutional:  "Positive for diaphoresis.   Respiratory: Positive for shortness of breath.    Cardiovascular: Positive for chest pain.   Gastrointestinal: Negative for nausea.   Musculoskeletal: Positive for back pain.   All other systems reviewed and are negative.      Past Medical History:   Diagnosis Date   • Aneurysm (CMS/HCC) 8/223/17    Cavernous left ICA aneurysm   • Asthma    • Carotid artery occlusion    • Chronic back pain    • CLL (chronic lymphocytic leukemia) (CMS/[facility])     OCTOBER OF 2012   • COPD (chronic obstructive pulmonary disease) (CMS/[facility])    • Degenerative arthritis    • Depression    • Diabetes mellitus (CMS/[facility])     type 2   • Hyperlipidemia    • Hypertension    • Lymphadenopathy    • Non Hodgkin's lymphoma (CMS/[facility])        Allergies   Allergen Reactions   • Codeine Other (See Comments)     \"UNKNOW  CHILDHOOD REACTION\"   • Penicillins Other (See Comments)     \"UNKNOWN CHILDHOOD ALLERGY\"   • Rituxan [Rituximab] Other (See Comments)     \"THROAT RAWNESS; FELT LIKE MY THROAT WAS CLOSING UP\"       Past Surgical History:   Procedure Laterality Date   • APPENDECTOMY     • BACK SURGERY      2002 (work accident) c-spine surgery 4/14   • CAROTID STENT Right 08/23/2017   • CEREBRAL ANGIOGRAM N/A 8/23/2017    Diagnostic Carotid/Cerebral Angiogram   • CHOLECYSTECTOMY     • HYSTERECTOMY      for endometriosis/ovarian bleed   • NECK SURGERY     • OTHER SURGICAL HISTORY      pac insertion and removal   • TONSILLECTOMY         Family History   Problem Relation Age of Onset   • Lung cancer Father 72   • Hypertension Father    • Heart disease Father    • Cancer Father    • Diabetes Father    • Other Brother 46        MI   • Heart disease Brother    • Breast cancer Paternal Aunt    • Colon cancer Maternal Grandfather    • Other Cousin         CLL (dx 48 yo)   • Hypertension Mother        Social History     Social History   • Marital status:      Social History Main Topics   • Smoking status: Current Every Day Smoker     " Packs/day: 0.50     Years: 35.00     Types: Cigarettes   • Smokeless tobacco: Never Used      Comment: 0.5-1 PPD   • Alcohol use No   • Drug use: No   • Sexual activity: Defer     Other Topics Concern   • Not on file         Objective   Physical Exam   Constitutional: She is oriented to person, place, and time. She appears well-developed and well-nourished. No distress.   HENT:   Head: Normocephalic and atraumatic.   Nose: Nose normal.   Pharynx benign. Airway patent.    Eyes: Conjunctivae are normal. No scleral icterus.   Neck: Normal range of motion. Neck supple.   Cardiovascular: Normal rate, regular rhythm and normal heart sounds.    No murmur heard.  Pulmonary/Chest: Effort normal and breath sounds normal. No respiratory distress. She exhibits no tenderness.   Abdominal: Soft. Bowel sounds are normal. There is tenderness (mild LUQ). There is no guarding.   Musculoskeletal: Normal range of motion. She exhibits no edema.   Neurological: She is alert and oriented to person, place, and time.   Skin: Skin is warm and dry.   Psychiatric: She has a normal mood and affect. Her behavior is normal.   Nursing note and vitals reviewed.      Procedures         ED Course  ED Course as of Sep 24 2116   Mon Sep 24, 2018   1646 Revisited the patient at bedside to update her on the results of labs and imaging and discuss the plan for further care. Her symptoms are currently resolved.   [TJ]   1910 Revisited the patient at bedside to update her on the results of imaging and labs. I illustrated the importance of staying in the hospital and the risks associated with leaving AMA.   [TJ]   1919 She insists on leaving and says she will see her cardiologist tomorrow or as soon as she can get in.  She was told that she was taking a risk of having a heart attack or even dying.  She understood but still insisted on leaving.  [LI]      ED Course User Index  [LI] Hai Callejas MD  [TJ] Jake Jones      Recent Results (from the past  24 hour(s))   Duplex Carotid Ultrasound CAR    Collection Time: 09/24/18  1:18 PM   Result Value Ref Range    Prox CCA .6 cm/sec    Prox CCA .5 cm/sec    Prox CCA EDV 45.3 cm/sec    Prox CCA EDV 29.5 cm/sec    left Mid CCA .0 cm/sec    Right Mid CCA .7 cm/sec    left Mid CCA EDV 55.3 cm/sec    right Mid CCA EDV 37.5 cm/sec    Dist CCA .0 cm/sec    Dist CCA .8 cm/sec    Dist CCA EDV 65.9 cm/sec    Dist CCA EDV 46.3 cm/sec    CCA ratio harley 173.0 cm/sec    CCA ratio harley 131.0 cm/sec    Prox ECA .8 cm/sec    Prox ECA .8 cm/sec    Prox ECA EDV 37.3 cm/sec    Prox ICA .6 cm/sec    Prox ICA .8 cm/sec    Prox ICA .7 cm/sec    Prox ICA EDV 43.2 cm/sec    Mid ICA .1 cm/sec    Mid ICA .5 cm/sec    Mid ICA .5 cm/sec    Mid ICA EDV 71.7 cm/sec    Dist ICA .3 cm/sec    Dist ICA .8 cm/sec    Dist ICA EDV 65.9 cm/sec    Dist ICA EDV 51.1 cm/sec    ICA ratio harley 302.0 cm/sec    ICA ratio harley 216.0 cm/sec    Vertebral A .1 cm/sec    ICA/CCA ratio 1.7     ICA/CCA ratio 1.6     Prox SCLA .8 cm/sec    Prox SCLA .6 cm/sec    BH CVPROX RIGHT ICA HIDDEN LRR 1 CM    BH CV MID RIGHT ICA HIDDEN LRR 1 cm    Rt. Proximal Stent  cm/s    Rt. Mid Stent  cm/s    Rt Prx Stent EDV 42.4 cm/s    Rt Mid Stent EDV 70.4 cm/s    Left arm /67 mmHg   Comprehensive Metabolic Panel    Collection Time: 09/24/18  2:41 PM   Result Value Ref Range    Glucose 132 (H) 70 - 100 mg/dL    BUN 5 (L) 9 - 23 mg/dL    Creatinine 0.73 0.60 - 1.30 mg/dL    Sodium 132 132 - 146 mmol/L    Potassium 4.1 3.5 - 5.5 mmol/L    Chloride 107 99 - 109 mmol/L    CO2 27.0 20.0 - 31.0 mmol/L    Calcium 9.5 8.7 - 10.4 mg/dL    Total Protein 6.9 5.7 - 8.2 g/dL    Albumin 4.47 3.20 - 4.80 g/dL    ALT (SGPT) 27 7 - 40 U/L    AST (SGOT) 19 0 - 33 U/L    Alkaline Phosphatase 60 25 - 100 U/L    Total Bilirubin 0.5 0.3 - 1.2 mg/dL    eGFR Non   Amer 83 >60 mL/min/1.73    Globulin 2.4 gm/dL    A/G Ratio 1.8 1.5 - 2.5 g/dL    BUN/Creatinine Ratio 6.8 (L) 7.0 - 25.0    Anion Gap -2.0 (L) 3.0 - 11.0 mmol/L   Lipase    Collection Time: 09/24/18  2:41 PM   Result Value Ref Range    Lipase 21 6 - 51 U/L   BNP    Collection Time: 09/24/18  2:41 PM   Result Value Ref Range    BNP 28.0 0.0 - 100.0 pg/mL   Light Blue Top    Collection Time: 09/24/18  2:41 PM   Result Value Ref Range    Extra Tube hold for add-on    Green Top (Gel)    Collection Time: 09/24/18  2:41 PM   Result Value Ref Range    Extra Tube Hold for add-ons.    Lavender Top    Collection Time: 09/24/18  2:41 PM   Result Value Ref Range    Extra Tube hold for add-on    Gold Top - SST    Collection Time: 09/24/18  2:41 PM   Result Value Ref Range    Extra Tube Hold for add-ons.    CBC Auto Differential    Collection Time: 09/24/18  2:41 PM   Result Value Ref Range    WBC 5.69 3.50 - 10.80 10*3/mm3    RBC 4.13 3.89 - 5.14 10*6/mm3    Hemoglobin 14.0 11.5 - 15.5 g/dL    Hematocrit 43.0 34.5 - 44.0 %    .1 (H) 80.0 - 99.0 fL    MCH 33.9 (H) 27.0 - 31.0 pg    MCHC 32.6 32.0 - 36.0 g/dL    RDW 12.6 11.3 - 14.5 %    RDW-SD 48.0 37.0 - 54.0 fl    MPV 10.6 6.0 - 12.0 fL    Platelets 142 (L) 150 - 450 10*3/mm3    Neutrophil % 47.8 41.0 - 71.0 %    Lymphocyte % 46.7 (H) 24.0 - 44.0 %    Monocyte % 4.2 0.0 - 12.0 %    Eosinophil % 0.7 0.0 - 3.0 %    Basophil % 0.2 0.0 - 1.0 %    Immature Grans % 0.4 0.0 - 0.6 %    Neutrophils, Absolute 2.72 1.50 - 8.30 10*3/mm3    Lymphocytes, Absolute 2.66 0.60 - 4.80 10*3/mm3    Monocytes, Absolute 0.24 0.00 - 1.00 10*3/mm3    Eosinophils, Absolute 0.04 0.00 - 0.30 10*3/mm3    Basophils, Absolute 0.01 0.00 - 0.20 10*3/mm3    Immature Grans, Absolute 0.02 0.00 - 0.03 10*3/mm3   POC Troponin, Rapid    Collection Time: 09/24/18  3:07 PM   Result Value Ref Range    Troponin I 0.00 0.00 - 0.07 ng/mL   POC Troponin, Rapid    Collection Time: 09/24/18  6:02 PM   Result  Value Ref Range    Troponin I 0.00 0.00 - 0.07 ng/mL     Note: In addition to lab results from this visit, the labs listed above may include labs taken at another facility or during a different encounter within the last 24 hours. Please correlate lab times with ED admission and discharge times for further clarification of the services performed during this visit.    XR Chest 1 View   Final Result   No acute cardiopulmonary disease.       D:  09/24/2018   E:  09/24/2018       This report was finalized on 9/24/2018 5:03 PM by Dr. Holli Simental MD.            Vitals:    09/24/18 1800 09/24/18 1830 09/24/18 1900 09/24/18 1915   BP: 119/73 136/73 136/79 129/76   BP Location:       Patient Position:       Pulse: 81 75 86 87   Resp: 16 16 16 16   Temp:       TempSrc:       SpO2: 97% 96% 97% 97%   Weight:       Height:         Medications   sodium chloride 0.9 % flush 10 mL (not administered)   nitroglycerin (NITROSTAT) ointment 1 inch (1 inch Topical Given 9/24/18 1542)   aspirin chewable tablet 81 mg (81 mg Oral Given 9/24/18 1542)     ECG/EMG Results (last 24 hours)     Procedure Component Value Units Date/Time    ECG 12 Lead [[id number]] Collected:  09/24/18 1436     Updated:  09/24/18 1500    Narrative:       Test Reason : cp  Blood Pressure : **/** mmHG  Vent. Rate : 077 BPM     Atrial Rate : 077 BPM     P-R Int : 154 ms          QRS Dur : 084 ms      QT Int : 366 ms       P-R-T Axes : 064 -36 026 degrees     QTc Int : 414 ms    Normal sinus rhythm  Left axis deviation  Minimal voltage criteria for LVH, may be normal variant  Septal infarct , age undetermined  Abnormal ECG  When compared with ECG of 17-APR-2014 15:11,  Septal infarct is now present  Confirmed by QUYEN GARZA MD ([id number]) on 9/24/2018 3:00:17 PM    Referred By:  SARKIS           Confirmed By:QUYEN GARZA MD                    HEART Score (for prediction of 6-week risk of major adverse cardiac event) reviewed and/or performed as part of the patient  evaluation and treatment planning process.  The result associated with this review/performance is: 5           MDM    Final diagnoses:   Angina pectoris (CMS/HCC)   Peripheral artery disease (CMS/HCC)       Documentation assistance provided by dallas Jones.  Information recorded by the [email or web address] was done at my direction and has been verified and validated by me.     Jake Jones  09/24/18 1607       Ramón Jonesance  09/24/18 1649       Robert Jake  09/24/18 1918       Robert [facility]  09/24/18 1923       Hai Callejas MD  09/24/18 3349

## 2018-09-25 LAB
BH CV MID RIGHT ICA HIDDEN LRR: 1 CM
BH CV VAS CAROTID RIGHT MID STENT EDV: 70.4 CM/S
BH CV VAS CAROTID RIGHT MID STENT PSV: 216 CM/S
BH CV VAS CAROTID RIGHT PROXIMAL STENT EDV: 42.4 CM/S
BH CV VAS CAROTID RIGHT PROXIMAL STENT PSV: 121 CM/S
BH CV XLRA MEAS LEFT CCA RATIO VEL: 173 CM/SEC
BH CV XLRA MEAS LEFT DIST CCA EDV: 65.9 CM/SEC
BH CV XLRA MEAS LEFT DIST CCA PSV: 174 CM/SEC
BH CV XLRA MEAS LEFT DIST ICA EDV: 65.9 CM/SEC
BH CV XLRA MEAS LEFT DIST ICA PSV: 140.3 CM/SEC
BH CV XLRA MEAS LEFT ICA RATIO VEL: 302 CM/SEC
BH CV XLRA MEAS LEFT ICA/CCA RATIO: 1.7
BH CV XLRA MEAS LEFT MID CCA EDV: 55.3 CM/SEC
BH CV XLRA MEAS LEFT MID CCA PSV: 181 CM/SEC
BH CV XLRA MEAS LEFT MID ICA EDV: 101.5 CM/SEC
BH CV XLRA MEAS LEFT MID ICA PSV: 232.1 CM/SEC
BH CV XLRA MEAS LEFT PROX CCA EDV: 45.3 CM/SEC
BH CV XLRA MEAS LEFT PROX CCA PSV: 144.6 CM/SEC
BH CV XLRA MEAS LEFT PROX ECA PSV: 169.8 CM/SEC
BH CV XLRA MEAS LEFT PROX ICA EDV: 125.7 CM/SEC
BH CV XLRA MEAS LEFT PROX ICA PSV: 304.6 CM/SEC
BH CV XLRA MEAS LEFT PROX SCLA PSV: 263.6 CM/SEC
BH CV XLRA MEAS LEFT VERTEBRAL A PSV: 129.1 CM/SEC
BH CV XLRA MEAS RIGHT CCA RATIO VEL: 131 CM/SEC
BH CV XLRA MEAS RIGHT DIST CCA EDV: 46.3 CM/SEC
BH CV XLRA MEAS RIGHT DIST CCA PSV: 131.8 CM/SEC
BH CV XLRA MEAS RIGHT DIST ICA EDV: 51.1 CM/SEC
BH CV XLRA MEAS RIGHT DIST ICA PSV: 177.8 CM/SEC
BH CV XLRA MEAS RIGHT ICA RATIO VEL: 216 CM/SEC
BH CV XLRA MEAS RIGHT ICA/CCA RATIO: 1.6
BH CV XLRA MEAS RIGHT MID CCA EDV: 37.5 CM/SEC
BH CV XLRA MEAS RIGHT MID CCA PSV: 125.7 CM/SEC
BH CV XLRA MEAS RIGHT MID ICA EDV: 71.7 CM/SEC
BH CV XLRA MEAS RIGHT MID ICA PSV: 217.5 CM/SEC
BH CV XLRA MEAS RIGHT PROX CCA EDV: 29.5 CM/SEC
BH CV XLRA MEAS RIGHT PROX CCA PSV: 210.5 CM/SEC
BH CV XLRA MEAS RIGHT PROX ECA EDV: 37.3 CM/SEC
BH CV XLRA MEAS RIGHT PROX ECA PSV: 451.8 CM/SEC
BH CV XLRA MEAS RIGHT PROX ICA EDV: 43.2 CM/SEC
BH CV XLRA MEAS RIGHT PROX ICA PSV: 121.8 CM/SEC
BH CV XLRA MEAS RIGHT PROX SCLA PSV: 256.8 CM/SEC
BH CVPROX RIGHT ICA HIDDEN LRR: 1 CM
LEFT ARM BP: NORMAL MMHG

## 2018-12-09 DIAGNOSIS — C91.10 CLL (CHRONIC LYMPHOCYTIC LEUKEMIA) (HCC): ICD-10-CM

## 2018-12-10 ENCOUNTER — OFFICE VISIT (OUTPATIENT)
Dept: ONCOLOGY | Facility: CLINIC | Age: 53
End: 2018-12-10

## 2018-12-10 ENCOUNTER — LAB (OUTPATIENT)
Dept: ONCOLOGY | Facility: CLINIC | Age: 53
End: 2018-12-10

## 2018-12-10 VITALS
OXYGEN SATURATION: 98 % | HEART RATE: 68 BPM | DIASTOLIC BLOOD PRESSURE: 65 MMHG | TEMPERATURE: 97 F | RESPIRATION RATE: 18 BRPM | WEIGHT: 143 LBS | SYSTOLIC BLOOD PRESSURE: 114 MMHG | BODY MASS INDEX: 23.8 KG/M2

## 2018-12-10 DIAGNOSIS — C91.10 CLL (CHRONIC LYMPHOCYTIC LEUKEMIA) (HCC): ICD-10-CM

## 2018-12-10 DIAGNOSIS — C91.10 CLL (CHRONIC LYMPHOCYTIC LEUKEMIA) (HCC): Primary | ICD-10-CM

## 2018-12-10 DIAGNOSIS — Z72.0 TOBACCO USE: ICD-10-CM

## 2018-12-10 LAB
ALBUMIN SERPL-MCNC: 4.4 G/DL (ref 3.5–5)
ALBUMIN/GLOB SERPL: 2.1 G/DL (ref 1.5–2.5)
ALP SERPL-CCNC: 55 U/L (ref 35–104)
ALT SERPL W P-5'-P-CCNC: 20 U/L (ref 10–36)
ANION GAP SERPL CALCULATED.3IONS-SCNC: -0.1 MMOL/L (ref 3.6–11.2)
AST SERPL-CCNC: 14 U/L (ref 10–30)
BASOPHILS # BLD AUTO: 0.02 10*3/MM3 (ref 0–0.3)
BASOPHILS NFR BLD AUTO: 0.5 % (ref 0–2)
BILIRUB SERPL-MCNC: 0.5 MG/DL (ref 0.2–1.8)
BUN BLD-MCNC: 8 MG/DL (ref 7–21)
BUN/CREAT SERPL: 12.3 (ref 7–25)
CALCIUM SPEC-SCNC: 8.9 MG/DL (ref 7.7–10)
CHLORIDE SERPL-SCNC: 106 MMOL/L (ref 99–112)
CO2 SERPL-SCNC: 25.1 MMOL/L (ref 24.3–31.9)
CREAT BLD-MCNC: 0.65 MG/DL (ref 0.43–1.29)
DEPRECATED RDW RBC AUTO: 46 FL (ref 37–54)
EOSINOPHIL # BLD AUTO: 0.06 10*3/MM3 (ref 0–0.7)
EOSINOPHIL NFR BLD AUTO: 1.4 % (ref 0–5)
ERYTHROCYTE [DISTWIDTH] IN BLOOD BY AUTOMATED COUNT: 12.4 % (ref 11.5–14.5)
GFR SERPL CREATININE-BSD FRML MDRD: 95 ML/MIN/1.73
GLOBULIN UR ELPH-MCNC: 2.1 GM/DL
GLUCOSE BLD-MCNC: 148 MG/DL (ref 70–110)
HCT VFR BLD AUTO: 40.1 % (ref 37–47)
HGB BLD-MCNC: 13.3 G/DL (ref 12–16)
IMM GRANULOCYTES # BLD: 0 10*3/MM3 (ref 0–0.03)
IMM GRANULOCYTES NFR BLD: 0 % (ref 0–0.5)
LYMPHOCYTES # BLD AUTO: 1.43 10*3/MM3 (ref 1–3)
LYMPHOCYTES NFR BLD AUTO: 33.3 % (ref 21–51)
MCH RBC QN AUTO: 33.8 PG (ref 27–33)
MCHC RBC AUTO-ENTMCNC: 33.2 G/DL (ref 33–37)
MCV RBC AUTO: 102 FL (ref 80–94)
MONOCYTES # BLD AUTO: 0.25 10*3/MM3 (ref 0.1–0.9)
MONOCYTES NFR BLD AUTO: 5.8 % (ref 0–10)
NEUTROPHILS # BLD AUTO: 2.53 10*3/MM3 (ref 1.4–6.5)
NEUTROPHILS NFR BLD AUTO: 59 % (ref 30–70)
OSMOLALITY SERPL CALC.SUM OF ELEC: 263.7 MOSM/KG (ref 273–305)
PLATELET # BLD AUTO: 174 10*3/MM3 (ref 130–400)
PMV BLD AUTO: 9.8 FL (ref 6–10)
POTASSIUM BLD-SCNC: 4.5 MMOL/L (ref 3.5–5.3)
PROT SERPL-MCNC: 6.5 G/DL (ref 6–8)
RBC # BLD AUTO: 3.93 10*6/MM3 (ref 4.2–5.4)
SODIUM BLD-SCNC: 131 MMOL/L (ref 135–153)
WBC NRBC COR # BLD: 4.29 10*3/MM3 (ref 4.5–12.5)

## 2018-12-10 PROCEDURE — 36415 COLL VENOUS BLD VENIPUNCTURE: CPT

## 2018-12-10 PROCEDURE — 99214 OFFICE O/P EST MOD 30 MIN: CPT | Performed by: INTERNAL MEDICINE

## 2018-12-10 PROCEDURE — 99406 BEHAV CHNG SMOKING 3-10 MIN: CPT | Performed by: INTERNAL MEDICINE

## 2018-12-10 PROCEDURE — 80053 COMPREHEN METABOLIC PANEL: CPT | Performed by: INTERNAL MEDICINE

## 2018-12-10 PROCEDURE — 85025 COMPLETE CBC W/AUTO DIFF WBC: CPT | Performed by: INTERNAL MEDICINE

## 2018-12-10 NOTE — PROGRESS NOTES
DATE:  12/10/2018    DIAGNOSIS:  SLL - initially diagnosed with [place] Stage II Disease in May 2012, based on excisional biopsy of a L supraclavicular LN. Bone marrow was involved.     CHIEF COMPLAINT:  CLL / SLL    TREATMENT HISTORY:  1. Received 6 cycles of Treanda without Rituxan between 10-24-12 and 3-21-13 because of anaphylactic reaction to Rituximab with her 1st cycle of treatment . Received Neulasta support. (Delivered by Dr. Koehler)     2. Imbruvica started 5-02-18      HISTORY OF PRESENT ILLNESS:   Ms. Mae was referred by Gillian Harrison for evaluation and treatment of CLL / SLL. She was reportedly diagnosed in May of 2012 when she saw Dr. Carbajal for biopsy of a L supraclavicular LN. At that time she had excessive fatigue & a 40 lb weight loss. She saw Dr. Koehler at  at time who performed bone marrow exam (bone marrow was involved) and started treatment with Bendamustine / Rituximab. Unfortunately with her 1st treatment, she had an anaphylactic reaction to Rituximab so this was not given with subsequent cycles of therapy. Her last cycle of treatment was 3-21-13. Per Dr. Koehler' s records, she had a complete response to treatment. She never had a significant leukocytosis or lymphocytosis, but had predominantly a lymphomatous presentation of her disease.     INTERVAL HISTORY:  Ms. Mae is here today with her daughter for follow up of CLL/SLL. She has been taking Imbruvica and tolerating it fairly well. She does report nausea and vomiting 2-3 times per week. She takes Compazine and Zofran which helps but does not relieve her symptoms. She reports numbness/tingling in her hands and asks if this is a side effect of Imbruvica or related to her spine disease.     PAST MEDICAL HISTORY:  Past Medical History:   Diagnosis Date   • Aneurysm (CMS/HCC) 8/223/17    Cavernous left ICA aneurysm   • Asthma    • Carotid artery occlusion    • Chronic back pain    • CLL (chronic lymphocytic leukemia) (CMS/HCC)      OCTOBER OF 2012   • COPD (chronic obstructive pulmonary disease) (CMS/HCC)    • Degenerative arthritis    • Depression    • Diabetes mellitus (CMS/HCC)     type 2   • Hyperlipidemia    • Hypertension    • Lymphadenopathy    • Non Hodgkin's lymphoma (CMS/HCC)        PAST SURGICAL HISTORY:  Past Surgical History:   Procedure Laterality Date   • APPENDECTOMY     • BACK SURGERY      2002 (work accident) c-spine surgery 4/14   • CAROTID STENT Right 08/23/2017   • CHOLECYSTECTOMY     • HYSTERECTOMY      for endometriosis/ovarian bleed   • NECK SURGERY     • OTHER SURGICAL HISTORY      pac insertion and removal   • TONSILLECTOMY         FAMILY HISTORY:  Family History   Problem Relation Age of Onset   • Lung cancer Father 72   • Hypertension Father    • Heart disease Father    • Cancer Father    • Diabetes Father    • Other Brother 46        MI   • Heart disease Brother    • Breast cancer Paternal Aunt    • Colon cancer Maternal Grandfather    • Other Cousin         CLL (dx 46 yo)   • Hypertension Mother      Social History     Socioeconomic History   • Marital status:      Spouse name: Not on file   • Number of children: Not on file   • Years of education: Not on file   • Highest education level: Not on file   Social Needs   • Financial resource strain: Not on file   • Food insecurity - worry: Not on file   • Food insecurity - inability: Not on file   • Transportation needs - medical: Not on file   • Transportation needs - non-medical: Not on file   Occupational History   • Not on file   Tobacco Use   • Smoking status: Current Every Day Smoker     Packs/day: 0.50     Years: 35.00     Pack years: 17.50     Types: Cigarettes   • Smokeless tobacco: Never Used   • Tobacco comment: 0.5-1 PPD   Substance and Sexual Activity   • Alcohol use: No   • Drug use: No   • Sexual activity: Defer   Other Topics Concern   • Not on file   Social History Narrative   • Not on file     REVIEW OF SYSTEMS:    A comprehensive 14 point  review of systems was performed and was negative except as mentioned    MEDICATIONS:  The current medication list was reviewed in the EMR    Current Outpatient Medications:   •  albuterol (PROVENTIL HFA;VENTOLIN HFA) 108 (90 Base) MCG/ACT inhaler, Inhale 2 puffs As Needed for Wheezing., Disp: , Rfl:   •  ALPRAZolam (XANAX) 0.5 MG tablet, Take 0.5 mg by mouth 3 (Three) Times a Day As Needed for Anxiety., Disp: , Rfl:   •  aspirin 81 MG EC tablet, Take 1 tablet by mouth Daily., Disp: 30 tablet, Rfl: 5  •  azithromycin (ZITHROMAX) 250 MG tablet, Take 2 tablets the first day, then 1 tablet daily for 4 days., Disp: 6 tablet, Rfl: 0  •  B-D UF III MINI PEN NEEDLES 31G X 5 MM misc, , Disp: , Rfl:   •  Brexpiprazole (REXULTI) 1 MG tablet, Take  by mouth Daily., Disp: , Rfl:   •  clopidogrel (PLAVIX) 75 MG tablet, Take 1 tablet by mouth Daily., Disp: 30 tablet, Rfl: 5  •  fluconazole (DIFLUCAN) 100 MG tablet, Take 2 tabs on day 1 then 1 tab for days 2-5, then stop, Disp: 6 tablet, Rfl: 0  •  fluticasone-salmeterol (ADVAIR) 100-50 MCG/DOSE DISKUS, Inhale 1 puff As Needed., Disp: , Rfl:   •  gabapentin (NEURONTIN) 800 MG tablet, Take 800 mg by mouth 4 (Four) Times a Day., Disp: , Rfl:   •  ibrutinib (IMBRUVICA) 420 MG tablet tablet, Take 1 tablet by mouth Daily., Disp: 28 tablet, Rfl: 5  •  Ibuprofen (ADVIL PO), Take 400 mg by mouth 3 (Three) Times a Day. LIQUIGELS, Disp: , Rfl:   •  Insulin Aspart (NOVOLOG FLEXPEN SC), Inject 8-12 Units under the skin 3 (Three) Times a Day Before Meals. PER SLIDING SCALE, Disp: , Rfl:   •  Insulin Detemir (LEVEMIR FLEXPEN SC), Inject 8-12 Units under the skin Every Night. SLIDING SCALE, Disp: , Rfl:   •  levETIRAcetam (KEPPRA) 1000 MG tablet, Take 1,000 mg by mouth 2 (Two) Times a Day., Disp: , Rfl:   •  lisinopril (PRINIVIL,ZESTRIL) 20 MG tablet, Take 20 mg by mouth Every Morning., Disp: , Rfl:   •  loratadine (CLARITIN) 10 MG tablet, Take 10 mg by mouth Every Night., Disp: , Rfl:   •   "nicotine (NICODERM CQ) 14 MG/24HR patch, Place 1 patch on the skin Daily., Disp: 21 patch, Rfl: 1  •  ondansetron (ZOFRAN) 8 MG tablet, Take 1 tablet by mouth Every 8 (Eight) Hours As Needed for Nausea or Vomiting., Disp: 90 tablet, Rfl: 6  •  oxyCODONE-acetaminophen (PERCOCET)  MG per tablet, Take 1 tablet by mouth Every 6 (Six) Hours As Needed for Moderate Pain ., Disp: , Rfl:   •  prochlorperazine (COMPAZINE) 10 MG tablet, Take 1 tablet by mouth Every 6 (Six) Hours As Needed for Nausea or Vomiting., Disp: 60 tablet, Rfl: 3  •  sertraline (ZOLOFT) 100 MG tablet, Take 100 mg by mouth Every Night., Disp: , Rfl:   •  sulfamethoxazole-trimethoprim (BACTRIM) 400-80 MG tablet, Take 1 tablet by mouth Daily., Disp: 30 tablet, Rfl: 3  •  valACYclovir (VALTREX) 500 MG tablet, Take 1 tablet by mouth Daily., Disp: 30 tablet, Rfl: 3    ALLERGIES:    Allergies   Allergen Reactions   • Codeine Other (See Comments)     \"UNKNOW  CHILDHOOD REACTION\"   • Penicillins Other (See Comments)     \"UNKNOWN CHILDHOOD ALLERGY\"   • Rituxan [Rituximab] Other (See Comments)     \"THROAT RAWNESS; FELT LIKE MY THROAT WAS CLOSING UP\"       PHYSICAL EXAM:  Visit Vitals  /65   Pulse 68   Temp 97 °F (36.1 °C) (Oral)   Resp 18   Wt 64.9 kg (143 lb)   SpO2 98%   BMI 23.80 kg/m²   General: Alert and oriented. Appears well  HEENT: Pupils are equal, round and reactive to light and accommodation, Extraocular movements full, oropharynx clear  Neck: no jvd or thyromegaly   Cardiovascular: regular rate and rhythm without murmurs, rubs or gallops.   Pulmonary: clear to auscultation bilaterally   Abdomen: soft, non-tender, non-distended, normal active bowel sounds present, no organomegaly   Extremities: No clubbing, cyanosis or edema   Lymph: L supraclavicular and carl cervical adenopathy no longer palpable . Carl axillary LAD and shoddy carl inguinal adenopathy no longer palpable   Neurologic: MS as above, CN II-XII intact. Strength 4+-5-/5 throughout " BUE proximally and distally and in LLE.      PATHOLOGY:  5-25-12 L Supraclavicular LN Biopsy:   - Extensive small lymphocytic lymphoma / chronic lymphocytic leukemia involvement.   Flow cytometry shows monoclonal Kappa B-cell population which coexpresses CD5 and CD23 representing 30% of gated cells. There is dim surface light chain and dim CD20 expression. No CD38 expression. Neoplastic cells are positive for CD20 (dim), PAX-5, CCD5, CD23, CD43. B cells are + for CD3. BCL1 stain negative.    BM Biopsy 6-29-12 ([facility])   - Limited BM specimen with scattered hematopoietic cells.   - Flow cytometry revealed peripheral blood with minute CLL/SLL population (0.5%) with normal  FISH studies.     Cervical Disectomy 04-18-14:   - Fragments of fibrohyaline cartilage, no acute inflammation or metastatic disease identified.     IMAGING:  PET-CT 6-19-12:   - Mild to moderate bilateral axillary LAD and mediastinal LAD with minimal associated hypermetabolism.     PET-CT 10-09-12:   - Multiple areas of abnormal hypermetabolism in the neck bilaterally, new from the prior study. R posterior trangle focus with SUV 3.1. L supraclavicular focus SUV 5.0 (LN 1.8 cm, prior 1.2 cm)   - Carl axillary LAD - L axilla 2.8 cm LN (prior 1.6 cm) with SUV 3.6, R axillary ln new 2.0 cm, SUV 2.6   - Mediastinal LAD noted. R prevascular LN 1.5 cm with SUV 2.1   - Carl external iliac LNs present ( R 3.2 cm with SUV 3.1)   - Overall worsening areas of hypermetabolism corresponding to enlarging nodes c/w worsening neoplastic involvement.     PET-CT 1-16-13:   - No PET-CT findings of active disease related to the patient's lymphoma. Interval resolution of hypermetabolic adenopathy described on previous examination. Spleen is normal.     PET-CT 1-21-14:   - No abnormal hypermetabolism to suggest neoplastic involvement. No mass or adenopathy noted. Spleen is normal.     04-03-14 MRI Spine:   - Cervical: Degenerative disk disease in the cervical disk spaces at  the level of C4-C7. At 4-5, there is a disk herniation associated with bulging. There was spinal stenosis at C5-6 but no disk herniation. At C6-7, the disk bulging is not felt to be significant. The postcontrasted scans in the cervical spine showed no areas of abnormal enhancement.   - Thoracic: negative MRI examination of the thoracic spine. A source of the patient's t horacic spine pain was not demonstrated.     Chest Xray 04-17-15:   - The lungs are clear, the heart is normal. There is no active disease in the chest.     CT CAP/neck w/ contrast 05-13-14:   - No neck lymphadenopathy by CT size criteria   - No intrathoracic or abdominal LAD     CTAP, neck w/ contrast 03-04-15:   - No enlarged lymph nodes were demonstrated. The bile ducts in the liver and the extrahepatic biliary tree is slightly dilated.   - Clinical correlation with laboratory evaluation for obstruction suggested. This, however, is not significantly changed from a previous CT from 05/2014. No retroperitoneal lymphadenopathy is identified.   - Negative CT of the of the soft tissues of the neck. No abnormally enlarged lymph nodes are demonstrated.     CT Neck, CAP 3-15-16:   - There is a change in the CT scan. There are prominent lymph nodes in the neck bilaterally. The largest are in the supraclavicular area and measure greater than a centimeter. Most of the nodes are in the 1cm size range.   - In the chest, most of the nodes are in the subcen timeter size range. The largest nodes are in the pretacheal area and measure 15 mm. There were no enlarged hilar nodes. On the lung windows, there are no pleural effusions. No pulmonary or parenchymal lung nodules or masses were demonstrated. .   There are m ore prominent lymph nodes in the retroperitoneum some of which are enlarged today consistent with recurrence. Again, most of these are in the 1 cm size range, but a few measure up to 17-18 mm. No mesenteric adenopathy demonstrated.     CTCAP, Neck  05-10-16:   - Chest: Persistent stable adenopathy in the mediastinum and axillary regions. The lungs remain clear.   - A/P: The lymph nodes are stable in comparing with the previous CT done in March.   - Neck: Persistent but stable adenopathy throughout the jugular lymph node chains in both sides of the neck.     [facility] neck 08-16-16:  - Stable mediastinal enlarged lymph nodes including an AP window lymph node measuring 9 mm  - A left axillary region lymph node measures 1.9 cm and was 1.9 cm. Stable right axillary region lymph adenopathy.   - Stable retroperitoneal and mesenteric lymphadenopathy   - persistent lymphadenopathy throughout the neck that is stable in size.     CTCAP 06-07-17:  - Stable bilateral jugular chain lymphadenopathy.  - Stable bilateral axillary and mediastinal region lymphadenopathy.  - Stable retroperitoneal lymphadenopathy.    [facility], Neck 02-27-18:  - Grossly stable bilateral jugular chain lymphadenopathy.  - LUNGS: A NEW RIGHT UPPER LOBE PULMONARY NODULE MEASURES 8.8 MM. RIGHT MIDDLE LOBE PULMONARY PERIBRONCHIAL VASCULAR NODULARITY MAY BE INFECTIOUS OR INFLAMMATORY  - MEDIASTINUM: AGAIN THERE IS MEDIASTINAL BORDERLINE LYMPHADENOPATHY  WITHIN AP WINDOW LYMPH NODE NOW MEASURING 1 CM AND WAS 8 MM WITH SIMILAR  INCREASE IN SIZE OF MULTIPLE BILATERAL AXILLARY LYMPH NODES WITH THE  LARGEST RIGHT MEASURING 4.4 CM OF THE CONGLOMERATE THAT WAS ABOUT 3 CM  AND A LEFT AXILLARY REGION LYMPH NODE MEASURING 2.7 CM THAT WAS 2.2 CM.  - SPLEEN: SPLENOMEGALY IS AGAIN NOTED.  -LYMPH NODES: Extensive retroperitoneal lymphadenopathy with a right para-aortic lymph node measuring about 4.5 cm and was 2.5 cm.    CTCAP 04-12-18:  - There are numerous enlarged supraclavicular lymph nodes.  - These have progressed in size in comparing with the earlier CT. Several of the lymph nodes now measure up to 3 cm in diameter. The lymph nodes extend into the axillary regions. There are some lymph nodes in the mediastinum that  are relatively stable in comparing with the earlier exam. The lungs are both well aerated and clear.  - The liver was normal in size and shape. There is mild dilatation of the bile ducts within the liver. The gallbladder is surgically absent. The common hepatic duct was also dilated in the pancreatic head, this is unchanged from the earlier exam. The spleen is homogeneous in density and is stable in size. There continues to be fairly marked adenopathy in the retroperitoneum. The lymph node size continues to increase slightly by a few millimeters. The largest bulk of lymph nodes is in the gastrohepatic ligament region. There are also lymph nodes along the aorta. These are larger in size as well. Some of the periaortic lymph nodes now measure up to 3 cm in diameter.   - There are enlarged lymph nodes in the mesentery. There are enlarged lymph nodes along the iliac lymph node chains and some borderline enlarged lymph nodes in both groins. The bowel shows no evidence of obstruction, the kidneys enhance appropriately.    US Pelvis, limited 5/2/18  IMPRESSION:  - There is a 8 mm subcutaneous walled collection suggestive of tiny abscess in the suprapubic region of interest.    08-28-18 CT Chest With Contrast   FINDINGS: Today's chest CT is compared with a previous chest CT done in  April. Contrast was administered intravenously and scans were obtained  from the apices of the lung and extended to the diaphragm. Both  mediastinal and lung windows were evaluated. On the mediastinal windows  the adenopathy seen on the earlier exam in the supraclavicular area is  significantly decreased. The lymph nodes are now less than a centimeter  in diameter. The bilateral axillary lymph nodes have also decreased  significantly in size. There are a few nodes that are greater than a  centimeter in diameter, but they have fatty hilar areas. The lymph nodes  in the mediastinum have also significantly decreased in size, and they  are now all  less than a centimeter in diameter. On the lung windows,  there were increased interstitial markings in the lungs. No pulmonary  parenchymal lung nodules or masses were identified.     IMPRESSION:  Significant improvement in the chest. Lymph nodes in the  supraclavicular area, axillary regions, and mediastinum have all  significantly decreased in size. The lymph nodes that are larger than a  centimeter in diameter have fatty changes in the hilum.    08-28-18 CT Abdomen Pelvis With Contrast   FINDINGS: Contrast was administered both orally and intravenously. Scans  were obtained from the diaphragm and extended through the pelvis. The  liver is normal in size and shape. No focal lesions were seen within the  liver. The gallbladder is surgically absent. The spleen was homogeneous  in density and normal in size. There were no masses in the adrenal  glands. The kidneys show normal cortical enhancement. The abdominal  aorta was normal in caliber. There has been marked improvement in the  adenopathy in the retroperitoneum. Only a few enlarged lymph nodes  remain. The mesenteric lymph nodes are also almost completely resolved.  In the pelvis, no enlarged lymph nodes are demonstrated on the current  exam. Urinary bladder was smooth in contour. The inguinal adenopathy is  also resolved. The bowel shows no evidence of obstruction. There is no  ascites or free air. There is a moderate amount of stool throughout the  colon suggesting constipation.     IMPRESSION:  Significant improvement in the adenopathy in the abdomen or  pelvis. There are only a few scattered lymph nodes in the area of the  gastrohepatic ligament that are greater than a centimeter in diameter.       RECENT LABS:  Lab Results   Component Value Date    WBC 4.29 (L) 12/10/2018    HGB 13.3 12/10/2018    HCT 40.1 12/10/2018    .0 (H) 12/10/2018    RDW 12.4 12/10/2018     12/10/2018    NEUTRORELPCT 59.0 12/10/2018    LYMPHORELPCT 33.3 12/10/2018     MONORELPCT 5.8 12/10/2018    EOSRELPCT 1.4 12/10/2018    BASORELPCT 0.5 12/10/2018    NEUTROABS 2.53 12/10/2018    LYMPHSABS 1.43 12/10/2018       Lab Results   Component Value Date     09/24/2018    K 4.1 09/24/2018    CO2 27.0 09/24/2018     09/24/2018    BUN 5 (L) 09/24/2018    CREATININE 0.73 09/24/2018    GLUCOSE 132 (H) 09/24/2018    CALCIUM 9.5 09/24/2018    ALKPHOS 60 09/24/2018    AST 19 09/24/2018    ALT 27 09/24/2018    BILITOT 0.5 09/24/2018    ALBUMIN 4.47 09/24/2018    PROTEINTOT 6.9 09/24/2018       Lab Results   Component Value Date     (L) 06/05/2018    URICACID 3.7 06/05/2018     Date  SPEP/MONE Mspike Free K Free L  K/L Ratio   02-12-15 MONE normal Not observed   06-25-15 MONE normal Not observed 22.10  14.93  1.48  11-17-15 MONE normal Not observed 17.17  14.41  1.19    Date  IgG IgA IgM  02-12-15 744 110 43  06-25-15 776 94 39  11-17-15 812 99 41  03-29-17 771 103 34  08-04-17 831 94 27  11-13-17 791 95 21  02-27-18 879 109 25     CLL profile, FISH:  The CLL interphase fluorescence in situ hybridization   (FISH) panel analysis was normal. There were no cells with   CCND1-IGH fusion. No extra signals or deletions of OLAF,   chromosome 12, 13q, or TP53 were observed.       SPECIFIC FISH RESULTS:     CCND1/IGH: NORMAL   .         nuc kenzie 11q13(ODEV7a2),14q32(IGHx2)[100]       OLAF: NORMAL         nuc kenzie 11q22.3(ATMx2)[100] .     12cen: NORMAL     .         nuc kenzie 12cen(M06Q3c6)[100] .     13q: NORMAL  .         nuc kenzie 13q14.3(DLEUx2),13q34(CSNZ1f6)[100] .     TP53: NORMAL  .         nuc kenzie 17p13.1(TP53x2)[100]     Acute Hepatitis Panel: Negative    ASSESSMENT & PLAN:  Ms. Mae is a 53 y.o.  female with a history of Stage II SLL diagnosed in May of 2012 by L supraclavicular LN biopsy.     1. Small Lymphocytic Lymphoma:   - Treated as above with 6 cycles of Bendamustine (after anaphylactic reaction to Rituximab with 1st cycle of treatment) with complete radiographic  response.   - She does have adenopathy in the cj cervical, supraclavicular, axillary and cj inguinal areas c/w CLL/SLL. She also has small mediastinal nodes and retroperitoneal LAD.  She has had continued growth of the lymph nodes, and has been losing weight.  She has some night sweats which may or may not be related.  -  Recommended consideration of treatment given worsening disease.  -  Given rituxan allergy, recommended treatment with Imbruvica 420 mg daily with Bactrim SS daily and Valtrex 500 mg daily for prophylaxis.  -  Given possibility for Imbruvica to cause atrial fibrillation or even possibly ventricular arrhythmias and I spoke with Dr. Shea.  He said he felt it would be safe for her to take and suggested that she go ahead and start while on the heart monitor, which she did.     -  Recommended Shingrix vaccine.  -  She has done very well with her treatment wtihout any significant side effects and with excellent clinical response.    -  Last CT Scans showed essentially a complete radiographic response to treatment.  Will plan to repeat imaging with labwork in 3 months.    2.  Signficant vascular disease:  -  She has been evaluated by Dr. Amaral and says they are investigating lower vasculature blockages.    3.  Tobacco use:  She tried to quit but restarted. Previously discussed methods to help her and she wanted to try. Therefore, provided Rx for Nicotine patches .She says she wants to quit but isn't ready to do so at this time.    4. Degenerative disc disease with worsening LLE and BUE symptoms::  Has plans to f/u with Dr. Soto.    5. Nausea: Continue zofran, Compazine prn.     6.   Prophylaxis:  She received  Prevnar vaccine 11-13-17.  She has had 2018 influenza vaccine.  Recommended HAV vaccine.  Previously recommended Shingrix vaccine.    7.  ACO Quality measures  - Melania Mae does currently use tobacco products.  - I advised the patient of the risks in continuing to use tobacco.  During  this visit, I spent > 3-10 minutes counseling the patient regarding smoking cessation.  - Patient's BMI is within normal parameters. No follow-up required.  - Current outpatient and discharge medications have been reconciled for the patient by Thea Sorto MD     This note was scribed for Thea Sorto MD by Violeta Fong RN.    I, Thea Sorto MD, personally performed the services described in this documentation as scribed by the above named individual in my presence, and it is both accurate and complete.  12/10/2018        I spent 25 minutes with Ms. Mae today. More than 50% of the time was spent in counseling /coordination of care for the above mentioned problems.       Electronically Signed by: Thea Sorto MD    CC:   Gillian Harrison APRN Dr. Donald Brown

## 2019-01-08 ENCOUNTER — APPOINTMENT (OUTPATIENT)
Dept: CARDIOLOGY | Facility: HOSPITAL | Age: 54
End: 2019-01-08
Attending: RADIOLOGY

## 2019-01-16 RX ORDER — VALACYCLOVIR HYDROCHLORIDE 500 MG/1
500 TABLET, FILM COATED ORAL DAILY
Qty: 30 TABLET | Refills: 3 | Status: SHIPPED | OUTPATIENT
Start: 2019-01-16 | End: 2019-09-13 | Stop reason: SDUPTHER

## 2019-01-16 RX ORDER — SULFAMETHOXAZOLE AND TRIMETHOPRIM 400; 80 MG/1; MG/1
1 TABLET ORAL DAILY
Qty: 30 TABLET | Refills: 3 | Status: SHIPPED | OUTPATIENT
Start: 2019-01-16 | End: 2019-09-13 | Stop reason: SDUPTHER

## 2019-01-21 ENCOUNTER — OFFICE VISIT (OUTPATIENT)
Dept: NEUROSURGERY | Facility: CLINIC | Age: 54
End: 2019-01-21

## 2019-01-21 ENCOUNTER — HOSPITAL ENCOUNTER (OUTPATIENT)
Dept: CARDIOLOGY | Facility: HOSPITAL | Age: 54
Discharge: HOME | End: 2019-01-21
Attending: RADIOLOGY | Admitting: RADIOLOGY

## 2019-01-21 VITALS
HEIGHT: 65 IN | WEIGHT: 146.2 LBS | TEMPERATURE: 97.9 F | DIASTOLIC BLOOD PRESSURE: 80 MMHG | BODY MASS INDEX: 24.36 KG/M2 | SYSTOLIC BLOOD PRESSURE: 170 MMHG

## 2019-01-21 VITALS — WEIGHT: 147 LBS | HEIGHT: 65 IN | BODY MASS INDEX: 24.49 KG/M2

## 2019-01-21 DIAGNOSIS — M50.30 DDD (DEGENERATIVE DISC DISEASE), CERVICAL: ICD-10-CM

## 2019-01-21 DIAGNOSIS — I65.21 CAROTID STENOSIS, RIGHT: Primary | ICD-10-CM

## 2019-01-21 DIAGNOSIS — I65.22 CAROTID STENOSIS, ASYMPTOMATIC, LEFT: ICD-10-CM

## 2019-01-21 DIAGNOSIS — I67.1 CEREBRAL ANEURYSM, NONRUPTURED: ICD-10-CM

## 2019-01-21 LAB
BH CV ECHO MEAS - BSA(HAYCOCK): 1.8 M^2
BH CV ECHO MEAS - BSA: 1.7 M^2
BH CV ECHO MEAS - BZI_BMI: 24.5 KILOGRAMS/M^2
BH CV ECHO MEAS - BZI_METRIC_HEIGHT: 165.1 CM
BH CV ECHO MEAS - BZI_METRIC_WEIGHT: 66.7 KG
BH CV MID RIGHT ICA HIDDEN LRR: 1 CM
BH CV XLRA MEAS LEFT CCA RATIO VEL: 191 CM/SEC
BH CV XLRA MEAS LEFT DIST CCA EDV: 50.3 CM/SEC
BH CV XLRA MEAS LEFT DIST CCA PSV: 191 CM/SEC
BH CV XLRA MEAS LEFT DIST ICA EDV: 80.3 CM/SEC
BH CV XLRA MEAS LEFT DIST ICA PSV: 211 CM/SEC
BH CV XLRA MEAS LEFT ICA RATIO VEL: 297 CM/SEC
BH CV XLRA MEAS LEFT ICA/CCA RATIO: 1.6
BH CV XLRA MEAS LEFT MID CCA EDV: 56.6 CM/SEC
BH CV XLRA MEAS LEFT MID CCA PSV: 191 CM/SEC
BH CV XLRA MEAS LEFT MID ICA EDV: 82.1 CM/SEC
BH CV XLRA MEAS LEFT MID ICA PSV: 220 CM/SEC
BH CV XLRA MEAS LEFT PROX CCA EDV: 56.6 CM/SEC
BH CV XLRA MEAS LEFT PROX CCA PSV: 206 CM/SEC
BH CV XLRA MEAS LEFT PROX ECA PSV: 157 CM/SEC
BH CV XLRA MEAS LEFT PROX ICA EDV: 110 CM/SEC
BH CV XLRA MEAS LEFT PROX ICA PSV: 297 CM/SEC
BH CV XLRA MEAS LEFT PROX SCLA PSV: 206 CM/SEC
BH CV XLRA MEAS LEFT VERTEBRAL A EDV: 35.2 CM/SEC
BH CV XLRA MEAS LEFT VERTEBRAL A PSV: 121 CM/SEC
BH CV XLRA MEAS RIGHT CCA RATIO VEL: 138 CM/SEC
BH CV XLRA MEAS RIGHT DIST CCA EDV: 47.1 CM/SEC
BH CV XLRA MEAS RIGHT DIST CCA PSV: 138 CM/SEC
BH CV XLRA MEAS RIGHT DIST ICA EDV: 61.9 CM/SEC
BH CV XLRA MEAS RIGHT DIST ICA PSV: 173 CM/SEC
BH CV XLRA MEAS RIGHT ICA RATIO VEL: 198 CM/SEC
BH CV XLRA MEAS RIGHT ICA/CCA RATIO: 1.4
BH CV XLRA MEAS RIGHT MID CCA EDV: 33.4 CM/SEC
BH CV XLRA MEAS RIGHT MID CCA PSV: 108 CM/SEC
BH CV XLRA MEAS RIGHT MID ICA EDV: 64.8 CM/SEC
BH CV XLRA MEAS RIGHT MID ICA PSV: 198 CM/SEC
BH CV XLRA MEAS RIGHT PROX CCA EDV: 37.7 CM/SEC
BH CV XLRA MEAS RIGHT PROX CCA PSV: 133 CM/SEC
BH CV XLRA MEAS RIGHT PROX ECA PSV: 416 CM/SEC
BH CV XLRA MEAS RIGHT PROX ICA EDV: 40.3 CM/SEC
BH CV XLRA MEAS RIGHT PROX ICA PSV: 131 CM/SEC
BH CV XLRA MEAS RIGHT PROX SCLA PSV: 212 CM/SEC
BH CVPROX RIGHT ICA HIDDEN LRR: 1 CM

## 2019-01-21 PROCEDURE — 99213 OFFICE O/P EST LOW 20 MIN: CPT | Performed by: NEUROLOGICAL SURGERY

## 2019-01-21 PROCEDURE — 93880 EXTRACRANIAL BILAT STUDY: CPT

## 2019-01-21 PROCEDURE — 93880 EXTRACRANIAL BILAT STUDY: CPT | Performed by: INTERNAL MEDICINE

## 2019-01-21 RX ORDER — NICOTINE 21 MG/24HR
1 PATCH, TRANSDERMAL 24 HOURS TRANSDERMAL EVERY 24 HOURS
Qty: 21 PATCH | Refills: 1 | Status: SHIPPED | OUTPATIENT
Start: 2019-01-21 | End: 2019-09-13

## 2019-01-21 NOTE — PROGRESS NOTES
Melania Mae  1965  [id number]                        CHIEF COMPLAINT:  Carotid occlusive disease          MEDICAL HISTORY SINCE LAST ENCOUNTER:  53-year-old reports for follow-up subsequent to NICOLE of the right ICA on 8/23/17.  She continues to smoke.  She is had no symptoms of transient cerebral ischemia however.            Past Medical History:   Diagnosis Date   • Aneurysm (CMS/HCC) 8/223/17    Cavernous left ICA aneurysm   • Asthma    • Carotid artery occlusion    • Chronic back pain    • CLL (chronic lymphocytic leukemia) (CMS/HCC)     OCTOBER OF 2012   • COPD (chronic obstructive pulmonary disease) (CMS/HCC)    • Degenerative arthritis    • Depression    • Diabetes mellitus (CMS/HCC)     type 2   • Hyperlipidemia    • Hypertension    • Lymphadenopathy    • Non Hodgkin's lymphoma (CMS/HCC)               Past Surgical History:   Procedure Laterality Date   • APPENDECTOMY     • BACK SURGERY      2002 (work accident) c-spine surgery 4/14   • CAROTID STENT Right 08/23/2017   • CEREBRAL ANGIOGRAM N/A 8/23/2017    Diagnostic Carotid/Cerebral Angiogram   • CHOLECYSTECTOMY     • HYSTERECTOMY      for endometriosis/ovarian bleed   • NECK SURGERY     • OTHER SURGICAL HISTORY      pac insertion and removal   • TONSILLECTOMY                Family History   Problem Relation Age of Onset   • Lung cancer Father 72   • Hypertension Father    • Heart disease Father    • Cancer Father    • Diabetes Father    • Other Brother 46        MI   • Heart disease Brother    • Breast cancer Paternal Aunt    • Colon cancer Maternal Grandfather    • Other Cousin         CLL (dx 46 yo)   • Hypertension Mother               Social History     Socioeconomic History   • Marital status:      Spouse name: Not on file   • Number of children: Not on file   • Years of education: Not on file   • Highest education level: Not on file   Social Needs   • Financial resource strain: Not on file   • Food insecurity - worry: Not on file  "  • Food insecurity - inability: Not on file   • Transportation needs - medical: Not on file   • Transportation needs - non-medical: Not on file   Occupational History   • Not on file   Tobacco Use   • Smoking status: Current Every Day Smoker     Packs/day: 0.50     Years: 35.00     Pack years: 17.50     Types: Cigarettes   • Smokeless tobacco: Never Used   • Tobacco comment: 0.5-1 PPD   Substance and Sexual Activity   • Alcohol use: No   • Drug use: No   • Sexual activity: Defer   Other Topics Concern   • Not on file   Social History Narrative   • Not on file              Allergies   Allergen Reactions   • Codeine Other (See Comments)     \"UNKNOW  CHILDHOOD REACTION\"   • Penicillins Other (See Comments)     \"UNKNOWN CHILDHOOD ALLERGY\"   • Rituxan [Rituximab] Other (See Comments)     \"THROAT RAWNESS; FELT LIKE MY THROAT WAS CLOSING UP\"              Current Outpatient Medications:   •  albuterol (PROVENTIL HFA;VENTOLIN HFA) 108 (90 Base) MCG/ACT inhaler, Inhale 2 puffs As Needed for Wheezing., Disp: , Rfl:   •  ALPRAZolam (XANAX) 0.5 MG tablet, Take 0.5 mg by mouth 3 (Three) Times a Day As Needed for Anxiety., Disp: , Rfl:   •  aspirin 81 MG EC tablet, Take 1 tablet by mouth Daily., Disp: 30 tablet, Rfl: 5  •  azithromycin (ZITHROMAX) 250 MG tablet, Take 2 tablets the first day, then 1 tablet daily for 4 days., Disp: 6 tablet, Rfl: 0  •  B-D UF III MINI PEN NEEDLES 31G X 5 MM misc, , Disp: , Rfl:   •  Brexpiprazole (REXULTI) 1 MG tablet, Take  by mouth Daily., Disp: , Rfl:   •  clopidogrel (PLAVIX) 75 MG tablet, Take 1 tablet by mouth Daily., Disp: 30 tablet, Rfl: 5  •  fluconazole (DIFLUCAN) 100 MG tablet, Take 2 tabs on day 1 then 1 tab for days 2-5, then stop, Disp: 6 tablet, Rfl: 0  •  fluticasone-salmeterol (ADVAIR) 100-50 MCG/DOSE DISKUS, Inhale 1 puff As Needed., Disp: , Rfl:   •  gabapentin (NEURONTIN) 800 MG tablet, Take 800 mg by mouth 4 (Four) Times a Day., Disp: , Rfl:   •  ibrutinib (IMBRUVICA) 420 MG " tablet tablet, Take 1 tablet by mouth Daily., Disp: 28 tablet, Rfl: 5  •  Ibuprofen (ADVIL PO), Take 400 mg by mouth 3 (Three) Times a Day. LIQUIGELS, Disp: , Rfl:   •  Insulin Aspart (NOVOLOG FLEXPEN SC), Inject 8-12 Units under the skin 3 (Three) Times a Day Before Meals. PER SLIDING SCALE, Disp: , Rfl:   •  Insulin Detemir (LEVEMIR FLEXPEN SC), Inject 8-12 Units under the skin Every Night. SLIDING SCALE, Disp: , Rfl:   •  levETIRAcetam (KEPPRA) 1000 MG tablet, Take 1,000 mg by mouth 2 (Two) Times a Day., Disp: , Rfl:   •  lisinopril (PRINIVIL,ZESTRIL) 20 MG tablet, Take 20 mg by mouth Every Morning., Disp: , Rfl:   •  loratadine (CLARITIN) 10 MG tablet, Take 10 mg by mouth Every Night., Disp: , Rfl:   •  nicotine (NICODERM CQ) 14 MG/24HR patch, Place 1 patch on the skin Daily., Disp: 21 patch, Rfl: 1  •  ondansetron (ZOFRAN) 8 MG tablet, Take 1 tablet by mouth Every 8 (Eight) Hours As Needed for Nausea or Vomiting., Disp: 90 tablet, Rfl: 6  •  oxyCODONE-acetaminophen (PERCOCET)  MG per tablet, Take 1 tablet by mouth Every 6 (Six) Hours As Needed for Moderate Pain ., Disp: , Rfl:   •  prochlorperazine (COMPAZINE) 10 MG tablet, Take 1 tablet by mouth Every 6 (Six) Hours As Needed for Nausea or Vomiting., Disp: 60 tablet, Rfl: 3  •  sertraline (ZOLOFT) 100 MG tablet, Take 100 mg by mouth Every Night., Disp: , Rfl:   •  sulfamethoxazole-trimethoprim (BACTRIM) 400-80 MG tablet, Take 1 tablet by mouth Daily., Disp: 30 tablet, Rfl: 3  •  valACYclovir (VALTREX) 500 MG tablet, Take 1 tablet by mouth Daily., Disp: 30 tablet, Rfl: 3         Review of Systems   Constitutional: Positive for activity change and fatigue. Negative for appetite change, chills, diaphoresis, fever and unexpected weight change.   HENT: Negative for congestion, dental problem, drooling, ear discharge, ear pain, facial swelling, hearing loss, mouth sores, nosebleeds, postnasal drip, rhinorrhea, sinus pressure, sneezing, sore throat, tinnitus,  "trouble swallowing and voice change.    Eyes: Negative for photophobia, pain, discharge, redness, itching and visual disturbance.   Respiratory: Negative for apnea, cough, choking, chest tightness, shortness of breath, wheezing and stridor.    Cardiovascular: Positive for chest pain and palpitations. Negative for leg swelling.   Gastrointestinal: Negative for abdominal distention, abdominal pain, anal bleeding, blood in stool, constipation, diarrhea, nausea, rectal pain and vomiting.   Endocrine: Positive for polydipsia and polyuria. Negative for cold intolerance, heat intolerance and polyphagia.   Genitourinary: Positive for dyspareunia and vaginal pain. Negative for decreased urine volume, difficulty urinating, dysuria, enuresis, flank pain, frequency, genital sores, hematuria and urgency.   Musculoskeletal: Positive for arthralgias, back pain, neck pain and neck stiffness. Negative for gait problem, joint swelling and myalgias.   Skin: Negative for color change, pallor, rash and wound.   Allergic/Immunologic: Positive for environmental allergies. Negative for food allergies and immunocompromised state.   Neurological: Positive for tremors, weakness, numbness and headaches. Negative for dizziness, seizures, syncope, facial asymmetry, speech difficulty and light-headedness.   Hematological: Positive for adenopathy. Bruises/bleeds easily.   Psychiatric/Behavioral: Positive for decreased concentration and sleep disturbance. Negative for agitation, behavioral problems, confusion, dysphoric mood, hallucinations, self-injury and suicidal ideas. The patient is nervous/anxious. The patient is not hyperactive.                Vitals:    01/21/19 1334   BP: 170/80   BP Location: Right arm   Patient Position: Sitting   Cuff Size: Adult   Temp: 97.9 °F (36.6 °C)   TempSrc: Temporal   Weight: 66.3 kg (146 lb 3.2 oz)   Height: 165.1 cm (65\")               EXAMINATION: [Alert, oriented.  No weakness sensory loss or reflex " asymmetry. ]            MEDICAL DECISION MAKING: [The carotid ultrasound shows no evidence of progression at this point in time the stent is patent. ]           ASSESSMENT/DISPOSITION: [ I have one her about continuation of smoking.  She will return to see is in 1 year for continued duplex surveillance.]              I APPRECIATE THE OPPORTUNITY OF THIS REFERRAL. PLEASE CALL IF ANY       QUESTIONS [phone]    Scribed for Adolph Huynh MD by Ernie Cooper CMA. 1/21/2019 1:56 PM     I have read and concur with the information provided by the scribe.  Adolph Huynh MD

## 2019-03-03 DIAGNOSIS — C91.10 CLL (CHRONIC LYMPHOCYTIC LEUKEMIA) (HCC): ICD-10-CM

## 2019-03-05 ENCOUNTER — APPOINTMENT (OUTPATIENT)
Dept: PHARMACY | Facility: HOSPITAL | Age: 54
End: 2019-03-05

## 2019-03-31 DIAGNOSIS — C91.10 CLL (CHRONIC LYMPHOCYTIC LEUKEMIA) (HCC): ICD-10-CM

## 2019-05-06 DIAGNOSIS — C91.10 CLL (CHRONIC LYMPHOCYTIC LEUKEMIA) (HCC): Primary | ICD-10-CM

## 2019-05-16 ENCOUNTER — APPOINTMENT (OUTPATIENT)
Dept: CT IMAGING | Facility: HOSPITAL | Age: 54
End: 2019-05-16

## 2019-05-27 DIAGNOSIS — C91.10 CLL (CHRONIC LYMPHOCYTIC LEUKEMIA) (HCC): ICD-10-CM

## 2019-05-29 ENCOUNTER — APPOINTMENT (OUTPATIENT)
Dept: CT IMAGING | Facility: HOSPITAL | Age: 54
End: 2019-05-29

## 2019-07-02 ENCOUNTER — APPOINTMENT (OUTPATIENT)
Dept: CT IMAGING | Facility: HOSPITAL | Age: 54
End: 2019-07-02

## 2019-07-25 ENCOUNTER — APPOINTMENT (OUTPATIENT)
Dept: CT IMAGING | Facility: HOSPITAL | Age: 54
End: 2019-07-25

## 2019-08-12 DIAGNOSIS — C91.10 CLL (CHRONIC LYMPHOCYTIC LEUKEMIA) (HCC): ICD-10-CM

## 2019-08-15 ENCOUNTER — SPECIALTY PHARMACY (OUTPATIENT)
Dept: PHARMACY | Facility: HOSPITAL | Age: 54
End: 2019-08-15

## 2019-08-15 DIAGNOSIS — C91.10 CLL (CHRONIC LYMPHOCYTIC LEUKEMIA) (HCC): ICD-10-CM

## 2019-08-23 DIAGNOSIS — C91.10 CLL (CHRONIC LYMPHOCYTIC LEUKEMIA) (HCC): Primary | ICD-10-CM

## 2019-09-04 ENCOUNTER — HOSPITAL ENCOUNTER (OUTPATIENT)
Dept: CT IMAGING | Facility: HOSPITAL | Age: 54
Discharge: HOME | End: 2019-09-04
Admitting: INTERNAL MEDICINE

## 2019-09-04 ENCOUNTER — HOSPITAL ENCOUNTER (OUTPATIENT)
Dept: CT IMAGING | Facility: HOSPITAL | Age: 54
Discharge: HOME | End: 2019-09-04

## 2019-09-04 DIAGNOSIS — C91.10 CLL (CHRONIC LYMPHOCYTIC LEUKEMIA) (HCC): ICD-10-CM

## 2019-09-04 LAB — CREAT BLDA-MCNC: 0.8 MG/DL (ref 0.6–1.3)

## 2019-09-04 PROCEDURE — 74177 CT ABD & PELVIS W/CONTRAST: CPT | Performed by: RADIOLOGY

## 2019-09-04 PROCEDURE — 82565 ASSAY OF CREATININE: CPT

## 2019-09-04 PROCEDURE — 70491 CT SOFT TISSUE NECK W/DYE: CPT

## 2019-09-04 PROCEDURE — 74177 CT ABD & PELVIS W/CONTRAST: CPT

## 2019-09-04 PROCEDURE — 71260 CT THORAX DX C+: CPT

## 2019-09-04 PROCEDURE — 71260 CT THORAX DX C+: CPT | Performed by: RADIOLOGY

## 2019-09-04 PROCEDURE — 70491 CT SOFT TISSUE NECK W/DYE: CPT | Performed by: RADIOLOGY

## 2019-09-04 PROCEDURE — 0 IOVERSOL 68 % SOLUTION: Performed by: INTERNAL MEDICINE

## 2019-09-04 RX ADMIN — IOVERSOL 100 ML: 678 INJECTION INTRA-ARTERIAL; INTRAVENOUS at 14:18

## 2019-09-13 ENCOUNTER — OFFICE VISIT (OUTPATIENT)
Dept: ONCOLOGY | Facility: CLINIC | Age: 54
End: 2019-09-13

## 2019-09-13 ENCOUNTER — SPECIALTY PHARMACY (OUTPATIENT)
Dept: PHARMACY | Facility: HOSPITAL | Age: 54
End: 2019-09-13

## 2019-09-13 VITALS
RESPIRATION RATE: 18 BRPM | SYSTOLIC BLOOD PRESSURE: 163 MMHG | WEIGHT: 151 LBS | DIASTOLIC BLOOD PRESSURE: 73 MMHG | OXYGEN SATURATION: 97 % | TEMPERATURE: 98.9 F | HEART RATE: 63 BPM | BODY MASS INDEX: 25.13 KG/M2

## 2019-09-13 DIAGNOSIS — C91.10 CLL (CHRONIC LYMPHOCYTIC LEUKEMIA) (HCC): ICD-10-CM

## 2019-09-13 DIAGNOSIS — Z72.0 TOBACCO ABUSE: Primary | ICD-10-CM

## 2019-09-13 LAB
ALBUMIN SERPL-MCNC: 3.84 G/DL (ref 3.5–5.2)
ALBUMIN/GLOB SERPL: 1.3 G/DL
ALP SERPL-CCNC: 127 U/L (ref 39–117)
ALT SERPL W P-5'-P-CCNC: 15 U/L (ref 1–33)
ANION GAP SERPL CALCULATED.3IONS-SCNC: 13.2 MMOL/L (ref 5–15)
AST SERPL-CCNC: 8 U/L (ref 1–32)
BASOPHILS # BLD AUTO: 0.02 10*3/MM3 (ref 0–0.2)
BASOPHILS NFR BLD AUTO: 0.3 % (ref 0–1.5)
BILIRUB SERPL-MCNC: 0.3 MG/DL (ref 0.2–1.2)
BUN BLD-MCNC: 8 MG/DL (ref 6–20)
BUN/CREAT SERPL: 10.1 (ref 7–25)
CALCIUM SPEC-SCNC: 9.2 MG/DL (ref 8.6–10.5)
CHLORIDE SERPL-SCNC: 97 MMOL/L (ref 98–107)
CO2 SERPL-SCNC: 23.8 MMOL/L (ref 22–29)
CREAT BLD-MCNC: 0.79 MG/DL (ref 0.57–1)
DEPRECATED RDW RBC AUTO: 44.1 FL (ref 37–54)
EOSINOPHIL # BLD AUTO: 0.1 10*3/MM3 (ref 0–0.4)
EOSINOPHIL NFR BLD AUTO: 1.6 % (ref 0.3–6.2)
ERYTHROCYTE [DISTWIDTH] IN BLOOD BY AUTOMATED COUNT: 13.1 % (ref 12.3–15.4)
GFR SERPL CREATININE-BSD FRML MDRD: 76 ML/MIN/1.73
GLOBULIN UR ELPH-MCNC: 3 GM/DL
GLUCOSE BLD-MCNC: 342 MG/DL (ref 65–99)
HCT VFR BLD AUTO: 37 % (ref 34–46.6)
HGB BLD-MCNC: 12.7 G/DL (ref 12–15.9)
IMM GRANULOCYTES # BLD AUTO: 0.03 10*3/MM3 (ref 0–0.05)
IMM GRANULOCYTES NFR BLD AUTO: 0.5 % (ref 0–0.5)
LDH SERPL-CCNC: 156 U/L (ref 135–214)
LYMPHOCYTES # BLD AUTO: 1.19 10*3/MM3 (ref 0.7–3.1)
LYMPHOCYTES NFR BLD AUTO: 18.7 % (ref 19.6–45.3)
MCH RBC QN AUTO: 31.6 PG (ref 26.6–33)
MCHC RBC AUTO-ENTMCNC: 34.3 G/DL (ref 31.5–35.7)
MCV RBC AUTO: 92 FL (ref 79–97)
MONOCYTES # BLD AUTO: 0.21 10*3/MM3 (ref 0.1–0.9)
MONOCYTES NFR BLD AUTO: 3.3 % (ref 5–12)
NEUTROPHILS # BLD AUTO: 4.81 10*3/MM3 (ref 1.7–7)
NEUTROPHILS NFR BLD AUTO: 75.6 % (ref 42.7–76)
PLATELET # BLD AUTO: 265 10*3/MM3 (ref 140–450)
PMV BLD AUTO: 9.9 FL (ref 6–12)
POTASSIUM BLD-SCNC: 4.4 MMOL/L (ref 3.5–5.2)
PROT SERPL-MCNC: 6.8 G/DL (ref 6–8.5)
RBC # BLD AUTO: 4.02 10*6/MM3 (ref 3.77–5.28)
SODIUM BLD-SCNC: 134 MMOL/L (ref 136–145)
URATE SERPL-MCNC: 3.1 MG/DL (ref 2.4–5.7)
WBC NRBC COR # BLD: 6.36 10*3/MM3 (ref 3.4–10.8)

## 2019-09-13 PROCEDURE — 99214 OFFICE O/P EST MOD 30 MIN: CPT | Performed by: INTERNAL MEDICINE

## 2019-09-13 PROCEDURE — 90674 CCIIV4 VAC NO PRSV 0.5 ML IM: CPT | Performed by: INTERNAL MEDICINE

## 2019-09-13 PROCEDURE — 84550 ASSAY OF BLOOD/URIC ACID: CPT | Performed by: INTERNAL MEDICINE

## 2019-09-13 PROCEDURE — G0008 ADMIN INFLUENZA VIRUS VAC: HCPCS | Performed by: INTERNAL MEDICINE

## 2019-09-13 PROCEDURE — 80053 COMPREHEN METABOLIC PANEL: CPT | Performed by: INTERNAL MEDICINE

## 2019-09-13 PROCEDURE — 36415 COLL VENOUS BLD VENIPUNCTURE: CPT | Performed by: INTERNAL MEDICINE

## 2019-09-13 PROCEDURE — 85025 COMPLETE CBC W/AUTO DIFF WBC: CPT | Performed by: INTERNAL MEDICINE

## 2019-09-13 PROCEDURE — 83615 LACTATE (LD) (LDH) ENZYME: CPT | Performed by: INTERNAL MEDICINE

## 2019-09-13 RX ORDER — VALACYCLOVIR HYDROCHLORIDE 500 MG/1
500 TABLET, FILM COATED ORAL DAILY
Qty: 30 TABLET | Refills: 3 | Status: SHIPPED | OUTPATIENT
Start: 2019-09-13 | End: 2020-01-23

## 2019-09-13 RX ORDER — SULFAMETHOXAZOLE AND TRIMETHOPRIM 400; 80 MG/1; MG/1
1 TABLET ORAL DAILY
Qty: 30 TABLET | Refills: 3 | Status: SHIPPED | OUTPATIENT
Start: 2019-09-13 | End: 2020-01-23

## 2019-09-13 RX ORDER — CLONIDINE HYDROCHLORIDE 0.1 MG/1
0.1 TABLET ORAL AS NEEDED
COMMUNITY
Start: 2019-09-12

## 2019-09-13 NOTE — PROGRESS NOTES
NAME: Melania Mae    : 1965    DATE:  2019    DIAGNOSIS:  SLL - initially diagnosed with [place] Stage II Disease in May 2012, based on excisional biopsy of a L supraclavicular LN. Bone marrow was involved.     CHIEF COMPLAINT:  CLL / SLL    TREATMENT HISTORY:  1. Received 6 cycles of Treanda without Rituxan between 10-24-12 and 3-21-13 because of anaphylactic reaction to Rituximab with her 1st cycle of treatment . Received Neulasta support. (Delivered by Dr. Koehler)     2. Imbruvica started 18      HISTORY OF PRESENT ILLNESS:   Ms. Mae was referred by Gillian Harrison for evaluation and treatment of CLL / SLL. She was reportedly diagnosed in May of 2012 when she saw Dr. Carbajal for biopsy of a L supraclavicular LN. At that time she had excessive fatigue & a 40 lb weight loss. She saw Dr. Koehler at  at time who performed bone marrow exam (bone marrow was involved) and started treatment with Bendamustine / Rituximab. Unfortunately with her 1st treatment, she had an anaphylactic reaction to Rituximab so this was not given with subsequent cycles of therapy. Her last cycle of treatment was 3-21-13. Per Dr. Koehler' s records, she had a complete response to treatment. She never had a significant leukocytosis or lymphocytosis, but had predominantly a lymphomatous presentation of her disease.     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 She reports she has been dealing with severe anxiety and depression recently. For awhile she was having panic attacks and was unable to get out of the house which is why she says she has missed so many appts.She is under the care of psychiatry who has been adjusting her medication. She has continued to take Imbruvica daily and tolerating it well. She did run out of medication around 19.    PAST MEDICAL HISTORY:  Past Medical History:   Diagnosis Date   • Aneurysm (CMS/HCC)     Cavernous  left ICA aneurysm   • Asthma    • Carotid artery occlusion    • Chronic back pain    • CLL (chronic lymphocytic leukemia) (CMS/HCC)     OCTOBER OF 2012   • COPD (chronic obstructive pulmonary disease) (CMS/HCC)    • Degenerative arthritis    • Depression    • Diabetes mellitus (CMS/HCC)     type 2   • Hyperlipidemia    • Hypertension    • Lymphadenopathy    • Non Hodgkin's lymphoma (CMS/HCC)        PAST SURGICAL HISTORY:  Past Surgical History:   Procedure Laterality Date   • APPENDECTOMY     • BACK SURGERY      2002 (work accident) c-spine surgery 4/14   • CAROTID STENT Right 08/23/2017   • CEREBRAL ANGIOGRAM N/A 8/23/2017    Diagnostic Carotid/Cerebral Angiogram   • CHOLECYSTECTOMY     • HYSTERECTOMY      for endometriosis/ovarian bleed   • NECK SURGERY     • OTHER SURGICAL HISTORY      pac insertion and removal   • TONSILLECTOMY         FAMILY HISTORY:  Family History   Problem Relation Age of Onset   • Lung cancer Father 72   • Hypertension Father    • Heart disease Father    • Cancer Father    • Diabetes Father    • Other Brother 46        MI   • Heart disease Brother    • Breast cancer Paternal Aunt    • Colon cancer Maternal Grandfather    • Other Cousin         CLL (dx 46 yo)   • Hypertension Mother      Social History     Socioeconomic History   • Marital status:      Spouse name: Not on file   • Number of children: Not on file   • Years of education: Not on file   • Highest education level: Not on file   Tobacco Use   • Smoking status: Current Every Day Smoker     Packs/day: 0.50     Years: 35.00     Pack years: 17.50     Types: Cigarettes   • Smokeless tobacco: Never Used   • Tobacco comment: 0.5-1 PPD   Substance and Sexual Activity   • Alcohol use: No   • Drug use: No   • Sexual activity: Defer     REVIEW OF SYSTEMS:    A comprehensive 14 point review of systems was performed and was negative except as mentioned    MEDICATIONS:  The current medication list was reviewed in the EMR    Current  Outpatient Medications:   •  albuterol (PROVENTIL HFA;VENTOLIN HFA) 108 (90 Base) MCG/ACT inhaler, Inhale 2 puffs As Needed for Wheezing., Disp: , Rfl:   •  ALPRAZolam (XANAX) 0.5 MG tablet, Take 0.5 mg by mouth 3 (Three) Times a Day As Needed for Anxiety., Disp: , Rfl:   •  aspirin 81 MG EC tablet, Take 1 tablet by mouth Daily., Disp: 30 tablet, Rfl: 5  •  B-D UF III MINI PEN NEEDLES 31G X 5 MM misc, , Disp: , Rfl:   •  Brexpiprazole (REXULTI) 1 MG tablet, Take  by mouth Daily., Disp: , Rfl:   •  CloNIDine (CATAPRES) 0.1 MG tablet, 0.1 mg 2 (Two) Times a Day., Disp: , Rfl:   •  clopidogrel (PLAVIX) 75 MG tablet, Take 1 tablet by mouth Daily., Disp: 30 tablet, Rfl: 5  •  fluticasone-salmeterol (ADVAIR) 100-50 MCG/DOSE DISKUS, Inhale 1 puff As Needed., Disp: , Rfl:   •  gabapentin (NEURONTIN) 800 MG tablet, Take 800 mg by mouth 4 (Four) Times a Day., Disp: , Rfl:   •  Ibuprofen (ADVIL PO), Take 400 mg by mouth 3 (Three) Times a Day. LIQUIGELS, Disp: , Rfl:   •  Insulin Aspart (NOVOLOG FLEXPEN SC), Inject 8-12 Units under the skin 3 (Three) Times a Day Before Meals. PER SLIDING SCALE, Disp: , Rfl:   •  Insulin Detemir (LEVEMIR FLEXPEN SC), Inject 8-12 Units under the skin Every Night. SLIDING SCALE, Disp: , Rfl:   •  lisinopril (PRINIVIL,ZESTRIL) 20 MG tablet, Take 20 mg by mouth Every Morning., Disp: , Rfl:   •  loratadine (CLARITIN) 10 MG tablet, Take 10 mg by mouth Every Night., Disp: , Rfl:   •  metoprolol tartrate (LOPRESSOR) 25 MG tablet, 25 mg 2 (Two) Times a Day., Disp: , Rfl:   •  ondansetron (ZOFRAN) 8 MG tablet, Take 1 tablet by mouth Every 8 (Eight) Hours As Needed for Nausea or Vomiting., Disp: 90 tablet, Rfl: 6  •  oxyCODONE-acetaminophen (PERCOCET)  MG per tablet, Take 1 tablet by mouth Every 6 (Six) Hours As Needed for Moderate Pain ., Disp: , Rfl:   •  prochlorperazine (COMPAZINE) 10 MG tablet, Take 1 tablet by mouth Every 6 (Six) Hours As Needed for Nausea or Vomiting., Disp: 60 tablet, Rfl:  "3  •  Rosuvastatin Calcium (CRESTOR PO), Take  by mouth., Disp: , Rfl:   •  sertraline (ZOLOFT) 100 MG tablet, Take 100 mg by mouth Every Night., Disp: , Rfl:   •  sulfamethoxazole-trimethoprim (BACTRIM) 400-80 MG tablet, Take 1 tablet by mouth Daily., Disp: 30 tablet, Rfl: 3  •  valACYclovir (VALTREX) 500 MG tablet, Take 1 tablet by mouth Daily., Disp: 30 tablet, Rfl: 3  •  ibrutinib (IMBRUVICA) 420 MG tablet tablet, Take 1 tablet by mouth Daily., Disp: 28 tablet, Rfl: 5  •  levETIRAcetam (KEPPRA) 1000 MG tablet, Take 1,000 mg by mouth 2 (Two) Times a Day., Disp: , Rfl:     ALLERGIES:    Allergies   Allergen Reactions   • Codeine Other (See Comments)     \"UNKNOW  CHILDHOOD REACTION\"   • Penicillins Other (See Comments)     \"UNKNOWN CHILDHOOD ALLERGY\"   • Rituxan [Rituximab] Other (See Comments)     \"THROAT RAWNESS; FELT LIKE MY THROAT WAS CLOSING UP\"       PHYSICAL EXAM:  Visit Vitals  /73   Pulse 63   Temp 98.9 °F (37.2 °C) (Oral)   Resp 18   Wt 68.5 kg (151 lb)   SpO2 97%   BMI 25.13 kg/m²     Pain Score    09/13/19 1003   PainSc:   6   PainLoc: Back     General: Alert and oriented. Appears well  HEENT: Pupils are equal, round and reactive to light and accommodation, Extraocular movements full, oropharynx clear  Neck: no jvd or thyromegaly   Cardiovascular: regular rate and rhythm without murmurs, rubs or gallops.   Pulmonary: clear to auscultation bilaterally   Abdomen: soft, non-tender, non-distended, normal active bowel sounds present, no organomegaly   Extremities: No clubbing, cyanosis or edema   Lymph: L supraclavicular and carl cervical adenopathy no longer palpable . Carl axillary LAD and shoddy carl inguinal adenopathy no longer palpable   Neurologic: MS as above, CN II-XII intact. Strength 4+-5-/5 throughout BUE proximally and distally and in LLE.      PATHOLOGY:  5-25-12 L Supraclavicular LN Biopsy:   - Extensive small lymphocytic lymphoma / chronic lymphocytic leukemia involvement.   Flow cytometry " shows monoclonal Kappa B-cell population which coexpresses CD5 and CD23 representing 30% of gated cells. There is dim surface light chain and dim CD20 expression. No CD38 expression. Neoplastic cells are positive for CD20 (dim), PAX-5, CCD5, CD23, CD43. B cells are + for CD3. BCL1 stain negative.    BM Biopsy 6-29-12 ([facility])   - Limited BM specimen with scattered hematopoietic cells.   - Flow cytometry revealed peripheral blood with minute CLL/SLL population (0.5%) with normal  FISH studies.     Cervical Disectomy 04-18-14:   - Fragments of fibrohyaline cartilage, no acute inflammation or metastatic disease identified.     IMAGING:  PET-CT 6-19-12:   - Mild to moderate bilateral axillary LAD and mediastinal LAD with minimal associated hypermetabolism.     PET-CT 10-09-12:   - Multiple areas of abnormal hypermetabolism in the neck bilaterally, new from the prior study. R posterior trangle focus with SUV 3.1. L supraclavicular focus SUV 5.0 (LN 1.8 cm, prior 1.2 cm)   - Carl axillary LAD - L axilla 2.8 cm LN (prior 1.6 cm) with SUV 3.6, R axillary ln new 2.0 cm, SUV 2.6   - Mediastinal LAD noted. R prevascular LN 1.5 cm with SUV 2.1   - Carl external iliac LNs present ( R 3.2 cm with SUV 3.1)   - Overall worsening areas of hypermetabolism corresponding to enlarging nodes c/w worsening neoplastic involvement.     PET-CT 1-16-13:   - No PET-CT findings of active disease related to the patient's lymphoma. Interval resolution of hypermetabolic adenopathy described on previous examination. Spleen is normal.     PET-CT 1-21-14:   - No abnormal hypermetabolism to suggest neoplastic involvement. No mass or adenopathy noted. Spleen is normal.     04-03-14 MRI Spine:   - Cervical: Degenerative disk disease in the cervical disk spaces at the level of C4-C7. At 4-5, there is a disk herniation associated with bulging. There was spinal stenosis at C5-6 but no disk herniation. At C6-7, the disk bulging is not felt to be significant.  The postcontrasted scans in the cervical spine showed no areas of abnormal enhancement.   - Thoracic: negative MRI examination of the thoracic spine. A source of the patient's t horacic spine pain was not demonstrated.     Chest Xray 04-17-15:   - The lungs are clear, the heart is normal. There is no active disease in the chest.     CT CAP/neck w/ contrast 05-13-14:   - No neck lymphadenopathy by CT size criteria   - No intrathoracic or abdominal LAD     CTAP, neck w/ contrast 03-04-15:   - No enlarged lymph nodes were demonstrated. The bile ducts in the liver and the extrahepatic biliary tree is slightly dilated.   - Clinical correlation with laboratory evaluation for obstruction suggested. This, however, is not significantly changed from a previous CT from 05/2014. No retroperitoneal lymphadenopathy is identified.   - Negative CT of the of the soft tissues of the neck. No abnormally enlarged lymph nodes are demonstrated.     CT Neck, CAP 3-15-16:   - There is a change in the CT scan. There are prominent lymph nodes in the neck bilaterally. The largest are in the supraclavicular area and measure greater than a centimeter. Most of the nodes are in the 1cm size range.   - In the chest, most of the nodes are in the subcen timeter size range. The largest nodes are in the pretacheal area and measure 15 mm. There were no enlarged hilar nodes. On the lung windows, there are no pleural effusions. No pulmonary or parenchymal lung nodules or masses were demonstrated. .   There are m ore prominent lymph nodes in the retroperitoneum some of which are enlarged today consistent with recurrence. Again, most of these are in the 1 cm size range, but a few measure up to 17-18 mm. No mesenteric adenopathy demonstrated.     CTCAP, Neck 05-10-16:   - Chest: Persistent stable adenopathy in the mediastinum and axillary regions. The lungs remain clear.   - A/P: The lymph nodes are stable in comparing with the previous CT done in March.    - Neck: Persistent but stable adenopathy throughout the jugular lymph node chains in both sides of the neck.     CTCAP neck 08-16-16:  - Stable mediastinal enlarged lymph nodes including an AP window lymph node measuring 9 mm  - A left axillary region lymph node measures 1.9 cm and was 1.9 cm. Stable right axillary region lymph adenopathy.   - Stable retroperitoneal and mesenteric lymphadenopathy   - persistent lymphadenopathy throughout the neck that is stable in size.     CTCAP 06-07-17:  - Stable bilateral jugular chain lymphadenopathy.  - Stable bilateral axillary and mediastinal region lymphadenopathy.  - Stable retroperitoneal lymphadenopathy.    CTCAP, Neck 02-27-18:  - Grossly stable bilateral jugular chain lymphadenopathy.  - LUNGS: A NEW RIGHT UPPER LOBE PULMONARY NODULE MEASURES 8.8 MM. RIGHT MIDDLE LOBE PULMONARY PERIBRONCHIAL VASCULAR NODULARITY MAY BE INFECTIOUS OR INFLAMMATORY  - MEDIASTINUM: AGAIN THERE IS MEDIASTINAL BORDERLINE LYMPHADENOPATHY WITHIN AP WINDOW LYMPH NODE NOW MEASURING 1 CM AND WAS 8 MM WITH SIMILAR INCREASE IN SIZE OF MULTIPLE BILATERAL AXILLARY LYMPH NODES WITH THE LARGEST RIGHT MEASURING 4.4 CM OF THE CONGLOMERATE THAT WAS ABOUT 3 CM  AND A LEFT AXILLARY REGION LYMPH NODE MEASURING 2.7 CM THAT WAS 2.2 CM.  - SPLEEN: SPLENOMEGALY IS AGAIN NOTED.  -LYMPH NODES: Extensive retroperitoneal lymphadenopathy with a right para-aortic lymph node measuring about 4.5 cm and was 2.5 cm.    CTCAP 04-12-18:  - There are numerous enlarged supraclavicular lymph nodes.  - These have progressed in size in comparing with the earlier CT. Several of the lymph nodes now measure up to 3 cm in diameter. The lymph nodes extend into the axillary regions. There are some lymph nodes in the mediastinum that are relatively stable in comparing with the earlier exam. The lungs are both well aerated and clear.  - The liver was normal in size and shape. There is mild dilatation of the bile ducts within the liver.  The gallbladder is surgically absent. The common hepatic duct was also dilated in the pancreatic head, this is unchanged from the earlier exam. The spleen is homogeneous in density and is stable in size. There continues to be fairly marked adenopathy in the retroperitoneum. The lymph node size continues to increase slightly by a few millimeters. The largest bulk of lymph nodes is in the gastrohepatic ligament region. There are also lymph nodes along the aorta. These are larger in size as well. Some of the periaortic lymph nodes now measure up to 3 cm in diameter.   - There are enlarged lymph nodes in the mesentery. There are enlarged lymph nodes along the iliac lymph node chains and some borderline enlarged lymph nodes in both groins. The bowel shows no evidence of obstruction, the kidneys enhance appropriately.    US Pelvis, limited 5/2/18  IMPRESSION:  - There is a 8 mm subcutaneous walled collection suggestive of tiny abscess in the suprapubic region of interest.    08-28-18 CT Chest With Contrast   FINDINGS:   Today's chest CT is compared with a previous chest CT done in April. Contrast was administered intravenously and scans were obtained from the apices of the lung and extended to the diaphragm. Both mediastinal and lung windows were evaluated. On the mediastinal windows the adenopathy seen on the earlier exam in the supraclavicular area is significantly decreased. The lymph nodes are now less than a centimeter in diameter. The bilateral axillary lymph nodes have also decreased significantly in size. There are a few nodes that are greater than a  centimeter in diameter, but they have fatty hilar areas. The lymph nodes in the mediastinum have also significantly decreased in size, and they are now all less than a centimeter in diameter. On the lung windows, there were increased interstitial markings in the lungs. No pulmonary parenchymal lung nodules or masses were identified.     IMPRESSION:  Significant  improvement in the chest. Lymph nodes in the  supraclavicular area, axillary regions, and mediastinum have all  significantly decreased in size. The lymph nodes that are larger than a  centimeter in diameter have fatty changes in the hilum.    08-28-18 CT Abdomen Pelvis With Contrast   FINDINGS:   - Contrast was administered both orally and intravenously. Scans were obtained from the diaphragm and extended through the pelvis. The liver is normal in size and shape. No focal lesions were seen within the liver. The gallbladder is surgically absent. The spleen was homogeneous in density and normal in size. There were no masses in the adrenal glands. The kidneys show normal cortical enhancement. The abdominal aorta was normal in caliber. There has been marked improvement in the adenopathy in the retroperitoneum. Only a few enlarged lymph nodes  remain. The mesenteric lymph nodes are also almost completely resolved.  In the pelvis, no enlarged lymph nodes are demonstrated on the current exam. Urinary bladder was smooth in contour. The inguinal adenopathy is also resolved. The bowel shows no evidence of obstruction. There is no ascites or free air. There is a moderate amount of stool throughout the colon suggesting constipation.     IMPRESSION:  Significant improvement in the adenopathy in the abdomen or pelvis. There are only a few scattered lymph nodes in the area of the gastrohepatic ligament that are greater than a centimeter in diameter.    CTCAP 09-04-19  Findings  LUNGS: Unremarkable. No parenchymal soft tissue nodules.  No focal air  space disease.     HEART: Unremarkable.     PERICARDIUM: No effusion.     MEDIASTINUM: STABLE BILATERAL AXILLARY REGION NONENLARGED LYMPH NODES AS WELL WAS NOT ENLARGED LYMPH NODES WITHIN THE SUPERIOR MEDIASTINUM.     PLEURA: No pleural effusion. No pleural mass or abnormal calcification.     MAJOR AIRWAYS: Clear. No intrinsic mass.     VASCULATURE: No evidence of aneurysm.      VISUALIZED UPPER ABDOMEN:  LIVER: Homogeneous. No focal hepatic mass or ductal dilatation.  SPLEEN: Homogeneous. No splenomegaly.  ADRENALS: No mass.  KIDNEYS: No mass. No obstructive uropathy.  No evidence of  urolithiasis.  GI TRACT: Non-dilated. No definite wall thickening.  PERITONEUM: No free air. No free fluid or loculated fluid  collections.  ABDOMINAL WALL: No focal hernia or mass.     OTHER: None.     BONES: No acute bony abnormality.     IMPRESSION:  Stable appearance of the chest.    Findings  LOWER THORAX: Clear. No effusions.     ABDOMEN:  LIVER: Homogeneous. No focal hepatic mass or ductal dilatation.  GALLBLADDER: CHOLECYSTECTOMY CLIPS ARE NOTED. CHOLECYSTECTOMY CLIPS ARE NOTED AND THERE IS AGAIN ENLARGEMENT OF THE COMMON BILE DUCT.  PANCREAS: Unremarkable. No mass or ductal dilatation.  SPLEEN: Homogeneous. No splenomegaly.  ADRENALS: No mass.  KIDNEYS: No mass. No obstructive uropathy.  No evidence of  urolithiasis.  GI TRACT: ABUNDANT STOOL THROUGHOUT THE COLON.  PERITONEUM: No free air. No free fluid or loculated fluid  collections.  MESENTERY: Unremarkable.  LYMPH NODES: STABLE BORDERLINE ENLARGED MESENTERIC LYMPH NODES.  VASCULATURE: ATHEROSCLEROTIC VASCULAR CALCIFICATION.  ABDOMINAL WALL: No focal hernia or mass.     OTHER: None.     PELVIS:  BLADDER: No focal mass or significant wall thickening  REPRODUCTIVE: Unremarkable as visualized.  APPENDIX: Nondistended. No surrounding inflammation.     BONES: No acute bony abnormality.     IMPRESSION:  Grossly stable appearance of the abdomen..    CT Neck 09-04-19  FINDINGS:      No soft tissue masses.   Marked improvement since 02/27/2018 of bilateral lymphadenopathy that is  now only seen to be nonenlarged bilateral slightly bulky lymph nodes.   A few nonenlarged bilateral parotid gland lymph nodes are noted.   There is no parapharyngeal mass or fluid collection.   No pharyngeal mucosal asymmetry.   Bone windows demonstrate no acute osseous  abnormality.   The sinuses are clear.   Traversing blood vessels incidentally appear unremarkable.     IMPRESSION:  Marked improvement since 02/27/2018 of bilateral  lymphadenopathy that is now only seen to be nonenlarged bilateral  slightly bulky lymph nodes.       RECENT LABS:  Lab Results   Component Value Date    WBC 4.29 (L) 12/10/2018    HGB 13.3 12/10/2018    HCT 40.1 12/10/2018    .0 (H) 12/10/2018    RDW 12.4 12/10/2018     12/10/2018    NEUTRORELPCT 59.0 12/10/2018    LYMPHORELPCT 33.3 12/10/2018    MONORELPCT 5.8 12/10/2018    EOSRELPCT 1.4 12/10/2018    BASORELPCT 0.5 12/10/2018    NEUTROABS 2.53 12/10/2018    LYMPHSABS 1.43 12/10/2018       Lab Results   Component Value Date     (L) 12/10/2018    K 4.5 12/10/2018    CO2 25.1 12/10/2018     12/10/2018    BUN 8 12/10/2018    CREATININE 0.80 09/04/2019    GLUCOSE 148 (H) 12/10/2018    CALCIUM 8.9 12/10/2018    ALKPHOS 55 12/10/2018    AST 14 12/10/2018    ALT 20 12/10/2018    BILITOT 0.5 12/10/2018    ALBUMIN 4.40 12/10/2018    PROTEINTOT 6.5 12/10/2018       Lab Results   Component Value Date     (L) 06/05/2018    URICACID 3.7 06/05/2018     Date  SPEP/MONE Mspike Free K Free L  K/L Ratio   02-12-15 MONE normal Not observed   06-25-15 MONE normal Not observed 22.10  14.93  1.48  11-17-15 MONE normal Not observed 17.17  14.41  1.19    Date  IgG IgA IgM  02-12-15 744 110 43  06-25-15 776 94 39  11-17-15 812 99 41  03-29-17 771 103 34  08-04-17 831 94 27  11-13-17 791 95 21  02-27-18 879 109 25     CLL profile, FISH:  The CLL interphase fluorescence in situ hybridization   (FISH) panel analysis was normal. There were no cells with   CCND1-IGH fusion. No extra signals or deletions of OLAF,   chromosome 12, 13q, or TP53 were observed.       SPECIFIC FISH RESULTS:     CCND1/IGH: NORMAL   .         nuc kenzie 11q13(REZE9k5),14q32(IGHx2)[100]       OLAF: NORMAL         nuc kenzie 11q22.3(ATMx2)[100] .     12cen: NORMAL     .         nuc kenzie  12cen([id number])[100] .     13q: NORMAL  .         nuc kenzie 13q14.3(DLEUx2),13q34(PFIP3h3)[100] .     TP53: NORMAL  .         nuc kenzie 17p13.1(TP53x2)[100]     Acute Hepatitis Panel: Negative    ASSESSMENT & PLAN:  Ms. Mae is a 54 y.o.  female with a history of Stage II SLL diagnosed in May of 2012 by L supraclavicular LN biopsy.     1. Small Lymphocytic Lymphoma:   - Treated as above with 6 cycles of Bendamustine (after anaphylactic reaction to Rituximab with 1st cycle of treatment) with complete radiographic response.   - She does have adenopathy in the cj cervical, supraclavicular, axillary and cj inguinal areas c/w CLL/SLL. She also has small mediastinal nodes and retroperitoneal LAD.  She has had continued growth of the lymph nodes, and has been losing weight.  She has some night sweats which may or may not be related.  -  Recommended consideration of treatment given worsening disease.    -  Given rituxan allergy, recommended treatment with Imbruvica 420 mg daily with Bactrim SS daily and Valtrex 500 mg daily for prophylaxis.  -  She has tolerated treatment really well with excellent disease control.  Will restart Imbruvica (she has been out for a few days.  -  Recommended Shingrix vaccine.    2.  Signficant vascular disease:  -  She has been evaluated by Dr. Amaral and says they are investigating lower vasculature blockages.    3.  Tobacco use:  She continues to smoke.  She says she wants to quit but isn't ready to do so at this time.    4. Degenerative disc disease with worsening LLE and BUE symptoms:  Has plans to f/u with Dr. Soto.    5.  Anxiety/Depression: Her prior psychiatrist relocated but she has found a new provider.  She is now doing better.     6.   Prophylaxis:  She received  Prevnar vaccine 11-13-17.  She has had 2018 influenza vaccine.  Will give 2019 influenza vaccine today. Recommended HAV vaccine.  Previously recommended Shingrix vaccine.    7.  ACO / ZOYA/Other  Quality measures  -  Melania Mae received 2018 flu vaccine. Will give 2019 influenza vaccine today.  - Melania Mae reports a pain score of 6.  Given her pain assessment as noted, treatment options were discussed and the following options were decided upon as a follow-up plan to address the patient's pain: referral to Primary Care for assistance in pain treatment guidance.  - Current outpatient and discharge medications have been reconciled for the patient.  Reviewed by: Thea Sorto MD     8. Follow up:   - will have her return for follow up in 3 months with labs prior including CBC, CMP, LDH, Uric acid    This note was scribed for Thea Sorto MD by Violeta Fong RN.    I, Thea Sorto MD, personally performed the services described in this documentation as scribed by the above named individual in my presence, and it is both accurate and complete.  09/13/2019     I spent 25 minutes with Melania Mae today.  In the office today, more than 50% of this time was spent face-to-face with her  in counseling / coordination of care, reviewing her interim medical history and counseling on the current treatment plan.  All questions were answered to her satisfaction.          Electronically Signed by: Thea Sorto MD    CC:   Gillian Harrison APRN Dr. Donald Brown

## 2019-10-15 DIAGNOSIS — C91.10 CLL (CHRONIC LYMPHOCYTIC LEUKEMIA) (HCC): ICD-10-CM

## 2019-11-07 ENCOUNTER — SPECIALTY PHARMACY (OUTPATIENT)
Dept: PHARMACY | Facility: HOSPITAL | Age: 54
End: 2019-11-07

## 2019-11-13 DIAGNOSIS — C91.10 CLL (CHRONIC LYMPHOCYTIC LEUKEMIA) (HCC): ICD-10-CM

## 2019-11-21 ENCOUNTER — LAB (OUTPATIENT)
Dept: ONCOLOGY | Facility: CLINIC | Age: 54
End: 2019-11-21

## 2019-11-21 ENCOUNTER — OFFICE VISIT (OUTPATIENT)
Dept: ONCOLOGY | Facility: CLINIC | Age: 54
End: 2019-11-21

## 2019-11-21 VITALS
RESPIRATION RATE: 18 BRPM | HEART RATE: 93 BPM | SYSTOLIC BLOOD PRESSURE: 193 MMHG | OXYGEN SATURATION: 97 % | TEMPERATURE: 98 F | DIASTOLIC BLOOD PRESSURE: 80 MMHG

## 2019-11-21 DIAGNOSIS — Z87.39 H/O DEGENERATIVE DISC DISEASE: ICD-10-CM

## 2019-11-21 DIAGNOSIS — I10 HYPERTENSION, UNSPECIFIED TYPE: ICD-10-CM

## 2019-11-21 DIAGNOSIS — C91.10 CLL (CHRONIC LYMPHOCYTIC LEUKEMIA) (HCC): ICD-10-CM

## 2019-11-21 DIAGNOSIS — Z72.0 TOBACCO USE: ICD-10-CM

## 2019-11-21 DIAGNOSIS — C91.10 CLL (CHRONIC LYMPHOCYTIC LEUKEMIA) (HCC): Primary | ICD-10-CM

## 2019-11-21 LAB
ALBUMIN SERPL-MCNC: 4.48 G/DL (ref 3.5–5.2)
ALBUMIN/GLOB SERPL: 1.5 G/DL
ALP SERPL-CCNC: 72 U/L (ref 39–117)
ALT SERPL W P-5'-P-CCNC: 10 U/L (ref 1–33)
ANION GAP SERPL CALCULATED.3IONS-SCNC: 13.7 MMOL/L (ref 5–15)
AST SERPL-CCNC: 10 U/L (ref 1–32)
BASOPHILS # BLD AUTO: 0.03 10*3/MM3 (ref 0–0.2)
BASOPHILS NFR BLD AUTO: 0.4 % (ref 0–1.5)
BILIRUB SERPL-MCNC: 0.2 MG/DL (ref 0.2–1.2)
BUN BLD-MCNC: 9 MG/DL (ref 6–20)
BUN/CREAT SERPL: 11.3 (ref 7–25)
CALCIUM SPEC-SCNC: 10 MG/DL (ref 8.6–10.5)
CHLORIDE SERPL-SCNC: 100 MMOL/L (ref 98–107)
CO2 SERPL-SCNC: 20.3 MMOL/L (ref 22–29)
CREAT BLD-MCNC: 0.8 MG/DL (ref 0.57–1)
DEPRECATED RDW RBC AUTO: 52.9 FL (ref 37–54)
EOSINOPHIL # BLD AUTO: 0.08 10*3/MM3 (ref 0–0.4)
EOSINOPHIL NFR BLD AUTO: 1 % (ref 0.3–6.2)
ERYTHROCYTE [DISTWIDTH] IN BLOOD BY AUTOMATED COUNT: 15.4 % (ref 12.3–15.4)
GFR SERPL CREATININE-BSD FRML MDRD: 74 ML/MIN/1.73
GLOBULIN UR ELPH-MCNC: 2.9 GM/DL
GLUCOSE BLD-MCNC: 100 MG/DL (ref 65–99)
HCT VFR BLD AUTO: 39.3 % (ref 34–46.6)
HGB BLD-MCNC: 13.3 G/DL (ref 12–15.9)
IMM GRANULOCYTES # BLD AUTO: 0.04 10*3/MM3 (ref 0–0.05)
IMM GRANULOCYTES NFR BLD AUTO: 0.5 % (ref 0–0.5)
LYMPHOCYTES # BLD AUTO: 2.65 10*3/MM3 (ref 0.7–3.1)
LYMPHOCYTES NFR BLD AUTO: 31.5 % (ref 19.6–45.3)
MCH RBC QN AUTO: 31.4 PG (ref 26.6–33)
MCHC RBC AUTO-ENTMCNC: 33.8 G/DL (ref 31.5–35.7)
MCV RBC AUTO: 92.9 FL (ref 79–97)
MONOCYTES # BLD AUTO: 0.57 10*3/MM3 (ref 0.1–0.9)
MONOCYTES NFR BLD AUTO: 6.8 % (ref 5–12)
NEUTROPHILS # BLD AUTO: 5.03 10*3/MM3 (ref 1.7–7)
NEUTROPHILS NFR BLD AUTO: 59.8 % (ref 42.7–76)
NRBC BLD AUTO-RTO: 0 /100 WBC (ref 0–0.2)
PLATELET # BLD AUTO: 272 10*3/MM3 (ref 140–450)
PMV BLD AUTO: 10.4 FL (ref 6–12)
POTASSIUM BLD-SCNC: 4.1 MMOL/L (ref 3.5–5.2)
PROT SERPL-MCNC: 7.4 G/DL (ref 6–8.5)
RBC # BLD AUTO: 4.23 10*6/MM3 (ref 3.77–5.28)
SODIUM BLD-SCNC: 134 MMOL/L (ref 136–145)
WBC NRBC COR # BLD: 8.4 10*3/MM3 (ref 3.4–10.8)

## 2019-11-21 PROCEDURE — 36415 COLL VENOUS BLD VENIPUNCTURE: CPT

## 2019-11-21 PROCEDURE — 80053 COMPREHEN METABOLIC PANEL: CPT | Performed by: INTERNAL MEDICINE

## 2019-11-21 PROCEDURE — 99214 OFFICE O/P EST MOD 30 MIN: CPT | Performed by: INTERNAL MEDICINE

## 2019-11-21 PROCEDURE — 85025 COMPLETE CBC W/AUTO DIFF WBC: CPT | Performed by: INTERNAL MEDICINE

## 2019-11-21 NOTE — PROGRESS NOTES
Specialty Pharmacy Note      Name:  Melania Mae  :  1965  Date:  2019         Past Medical History:   Diagnosis Date   • Aneurysm (CMS/HCC)     Cavernous left ICA aneurysm   • Asthma    • Carotid artery occlusion    • Chronic back pain    • CLL (chronic lymphocytic leukemia) (CMS/HCC)     2012   • COPD (chronic obstructive pulmonary disease) (CMS/[facility])    • Degenerative arthritis    • Depression    • Diabetes mellitus (CMS/[facility])     type 2   • Hyperlipidemia    • Hypertension    • Lymphadenopathy    • Non Hodgkin's lymphoma (CMS/[facility])        Past Surgical History:   Procedure Laterality Date   • APPENDECTOMY     • BACK SURGERY       (work accident) c-spine surgery    • CAROTID STENT Right 2017   • CEREBRAL ANGIOGRAM N/A 2017    Diagnostic Carotid/Cerebral Angiogram   • CHOLECYSTECTOMY     • HYSTERECTOMY      for endometriosis/ovarian bleed   • NECK SURGERY     • OTHER SURGICAL HISTORY      pac insertion and removal   • TONSILLECTOMY         Social History     Socioeconomic History   • Marital status:      Spouse name: Not on file   • Number of children: Not on file   • Years of education: Not on file   • Highest education level: Not on file   Tobacco Use   • Smoking status: Current Every Day Smoker     Packs/day: 0.50     Years: 35.00     Pack years: 17.50     Types: Cigarettes   • Smokeless tobacco: Never Used   • Tobacco comment: 0.5-1 PPD   Substance and Sexual Activity   • Alcohol use: No   • Drug use: No   • Sexual activity: Defer       Family History   Problem Relation Age of Onset   • Lung cancer Father 72   • Hypertension Father    • Heart disease Father    • Cancer Father    • Diabetes Father    • Other Brother 46        MI   • Heart disease Brother    • Breast cancer Paternal Aunt    • Colon cancer Maternal Grandfather    • Other Cousin         CLL (dx 48 yo)   • Hypertension Mother        Allergies   Allergen Reactions   • Codeine Other  "(See Comments)     \"UNKNOW  CHILDHOOD REACTION\"   • Penicillins Other (See Comments)     \"UNKNOWN CHILDHOOD ALLERGY\"   • Rituxan [Rituximab] Other (See Comments)     \"THROAT RAWNESS; FELT LIKE MY THROAT WAS CLOSING UP\"       Current Outpatient Medications   Medication Sig Dispense Refill   • albuterol (PROVENTIL HFA;VENTOLIN HFA) 108 (90 Base) MCG/ACT inhaler Inhale 2 puffs As Needed for Wheezing.     • ALPRAZolam (XANAX) 0.5 MG tablet Take 0.5 mg by mouth 3 (Three) Times a Day As Needed for Anxiety.     • aspirin 81 MG EC tablet Take 1 tablet by mouth Daily. 30 tablet 5   • B-D UF III MINI PEN NEEDLES 31G X 5 MM misc      • Brexpiprazole (REXULTI) 1 MG tablet Take  by mouth Daily.     • CloNIDine (CATAPRES) 0.1 MG tablet 0.1 mg 2 (Two) Times a Day.     • clopidogrel (PLAVIX) 75 MG tablet Take 1 tablet by mouth Daily. 30 tablet 5   • fluticasone-salmeterol (ADVAIR) 100-50 MCG/DOSE DISKUS Inhale 1 puff As Needed.     • gabapentin (NEURONTIN) 800 MG tablet Take 800 mg by mouth 4 (Four) Times a Day.     • ibrutinib (IMBRUVICA) 420 MG tablet tablet Take 1 tablet by mouth Daily. 28 tablet 2   • Ibuprofen (ADVIL PO) Take 400 mg by mouth 3 (Three) Times a Day. LIQUIGELS     • Insulin Aspart (NOVOLOG FLEXPEN SC) Inject 8-12 Units under the skin 3 (Three) Times a Day Before Meals. PER SLIDING SCALE     • Insulin Detemir (LEVEMIR FLEXPEN SC) Inject 8-12 Units under the skin Every Night. SLIDING SCALE     • levETIRAcetam (KEPPRA) 1000 MG tablet Take 1,000 mg by mouth 2 (Two) Times a Day.     • lisinopril (PRINIVIL,ZESTRIL) 20 MG tablet Take 20 mg by mouth Every Morning.     • loratadine (CLARITIN) 10 MG tablet Take 10 mg by mouth Every Night.     • metoprolol tartrate (LOPRESSOR) 25 MG tablet 25 mg 2 (Two) Times a Day.     • ondansetron (ZOFRAN) 8 MG tablet Take 1 tablet by mouth Every 8 (Eight) Hours As Needed for Nausea or Vomiting. 90 tablet 6   • oxyCODONE-acetaminophen (PERCOCET)  MG per tablet Take 1 tablet by " mouth Every 6 (Six) Hours As Needed for Moderate Pain .     • prochlorperazine (COMPAZINE) 10 MG tablet Take 1 tablet by mouth Every 6 (Six) Hours As Needed for Nausea or Vomiting. 60 tablet 3   • Rosuvastatin Calcium (CRESTOR PO) Take  by mouth.     • sertraline (ZOLOFT) 100 MG tablet Take 100 mg by mouth Every Night.     • sulfamethoxazole-trimethoprim (BACTRIM) 400-80 MG tablet Take 1 tablet by mouth Daily. 30 tablet 3   • valACYclovir (VALTREX) 500 MG tablet Take 1 tablet by mouth Daily. 30 tablet 3     No current facility-administered medications for this visit.          LABORATORY:    Lab Results   Component Value Date    TSH 1.036 11/13/2017    PROTIME 10.2 04/17/2014    INR 0.96 04/17/2014     Lab Results   Component Value Date    PROTIME 10.2 04/17/2014    INR 0.96 04/17/2014     No results found for: HAV, HCVQUANT, [id number], HCVINFO, HCVGENOTYPE  Lab Results   Component Value Date    HEPBSAB Non-Reactive 04/27/2018     Last Urine Toxicity     There is no flowsheet data to display.          ASSESSMENT/PLAN:    Patient Update Assessment (new medications, allergies, medical history): No changes.      Medication(s): Imbruvica 420 mg chemo tablet.      Currently Taking Medication(s): Patient reports taking medication as directed.     Experiencing Side Effects: No significant side effects expressed at this time.      Prior Authorization Status: Approved.      Financial Assistance Status: Not needed at this time.      Any Issues Identified: None.      Appropriate to Process Prescription(s): Yes.  Medication will be dispensed to patient from [facility].      Counseling Offered: Patient declined, previously counseled.  Aware to call pharmacy with any questions or concerns.     Next Specialty Pharmacy Visit:  Scheduled for 12/02/2019.

## 2019-12-02 ENCOUNTER — SPECIALTY PHARMACY (OUTPATIENT)
Dept: PHARMACY | Facility: HOSPITAL | Age: 54
End: 2019-12-02

## 2019-12-02 DIAGNOSIS — C91.10 CLL (CHRONIC LYMPHOCYTIC LEUKEMIA) (HCC): ICD-10-CM

## 2019-12-08 DIAGNOSIS — C91.10 CLL (CHRONIC LYMPHOCYTIC LEUKEMIA) (HCC): ICD-10-CM

## 2019-12-09 ENCOUNTER — TELEPHONE (OUTPATIENT)
Dept: NEUROSURGERY | Facility: CLINIC | Age: 54
End: 2019-12-09

## 2019-12-09 DIAGNOSIS — E11.8 TYPE 2 DIABETES MELLITUS WITH COMPLICATION, WITH LONG-TERM CURRENT USE OF INSULIN (HCC): ICD-10-CM

## 2019-12-09 DIAGNOSIS — I65.21 STENOSIS OF RIGHT CAROTID ARTERY: Primary | ICD-10-CM

## 2019-12-09 DIAGNOSIS — I74.09 OTHER ARTERIAL EMBOLISM AND THROMBOSIS OF ABDOMINAL AORTA (HCC): ICD-10-CM

## 2019-12-09 DIAGNOSIS — Z79.4 TYPE 2 DIABETES MELLITUS WITH COMPLICATION, WITH LONG-TERM CURRENT USE OF INSULIN (HCC): ICD-10-CM

## 2019-12-09 DIAGNOSIS — D72.0 GENETIC ANOMALIES OF LEUKOCYTES (HCC): ICD-10-CM

## 2019-12-09 NOTE — TELEPHONE ENCOUNTER
Provider:  Meka  Caller: patient  Time of call:   3:49  Phone #:  [phone]  Surgery:  Carotid stenosis right  Surgery Date:    Last visit:   01/21/19  Next visit: None    ARIANNA:         Reason for call:     Pt called to let Dr. Ackerman know that her BP has been elevated.    Order pended perMichael Orta in scheduling.  Carotid U/S (checked bilateral)     PA's please make sure order is correct before signing.

## 2019-12-13 ENCOUNTER — DOCUMENTATION (OUTPATIENT)
Dept: ONCOLOGY | Facility: CLINIC | Age: 54
End: 2019-12-13

## 2019-12-13 NOTE — CODE DOCUMENTATION
Patient called said, Dr. Clements had given her Dexamethasone 1.5 mg  27 tablets that she is supposed to take in 3 days time for shoulder pain and they told her to check with Dr. Sorto to make sure she was okay to take this. I spoke with Dr. Villegas because  is out of this office and she confirmed that it was okay for her to take this medication. Thank you

## 2019-12-18 ENCOUNTER — OFFICE VISIT (OUTPATIENT)
Dept: NEUROSURGERY | Facility: CLINIC | Age: 54
End: 2019-12-18

## 2019-12-18 ENCOUNTER — HOSPITAL ENCOUNTER (OUTPATIENT)
Dept: CARDIOLOGY | Facility: HOSPITAL | Age: 54
Discharge: HOME | End: 2019-12-18
Admitting: PHYSICIAN ASSISTANT

## 2019-12-18 VITALS
WEIGHT: 149 LBS | SYSTOLIC BLOOD PRESSURE: 162 MMHG | DIASTOLIC BLOOD PRESSURE: 72 MMHG | HEIGHT: 65 IN | TEMPERATURE: 98 F | BODY MASS INDEX: 24.83 KG/M2

## 2019-12-18 DIAGNOSIS — I10 HYPERTENSION, UNSPECIFIED TYPE: ICD-10-CM

## 2019-12-18 DIAGNOSIS — I65.21 STENOSIS OF RIGHT CAROTID ARTERY: ICD-10-CM

## 2019-12-18 DIAGNOSIS — I65.22 CAROTID STENOSIS, ASYMPTOMATIC, LEFT: Primary | ICD-10-CM

## 2019-12-18 PROCEDURE — 93880 EXTRACRANIAL BILAT STUDY: CPT

## 2019-12-18 PROCEDURE — 99213 OFFICE O/P EST LOW 20 MIN: CPT | Performed by: RADIOLOGY

## 2019-12-18 PROCEDURE — 93880 EXTRACRANIAL BILAT STUDY: CPT | Performed by: INTERNAL MEDICINE

## 2019-12-18 NOTE — PROGRESS NOTES
"NAME: KALI STILL   DOS: 2019  : 1965  PCP: Gillian Harrison APRN    Chief Complaint:    Chief Complaint   Patient presents with   • Carotid Artery Disease       History of Present Illness:  54 y.o. female known to the neuro interventional service, having undergone prior angioplasty/stent placement in 2017 for asymptomatic, high-grade right carotid stenosis.  She did have a history of prior multilevel ACDF/neck surgery, as well as lymphoma/leukemia, and thus was deemed a \"high surgical risk\" for endarterectomy.  She did have contralateral left carotid disease which has been managed medically, and followed with serial carotid duplex examinations.  She is done well from a neurovascular standpoint, with no stroke or TIA-like symptoms.  She presents today for routine follow-up.  She does continue to smoke.    Past Medical History:  Past Medical History:   Diagnosis Date   • Aneurysm (CMS/HCC)     Cavernous left ICA aneurysm   • Asthma    • Carotid artery occlusion    • Chronic back pain    • CLL (chronic lymphocytic leukemia) (CMS/HCC)     2012   • COPD (chronic obstructive pulmonary disease) (CMS/HCC)    • Degenerative arthritis    • Depression    • Diabetes mellitus (CMS/HCC)     type 2   • Hyperlipidemia    • Hypertension    • Lymphadenopathy    • Non Hodgkin's lymphoma (CMS/HCC)        Past Surgical History:  Past Surgical History:   Procedure Laterality Date   • APPENDECTOMY     • BACK SURGERY       (work accident) c-spine surgery    • CAROTID STENT Right 2017   • CEREBRAL ANGIOGRAM N/A 2017    Diagnostic Carotid/Cerebral Angiogram   • CHOLECYSTECTOMY     • HYSTERECTOMY      for endometriosis/ovarian bleed   • NECK SURGERY     • OTHER SURGICAL HISTORY      pac insertion and removal   • TONSILLECTOMY         Review of Systems:        Review of Systems   Constitutional: Positive for appetite change and fatigue.   Eyes: Positive for visual disturbance. "   Respiratory: Positive for shortness of breath.    Cardiovascular: Positive for chest pain, palpitations and leg swelling.   Gastrointestinal: Positive for abdominal pain, constipation, nausea and vomiting.   Endocrine: Positive for cold intolerance, heat intolerance and polydipsia.   Genitourinary: Positive for frequency.   Musculoskeletal: Positive for back pain, gait problem, myalgias and neck pain.   Neurological: Positive for dizziness, light-headedness, numbness and headaches.   Hematological: Bruises/bleeds easily.   Psychiatric/Behavioral: Positive for sleep disturbance. The patient is nervous/anxious.    All other systems reviewed and are negative.       Medications    Current Outpatient Medications:   •  albuterol (PROVENTIL HFA;VENTOLIN HFA) 108 (90 Base) MCG/ACT inhaler, Inhale 2 puffs As Needed for Wheezing., Disp: , Rfl:   •  ALPRAZolam (XANAX) 0.5 MG tablet, Take 0.5 mg by mouth 3 (Three) Times a Day As Needed for Anxiety., Disp: , Rfl:   •  aspirin 81 MG EC tablet, Take 1 tablet by mouth Daily., Disp: 30 tablet, Rfl: 5  •  B-D UF III MINI PEN NEEDLES 31G X 5 MM misc, , Disp: , Rfl:   •  Brexpiprazole (REXULTI) 1 MG tablet, Take  by mouth Daily., Disp: , Rfl:   •  CloNIDine (CATAPRES) 0.1 MG tablet, 0.1 mg 2 (Two) Times a Day., Disp: , Rfl:   •  clopidogrel (PLAVIX) 75 MG tablet, Take 1 tablet by mouth Daily., Disp: 30 tablet, Rfl: 5  •  fluticasone-salmeterol (ADVAIR) 100-50 MCG/DOSE DISKUS, Inhale 1 puff As Needed., Disp: , Rfl:   •  gabapentin (NEURONTIN) 800 MG tablet, Take 800 mg by mouth 4 (Four) Times a Day., Disp: , Rfl:   •  ibrutinib (IMBRUVICA) 420 MG tablet tablet, Take 1 tablet by mouth Daily., Disp: 28 tablet, Rfl: 2  •  Ibuprofen (ADVIL PO), Take 400 mg by mouth 3 (Three) Times a Day. LIQUIGELS, Disp: , Rfl:   •  Insulin Aspart (NOVOLOG FLEXPEN SC), Inject 8-12 Units under the skin 3 (Three) Times a Day Before Meals. PER SLIDING SCALE, Disp: , Rfl:   •  Insulin Detemir (LEVEMIR FLEXPEN  "SC), Inject 8-12 Units under the skin Every Night. SLIDING SCALE, Disp: , Rfl:   •  levETIRAcetam (KEPPRA) 1000 MG tablet, Take 1,000 mg by mouth 2 (Two) Times a Day., Disp: , Rfl:   •  lisinopril (PRINIVIL,ZESTRIL) 20 MG tablet, Take 20 mg by mouth Every Morning., Disp: , Rfl:   •  loratadine (CLARITIN) 10 MG tablet, Take 10 mg by mouth Every Night., Disp: , Rfl:   •  metoprolol tartrate (LOPRESSOR) 25 MG tablet, 25 mg 2 (Two) Times a Day., Disp: , Rfl:   •  ondansetron (ZOFRAN) 8 MG tablet, Take 1 tablet by mouth Every 8 (Eight) Hours As Needed for Nausea or Vomiting., Disp: 90 tablet, Rfl: 6  •  oxyCODONE-acetaminophen (PERCOCET)  MG per tablet, Take 1 tablet by mouth Every 6 (Six) Hours As Needed for Moderate Pain ., Disp: , Rfl:   •  prochlorperazine (COMPAZINE) 10 MG tablet, Take 1 tablet by mouth Every 6 (Six) Hours As Needed for Nausea or Vomiting., Disp: 60 tablet, Rfl: 3  •  Rosuvastatin Calcium (CRESTOR PO), Take  by mouth., Disp: , Rfl:   •  sertraline (ZOLOFT) 100 MG tablet, Take 100 mg by mouth Every Night., Disp: , Rfl:   •  sulfamethoxazole-trimethoprim (BACTRIM) 400-80 MG tablet, Take 1 tablet by mouth Daily., Disp: 30 tablet, Rfl: 3  •  valACYclovir (VALTREX) 500 MG tablet, Take 1 tablet by mouth Daily., Disp: 30 tablet, Rfl: 3    Allergies:  Allergies   Allergen Reactions   • Codeine Other (See Comments)     \"UNKNOW  CHILDHOOD REACTION\"   • Penicillins Other (See Comments)     \"UNKNOWN CHILDHOOD ALLERGY\"   • Rituxan [Rituximab] Other (See Comments)     \"THROAT RAWNESS; FELT LIKE MY THROAT WAS CLOSING UP\"       Social Hx:  Social History     Tobacco Use   • Smoking status: Current Every Day Smoker     Packs/day: 0.50     Years: 35.00     Pack years: 17.50     Types: Cigarettes   • Smokeless tobacco: Never Used   • Tobacco comment: 0.5-1 PPD   Substance Use Topics   • Alcohol use: No   • Drug use: No       Family Hx:  Family History   Problem Relation Age of Onset   • Lung cancer Father 72   • " Hypertension Father    • Heart disease Father    • Cancer Father    • Diabetes Father    • Other Brother 46        MI   • Heart disease Brother    • Breast cancer Paternal Aunt    • Colon cancer Maternal Grandfather    • Other Cousin         CLL (dx 46 yo)   • Hypertension Mother        Review of Imaging:  Carotid duplex dated 12/18/2019 was reviewed along with his corresponding radiologic report.  Comparison is made to multiple prior studies, the most recent being on 9/28/2018.  Her right carotid stent remains widely patent, without evidence of recurrent hemodynamically significant disease.  Peak velocities within the right carotid vasculature are 159/35 cm/s.  Plaque formation at her left carotid bifurcation appears at least stable compared to prior study, with peak velocities of 232/73 cm/s (was 302/123 cm/s).    Physical Examination:  Vitals:    12/18/19 1301   BP: 162/72   Temp: 98 °F (36.7 °C)        General Appearance:   Well developed, well nourished, well groomed, alert, and cooperative.  Cardiovascular: Regular rate and rhythm.  Harsh bilateral carotid bruits      Neurological examination:  Neurologic Exam     Mental Status   Oriented to person, place, and time.   Speech: speech is normal   Level of consciousness: alert    Cranial Nerves   Cranial nerves II through XII intact.     Motor Exam     Strength   Strength 5/5 throughout.     Sensory Exam   Light touch normal.     Gait, Coordination, and Reflexes     Gait  Gait: normal      Diagnoses/Plan:    Ms. Mae is a 54 y.o. female status post right carotid angioplasty/stent placement in August 2017.  Her right carotid stent remains widely patent, and there is no evidence of recurrent hemodynamically significant disease on today's carotid duplex.  Similarly, plaque formation at the left carotid bifurcation appears at least stable, and represents nonhemodynamically significant lesion (likely 50% or less).  She is on chronic dual antiplatelet regimen,  predominantly for advanced peripheral vascular disease, and certainly this would result in some element of stroke risk reduction for her carotid disease as well.  I plan on seeing her back in 12 months time with carotid duplex, to ensure stability and exclude any progression of disease and might necessitate further treatment/intervention.    Ms. ng has been having very labile hypertension, and has a history of nonobstructive coronary artery disease.  She wishes to have a cardiology second opinion in regards to her coronary vasculature and hypertension, and given her location in [place] we will attempt to have her follow-up with the [facility] practice at [facility].    The importance of smoking cessation was discussed at length with the patient/patient's family (3-10 minutes).  Specifically, the risk of progression and recurrence of cerebrovascular disease (i.e. stroke, aneurysm) despite medical therapy/intervention with continued smoking. The patient/patient's family expressed an understanding of this risk.

## 2019-12-19 LAB
BH CV DISTAL RIGHT ICA HIDDEN LRR: 1 CM
BH CV ECHO MEAS - BSA(HAYCOCK): 1.8 M^2
BH CV ECHO MEAS - BSA: 1.7 M^2
BH CV ECHO MEAS - BZI_BMI: 24.5 KILOGRAMS/M^2
BH CV ECHO MEAS - BZI_METRIC_HEIGHT: 165.1 CM
BH CV ECHO MEAS - BZI_METRIC_WEIGHT: 66.7 KG
BH CV MID RIGHT ICA HIDDEN LRR: 1 CM
BH CV VAS CAROTID RIGHT DISTAL STENT EDV: 36 CM/S
BH CV VAS CAROTID RIGHT DISTAL STENT PSV: 149 CM/S
BH CV VAS CAROTID RIGHT DISTAL TO STENT NATIVE VESSEL E: 3 CM/S
BH CV VAS CAROTID RIGHT DISTAL TO STENT PSV: 105 CM/S
BH CV VAS CAROTID RIGHT MID STENT EDV: 35 CM/S
BH CV VAS CAROTID RIGHT MID STENT PSV: 159 CM/S
BH CV VAS CAROTID RIGHT PROXIMAL STENT EDV: 29 CM/S
BH CV VAS CAROTID RIGHT PROXIMAL STENT PSV: 127 CM/S
BH CV VAS CAROTID RIGHT STENT NATIVE VESSEL PROXIMAL EDV: 35 CM/S
BH CV VAS CAROTID RIGHT STENT NATIVE VESSEL PROXIMAL PS: 127 CM/S
BH CV XLRA MEAS LEFT DIST CCA EDV: 51 CM/SEC
BH CV XLRA MEAS LEFT DIST CCA PSV: 248 CM/SEC
BH CV XLRA MEAS LEFT DIST ICA EDV: 54 CM/SEC
BH CV XLRA MEAS LEFT DIST ICA PSV: 155 CM/SEC
BH CV XLRA MEAS LEFT ICA/CCA RATIO: 0.98
BH CV XLRA MEAS LEFT MID CCA EDV: 31 CM/SEC
BH CV XLRA MEAS LEFT MID CCA PSV: 200 CM/SEC
BH CV XLRA MEAS LEFT MID ICA EDV: 73 CM/SEC
BH CV XLRA MEAS LEFT MID ICA PSV: 232 CM/SEC
BH CV XLRA MEAS LEFT PROX CCA EDV: 33 CM/SEC
BH CV XLRA MEAS LEFT PROX CCA PSV: 171 CM/SEC
BH CV XLRA MEAS LEFT PROX ECA EDV: 0 CM/SEC
BH CV XLRA MEAS LEFT PROX ECA PSV: 272 CM/SEC
BH CV XLRA MEAS LEFT PROX ICA EDV: 40 CM/SEC
BH CV XLRA MEAS LEFT PROX ICA PSV: 234 CM/SEC
BH CV XLRA MEAS LEFT PROX SCLA EDV: 0 CM/SEC
BH CV XLRA MEAS LEFT PROX SCLA PSV: 181 CM/SEC
BH CV XLRA MEAS LEFT VERTEBRAL A EDV: 28 CM/SEC
BH CV XLRA MEAS LEFT VERTEBRAL A PSV: 155 CM/SEC
BH CV XLRA MEAS RIGHT DIST CCA EDV: 28 CM/SEC
BH CV XLRA MEAS RIGHT DIST CCA PSV: 116 CM/SEC
BH CV XLRA MEAS RIGHT MID CCA EDV: 20 CM/SEC
BH CV XLRA MEAS RIGHT MID CCA PSV: 109 CM/SEC
BH CV XLRA MEAS RIGHT PROX CCA EDV: 30 CM/SEC
BH CV XLRA MEAS RIGHT PROX CCA PSV: 212 CM/SEC
BH CV XLRA MEAS RIGHT PROX ECA EDV: 138 CM/SEC
BH CV XLRA MEAS RIGHT PROX ECA PSV: 832 CM/SEC
BH CV XLRA MEAS RIGHT PROX SCLA EDV: 0 CM/SEC
BH CV XLRA MEAS RIGHT PROX SCLA PSV: 154 CM/SEC
BH CV XLRA MEAS RIGHT VERTEBRAL A EDV: 39 CM/SEC
BH CV XLRA MEAS RIGHT VERTEBRAL A PSV: 147 CM/SEC
BH CVPROX RIGHT ICA HIDDEN LRR: 1 CM

## 2019-12-22 NOTE — PROGRESS NOTES
Specialty Pharmacy Note      Name:  Melania Mae  :  1965  Date:  2019         Past Medical History:   Diagnosis Date   • Aneurysm (CMS/HCC)     Cavernous left ICA aneurysm   • Asthma    • Carotid artery occlusion    • Chronic back pain    • CLL (chronic lymphocytic leukemia) (CMS/HCC)     2012   • COPD (chronic obstructive pulmonary disease) (CMS/[facility])    • Degenerative arthritis    • Depression    • Diabetes mellitus (CMS/[facility])     type 2   • Hyperlipidemia    • Hypertension    • Lymphadenopathy    • Non Hodgkin's lymphoma (CMS/[facility])        Past Surgical History:   Procedure Laterality Date   • APPENDECTOMY     • BACK SURGERY       (work accident) c-spine surgery    • CAROTID STENT Right 2017   • CEREBRAL ANGIOGRAM N/A 2017    Diagnostic Carotid/Cerebral Angiogram   • CHOLECYSTECTOMY     • HYSTERECTOMY      for endometriosis/ovarian bleed   • NECK SURGERY     • OTHER SURGICAL HISTORY      pac insertion and removal   • TONSILLECTOMY         Social History     Socioeconomic History   • Marital status:      Spouse name: Not on file   • Number of children: Not on file   • Years of education: Not on file   • Highest education level: Not on file   Tobacco Use   • Smoking status: Current Every Day Smoker     Packs/day: 0.50     Years: 35.00     Pack years: 17.50     Types: Cigarettes   • Smokeless tobacco: Never Used   • Tobacco comment: 0.5-1 PPD   Substance and Sexual Activity   • Alcohol use: No   • Drug use: No   • Sexual activity: Defer       Family History   Problem Relation Age of Onset   • Lung cancer Father 72   • Hypertension Father    • Heart disease Father    • Cancer Father    • Diabetes Father    • Other Brother 46        MI   • Heart disease Brother    • Breast cancer Paternal Aunt    • Colon cancer Maternal Grandfather    • Other Cousin         CLL (dx 48 yo)   • Hypertension Mother        Allergies   Allergen Reactions   • Codeine Other  "(See Comments)     \"UNKNOW  CHILDHOOD REACTION\"   • Penicillins Other (See Comments)     \"UNKNOWN CHILDHOOD ALLERGY\"   • Rituxan [Rituximab] Other (See Comments)     \"THROAT RAWNESS; FELT LIKE MY THROAT WAS CLOSING UP\"       Current Outpatient Medications   Medication Sig Dispense Refill   • albuterol (PROVENTIL HFA;VENTOLIN HFA) 108 (90 Base) MCG/ACT inhaler Inhale 2 puffs As Needed for Wheezing.     • ALPRAZolam (XANAX) 0.5 MG tablet Take 0.5 mg by mouth 3 (Three) Times a Day As Needed for Anxiety.     • aspirin 81 MG EC tablet Take 1 tablet by mouth Daily. 30 tablet 5   • B-D UF III MINI PEN NEEDLES 31G X 5 MM misc      • Brexpiprazole (REXULTI) 1 MG tablet Take  by mouth Daily.     • CloNIDine (CATAPRES) 0.1 MG tablet 0.1 mg 2 (Two) Times a Day.     • clopidogrel (PLAVIX) 75 MG tablet Take 1 tablet by mouth Daily. 30 tablet 5   • fluticasone-salmeterol (ADVAIR) 100-50 MCG/DOSE DISKUS Inhale 1 puff As Needed.     • gabapentin (NEURONTIN) 800 MG tablet Take 800 mg by mouth 4 (Four) Times a Day.     • ibrutinib (IMBRUVICA) 420 MG tablet tablet Take 1 tablet by mouth Daily. 28 tablet 2   • Ibuprofen (ADVIL PO) Take 400 mg by mouth 3 (Three) Times a Day. LIQUIGELS     • Insulin Aspart (NOVOLOG FLEXPEN SC) Inject 8-12 Units under the skin 3 (Three) Times a Day Before Meals. PER SLIDING SCALE     • Insulin Detemir (LEVEMIR FLEXPEN SC) Inject 8-12 Units under the skin Every Night. SLIDING SCALE     • levETIRAcetam (KEPPRA) 1000 MG tablet Take 1,000 mg by mouth 2 (Two) Times a Day.     • lisinopril (PRINIVIL,ZESTRIL) 20 MG tablet Take 20 mg by mouth Every Morning.     • loratadine (CLARITIN) 10 MG tablet Take 10 mg by mouth Every Night.     • metoprolol tartrate (LOPRESSOR) 25 MG tablet 25 mg 2 (Two) Times a Day.     • ondansetron (ZOFRAN) 8 MG tablet Take 1 tablet by mouth Every 8 (Eight) Hours As Needed for Nausea or Vomiting. 90 tablet 6   • oxyCODONE-acetaminophen (PERCOCET)  MG per tablet Take 1 tablet by " mouth Every 6 (Six) Hours As Needed for Moderate Pain .     • prochlorperazine (COMPAZINE) 10 MG tablet Take 1 tablet by mouth Every 6 (Six) Hours As Needed for Nausea or Vomiting. 60 tablet 3   • Rosuvastatin Calcium (CRESTOR PO) Take  by mouth.     • sertraline (ZOLOFT) 100 MG tablet Take 100 mg by mouth Every Night.     • sulfamethoxazole-trimethoprim (BACTRIM) 400-80 MG tablet Take 1 tablet by mouth Daily. 30 tablet 3   • valACYclovir (VALTREX) 500 MG tablet Take 1 tablet by mouth Daily. 30 tablet 3     No current facility-administered medications for this visit.          LABORATORY:    Lab Results   Component Value Date    TSH 1.036 11/13/2017    PROTIME 10.2 04/17/2014    INR 0.96 04/17/2014     Lab Results   Component Value Date    PROTIME 10.2 04/17/2014    INR 0.96 04/17/2014     No results found for: HAV, HCVQUANT, [id number], HCVINFO, HCVGENOTYPE  Lab Results   Component Value Date    HEPBSAB Non-Reactive 04/27/2018     Last Urine Toxicity     There is no flowsheet data to display.          ASSESSMENT/PLAN:    Patient Update Assessment (new medications, allergies, medical history): No changes.      Medication(s): Imbruvica 420 mg chemo tablet.      Currently Taking Medication(s): Patient reports taking medication as directed.     Experiencing Side Effects: No significant side effects expressed at this time.      Prior Authorization Status: Approved.      Financial Assistance Status: Not needed at this time.      Any Issues Identified: None.      Appropriate to Process Prescription(s): Yes.  Medication will be dispensed to patient from [facility].      Counseling Offered: Patient declined, previously counseled.  Aware to call pharmacy with any questions or concerns.     Next Specialty Pharmacy Visit:  Scheduled for 1/2/20

## 2020-01-02 ENCOUNTER — SPECIALTY PHARMACY (OUTPATIENT)
Dept: PHARMACY | Facility: HOSPITAL | Age: 55
End: 2020-01-02

## 2020-01-23 RX ORDER — SULFAMETHOXAZOLE AND TRIMETHOPRIM 400; 80 MG/1; MG/1
TABLET ORAL
Qty: 30 TABLET | Refills: 3 | Status: SHIPPED | OUTPATIENT
Start: 2020-01-23 | End: 2020-06-22

## 2020-01-23 RX ORDER — VALACYCLOVIR HYDROCHLORIDE 500 MG/1
TABLET, FILM COATED ORAL
Qty: 30 TABLET | Refills: 3 | Status: SHIPPED | OUTPATIENT
Start: 2020-01-23 | End: 2020-06-22

## 2020-02-02 DIAGNOSIS — C91.10 CLL (CHRONIC LYMPHOCYTIC LEUKEMIA) (HCC): ICD-10-CM

## 2020-02-03 ENCOUNTER — SPECIALTY PHARMACY (OUTPATIENT)
Dept: PHARMACY | Facility: HOSPITAL | Age: 55
End: 2020-02-03

## 2020-02-07 NOTE — PROGRESS NOTES
Specialty Pharmacy Note      Name:  Melania Mae  :  1965  Date:  2020         Past Medical History:   Diagnosis Date   • Aneurysm (CMS/HCC)     Cavernous left ICA aneurysm   • Asthma    • Carotid artery occlusion    • Chronic back pain    • CLL (chronic lymphocytic leukemia) (CMS/HCC)     2012   • COPD (chronic obstructive pulmonary disease) (CMS/[facility])    • Degenerative arthritis    • Depression    • Diabetes mellitus (CMS/[facility])     type 2   • Hyperlipidemia    • Hypertension    • Lymphadenopathy    • Non Hodgkin's lymphoma (CMS/[facility])        Past Surgical History:   Procedure Laterality Date   • APPENDECTOMY     • BACK SURGERY       (work accident) c-spine surgery    • CAROTID STENT Right 2017   • CEREBRAL ANGIOGRAM N/A 2017    Diagnostic Carotid/Cerebral Angiogram   • CHOLECYSTECTOMY     • HYSTERECTOMY      for endometriosis/ovarian bleed   • NECK SURGERY     • OTHER SURGICAL HISTORY      pac insertion and removal   • TONSILLECTOMY         Social History     Socioeconomic History   • Marital status:      Spouse name: Not on file   • Number of children: Not on file   • Years of education: Not on file   • Highest education level: Not on file   Tobacco Use   • Smoking status: Current Every Day Smoker     Packs/day: 0.50     Years: 35.00     Pack years: 17.50     Types: Cigarettes   • Smokeless tobacco: Never Used   • Tobacco comment: 0.5-1 PPD   Substance and Sexual Activity   • Alcohol use: No   • Drug use: No   • Sexual activity: Defer       Family History   Problem Relation Age of Onset   • Lung cancer Father 72   • Hypertension Father    • Heart disease Father    • Cancer Father    • Diabetes Father    • Other Brother 46        MI   • Heart disease Brother    • Breast cancer Paternal Aunt    • Colon cancer Maternal Grandfather    • Other Cousin         CLL (dx 46 yo)   • Hypertension Mother        Allergies   Allergen Reactions   • Codeine Other  "(See Comments)     \"UNKNOW  CHILDHOOD REACTION\"   • Penicillins Other (See Comments)     \"UNKNOWN CHILDHOOD ALLERGY\"   • Rituxan [Rituximab] Other (See Comments)     \"THROAT RAWNESS; FELT LIKE MY THROAT WAS CLOSING UP\"       Current Outpatient Medications   Medication Sig Dispense Refill   • albuterol (PROVENTIL HFA;VENTOLIN HFA) 108 (90 Base) MCG/ACT inhaler Inhale 2 puffs As Needed for Wheezing.     • ALPRAZolam (XANAX) 0.5 MG tablet Take 0.5 mg by mouth 3 (Three) Times a Day As Needed for Anxiety.     • aspirin 81 MG EC tablet Take 1 tablet by mouth Daily. 30 tablet 5   • B-D UF III MINI PEN NEEDLES 31G X 5 MM misc      • Brexpiprazole (REXULTI) 1 MG tablet Take  by mouth Daily.     • CloNIDine (CATAPRES) 0.1 MG tablet 0.1 mg 2 (Two) Times a Day.     • clopidogrel (PLAVIX) 75 MG tablet Take 1 tablet by mouth Daily. 30 tablet 5   • fluticasone-salmeterol (ADVAIR) 100-50 MCG/DOSE DISKUS Inhale 1 puff As Needed.     • gabapentin (NEURONTIN) 800 MG tablet Take 800 mg by mouth 4 (Four) Times a Day.     • ibrutinib (IMBRUVICA) 420 MG tablet tablet Take 1 tablet by mouth Daily. 28 tablet 2   • Ibuprofen (ADVIL PO) Take 400 mg by mouth 3 (Three) Times a Day. LIQUIGELS     • Insulin Aspart (NOVOLOG FLEXPEN SC) Inject 8-12 Units under the skin 3 (Three) Times a Day Before Meals. PER SLIDING SCALE     • Insulin Detemir (LEVEMIR FLEXPEN SC) Inject 8-12 Units under the skin Every Night. SLIDING SCALE     • levETIRAcetam (KEPPRA) 1000 MG tablet Take 1,000 mg by mouth 2 (Two) Times a Day.     • lisinopril (PRINIVIL,ZESTRIL) 20 MG tablet Take 20 mg by mouth Every Morning.     • loratadine (CLARITIN) 10 MG tablet Take 10 mg by mouth Every Night.     • metoprolol tartrate (LOPRESSOR) 25 MG tablet 25 mg 2 (Two) Times a Day.     • ondansetron (ZOFRAN) 8 MG tablet Take 1 tablet by mouth Every 8 (Eight) Hours As Needed for Nausea or Vomiting. 90 tablet 6   • oxyCODONE-acetaminophen (PERCOCET)  MG per tablet Take 1 tablet by " mouth Every 6 (Six) Hours As Needed for Moderate Pain .     • prochlorperazine (COMPAZINE) 10 MG tablet Take 1 tablet by mouth Every 6 (Six) Hours As Needed for Nausea or Vomiting. 60 tablet 3   • Rosuvastatin Calcium (CRESTOR PO) Take  by mouth.     • sertraline (ZOLOFT) 100 MG tablet Take 100 mg by mouth Every Night.     • sulfamethoxazole-trimethoprim (BACTRIM,SEPTRA) 400-80 MG tablet TAKE 1 TABLET BY MOUTH EVERY DAY FOR INFECTION 30 tablet 3   • valACYclovir (VALTREX) 500 MG tablet TAKE 1 TABLET BY MOUTH EVERY DAY 30 tablet 3     No current facility-administered medications for this visit.          LABORATORY:    Lab Results   Component Value Date    TSH 1.036 11/13/2017    PROTIME 10.2 04/17/2014    INR 0.96 04/17/2014     Lab Results   Component Value Date    PROTIME 10.2 04/17/2014    INR 0.96 04/17/2014     No results found for: HAV, HCVQUANT, ACVBOY27, HCVINFO, HCVGENOTYPE  Lab Results   Component Value Date    HEPBSAB Non-Reactive 04/27/2018     Last Urine Toxicity     There is no flowsheet data to display.          ASSESSMENT/PLAN:    Patient Update Assessment (new medications, allergies, medical history): No changes.      Medication(s): Imbruvica 420 mg chemo tablet.      Currently Taking Medication(s): Patient reports taking medication as directed.     Experiencing Side Effects: No significant side effects expressed at this time.      Prior Authorization Status: Approved.      Financial Assistance Status: Not needed at this time.      Any Issues Identified: None.      Appropriate to Process Prescription(s): Yes.  Medication will be dispensed to patient from [facility].      Counseling Offered: Patient declined, previously counseled.  Aware to call pharmacy with any questions or concerns.

## 2020-02-17 ENCOUNTER — APPOINTMENT (OUTPATIENT)
Dept: CT IMAGING | Facility: HOSPITAL | Age: 55
End: 2020-02-17

## 2020-02-21 NOTE — PROGRESS NOTES
Specialty Pharmacy Note      Name:  Melania Mae  :  1965  Date:  2020         Past Medical History:   Diagnosis Date   • Aneurysm (CMS/HCC)     Cavernous left ICA aneurysm   • Asthma    • Carotid artery occlusion    • Chronic back pain    • CLL (chronic lymphocytic leukemia) (CMS/HCC)     2012   • COPD (chronic obstructive pulmonary disease) (CMS/[facility])    • Degenerative arthritis    • Depression    • Diabetes mellitus (CMS/[facility])     type 2   • Hyperlipidemia    • Hypertension    • Lymphadenopathy    • Non Hodgkin's lymphoma (CMS/[facility])        Past Surgical History:   Procedure Laterality Date   • APPENDECTOMY     • BACK SURGERY       (work accident) c-spine surgery    • CAROTID STENT Right 2017   • CEREBRAL ANGIOGRAM N/A 2017    Diagnostic Carotid/Cerebral Angiogram   • CHOLECYSTECTOMY     • HYSTERECTOMY      for endometriosis/ovarian bleed   • NECK SURGERY     • OTHER SURGICAL HISTORY      pac insertion and removal   • TONSILLECTOMY         Social History     Socioeconomic History   • Marital status:      Spouse name: Not on file   • Number of children: Not on file   • Years of education: Not on file   • Highest education level: Not on file   Tobacco Use   • Smoking status: Current Every Day Smoker     Packs/day: 0.50     Years: 35.00     Pack years: 17.50     Types: Cigarettes   • Smokeless tobacco: Never Used   • Tobacco comment: 0.5-1 PPD   Substance and Sexual Activity   • Alcohol use: No   • Drug use: No   • Sexual activity: Defer       Family History   Problem Relation Age of Onset   • Lung cancer Father 72   • Hypertension Father    • Heart disease Father    • Cancer Father    • Diabetes Father    • Other Brother 46        MI   • Heart disease Brother    • Breast cancer Paternal Aunt    • Colon cancer Maternal Grandfather    • Other Cousin         CLL (dx 48 yo)   • Hypertension Mother        Allergies   Allergen Reactions   • Codeine Other  "(See Comments)     \"UNKNOW  CHILDHOOD REACTION\"   • Penicillins Other (See Comments)     \"UNKNOWN CHILDHOOD ALLERGY\"   • Rituxan [Rituximab] Other (See Comments)     \"THROAT RAWNESS; FELT LIKE MY THROAT WAS CLOSING UP\"       Current Outpatient Medications   Medication Sig Dispense Refill   • albuterol (PROVENTIL HFA;VENTOLIN HFA) 108 (90 Base) MCG/ACT inhaler Inhale 2 puffs As Needed for Wheezing.     • ALPRAZolam (XANAX) 0.5 MG tablet Take 0.5 mg by mouth 3 (Three) Times a Day As Needed for Anxiety.     • aspirin 81 MG EC tablet Take 1 tablet by mouth Daily. 30 tablet 5   • B-D UF III MINI PEN NEEDLES 31G X 5 MM misc      • Brexpiprazole (REXULTI) 1 MG tablet Take  by mouth Daily.     • CloNIDine (CATAPRES) 0.1 MG tablet 0.1 mg 2 (Two) Times a Day.     • clopidogrel (PLAVIX) 75 MG tablet Take 1 tablet by mouth Daily. 30 tablet 5   • fluticasone-salmeterol (ADVAIR) 100-50 MCG/DOSE DISKUS Inhale 1 puff As Needed.     • gabapentin (NEURONTIN) 800 MG tablet Take 800 mg by mouth 4 (Four) Times a Day.     • ibrutinib (IMBRUVICA) 420 MG tablet tablet Take 1 tablet by mouth Daily. 28 tablet 2   • Ibuprofen (ADVIL PO) Take 400 mg by mouth 3 (Three) Times a Day. LIQUIGELS     • Insulin Aspart (NOVOLOG FLEXPEN SC) Inject 8-12 Units under the skin 3 (Three) Times a Day Before Meals. PER SLIDING SCALE     • Insulin Detemir (LEVEMIR FLEXPEN SC) Inject 8-12 Units under the skin Every Night. SLIDING SCALE     • levETIRAcetam (KEPPRA) 1000 MG tablet Take 1,000 mg by mouth 2 (Two) Times a Day.     • lisinopril (PRINIVIL,ZESTRIL) 20 MG tablet Take 20 mg by mouth Every Morning.     • loratadine (CLARITIN) 10 MG tablet Take 10 mg by mouth Every Night.     • metoprolol tartrate (LOPRESSOR) 25 MG tablet 25 mg 2 (Two) Times a Day.     • ondansetron (ZOFRAN) 8 MG tablet Take 1 tablet by mouth Every 8 (Eight) Hours As Needed for Nausea or Vomiting. 90 tablet 6   • oxyCODONE-acetaminophen (PERCOCET)  MG per tablet Take 1 tablet by " mouth Every 6 (Six) Hours As Needed for Moderate Pain .     • prochlorperazine (COMPAZINE) 10 MG tablet Take 1 tablet by mouth Every 6 (Six) Hours As Needed for Nausea or Vomiting. 60 tablet 3   • Rosuvastatin Calcium (CRESTOR PO) Take  by mouth.     • sertraline (ZOLOFT) 100 MG tablet Take 100 mg by mouth Every Night.     • sulfamethoxazole-trimethoprim (BACTRIM,SEPTRA) 400-80 MG tablet TAKE 1 TABLET BY MOUTH EVERY DAY FOR INFECTION 30 tablet 3   • valACYclovir (VALTREX) 500 MG tablet TAKE 1 TABLET BY MOUTH EVERY DAY 30 tablet 3     No current facility-administered medications for this visit.          LABORATORY:    Lab Results   Component Value Date    TSH 1.036 11/13/2017    PROTIME 10.2 04/17/2014    INR 0.96 04/17/2014     Lab Results   Component Value Date    PROTIME 10.2 04/17/2014    INR 0.96 04/17/2014     No results found for: HAV, HCVQUANT, [id number], HCVINFO, HCVGENOTYPE  Lab Results   Component Value Date    HEPBSAB Non-Reactive 04/27/2018     Last Urine Toxicity     There is no flowsheet data to display.          ASSESSMENT/PLAN:    Patient Update Assessment (new medications, allergies, medical history): No changes.      Medication(s): Imbruvica 420 mg chemo tablet.      Currently Taking Medication(s): Patient reports taking medication as directed.     Experiencing Side Effects: No significant side effects expressed at this time.      Prior Authorization Status: Approved.      Financial Assistance Status: Not needed at this time.      Any Issues Identified: None.      Appropriate to Process Prescription(s): Yes.  Medication will be dispensed to patient from [facility].      Counseling Offered: Patient declined, previously counseled.  Aware to call pharmacy with any questions or concerns.     Next Specialty Pharmacy Visit:  Scheduled for 03/04/2020.

## 2020-02-24 ENCOUNTER — HOSPITAL ENCOUNTER (OUTPATIENT)
Dept: CT IMAGING | Facility: HOSPITAL | Age: 55
Discharge: HOME | End: 2020-02-24

## 2020-02-24 ENCOUNTER — HOSPITAL ENCOUNTER (OUTPATIENT)
Dept: CT IMAGING | Facility: HOSPITAL | Age: 55
Discharge: HOME | End: 2020-02-24
Admitting: INTERNAL MEDICINE

## 2020-02-24 DIAGNOSIS — C91.10 CLL (CHRONIC LYMPHOCYTIC LEUKEMIA) (HCC): ICD-10-CM

## 2020-02-24 LAB — CREAT BLDA-MCNC: 0.8 MG/DL (ref 0.6–1.3)

## 2020-02-24 PROCEDURE — 74177 CT ABD & PELVIS W/CONTRAST: CPT | Performed by: RADIOLOGY

## 2020-02-24 PROCEDURE — 71260 CT THORAX DX C+: CPT | Performed by: RADIOLOGY

## 2020-02-24 PROCEDURE — 82565 ASSAY OF CREATININE: CPT

## 2020-02-24 PROCEDURE — 74177 CT ABD & PELVIS W/CONTRAST: CPT

## 2020-02-24 PROCEDURE — 70491 CT SOFT TISSUE NECK W/DYE: CPT

## 2020-02-24 PROCEDURE — 25010000002 IOPAMIDOL 61 % SOLUTION: Performed by: INTERNAL MEDICINE

## 2020-02-24 PROCEDURE — 71260 CT THORAX DX C+: CPT

## 2020-02-24 PROCEDURE — 70491 CT SOFT TISSUE NECK W/DYE: CPT | Performed by: RADIOLOGY

## 2020-02-24 RX ADMIN — IOPAMIDOL 100 ML: 612 INJECTION, SOLUTION INTRAVENOUS at 10:33

## 2020-03-01 DIAGNOSIS — C91.10 CLL (CHRONIC LYMPHOCYTIC LEUKEMIA) (HCC): ICD-10-CM

## 2020-03-04 ENCOUNTER — SPECIALTY PHARMACY (OUTPATIENT)
Dept: PHARMACY | Facility: HOSPITAL | Age: 55
End: 2020-03-04

## 2020-03-04 DIAGNOSIS — C91.10 CLL (CHRONIC LYMPHOCYTIC LEUKEMIA) (HCC): ICD-10-CM

## 2020-03-04 NOTE — PROGRESS NOTES
Specialty Pharmacy Note      Name:  Melania Mae  :  1965  Date:  3/4/2020         Past Medical History:   Diagnosis Date   • Aneurysm (CMS/HCC)     Cavernous left ICA aneurysm   • Asthma    • Carotid artery occlusion    • Chronic back pain    • CLL (chronic lymphocytic leukemia) (CMS/HCC)     2012   • COPD (chronic obstructive pulmonary disease) (CMS/[facility])    • Degenerative arthritis    • Depression    • Diabetes mellitus (CMS/[facility])     type 2   • Hyperlipidemia    • Hypertension    • Lymphadenopathy    • Non Hodgkin's lymphoma (CMS/[facility])        Past Surgical History:   Procedure Laterality Date   • APPENDECTOMY     • BACK SURGERY       (work accident) c-spine surgery    • CAROTID STENT Right 2017   • CEREBRAL ANGIOGRAM N/A 2017    Diagnostic Carotid/Cerebral Angiogram   • CHOLECYSTECTOMY     • HYSTERECTOMY      for endometriosis/ovarian bleed   • NECK SURGERY     • OTHER SURGICAL HISTORY      pac insertion and removal   • TONSILLECTOMY         Social History     Socioeconomic History   • Marital status:      Spouse name: Not on file   • Number of children: Not on file   • Years of education: Not on file   • Highest education level: Not on file   Tobacco Use   • Smoking status: Current Every Day Smoker     Packs/day: 0.50     Years: 35.00     Pack years: 17.50     Types: Cigarettes   • Smokeless tobacco: Never Used   • Tobacco comment: 0.5-1 PPD   Substance and Sexual Activity   • Alcohol use: No   • Drug use: No   • Sexual activity: Defer       Family History   Problem Relation Age of Onset   • Lung cancer Father 72   • Hypertension Father    • Heart disease Father    • Cancer Father    • Diabetes Father    • Other Brother 46        MI   • Heart disease Brother    • Breast cancer Paternal Aunt    • Colon cancer Maternal Grandfather    • Other Cousin         CLL (dx 47 yo)   • Hypertension Mother        Allergies   Allergen Reactions   • Codeine Other  "(See Comments)     \"UNKNOW  CHILDHOOD REACTION\"   • Penicillins Other (See Comments)     \"UNKNOWN CHILDHOOD ALLERGY\"   • Rituxan [Rituximab] Other (See Comments)     \"THROAT RAWNESS; FELT LIKE MY THROAT WAS CLOSING UP\"       Current Outpatient Medications   Medication Sig Dispense Refill   • albuterol (PROVENTIL HFA;VENTOLIN HFA) 108 (90 Base) MCG/ACT inhaler Inhale 2 puffs As Needed for Wheezing.     • ALPRAZolam (XANAX) 0.5 MG tablet Take 0.5 mg by mouth 3 (Three) Times a Day As Needed for Anxiety.     • aspirin 81 MG EC tablet Take 1 tablet by mouth Daily. 30 tablet 5   • B-D UF III MINI PEN NEEDLES 31G X 5 MM misc      • Brexpiprazole (REXULTI) 1 MG tablet Take  by mouth Daily.     • CloNIDine (CATAPRES) 0.1 MG tablet 0.1 mg 2 (Two) Times a Day.     • clopidogrel (PLAVIX) 75 MG tablet Take 1 tablet by mouth Daily. 30 tablet 5   • fluticasone-salmeterol (ADVAIR) 100-50 MCG/DOSE DISKUS Inhale 1 puff As Needed.     • gabapentin (NEURONTIN) 800 MG tablet Take 800 mg by mouth 4 (Four) Times a Day.     • ibrutinib (IMBRUVICA) 420 MG tablet tablet Take 1 tablet by mouth Daily. 28 tablet 1   • Ibuprofen (ADVIL PO) Take 400 mg by mouth 3 (Three) Times a Day. LIQUIGELS     • Insulin Aspart (NOVOLOG FLEXPEN SC) Inject 8-12 Units under the skin 3 (Three) Times a Day Before Meals. PER SLIDING SCALE     • Insulin Detemir (LEVEMIR FLEXPEN SC) Inject 8-12 Units under the skin Every Night. SLIDING SCALE     • levETIRAcetam (KEPPRA) 1000 MG tablet Take 1,000 mg by mouth 2 (Two) Times a Day.     • lisinopril (PRINIVIL,ZESTRIL) 20 MG tablet Take 20 mg by mouth Every Morning.     • loratadine (CLARITIN) 10 MG tablet Take 10 mg by mouth Every Night.     • metoprolol tartrate (LOPRESSOR) 25 MG tablet 25 mg 2 (Two) Times a Day.     • ondansetron (ZOFRAN) 8 MG tablet Take 1 tablet by mouth Every 8 (Eight) Hours As Needed for Nausea or Vomiting. 90 tablet 6   • oxyCODONE-acetaminophen (PERCOCET)  MG per tablet Take 1 tablet by " mouth Every 6 (Six) Hours As Needed for Moderate Pain .     • prochlorperazine (COMPAZINE) 10 MG tablet Take 1 tablet by mouth Every 6 (Six) Hours As Needed for Nausea or Vomiting. 60 tablet 3   • Rosuvastatin Calcium (CRESTOR PO) Take  by mouth.     • sertraline (ZOLOFT) 100 MG tablet Take 100 mg by mouth Every Night.     • sulfamethoxazole-trimethoprim (BACTRIM,SEPTRA) 400-80 MG tablet TAKE 1 TABLET BY MOUTH EVERY DAY FOR INFECTION 30 tablet 3   • valACYclovir (VALTREX) 500 MG tablet TAKE 1 TABLET BY MOUTH EVERY DAY 30 tablet 3     No current facility-administered medications for this visit.          LABORATORY:    Lab Results   Component Value Date    TSH 1.036 11/13/2017    PROTIME 10.2 04/17/2014    INR 0.96 04/17/2014     Lab Results   Component Value Date    PROTIME 10.2 04/17/2014    INR 0.96 04/17/2014     No results found for: HAV, HCVQUANT, [id number], HCVINFO, HCVGENOTYPE  Lab Results   Component Value Date    HEPBSAB Non-Reactive 04/27/2018     Last Urine Toxicity     There is no flowsheet data to display.          ASSESSMENT/PLAN:    Patient Update Assessment (new medications, allergies, medical history): Updated     Medication(s): Imbruvica 420 mg    Currently Taking Medication(s): Yes    Effectiveness of Medication: Effective    Experiencing Side Effects: None significant    Prior Authorization Status: Active    Financial Assistance Status: None needed at this time    Any Issues Identified: Patient no longer has refills. Thus, a new Rx was processed and sent to [facility].    Appropriate to Process Prescription(s): Yes. Patient states she is taking with no significant side effects. Chart review performed reviewing progress notes, labs, etc. After assessment and patient discussion, it is appropriate to process.    Counseling Offered: Patient had no questions/concerns.    Next Specialty Pharmacy Visit: In approximately 1 month.

## 2020-03-16 ENCOUNTER — TELEPHONE (OUTPATIENT)
Dept: ONCOLOGY | Facility: CLINIC | Age: 55
End: 2020-03-16

## 2020-03-27 DIAGNOSIS — C91.10 CLL (CHRONIC LYMPHOCYTIC LEUKEMIA) (HCC): ICD-10-CM

## 2020-04-02 ENCOUNTER — SPECIALTY PHARMACY (OUTPATIENT)
Dept: PHARMACY | Facility: HOSPITAL | Age: 55
End: 2020-04-02

## 2020-04-16 NOTE — PROGRESS NOTES
Specialty Pharmacy Note      Name:  Melania Mae  :  1965  Date:  2020       Past Medical History:   Diagnosis Date   • Aneurysm (CMS/HCC)     Cavernous left ICA aneurysm   • Asthma    • Carotid artery occlusion    • Chronic back pain    • CLL (chronic lymphocytic leukemia) (CMS/HCC)     2012   • COPD (chronic obstructive pulmonary disease) (CMS/[facility])    • Degenerative arthritis    • Depression    • Diabetes mellitus (CMS/[facility])     type 2   • Hyperlipidemia    • Hypertension    • Lymphadenopathy    • Non Hodgkin's lymphoma (CMS/[facility])        Past Surgical History:   Procedure Laterality Date   • APPENDECTOMY     • BACK SURGERY       (work accident) c-spine surgery    • CAROTID STENT Right 2017   • CEREBRAL ANGIOGRAM N/A 2017    Diagnostic Carotid/Cerebral Angiogram   • CHOLECYSTECTOMY     • HYSTERECTOMY      for endometriosis/ovarian bleed   • NECK SURGERY     • OTHER SURGICAL HISTORY      pac insertion and removal   • TONSILLECTOMY         Social History     Socioeconomic History   • Marital status:      Spouse name: Not on file   • Number of children: Not on file   • Years of education: Not on file   • Highest education level: Not on file   Tobacco Use   • Smoking status: Current Every Day Smoker     Packs/day: 0.50     Years: 35.00     Pack years: 17.50     Types: Cigarettes   • Smokeless tobacco: Never Used   • Tobacco comment: 0.5-1 PPD   Substance and Sexual Activity   • Alcohol use: No   • Drug use: No   • Sexual activity: Defer       Family History   Problem Relation Age of Onset   • Lung cancer Father 72   • Hypertension Father    • Heart disease Father    • Cancer Father    • Diabetes Father    • Other Brother 46        MI   • Heart disease Brother    • Breast cancer Paternal Aunt    • Colon cancer Maternal Grandfather    • Other Cousin         CLL (dx 48 yo)   • Hypertension Mother        Allergies   Allergen Reactions   • Codeine Other (See  "Comments)     \"UNKNOW  CHILDHOOD REACTION\"   • Penicillins Other (See Comments)     \"UNKNOWN CHILDHOOD ALLERGY\"   • Rituxan [Rituximab] Other (See Comments)     \"THROAT RAWNESS; FELT LIKE MY THROAT WAS CLOSING UP\"       Current Outpatient Medications   Medication Sig Dispense Refill   • albuterol (PROVENTIL HFA;VENTOLIN HFA) 108 (90 Base) MCG/ACT inhaler Inhale 2 puffs As Needed for Wheezing.     • ALPRAZolam (XANAX) 0.5 MG tablet Take 0.5 mg by mouth 3 (Three) Times a Day As Needed for Anxiety.     • aspirin 81 MG EC tablet Take 1 tablet by mouth Daily. 30 tablet 5   • B-D UF III MINI PEN NEEDLES 31G X 5 MM misc      • Brexpiprazole (REXULTI) 1 MG tablet Take  by mouth Daily.     • CloNIDine (CATAPRES) 0.1 MG tablet 0.1 mg 2 (Two) Times a Day.     • clopidogrel (PLAVIX) 75 MG tablet Take 1 tablet by mouth Daily. 30 tablet 5   • fluticasone-salmeterol (ADVAIR) 100-50 MCG/DOSE DISKUS Inhale 1 puff As Needed.     • gabapentin (NEURONTIN) 800 MG tablet Take 800 mg by mouth 4 (Four) Times a Day.     • ibrutinib (IMBRUVICA) 420 MG tablet tablet Take 1 tablet by mouth Daily. 28 tablet 1   • Ibuprofen (ADVIL PO) Take 400 mg by mouth 3 (Three) Times a Day. LIQUIGELS     • Insulin Aspart (NOVOLOG FLEXPEN SC) Inject 8-12 Units under the skin 3 (Three) Times a Day Before Meals. PER SLIDING SCALE     • Insulin Detemir (LEVEMIR FLEXPEN SC) Inject 8-12 Units under the skin Every Night. SLIDING SCALE     • levETIRAcetam (KEPPRA) 1000 MG tablet Take 1,000 mg by mouth 2 (Two) Times a Day.     • lisinopril (PRINIVIL,ZESTRIL) 20 MG tablet Take 20 mg by mouth Every Morning.     • loratadine (CLARITIN) 10 MG tablet Take 10 mg by mouth Every Night.     • metoprolol tartrate (LOPRESSOR) 25 MG tablet 25 mg 2 (Two) Times a Day.     • ondansetron (ZOFRAN) 8 MG tablet Take 1 tablet by mouth Every 8 (Eight) Hours As Needed for Nausea or Vomiting. 90 tablet 6   • oxyCODONE-acetaminophen (PERCOCET)  MG per tablet Take 1 tablet by mouth " Every 6 (Six) Hours As Needed for Moderate Pain .     • prochlorperazine (COMPAZINE) 10 MG tablet Take 1 tablet by mouth Every 6 (Six) Hours As Needed for Nausea or Vomiting. 60 tablet 3   • Rosuvastatin Calcium (CRESTOR PO) Take  by mouth.     • sertraline (ZOLOFT) 100 MG tablet Take 100 mg by mouth Every Night.     • sulfamethoxazole-trimethoprim (BACTRIM,SEPTRA) 400-80 MG tablet TAKE 1 TABLET BY MOUTH EVERY DAY FOR INFECTION 30 tablet 3   • valACYclovir (VALTREX) 500 MG tablet TAKE 1 TABLET BY MOUTH EVERY DAY 30 tablet 3     No current facility-administered medications for this visit.          LABORATORY:    Lab Results   Component Value Date    TSH 1.036 11/13/2017    PROTIME 10.2 04/17/2014    INR 0.96 04/17/2014     Lab Results   Component Value Date    PROTIME 10.2 04/17/2014    INR 0.96 04/17/2014     No results found for: HAV, HCVQUANT, [id number], HCVINFO, HCVGENOTYPE  Lab Results   Component Value Date    HEPBSAB Non-Reactive 04/27/2018     Last Urine Toxicity     There is no flowsheet data to display.          ASSESSMENT/PLAN:    Patient Update Assessment (new medications, allergies, medical history): Updated     Medication(s): Imbruvica 420 mg    Currently Taking Medication(s): Yes    Effectiveness of Medication: Effective    Experiencing Side Effects: None significant    Prior Authorization Status: Active    Financial Assistance Status: None needed at this time    Any Issues Identified: Not currently aware of any issues.    Appropriate to Process Prescription(s): Yes. Patient states she is taking with no significant side effects. Chart review performed reviewing progress notes, labs, etc. After assessment and patient discussion, it is appropriate to process.    Counseling Offered: Patient had no questions/concerns.    Next Specialty Pharmacy Visit: In approximately 1 month.

## 2020-04-26 DIAGNOSIS — C91.10 CLL (CHRONIC LYMPHOCYTIC LEUKEMIA) (HCC): ICD-10-CM

## 2020-05-04 ENCOUNTER — SPECIALTY PHARMACY (OUTPATIENT)
Dept: PHARMACY | Facility: HOSPITAL | Age: 55
End: 2020-05-04

## 2020-05-04 DIAGNOSIS — C91.10 CLL (CHRONIC LYMPHOCYTIC LEUKEMIA) (HCC): ICD-10-CM

## 2020-05-04 NOTE — PROGRESS NOTES
Specialty Pharmacy Note      Name:  Melania Mae  :  1965  Date:  2020         Past Medical History:   Diagnosis Date   • Aneurysm (CMS/HCC)     Cavernous left ICA aneurysm   • Asthma    • Carotid artery occlusion    • Chronic back pain    • CLL (chronic lymphocytic leukemia) (CMS/HCC)     2012   • COPD (chronic obstructive pulmonary disease) (CMS/[facility])    • Degenerative arthritis    • Depression    • Diabetes mellitus (CMS/[facility])     type 2   • Hyperlipidemia    • Hypertension    • Lymphadenopathy    • Non Hodgkin's lymphoma (CMS/[facility])        Past Surgical History:   Procedure Laterality Date   • APPENDECTOMY     • BACK SURGERY       (work accident) c-spine surgery    • CAROTID STENT Right 2017   • CEREBRAL ANGIOGRAM N/A 2017    Diagnostic Carotid/Cerebral Angiogram   • CHOLECYSTECTOMY     • HYSTERECTOMY      for endometriosis/ovarian bleed   • NECK SURGERY     • OTHER SURGICAL HISTORY      pac insertion and removal   • TONSILLECTOMY         Social History     Socioeconomic History   • Marital status:      Spouse name: Not on file   • Number of children: Not on file   • Years of education: Not on file   • Highest education level: Not on file   Tobacco Use   • Smoking status: Current Every Day Smoker     Packs/day: 0.50     Years: 35.00     Pack years: 17.50     Types: Cigarettes   • Smokeless tobacco: Never Used   • Tobacco comment: 0.5-1 PPD   Substance and Sexual Activity   • Alcohol use: No   • Drug use: No   • Sexual activity: Defer       Family History   Problem Relation Age of Onset   • Lung cancer Father 72   • Hypertension Father    • Heart disease Father    • Cancer Father    • Diabetes Father    • Other Brother 46        MI   • Heart disease Brother    • Breast cancer Paternal Aunt    • Colon cancer Maternal Grandfather    • Other Cousin         CLL (dx 48 yo)   • Hypertension Mother        Allergies   Allergen Reactions   • Codeine Other  "(See Comments)     \"UNKNOW  CHILDHOOD REACTION\"   • Penicillins Other (See Comments)     \"UNKNOWN CHILDHOOD ALLERGY\"   • Rituxan [Rituximab] Other (See Comments)     \"THROAT RAWNESS; FELT LIKE MY THROAT WAS CLOSING UP\"       Current Outpatient Medications   Medication Sig Dispense Refill   • albuterol (PROVENTIL HFA;VENTOLIN HFA) 108 (90 Base) MCG/ACT inhaler Inhale 2 puffs As Needed for Wheezing.     • ALPRAZolam (XANAX) 0.5 MG tablet Take 0.5 mg by mouth 3 (Three) Times a Day As Needed for Anxiety.     • aspirin 81 MG EC tablet Take 1 tablet by mouth Daily. 30 tablet 5   • B-D UF III MINI PEN NEEDLES 31G X 5 MM misc      • Brexpiprazole (REXULTI) 1 MG tablet Take  by mouth Daily.     • CloNIDine (CATAPRES) 0.1 MG tablet 0.1 mg 2 (Two) Times a Day.     • clopidogrel (PLAVIX) 75 MG tablet Take 1 tablet by mouth Daily. 30 tablet 5   • fluticasone-salmeterol (ADVAIR) 100-50 MCG/DOSE DISKUS Inhale 1 puff As Needed.     • gabapentin (NEURONTIN) 800 MG tablet Take 800 mg by mouth 4 (Four) Times a Day.     • ibrutinib (IMBRUVICA) 420 MG tablet tablet Take 1 tablet by mouth Daily. 28 tablet 1   • Ibuprofen (ADVIL PO) Take 400 mg by mouth 3 (Three) Times a Day. LIQUIGELS     • Insulin Aspart (NOVOLOG FLEXPEN SC) Inject 8-12 Units under the skin 3 (Three) Times a Day Before Meals. PER SLIDING SCALE     • Insulin Detemir (LEVEMIR FLEXPEN SC) Inject 8-12 Units under the skin Every Night. SLIDING SCALE     • levETIRAcetam (KEPPRA) 1000 MG tablet Take 1,000 mg by mouth 2 (Two) Times a Day.     • lisinopril (PRINIVIL,ZESTRIL) 20 MG tablet Take 20 mg by mouth Every Morning.     • loratadine (CLARITIN) 10 MG tablet Take 10 mg by mouth Every Night.     • metoprolol tartrate (LOPRESSOR) 25 MG tablet 25 mg 2 (Two) Times a Day.     • ondansetron (ZOFRAN) 8 MG tablet Take 1 tablet by mouth Every 8 (Eight) Hours As Needed for Nausea or Vomiting. 90 tablet 6   • oxyCODONE-acetaminophen (PERCOCET)  MG per tablet Take 1 tablet by " mouth Every 6 (Six) Hours As Needed for Moderate Pain .     • prochlorperazine (COMPAZINE) 10 MG tablet Take 1 tablet by mouth Every 6 (Six) Hours As Needed for Nausea or Vomiting. 60 tablet 3   • Rosuvastatin Calcium (CRESTOR PO) Take  by mouth.     • sertraline (ZOLOFT) 100 MG tablet Take 100 mg by mouth Every Night.     • sulfamethoxazole-trimethoprim (BACTRIM,SEPTRA) 400-80 MG tablet TAKE 1 TABLET BY MOUTH EVERY DAY FOR INFECTION 30 tablet 3   • valACYclovir (VALTREX) 500 MG tablet TAKE 1 TABLET BY MOUTH EVERY DAY 30 tablet 3     No current facility-administered medications for this visit.        ASSESSMENT/PLAN:    Patient Update Assessment (new medications, allergies, medical history): Assessed    Medication(s): Imbruvica    Currently Taking Medication(s): Yes    Effectiveness of Medication: On maintenance therapy    Experiencing Side Effects: Minimal    Prior Authorization Status: Active    Financial Assistance Status: None needed at this time    Any Issues Identified: Patient is out of refills at [facility]. Thus, new Rx obtained. Next office visit is scheduled for 05/27/2020.    Appropriate to Process Prescription(s): Yes, after assessment of chart review and patient discussion, it is appropriate at this time to process at [facility].     Counseling Offered: Patient declined    Next Specialty Pharmacy Visit: in ~4 weeks.

## 2020-05-26 ENCOUNTER — TELEPHONE (OUTPATIENT)
Dept: ONCOLOGY | Facility: CLINIC | Age: 55
End: 2020-05-26

## 2020-05-27 RX ORDER — PROMETHAZINE HYDROCHLORIDE 25 MG/1
25 TABLET ORAL EVERY 6 HOURS PRN
Qty: 60 TABLET | Refills: 1 | Status: SHIPPED | OUTPATIENT
Start: 2020-05-27 | End: 2020-10-16

## 2020-05-27 NOTE — TELEPHONE ENCOUNTER
Pt called requesting prescription for phenergan.  Pt has been taking zofran and compazine and continues to c/o n/v.  Pt stated phenergan usually helps with her nausea.  Pt aware to discontinue compazine.  BRANDON Stein aware, Phenergan 25 mg po every 6 hrs PRN for n/v e-scribed to [facility] as ordered. Pt aware to push oral fluids and call with any other questions or concerns.

## 2020-06-03 RX ORDER — PROCHLORPERAZINE MALEATE 10 MG
TABLET ORAL
Qty: 60 TABLET | Refills: 3 | OUTPATIENT
Start: 2020-06-03

## 2020-06-03 NOTE — TELEPHONE ENCOUNTER
Spoke with patient regarding compazine refill request today, pt stated that she went to pharmacy to get refill & script has been discontinued. She stated that pharmacy must have routed the refill request to us today. I informed patient that when she had called previously to report that Zofran & Compazine were not giving her relief, phenergan was then prescribed & Compazine was discontinued. Pt states that this is fine as phenergan is working well so Compazine is not needed. Encouraged pt to call back if any other issues arise. Pt verbalized understanding without any further questions at this time.

## 2020-06-09 ENCOUNTER — SPECIALTY PHARMACY (OUTPATIENT)
Dept: PHARMACY | Facility: HOSPITAL | Age: 55
End: 2020-06-09

## 2020-06-11 ENCOUNTER — OFFICE VISIT (OUTPATIENT)
Dept: ONCOLOGY | Facility: CLINIC | Age: 55
End: 2020-06-11

## 2020-06-11 ENCOUNTER — DOCUMENTATION (OUTPATIENT)
Dept: ONCOLOGY | Facility: HOSPITAL | Age: 55
End: 2020-06-11

## 2020-06-11 ENCOUNTER — LAB (OUTPATIENT)
Dept: ONCOLOGY | Facility: CLINIC | Age: 55
End: 2020-06-11

## 2020-06-11 VITALS
OXYGEN SATURATION: 97 % | RESPIRATION RATE: 20 BRPM | SYSTOLIC BLOOD PRESSURE: 144 MMHG | WEIGHT: 157.4 LBS | TEMPERATURE: 98 F | HEART RATE: 64 BPM | DIASTOLIC BLOOD PRESSURE: 67 MMHG | BODY MASS INDEX: 26.19 KG/M2

## 2020-06-11 DIAGNOSIS — Z72.0 TOBACCO USE: ICD-10-CM

## 2020-06-11 DIAGNOSIS — C91.10 CLL (CHRONIC LYMPHOCYTIC LEUKEMIA) (HCC): Primary | ICD-10-CM

## 2020-06-11 DIAGNOSIS — I10 HYPERTENSION, UNSPECIFIED TYPE: ICD-10-CM

## 2020-06-11 DIAGNOSIS — C91.10 CLL (CHRONIC LYMPHOCYTIC LEUKEMIA) (HCC): ICD-10-CM

## 2020-06-11 LAB
ALBUMIN SERPL-MCNC: 4.11 G/DL (ref 3.5–5.2)
ALBUMIN/GLOB SERPL: 1.8 G/DL
ALP SERPL-CCNC: 68 U/L (ref 39–117)
ALT SERPL W P-5'-P-CCNC: 8 U/L (ref 1–33)
ANION GAP SERPL CALCULATED.3IONS-SCNC: 9.5 MMOL/L (ref 5–15)
AST SERPL-CCNC: 9 U/L (ref 1–32)
BASOPHILS # BLD AUTO: 0.02 10*3/MM3 (ref 0–0.2)
BASOPHILS NFR BLD AUTO: 0.3 % (ref 0–1.5)
BILIRUB SERPL-MCNC: 0.2 MG/DL (ref 0.2–1.2)
BUN BLD-MCNC: 7 MG/DL (ref 6–20)
BUN/CREAT SERPL: 9.1 (ref 7–25)
CALCIUM SPEC-SCNC: 9.2 MG/DL (ref 8.6–10.5)
CHLORIDE SERPL-SCNC: 99 MMOL/L (ref 98–107)
CO2 SERPL-SCNC: 22.5 MMOL/L (ref 22–29)
CREAT BLD-MCNC: 0.77 MG/DL (ref 0.57–1)
DEPRECATED RDW RBC AUTO: 45.8 FL (ref 37–54)
EOSINOPHIL # BLD AUTO: 0.08 10*3/MM3 (ref 0–0.4)
EOSINOPHIL NFR BLD AUTO: 1.4 % (ref 0.3–6.2)
ERYTHROCYTE [DISTWIDTH] IN BLOOD BY AUTOMATED COUNT: 12.9 % (ref 12.3–15.4)
GFR SERPL CREATININE-BSD FRML MDRD: 78 ML/MIN/1.73
GLOBULIN UR ELPH-MCNC: 2.3 GM/DL
GLUCOSE BLD-MCNC: 164 MG/DL (ref 65–99)
HCT VFR BLD AUTO: 35.8 % (ref 34–46.6)
HGB BLD-MCNC: 11.9 G/DL (ref 12–15.9)
IMM GRANULOCYTES # BLD AUTO: 0.03 10*3/MM3 (ref 0–0.05)
IMM GRANULOCYTES NFR BLD AUTO: 0.5 % (ref 0–0.5)
LYMPHOCYTES # BLD AUTO: 1.28 10*3/MM3 (ref 0.7–3.1)
LYMPHOCYTES NFR BLD AUTO: 21.8 % (ref 19.6–45.3)
MCH RBC QN AUTO: 32.4 PG (ref 26.6–33)
MCHC RBC AUTO-ENTMCNC: 33.2 G/DL (ref 31.5–35.7)
MCV RBC AUTO: 97.5 FL (ref 79–97)
MONOCYTES # BLD AUTO: 0.36 10*3/MM3 (ref 0.1–0.9)
MONOCYTES NFR BLD AUTO: 6.1 % (ref 5–12)
NEUTROPHILS # BLD AUTO: 4.09 10*3/MM3 (ref 1.7–7)
NEUTROPHILS NFR BLD AUTO: 69.9 % (ref 42.7–76)
NRBC BLD AUTO-RTO: 0 /100 WBC (ref 0–0.2)
PLATELET # BLD AUTO: 193 10*3/MM3 (ref 140–450)
PMV BLD AUTO: 10.1 FL (ref 6–12)
POTASSIUM BLD-SCNC: 4.2 MMOL/L (ref 3.5–5.2)
PROT SERPL-MCNC: 6.4 G/DL (ref 6–8.5)
RBC # BLD AUTO: 3.67 10*6/MM3 (ref 3.77–5.28)
SODIUM BLD-SCNC: 131 MMOL/L (ref 136–145)
WBC NRBC COR # BLD: 5.86 10*3/MM3 (ref 3.4–10.8)

## 2020-06-11 PROCEDURE — 80053 COMPREHEN METABOLIC PANEL: CPT | Performed by: INTERNAL MEDICINE

## 2020-06-11 PROCEDURE — 85025 COMPLETE CBC W/AUTO DIFF WBC: CPT | Performed by: INTERNAL MEDICINE

## 2020-06-11 PROCEDURE — 99214 OFFICE O/P EST MOD 30 MIN: CPT | Performed by: INTERNAL MEDICINE

## 2020-06-11 PROCEDURE — 36415 COLL VENOUS BLD VENIPUNCTURE: CPT

## 2020-06-11 RX ORDER — ARIPIPRAZOLE 15 MG/1
15 TABLET ORAL DAILY
COMMUNITY
End: 2020-10-16

## 2020-06-11 NOTE — PROGRESS NOTES
NAME: Melania Mae    : 1965    DATE:  2020    DIAGNOSIS:  SLL - initially diagnosed with [place] Stage II Disease in May 2012, based on excisional biopsy of a L supraclavicular LN. Bone marrow was involved.     CHIEF COMPLAINT:  CLL / SLL    TREATMENT HISTORY:  1. Received 6 cycles of Treanda without Rituxan between 10-24-12 and 3-21-13 because of anaphylactic reaction to Rituximab with her 1st cycle of treatment . Received Neulasta support. (Delivered by Dr. Koehler)     2. Imbruvica started 18      HISTORY OF PRESENT ILLNESS:   Ms. Mae was referred by Gillian Harrison for evaluation and treatment of CLL / SLL. She was reportedly diagnosed in May of 2012 when she saw Dr. Carbajal for biopsy of a L supraclavicular LN. At that time she had excessive fatigue & a 40 lb weight loss. She saw Dr. Koehler at  at time who performed bone marrow exam (bone marrow was involved) and started treatment with Bendamustine / Rituximab. Unfortunately with her 1st treatment, she had an anaphylactic reaction to Rituximab so this was not given with subsequent cycles of therapy. Her last cycle of treatment was 3-21-13. Per Dr. Koehler' s records, she had a complete response to treatment. She never had a significant leukocytosis or lymphocytosis, but had predominantly a lymphomatous presentation of her disease.     INTERVAL HISTORY:  Ms. Mae is here today for follow up of CLL/SLL. She continues to take Imbruvica and is tolerating this well.  She has been struggling with anxiety and also continuing to work on her blood pressure.  She has been very careful to practice social distancing during the Coronavirus pandemic.  She is otherwise well and has no particular concerns about her health today.   She is worried about her daughter who has upcoming colon surgery.      PAST MEDICAL HISTORY:  Past Medical History:   Diagnosis Date   • Aneurysm (CMS/HCC)     Cavernous left ICA aneurysm   • Asthma    • Carotid  artery occlusion    • Chronic back pain    • CLL (chronic lymphocytic leukemia) (CMS/HCC)     OCTOBER OF 2012   • COPD (chronic obstructive pulmonary disease) (CMS/HCC)    • Degenerative arthritis    • Depression    • Diabetes mellitus (CMS/HCC)     type 2   • Hyperlipidemia    • Hypertension    • Lymphadenopathy    • Non Hodgkin's lymphoma (CMS/HCC)        PAST SURGICAL HISTORY:  Past Surgical History:   Procedure Laterality Date   • APPENDECTOMY     • BACK SURGERY      2002 (work accident) c-spine surgery 4/14   • CAROTID STENT Right 08/23/2017   • CEREBRAL ANGIOGRAM N/A 8/23/2017    Diagnostic Carotid/Cerebral Angiogram   • CHOLECYSTECTOMY     • HYSTERECTOMY      for endometriosis/ovarian bleed   • NECK SURGERY     • OTHER SURGICAL HISTORY      pac insertion and removal   • TONSILLECTOMY         FAMILY HISTORY:  Family History   Problem Relation Age of Onset   • Lung cancer Father 72   • Hypertension Father    • Heart disease Father    • Cancer Father    • Diabetes Father    • Other Brother 46        MI   • Heart disease Brother    • Breast cancer Paternal Aunt    • Colon cancer Maternal Grandfather    • Other Cousin         CLL (dx 48 yo)   • Hypertension Mother      Social History     Socioeconomic History   • Marital status:      Spouse name: Not on file   • Number of children: Not on file   • Years of education: Not on file   • Highest education level: Not on file   Tobacco Use   • Smoking status: Current Every Day Smoker     Packs/day: 0.50     Years: 35.00     Pack years: 17.50     Types: Cigarettes   • Smokeless tobacco: Never Used   • Tobacco comment: 0.5-1 PPD   Substance and Sexual Activity   • Alcohol use: No   • Drug use: No   • Sexual activity: Defer     REVIEW OF SYSTEMS:    A comprehensive 14 point review of systems was performed and was negative except as mentioned    MEDICATIONS:  The current medication list was reviewed in the EMR    Current Outpatient Medications:   •  albuterol  (PROVENTIL HFA;VENTOLIN HFA) 108 (90 Base) MCG/ACT inhaler, Inhale 2 puffs As Needed for Wheezing., Disp: , Rfl:   •  ALPRAZolam (XANAX) 0.5 MG tablet, Take 0.5 mg by mouth 3 (Three) Times a Day As Needed for Anxiety., Disp: , Rfl:   •  ARIPiprazole (ABILIFY) 15 MG tablet, Take 15 mg by mouth Daily., Disp: , Rfl:   •  aspirin 81 MG EC tablet, Take 1 tablet by mouth Daily., Disp: 30 tablet, Rfl: 5  •  B-D UF III MINI PEN NEEDLES 31G X 5 MM misc, , Disp: , Rfl:   •  CloNIDine (CATAPRES) 0.1 MG tablet, 0.1 mg As Needed., Disp: , Rfl:   •  clopidogrel (PLAVIX) 75 MG tablet, Take 1 tablet by mouth Daily., Disp: 30 tablet, Rfl: 5  •  fluticasone-salmeterol (ADVAIR) 100-50 MCG/DOSE DISKUS, Inhale 1 puff As Needed., Disp: , Rfl:   •  gabapentin (NEURONTIN) 800 MG tablet, Take 800 mg by mouth 4 (Four) Times a Day., Disp: , Rfl:   •  ibrutinib (IMBRUVICA) 420 MG tablet tablet, Take 1 tablet by mouth Daily., Disp: 28 tablet, Rfl: 1  •  Ibuprofen (ADVIL PO), Take 400 mg by mouth 3 (Three) Times a Day. LIQUIGELS, Disp: , Rfl:   •  Insulin Aspart (NOVOLOG FLEXPEN SC), Inject 8-12 Units under the skin 3 (Three) Times a Day Before Meals. PER SLIDING SCALE, Disp: , Rfl:   •  Insulin Detemir (LEVEMIR FLEXPEN SC), Inject 8-12 Units under the skin Every Night. SLIDING SCALE, Disp: , Rfl:   •  lisinopril (PRINIVIL,ZESTRIL) 20 MG tablet, Take 20 mg by mouth Every Morning., Disp: , Rfl:   •  loratadine (CLARITIN) 10 MG tablet, Take 10 mg by mouth Every Night., Disp: , Rfl:   •  metoprolol tartrate (LOPRESSOR) 25 MG tablet, 25 mg 2 (Two) Times a Day., Disp: , Rfl:   •  ondansetron (ZOFRAN) 8 MG tablet, Take 1 tablet by mouth Every 8 (Eight) Hours As Needed for Nausea or Vomiting., Disp: 90 tablet, Rfl: 6  •  oxyCODONE-acetaminophen (PERCOCET)  MG per tablet, Take 1 tablet by mouth Every 6 (Six) Hours As Needed for Moderate Pain ., Disp: , Rfl:   •  promethazine (PHENERGAN) 25 MG tablet, Take 1 tablet by mouth Every 6 (Six) Hours As  "Needed for Nausea or Vomiting., Disp: 60 tablet, Rfl: 1  •  sertraline (ZOLOFT) 100 MG tablet, Take 100 mg by mouth Every Night., Disp: , Rfl:   •  sulfamethoxazole-trimethoprim (BACTRIM,SEPTRA) 400-80 MG tablet, TAKE 1 TABLET BY MOUTH EVERY DAY FOR INFECTION, Disp: 30 tablet, Rfl: 3  •  valACYclovir (VALTREX) 500 MG tablet, TAKE 1 TABLET BY MOUTH EVERY DAY, Disp: 30 tablet, Rfl: 3    ALLERGIES:    Allergies   Allergen Reactions   • Codeine Other (See Comments)     \"UNKNOW  CHILDHOOD REACTION\"   • Penicillins Other (See Comments)     \"UNKNOWN CHILDHOOD ALLERGY\"   • Rituxan [Rituximab] Other (See Comments)     \"THROAT RAWNESS; FELT LIKE MY THROAT WAS CLOSING UP\"       PHYSICAL EXAM:  Visit Vitals  /67   Pulse 64   Temp 98 °F (36.7 °C) (Temporal)   Resp 20   Wt 71.4 kg (157 lb 6.4 oz)   SpO2 97%   BMI 26.19 kg/m²     Pain Score    06/11/20 1454   PainSc:   4     General: Alert and oriented. Appears well  HEENT: Pupils are equal, round and reactive to light and accommodation, Extraocular movements full, oropharynx clear  Neck: no jvd or thyromegaly   Cardiovascular: regular rate and rhythm without murmurs, rubs or gallops.   Pulmonary: clear to auscultation bilaterally, no wheezes  Abdomen: soft, non-tender, non-distended, normal active bowel sounds present, no organomegaly   Extremities: No clubbing, cyanosis or edema   Lymph: Previously had cj cervical, L supraclavicular, cj axillary and cj inguinal adenopathy.  This has all resolved.    Neurologic: MS as above, CN II-XII intact. Strength 4+-5-/5 throughout BUE proximally and distally and in LLE.      PATHOLOGY:  5-25-12 L Supraclavicular LN Biopsy:   - Extensive small lymphocytic lymphoma / chronic lymphocytic leukemia involvement.   Flow cytometry shows monoclonal Kappa B-cell population which coexpresses CD5 and CD23 representing 30% of gated cells. There is dim surface light chain and dim CD20 expression. No CD38 expression. Neoplastic cells are positive " for CD20 (dim), PAX-5, CCD5, CD23, CD43. B cells are + for CD3. BCL1 stain negative.    BM Biopsy 6-29-12 ([facility])   - Limited BM specimen with scattered hematopoietic cells.   - Flow cytometry revealed peripheral blood with minute CLL/SLL population (0.5%) with normal  FISH studies.     Cervical Disectomy 04-18-14:   - Fragments of fibrohyaline cartilage, no acute inflammation or metastatic disease identified.     IMAGING:  PET-CT 6-19-12:   - Mild to moderate bilateral axillary LAD and mediastinal LAD with minimal associated hypermetabolism.     PET-CT 10-09-12:   - Multiple areas of abnormal hypermetabolism in the neck bilaterally, new from the prior study. R posterior trangle focus with SUV 3.1. L supraclavicular focus SUV 5.0 (LN 1.8 cm, prior 1.2 cm)   - Carl axillary LAD - L axilla 2.8 cm LN (prior 1.6 cm) with SUV 3.6, R axillary ln new 2.0 cm, SUV 2.6   - Mediastinal LAD noted. R prevascular LN 1.5 cm with SUV 2.1   - Carl external iliac LNs present ( R 3.2 cm with SUV 3.1)   - Overall worsening areas of hypermetabolism corresponding to enlarging nodes c/w worsening neoplastic involvement.     PET-CT 1-16-13:   - No PET-CT findings of active disease related to the patient's lymphoma. Interval resolution of hypermetabolic adenopathy described on previous examination. Spleen is normal.     PET-CT 1-21-14:   - No abnormal hypermetabolism to suggest neoplastic involvement. No mass or adenopathy noted. Spleen is normal.     04-03-14 MRI Spine:   - Cervical: Degenerative disk disease in the cervical disk spaces at the level of C4-C7. At 4-5, there is a disk herniation associated with bulging. There was spinal stenosis at C5-6 but no disk herniation. At C6-7, the disk bulging is not felt to be significant. The postcontrasted scans in the cervical spine showed no areas of abnormal enhancement.   - Thoracic: negative MRI examination of the thoracic spine. A source of the patient's t horacic spine pain was not  demonstrated.     Chest Xray 04-17-15:   - The lungs are clear, the heart is normal. There is no active disease in the chest.     CT CAP/neck w/ contrast 05-13-14:   - No neck lymphadenopathy by CT size criteria   - No intrathoracic or abdominal LAD     CTAP, neck w/ contrast 03-04-15:   - No enlarged lymph nodes were demonstrated. The bile ducts in the liver and the extrahepatic biliary tree is slightly dilated.   - Clinical correlation with laboratory evaluation for obstruction suggested. This, however, is not significantly changed from a previous CT from 05/2014. No retroperitoneal lymphadenopathy is identified.   - Negative CT of the of the soft tissues of the neck. No abnormally enlarged lymph nodes are demonstrated.     CT Neck, CAP 3-15-16:   - There is a change in the CT scan. There are prominent lymph nodes in the neck bilaterally. The largest are in the supraclavicular area and measure greater than a centimeter. Most of the nodes are in the 1cm size range.   - In the chest, most of the nodes are in the subcen timeter size range. The largest nodes are in the pretacheal area and measure 15 mm. There were no enlarged hilar nodes. On the lung windows, there are no pleural effusions. No pulmonary or parenchymal lung nodules or masses were demonstrated. .   There are m ore prominent lymph nodes in the retroperitoneum some of which are enlarged today consistent with recurrence. Again, most of these are in the 1 cm size range, but a few measure up to 17-18 mm. No mesenteric adenopathy demonstrated.     CTCAP, Neck 05-10-16:   - Chest: Persistent stable adenopathy in the mediastinum and axillary regions. The lungs remain clear.   - A/P: The lymph nodes are stable in comparing with the previous CT done in March.   - Neck: Persistent but stable adenopathy throughout the jugular lymph node chains in both sides of the neck.     CTCAP neck 08-16-16:  - Stable mediastinal enlarged lymph nodes including an AP window lymph  node measuring 9 mm  - A left axillary region lymph node measures 1.9 cm and was 1.9 cm. Stable right axillary region lymph adenopathy.   - Stable retroperitoneal and mesenteric lymphadenopathy   - persistent lymphadenopathy throughout the neck that is stable in size.     CTCAP 06-07-17:  - Stable bilateral jugular chain lymphadenopathy.  - Stable bilateral axillary and mediastinal region lymphadenopathy.  - Stable retroperitoneal lymphadenopathy.    CTCAP, Neck 02-27-18:  - Grossly stable bilateral jugular chain lymphadenopathy.  - LUNGS: A NEW RIGHT UPPER LOBE PULMONARY NODULE MEASURES 8.8 MM. RIGHT MIDDLE LOBE PULMONARY PERIBRONCHIAL VASCULAR NODULARITY MAY BE INFECTIOUS OR INFLAMMATORY  - MEDIASTINUM: AGAIN THERE IS MEDIASTINAL BORDERLINE LYMPHADENOPATHY WITHIN AP WINDOW LYMPH NODE NOW MEASURING 1 CM AND WAS 8 MM WITH SIMILAR INCREASE IN SIZE OF MULTIPLE BILATERAL AXILLARY LYMPH NODES WITH THE LARGEST RIGHT MEASURING 4.4 CM OF THE CONGLOMERATE THAT WAS ABOUT 3 CM  AND A LEFT AXILLARY REGION LYMPH NODE MEASURING 2.7 CM THAT WAS 2.2 CM.  - SPLEEN: SPLENOMEGALY IS AGAIN NOTED.  -LYMPH NODES: Extensive retroperitoneal lymphadenopathy with a right para-aortic lymph node measuring about 4.5 cm and was 2.5 cm.    CTCAP 04-12-18:  - There are numerous enlarged supraclavicular lymph nodes.  - These have progressed in size in comparing with the earlier CT. Several of the lymph nodes now measure up to 3 cm in diameter. The lymph nodes extend into the axillary regions. There are some lymph nodes in the mediastinum that are relatively stable in comparing with the earlier exam. The lungs are both well aerated and clear.  - The liver was normal in size and shape. There is mild dilatation of the bile ducts within the liver. The gallbladder is surgically absent. The common hepatic duct was also dilated in the pancreatic head, this is unchanged from the earlier exam. The spleen is homogeneous in density and is stable in size.  There continues to be fairly marked adenopathy in the retroperitoneum. The lymph node size continues to increase slightly by a few millimeters. The largest bulk of lymph nodes is in the gastrohepatic ligament region. There are also lymph nodes along the aorta. These are larger in size as well. Some of the periaortic lymph nodes now measure up to 3 cm in diameter.   - There are enlarged lymph nodes in the mesentery. There are enlarged lymph nodes along the iliac lymph node chains and some borderline enlarged lymph nodes in both groins. The bowel shows no evidence of obstruction, the kidneys enhance appropriately.    US Pelvis, limited 5/2/18  IMPRESSION:  - There is a 8 mm subcutaneous walled collection suggestive of tiny abscess in the suprapubic region of interest.    08-28-18 CT Chest With Contrast   FINDINGS:   Today's chest CT is compared with a previous chest CT done in April. Contrast was administered intravenously and scans were obtained from the apices of the lung and extended to the diaphragm. Both mediastinal and lung windows were evaluated. On the mediastinal windows the adenopathy seen on the earlier exam in the supraclavicular area is significantly decreased. The lymph nodes are now less than a centimeter in diameter. The bilateral axillary lymph nodes have also decreased significantly in size. There are a few nodes that are greater than a  centimeter in diameter, but they have fatty hilar areas. The lymph nodes in the mediastinum have also significantly decreased in size, and they are now all less than a centimeter in diameter. On the lung windows, there were increased interstitial markings in the lungs. No pulmonary parenchymal lung nodules or masses were identified.     IMPRESSION:  Significant improvement in the chest. Lymph nodes in the  supraclavicular area, axillary regions, and mediastinum have all  significantly decreased in size. The lymph nodes that are larger than a  centimeter in diameter  have fatty changes in the hilum.    08-28-18 CT Abdomen Pelvis With Contrast   FINDINGS:   - Contrast was administered both orally and intravenously. Scans were obtained from the diaphragm and extended through the pelvis. The liver is normal in size and shape. No focal lesions were seen within the liver. The gallbladder is surgically absent. The spleen was homogeneous in density and normal in size. There were no masses in the adrenal glands. The kidneys show normal cortical enhancement. The abdominal aorta was normal in caliber. There has been marked improvement in the adenopathy in the retroperitoneum. Only a few enlarged lymph nodes remain. The mesenteric lymph nodes are also almost completely resolved.  In the pelvis, no enlarged lymph nodes are demonstrated on the current exam. Urinary bladder was smooth in contour. The inguinal adenopathy is also resolved. The bowel shows no evidence of obstruction. There is no ascites or free air. There is a moderate amount of stool throughout the colon suggesting constipation.     IMPRESSION:  Significant improvement in the adenopathy in the abdomen or pelvis. There are only a few scattered lymph nodes in the area of the gastrohepatic ligament that are greater than a centimeter in diameter.    CTCAP 09-04-19  Findings  LUNGS: Unremarkable. No parenchymal soft tissue nodules.  No focal air  space disease.     HEART: Unremarkable.     PERICARDIUM: No effusion.     MEDIASTINUM: STABLE BILATERAL AXILLARY REGION NONENLARGED LYMPH NODES AS WELL WAS NOT ENLARGED LYMPH NODES WITHIN THE SUPERIOR MEDIASTINUM.     PLEURA: No pleural effusion. No pleural mass or abnormal calcification.     MAJOR AIRWAYS: Clear. No intrinsic mass.     VASCULATURE: No evidence of aneurysm.     VISUALIZED UPPER ABDOMEN:  LIVER: Homogeneous. No focal hepatic mass or ductal dilatation.  SPLEEN: Homogeneous. No splenomegaly.  ADRENALS: No mass.  KIDNEYS: No mass. No obstructive uropathy.  No evidence  of  urolithiasis.  GI TRACT: Non-dilated. No definite wall thickening.  PERITONEUM: No free air. No free fluid or loculated fluid  collections.  ABDOMINAL WALL: No focal hernia or mass.     OTHER: None.     BONES: No acute bony abnormality.     IMPRESSION:  Stable appearance of the chest.    Findings  LOWER THORAX: Clear. No effusions.     ABDOMEN:  LIVER: Homogeneous. No focal hepatic mass or ductal dilatation.  GALLBLADDER: CHOLECYSTECTOMY CLIPS ARE NOTED. CHOLECYSTECTOMY CLIPS ARE NOTED AND THERE IS AGAIN ENLARGEMENT OF THE COMMON BILE DUCT.  PANCREAS: Unremarkable. No mass or ductal dilatation.  SPLEEN: Homogeneous. No splenomegaly.  ADRENALS: No mass.  KIDNEYS: No mass. No obstructive uropathy.  No evidence of  urolithiasis.  GI TRACT: ABUNDANT STOOL THROUGHOUT THE COLON.  PERITONEUM: No free air. No free fluid or loculated fluid  collections.  MESENTERY: Unremarkable.  LYMPH NODES: STABLE BORDERLINE ENLARGED MESENTERIC LYMPH NODES.  VASCULATURE: ATHEROSCLEROTIC VASCULAR CALCIFICATION.  ABDOMINAL WALL: No focal hernia or mass.     OTHER: None.     PELVIS:  BLADDER: No focal mass or significant wall thickening  REPRODUCTIVE: Unremarkable as visualized.  APPENDIX: Nondistended. No surrounding inflammation.     BONES: No acute bony abnormality.     IMPRESSION:  Grossly stable appearance of the abdomen..    CT Neck 09-04-19  FINDINGS:      No soft tissue masses.   Marked improvement since 02/27/2018 of bilateral lymphadenopathy that is  now only seen to be nonenlarged bilateral slightly bulky lymph nodes.   A few nonenlarged bilateral parotid gland lymph nodes are noted.   There is no parapharyngeal mass or fluid collection.   No pharyngeal mucosal asymmetry.   Bone windows demonstrate no acute osseous abnormality.   The sinuses are clear.   Traversing blood vessels incidentally appear unremarkable.     IMPRESSION:  Marked improvement since 02/27/2018 of bilateral  lymphadenopathy that is now only seen to be  nonenlarged bilateral  slightly bulky lymph nodes.       CT Neck 02-24-20  FINDINGS:  There is evidence of atherosclerotic vascular disease.     Stent in the right carotid.     No adenopathy.     No drainable fluid collection.     Postoperative anterior fusion device at C4-C6.     IMPRESSION:  No evidence of metastatic disease.      CTCAP 02-24-20  FINDINGS:  Adenopathy:No evidence of mediastinal or hilar lymph node enlargement.  No axillary lymph node enlargement.     Fluid:There are no pericardial or pleural effusions.     LUNGS:No parenchymal soft tissue nodules or masses are present.     Skeletal:No acute or destructive bony abnormality is identified.     Coronary artery calcifications are present.     IMPRESSION:  Stable appearance of the chest. No evidence of metastatic disease.    FINDINGS:   The lung bases are clear. There are no pleural effusions.     The liver is homogeneous. There is no evidence of focal hepatic mass.     The spleen is homogeneous.     There is no peripancreatic stranding or pancreatic head mass.     There is no adrenal enlargement.     The kidneys show no evidence of hydronephrosis or hydroureter. I do not  see any distal ureteral stones.      Otherwise I do not see any free fluid or walled off fluid collections.     There is no bowel wall thickening or mesenteric stranding.     Distended loops of colon. Moderate to large volume stool.     Extensive atherosclerotic vascular disease.     IMPRESSION:  1. No evidence of metastatic disease.  2. Colonic distention. Moderate stool in the colon.  3. Evidence of atherosclerotic vascular disease.      RECENT LABS:  Lab Results   Component Value Date    WBC 5.86 06/11/2020    HGB 11.9 (L) 06/11/2020    HCT 35.8 06/11/2020    MCV 97.5 (H) 06/11/2020    RDW 12.9 06/11/2020     06/11/2020    NEUTRORELPCT 69.9 06/11/2020    LYMPHORELPCT 21.8 06/11/2020    MONORELPCT 6.1 06/11/2020    EOSRELPCT 1.4 06/11/2020    BASORELPCT 0.3 06/11/2020     NEUTROABS 4.09 06/11/2020    LYMPHSABS 1.28 06/11/2020       Lab Results   Component Value Date     (L) 06/11/2020    K 4.2 06/11/2020    CO2 22.5 06/11/2020    CL 99 06/11/2020    BUN 7 06/11/2020    CREATININE 0.77 06/11/2020    GLUCOSE 164 (H) 06/11/2020    CALCIUM 9.2 06/11/2020    ALKPHOS 68 06/11/2020    AST 9 06/11/2020    ALT 8 06/11/2020    BILITOT 0.2 06/11/2020    ALBUMIN 4.11 06/11/2020    PROTEINTOT 6.4 06/11/2020       Lab Results   Component Value Date     09/13/2019    URICACID 3.1 09/13/2019     Date  SPEP/MONE Mspike Free K Free L  K/L Ratio   02-12-15 MONE normal Not observed   06-25-15 MONE normal Not observed 22.10  14.93  1.48  11-17-15 MONE normal Not observed 17.17  14.41  1.19    Date  IgG IgA IgM  02-12-15 744 110 43  06-25-15 776 94 39  11-17-15 812 99 41  03-29-17 771 103 34  08-04-17 831 94 27  11-13-17 791 95 21  02-27-18 879 109 25     CLL profile, FISH:  The CLL interphase fluorescence in situ hybridization   (FISH) panel analysis was normal. There were no cells with   CCND1-IGH fusion. No extra signals or deletions of OLAF,   chromosome 12, 13q, or TP53 were observed.       SPECIFIC FISH RESULTS:     CCND1/IGH: NORMAL   .         nuc kenzie 11q13(TFMZ6o4),14q32(IGHx2)[100]       OLAF: NORMAL         nuc kenzie 11q22.3(ATMx2)[100] .     12cen: NORMAL     .         nuc kenzie 12cen([id number])[100] .     13q: NORMAL  .         nuc kenzie 13q14.3(DLEUx2),13q34(LFXS3m9)[100] .     TP53: NORMAL  .         nuc kenzie 17p13.1(TP53x2)[100]     Acute Hepatitis Panel: Negative    ASSESSMENT & PLAN:  Ms. Mae is a 55 y.o.  female with a history of Stage II SLL diagnosed in May of 2012 by L supraclavicular LN biopsy.     1. Small Lymphocytic Lymphoma:   - Treated as above with 6 cycles of Bendamustine (after anaphylactic reaction to Rituximab with 1st cycle of treatment) with complete radiographic response.   - She does have adenopathy in the cj cervical, supraclavicular, axillary and cj  inguinal areas c/w CLL/SLL. She also has small mediastinal nodes and retroperitoneal LAD.  She has had continued growth of the lymph nodes, and has been losing weight.  She has some night sweats which may or may not be related.  -  Recommended consideration of treatment given worsening disease.    -  Given rituxan allergy, recommended treatment with Imbruvica 420 mg daily with Bactrim SS daily and Valtrex 500 mg daily for prophylaxis.  -  She has tolerated treatment really well with excellent disease control.  Will order CT Neck, CAP and ask her to return in about a monht.    2.  Signficant vascular disease:  -  She has been evaluated by Dr. Amaral and says everything turned out ok..  -  She has been working with her PCP on better BP control.     3.  Tobacco use:  She continues to smoke.  She says she wants to quit but isn't ready to do so at this time.    4. Degenerative disc disease with worsening LLE and BUE symptoms:  Follows with Dr. Soto.    5.  Anxiety/Depression: Her prior psychiatrist relocated but she has found a new provider.  She continues with significant anxiety.    6.   Prophylaxis:  She received  Prevnar vaccine 11-13-17.  She has had 2019 influenza vaccine.  Previously recommended HAV vaccine as well as Shingrix vaccine.    7.  ACO / ZOYA/Other  Quality measures  - Melania Mae received 2019 flu vaccine.    - Melania Mae reports a pain score of 6.  Given her pain assessment as noted, treatment options were discussed and the following options were decided upon as a follow-up plan to address the patient's pain: continued f/u with PCP for non-neoplasm related pain..  - Current outpatient and discharge medications have been reconciled for the patient.  Reviewed by: Thea Sorto MD       8. Follow up:   - will have her return for follow up in 1 month with CT Neck, CAP as well as labs prior including CBC, CMP, LDH, Uric acid    This note was scribed for Thea Sorto MD by Violeta  JAMES Fong.    I, Thea Sorto MD, personally performed the services described in this documentation as scribed by the above named individual in my presence, and it is both accurate and complete.  06/11/2020       I spent 25 minutes with Melania Mae today.  In the office today, more than 50% of this time was spent face-to-face with her  in counseling / coordination of care, reviewing her interim medical history and counseling on the current treatment plan.  All questions were answered to her satisfaction.          Electronically Signed by: Thea Sorto MD    CC:   Gillian Harrison APRN Dr. Donald Brown

## 2020-06-12 DIAGNOSIS — C91.10 CLL (CHRONIC LYMPHOCYTIC LEUKEMIA) (HCC): Primary | ICD-10-CM

## 2020-06-12 NOTE — PROGRESS NOTES
Specialty Pharmacy Note      Name:  Melania Mae  :  1965  Date:  2020         Past Medical History:   Diagnosis Date   • Aneurysm (CMS/HCC)     Cavernous left ICA aneurysm   • Asthma    • Carotid artery occlusion    • Chronic back pain    • CLL (chronic lymphocytic leukemia) (CMS/HCC)     2012   • COPD (chronic obstructive pulmonary disease) (CMS/Formerly [facility])    • Degenerative arthritis    • Depression    • Diabetes mellitus (CMS/Formerly [facility])     type 2   • Hyperlipidemia    • Hypertension    • Lymphadenopathy    • Non Hodgkin's lymphoma (CMS/Formerly [facility])        Past Surgical History:   Procedure Laterality Date   • APPENDECTOMY     • BACK SURGERY       (work accident) c-spine surgery    • CAROTID STENT Right 2017   • CEREBRAL ANGIOGRAM N/A 2017    Diagnostic Carotid/Cerebral Angiogram   • CHOLECYSTECTOMY     • HYSTERECTOMY      for endometriosis/ovarian bleed   • NECK SURGERY     • OTHER SURGICAL HISTORY      pac insertion and removal   • TONSILLECTOMY         Social History     Socioeconomic History   • Marital status:      Spouse name: Not on file   • Number of children: Not on file   • Years of education: Not on file   • Highest education level: Not on file   Tobacco Use   • Smoking status: Current Every Day Smoker     Packs/day: 0.50     Years: 35.00     Pack years: 17.50     Types: Cigarettes   • Smokeless tobacco: Never Used   • Tobacco comment: 0.5-1 PPD   Substance and Sexual Activity   • Alcohol use: No   • Drug use: No   • Sexual activity: Defer       Family History   Problem Relation Age of Onset   • Lung cancer Father 72   • Hypertension Father    • Heart disease Father    • Cancer Father    • Diabetes Father    • Other Brother 46        MI   • Heart disease Brother    • Breast cancer Paternal Aunt    • Colon cancer Maternal Grandfather    • Other Cousin         CLL (dx 46 yo)   • Hypertension Mother        Allergies   Allergen Reactions   • Codeine Other  "(See Comments)     \"UNKNOW  CHILDHOOD REACTION\"   • Penicillins Other (See Comments)     \"UNKNOWN CHILDHOOD ALLERGY\"   • Rituxan [Rituximab] Other (See Comments)     \"THROAT RAWNESS; FELT LIKE MY THROAT WAS CLOSING UP\"       Current Outpatient Medications   Medication Sig Dispense Refill   • albuterol (PROVENTIL HFA;VENTOLIN HFA) 108 (90 Base) MCG/ACT inhaler Inhale 2 puffs As Needed for Wheezing.     • ALPRAZolam (XANAX) 0.5 MG tablet Take 0.5 mg by mouth 3 (Three) Times a Day As Needed for Anxiety.     • ARIPiprazole (ABILIFY) 15 MG tablet Take 15 mg by mouth Daily.     • aspirin 81 MG EC tablet Take 1 tablet by mouth Daily. 30 tablet 5   • B-D UF III MINI PEN NEEDLES 31G X 5 MM misc      • CloNIDine (CATAPRES) 0.1 MG tablet 0.1 mg As Needed.     • clopidogrel (PLAVIX) 75 MG tablet Take 1 tablet by mouth Daily. 30 tablet 5   • fluticasone-salmeterol (ADVAIR) 100-50 MCG/DOSE DISKUS Inhale 1 puff As Needed.     • gabapentin (NEURONTIN) 800 MG tablet Take 800 mg by mouth 4 (Four) Times a Day.     • ibrutinib (IMBRUVICA) 420 MG tablet tablet Take 1 tablet by mouth Daily. 28 tablet 1   • Ibuprofen (ADVIL PO) Take 400 mg by mouth 3 (Three) Times a Day. LIQUIGELS     • Insulin Aspart (NOVOLOG FLEXPEN SC) Inject 8-12 Units under the skin 3 (Three) Times a Day Before Meals. PER SLIDING SCALE     • Insulin Detemir (LEVEMIR FLEXPEN SC) Inject 8-12 Units under the skin Every Night. SLIDING SCALE     • lisinopril (PRINIVIL,ZESTRIL) 20 MG tablet Take 20 mg by mouth Every Morning.     • loratadine (CLARITIN) 10 MG tablet Take 10 mg by mouth Every Night.     • metoprolol tartrate (LOPRESSOR) 25 MG tablet 25 mg 2 (Two) Times a Day.     • ondansetron (ZOFRAN) 8 MG tablet Take 1 tablet by mouth Every 8 (Eight) Hours As Needed for Nausea or Vomiting. 90 tablet 6   • oxyCODONE-acetaminophen (PERCOCET)  MG per tablet Take 1 tablet by mouth Every 6 (Six) Hours As Needed for Moderate Pain .     • promethazine (PHENERGAN) 25 MG " tablet Take 1 tablet by mouth Every 6 (Six) Hours As Needed for Nausea or Vomiting. 60 tablet 1   • sertraline (ZOLOFT) 100 MG tablet Take 100 mg by mouth Every Night.     • sulfamethoxazole-trimethoprim (BACTRIM,SEPTRA) 400-80 MG tablet TAKE 1 TABLET BY MOUTH EVERY DAY FOR INFECTION 30 tablet 3   • valACYclovir (VALTREX) 500 MG tablet TAKE 1 TABLET BY MOUTH EVERY DAY 30 tablet 3     No current facility-administered medications for this visit.          LABORATORY:    Lab Results   Component Value Date    WBC 5.86 06/11/2020    HGB 11.9 (L) 06/11/2020    HCT 35.8 06/11/2020    MCV 97.5 (H) 06/11/2020    RDW 12.9 06/11/2020     06/11/2020    NEUTRORELPCT 69.9 06/11/2020    LYMPHORELPCT 21.8 06/11/2020    MONORELPCT 6.1 06/11/2020    EOSRELPCT 1.4 06/11/2020    BASORELPCT 0.3 06/11/2020    NEUTROABS 4.09 06/11/2020    LYMPHSABS 1.28 06/11/2020       Lab Results   Component Value Date     (L) 06/11/2020    K 4.2 06/11/2020    CO2 22.5 06/11/2020    CL 99 06/11/2020    BUN 7 06/11/2020    CREATININE 0.77 06/11/2020    GLUCOSE 164 (H) 06/11/2020    CALCIUM 9.2 06/11/2020    ALKPHOS 68 06/11/2020    AST 9 06/11/2020    ALT 8 06/11/2020    BILITOT 0.2 06/11/2020    ALBUMIN 4.11 06/11/2020    PROTEINTOT 6.4 06/11/2020       Lab Results   Component Value Date     09/13/2019    URICACID 3.1 09/13/2019        Imaging Results (Last 24 Hours)     ** No results found for the last 24 hours. **          ASSESSMENT/PLAN:    Patient Update Assessment (new medications, allergies, medical history): No changes.      Medication(s): Imbruvica 420 mg chemo tablet.      Currently Taking Medication(s): Patient reports taking medication as directed.     Experiencing Side Effects: No significant side effects expressed at this time.      Prior Authorization Status: Approved.      Financial Assistance Status: Not needed at this time.      Any Issues Identified: None.      Appropriate to Process Prescription(s):  Yes.   Medication refill will be dispensed from [facility] and delivered via home delivery services per patient request.    Counseling Offered: Patient previously counseled upon initiation of therapy, aware to contact pharmacy with any new questions or concerns.      Next Specialty Pharmacy Visit:  07/02/2020

## 2020-06-12 NOTE — PROGRESS NOTES
Pt seen in office visit this date by provider.     The patient and I discussed the results of her PHQ depression screening.    PHQ-9 Total Score:  11    SS will follow as needed.

## 2020-06-21 DIAGNOSIS — C91.10 CLL (CHRONIC LYMPHOCYTIC LEUKEMIA) (HCC): ICD-10-CM

## 2020-06-22 RX ORDER — SULFAMETHOXAZOLE AND TRIMETHOPRIM 400; 80 MG/1; MG/1
TABLET ORAL
Qty: 30 TABLET | Refills: 3 | Status: SHIPPED | OUTPATIENT
Start: 2020-06-22 | End: 2020-11-03

## 2020-06-22 RX ORDER — VALACYCLOVIR HYDROCHLORIDE 500 MG/1
TABLET, FILM COATED ORAL
Qty: 30 TABLET | Refills: 3 | Status: SHIPPED | OUTPATIENT
Start: 2020-06-22 | End: 2020-11-03

## 2020-07-02 ENCOUNTER — SPECIALTY PHARMACY (OUTPATIENT)
Dept: PHARMACY | Facility: HOSPITAL | Age: 55
End: 2020-07-02

## 2020-07-02 DIAGNOSIS — C91.10 CLL (CHRONIC LYMPHOCYTIC LEUKEMIA) (HCC): ICD-10-CM

## 2020-07-06 ENCOUNTER — LAB (OUTPATIENT)
Dept: LAB | Facility: HOSPITAL | Age: 55
End: 2020-07-06

## 2020-07-06 DIAGNOSIS — Z20.822 EXPOSURE TO COVID-19 VIRUS: Primary | ICD-10-CM

## 2020-07-06 DIAGNOSIS — Z20.822 EXPOSURE TO COVID-19 VIRUS: ICD-10-CM

## 2020-07-06 PROCEDURE — U0004 COV-19 TEST NON-CDC HGH THRU: HCPCS

## 2020-07-06 PROCEDURE — C9803 HOPD COVID-19 SPEC COLLECT: HCPCS

## 2020-07-06 PROCEDURE — U0002 COVID-19 LAB TEST NON-CDC: HCPCS

## 2020-07-07 LAB
REF LAB TEST METHOD: NORMAL
SARS-COV-2 RNA RESP QL NAA+PROBE: NOT DETECTED

## 2020-07-19 DIAGNOSIS — C91.10 CLL (CHRONIC LYMPHOCYTIC LEUKEMIA) (HCC): ICD-10-CM

## 2020-07-20 NOTE — PROGRESS NOTES
Specialty Pharmacy Note      Name:  Melania Mae  :  1965  Date:  2020         Past Medical History:   Diagnosis Date   • Aneurysm (CMS/HCC)     Cavernous left ICA aneurysm   • Asthma    • Carotid artery occlusion    • Chronic back pain    • CLL (chronic lymphocytic leukemia) (CMS/HCC)     2012   • COPD (chronic obstructive pulmonary disease) (CMS/Formerly [facility])    • Degenerative arthritis    • Depression    • Diabetes mellitus (CMS/Formerly [facility])     type 2   • Hyperlipidemia    • Hypertension    • Lymphadenopathy    • Non Hodgkin's lymphoma (CMS/Formerly [facility])        Past Surgical History:   Procedure Laterality Date   • APPENDECTOMY     • BACK SURGERY       (work accident) c-spine surgery    • CAROTID STENT Right 2017   • CEREBRAL ANGIOGRAM N/A 2017    Diagnostic Carotid/Cerebral Angiogram   • CHOLECYSTECTOMY     • HYSTERECTOMY      for endometriosis/ovarian bleed   • NECK SURGERY     • OTHER SURGICAL HISTORY      pac insertion and removal   • TONSILLECTOMY         Social History     Socioeconomic History   • Marital status:      Spouse name: Not on file   • Number of children: Not on file   • Years of education: Not on file   • Highest education level: Not on file   Tobacco Use   • Smoking status: Current Every Day Smoker     Packs/day: 0.50     Years: 35.00     Pack years: 17.50     Types: Cigarettes   • Smokeless tobacco: Never Used   • Tobacco comment: 0.5-1 PPD   Substance and Sexual Activity   • Alcohol use: No   • Drug use: No   • Sexual activity: Defer       Family History   Problem Relation Age of Onset   • Lung cancer Father 72   • Hypertension Father    • Heart disease Father    • Cancer Father    • Diabetes Father    • Other Brother 46        MI   • Heart disease Brother    • Breast cancer Paternal Aunt    • Colon cancer Maternal Grandfather    • Other Cousin         CLL (dx 48 yo)   • Hypertension Mother        Allergies   Allergen Reactions   • Codeine Other  "(See Comments)     \"UNKNOW  CHILDHOOD REACTION\"   • Penicillins Other (See Comments)     \"UNKNOWN CHILDHOOD ALLERGY\"   • Rituxan [Rituximab] Other (See Comments)     \"THROAT RAWNESS; FELT LIKE MY THROAT WAS CLOSING UP\"       Current Outpatient Medications   Medication Sig Dispense Refill   • albuterol (PROVENTIL HFA;VENTOLIN HFA) 108 (90 Base) MCG/ACT inhaler Inhale 2 puffs As Needed for Wheezing.     • ALPRAZolam (XANAX) 0.5 MG tablet Take 0.5 mg by mouth 3 (Three) Times a Day As Needed for Anxiety.     • ARIPiprazole (ABILIFY) 15 MG tablet Take 15 mg by mouth Daily.     • aspirin 81 MG EC tablet Take 1 tablet by mouth Daily. 30 tablet 5   • B-D UF III MINI PEN NEEDLES 31G X 5 MM misc      • CloNIDine (CATAPRES) 0.1 MG tablet 0.1 mg As Needed.     • clopidogrel (PLAVIX) 75 MG tablet Take 1 tablet by mouth Daily. 30 tablet 5   • fluticasone-salmeterol (ADVAIR) 100-50 MCG/DOSE DISKUS Inhale 1 puff As Needed.     • gabapentin (NEURONTIN) 800 MG tablet Take 800 mg by mouth 4 (Four) Times a Day.     • ibrutinib (IMBRUVICA) 420 MG tablet tablet Take 1 tablet by mouth Daily. 28 tablet 0   • Ibuprofen (ADVIL PO) Take 400 mg by mouth 3 (Three) Times a Day. LIQUIGELS     • Insulin Aspart (NOVOLOG FLEXPEN SC) Inject 8-12 Units under the skin 3 (Three) Times a Day Before Meals. PER SLIDING SCALE     • Insulin Detemir (LEVEMIR FLEXPEN SC) Inject 8-12 Units under the skin Every Night. SLIDING SCALE     • lisinopril (PRINIVIL,ZESTRIL) 20 MG tablet Take 20 mg by mouth Every Morning.     • loratadine (CLARITIN) 10 MG tablet Take 10 mg by mouth Every Night.     • metoprolol tartrate (LOPRESSOR) 25 MG tablet 25 mg 2 (Two) Times a Day.     • ondansetron (ZOFRAN) 8 MG tablet Take 1 tablet by mouth Every 8 (Eight) Hours As Needed for Nausea or Vomiting. 90 tablet 6   • oxyCODONE-acetaminophen (PERCOCET)  MG per tablet Take 1 tablet by mouth Every 6 (Six) Hours As Needed for Moderate Pain .     • promethazine (PHENERGAN) 25 MG " tablet Take 1 tablet by mouth Every 6 (Six) Hours As Needed for Nausea or Vomiting. 60 tablet 1   • sertraline (ZOLOFT) 100 MG tablet Take 100 mg by mouth Every Night.     • sulfamethoxazole-trimethoprim (BACTRIM,SEPTRA) 400-80 MG tablet TAKE 1 TABLET BY MOUTH EVERY DAY 30 tablet 3   • valACYclovir (VALTREX) 500 MG tablet TAKE 1 TABLET BY MOUTH EVERY DAY 30 tablet 3     No current facility-administered medications for this visit.          LABORATORY:    Lab Results   Component Value Date    WBC 5.86 06/11/2020    HGB 11.9 (L) 06/11/2020    HCT 35.8 06/11/2020    MCV 97.5 (H) 06/11/2020    RDW 12.9 06/11/2020     06/11/2020    NEUTRORELPCT 69.9 06/11/2020    LYMPHORELPCT 21.8 06/11/2020    MONORELPCT 6.1 06/11/2020    EOSRELPCT 1.4 06/11/2020    BASORELPCT 0.3 06/11/2020    NEUTROABS 4.09 06/11/2020    LYMPHSABS 1.28 06/11/2020       Lab Results   Component Value Date     (L) 06/11/2020    K 4.2 06/11/2020    CO2 22.5 06/11/2020    CL 99 06/11/2020    BUN 7 06/11/2020    CREATININE 0.77 06/11/2020    GLUCOSE 164 (H) 06/11/2020    CALCIUM 9.2 06/11/2020    ALKPHOS 68 06/11/2020    AST 9 06/11/2020    ALT 8 06/11/2020    BILITOT 0.2 06/11/2020    ALBUMIN 4.11 06/11/2020    PROTEINTOT 6.4 06/11/2020       Lab Results   Component Value Date     09/13/2019    URICACID 3.1 09/13/2019        Imaging Results (Last 24 Hours)     ** No results found for the last 24 hours. **          ASSESSMENT/PLAN:    Patient Update Assessment (new medications, allergies, medical history): No changes.      Medication(s): Imbruvica 420 mg chemo tablet.      Currently Taking Medication(s): Patient reports taking medication as directed.     Experiencing Side Effects: No significant side effects expressed at this time.      Prior Authorization Status: Approved.      Financial Assistance Status: Not needed at this time.      Any Issues Identified: None.      Appropriate to Process Prescription(s):  Yes.  Medication refill will  be dispensed from [facility].     Counseling Offered: Patient previously counseled upon initiation of therapy, aware to contact pharmacy with any new questions or concerns.      Next Specialty Pharmacy Visit:  8/3/2020

## 2020-08-05 ENCOUNTER — SPECIALTY PHARMACY (OUTPATIENT)
Dept: PHARMACY | Facility: HOSPITAL | Age: 55
End: 2020-08-05

## 2020-08-05 DIAGNOSIS — C91.10 CLL (CHRONIC LYMPHOCYTIC LEUKEMIA) (HCC): ICD-10-CM

## 2020-08-05 NOTE — PROGRESS NOTES
Specialty Pharmacy Note      Name:  Melania Mae  :  1965  Date:  2020         Past Medical History:   Diagnosis Date   • Aneurysm (CMS/HCC)     Cavernous left ICA aneurysm   • Asthma    • Carotid artery occlusion    • Chronic back pain    • CLL (chronic lymphocytic leukemia) (CMS/HCC)     2012   • COPD (chronic obstructive pulmonary disease) (CMS/[facility])    • Degenerative arthritis    • Depression    • Diabetes mellitus (CMS/[facility])     type 2   • Hyperlipidemia    • Hypertension    • Lymphadenopathy    • Non Hodgkin's lymphoma (CMS/[facility])        Past Surgical History:   Procedure Laterality Date   • APPENDECTOMY     • BACK SURGERY       (work accident) c-spine surgery    • CAROTID STENT Right 2017   • CEREBRAL ANGIOGRAM N/A 2017    Diagnostic Carotid/Cerebral Angiogram   • CHOLECYSTECTOMY     • HYSTERECTOMY      for endometriosis/ovarian bleed   • NECK SURGERY     • OTHER SURGICAL HISTORY      pac insertion and removal   • TONSILLECTOMY         Social History     Socioeconomic History   • Marital status:      Spouse name: Not on file   • Number of children: Not on file   • Years of education: Not on file   • Highest education level: Not on file   Tobacco Use   • Smoking status: Current Every Day Smoker     Packs/day: 0.50     Years: 35.00     Pack years: 17.50     Types: Cigarettes   • Smokeless tobacco: Never Used   • Tobacco comment: 0.5-1 PPD   Substance and Sexual Activity   • Alcohol use: No   • Drug use: No   • Sexual activity: Defer       Family History   Problem Relation Age of Onset   • Lung cancer Father 72   • Hypertension Father    • Heart disease Father    • Cancer Father    • Diabetes Father    • Other Brother 46        MI   • Heart disease Brother    • Breast cancer Paternal Aunt    • Colon cancer Maternal Grandfather    • Other Cousin         CLL (dx 47 yo)   • Hypertension Mother        Allergies   Allergen Reactions   • Codeine Other  "(See Comments)     \"UNKNOW  CHILDHOOD REACTION\"   • Penicillins Other (See Comments)     \"UNKNOWN CHILDHOOD ALLERGY\"   • Rituxan [Rituximab] Other (See Comments)     \"THROAT RAWNESS; FELT LIKE MY THROAT WAS CLOSING UP\"       Current Outpatient Medications   Medication Sig Dispense Refill   • albuterol (PROVENTIL HFA;VENTOLIN HFA) 108 (90 Base) MCG/ACT inhaler Inhale 2 puffs As Needed for Wheezing.     • ALPRAZolam (XANAX) 0.5 MG tablet Take 0.5 mg by mouth 3 (Three) Times a Day As Needed for Anxiety.     • ARIPiprazole (ABILIFY) 15 MG tablet Take 15 mg by mouth Daily.     • aspirin 81 MG EC tablet Take 1 tablet by mouth Daily. 30 tablet 5   • B-D UF III MINI PEN NEEDLES 31G X 5 MM misc      • CloNIDine (CATAPRES) 0.1 MG tablet 0.1 mg As Needed.     • clopidogrel (PLAVIX) 75 MG tablet Take 1 tablet by mouth Daily. 30 tablet 5   • fluticasone-salmeterol (ADVAIR) 100-50 MCG/DOSE DISKUS Inhale 1 puff As Needed.     • gabapentin (NEURONTIN) 800 MG tablet Take 800 mg by mouth 4 (Four) Times a Day.     • ibrutinib (IMBRUVICA) 420 MG tablet tablet Take 1 tablet by mouth Daily. 28 tablet 3   • Ibuprofen (ADVIL PO) Take 400 mg by mouth 3 (Three) Times a Day. LIQUIGELS     • Insulin Aspart (NOVOLOG FLEXPEN SC) Inject 8-12 Units under the skin 3 (Three) Times a Day Before Meals. PER SLIDING SCALE     • Insulin Detemir (LEVEMIR FLEXPEN SC) Inject 8-12 Units under the skin Every Night. SLIDING SCALE     • lisinopril (PRINIVIL,ZESTRIL) 20 MG tablet Take 20 mg by mouth Every Morning.     • loratadine (CLARITIN) 10 MG tablet Take 10 mg by mouth Every Night.     • metoprolol tartrate (LOPRESSOR) 25 MG tablet 25 mg 2 (Two) Times a Day.     • ondansetron (ZOFRAN) 8 MG tablet Take 1 tablet by mouth Every 8 (Eight) Hours As Needed for Nausea or Vomiting. 90 tablet 6   • oxyCODONE-acetaminophen (PERCOCET)  MG per tablet Take 1 tablet by mouth Every 6 (Six) Hours As Needed for Moderate Pain .     • promethazine (PHENERGAN) 25 MG " tablet Take 1 tablet by mouth Every 6 (Six) Hours As Needed for Nausea or Vomiting. 60 tablet 1   • sertraline (ZOLOFT) 100 MG tablet Take 100 mg by mouth Every Night.     • sulfamethoxazole-trimethoprim (BACTRIM,SEPTRA) 400-80 MG tablet TAKE 1 TABLET BY MOUTH EVERY DAY 30 tablet 3   • valACYclovir (VALTREX) 500 MG tablet TAKE 1 TABLET BY MOUTH EVERY DAY 30 tablet 3     No current facility-administered medications for this visit.          LABORATORY:    Lab Results   Component Value Date    WBC 5.86 06/11/2020    HGB 11.9 (L) 06/11/2020    HCT 35.8 06/11/2020    MCV 97.5 (H) 06/11/2020    RDW 12.9 06/11/2020     06/11/2020    NEUTRORELPCT 69.9 06/11/2020    LYMPHORELPCT 21.8 06/11/2020    MONORELPCT 6.1 06/11/2020    EOSRELPCT 1.4 06/11/2020    BASORELPCT 0.3 06/11/2020    NEUTROABS 4.09 06/11/2020    LYMPHSABS 1.28 06/11/2020       Lab Results   Component Value Date     (L) 06/11/2020    K 4.2 06/11/2020    CO2 22.5 06/11/2020    CL 99 06/11/2020    BUN 7 06/11/2020    CREATININE 0.77 06/11/2020    GLUCOSE 164 (H) 06/11/2020    CALCIUM 9.2 06/11/2020    ALKPHOS 68 06/11/2020    AST 9 06/11/2020    ALT 8 06/11/2020    BILITOT 0.2 06/11/2020    ALBUMIN 4.11 06/11/2020    PROTEINTOT 6.4 06/11/2020       Lab Results   Component Value Date     09/13/2019    URICACID 3.1 09/13/2019        Imaging Results (Last 24 Hours)     ** No results found for the last 24 hours. **          ASSESSMENT/PLAN:    Patient Update Assessment (new medications, allergies, medical history): assessed    Medication(s): Imbruvica    Currently Taking Medication(s): Yes    Effectiveness of Medication: She has disease control    Experiencing Side Effects: Tolerating well.    Prior Authorization Status: Is approved with insurance.    Financial Assistance Status: None needed.    Any Issues Identified: Patient is out of refills. New rx was obtained and sent to [facility].    Appropriate to Process Prescription(s): After  chart review and patient discussion, it is appropriate to process at this time.     Counseling Offered: Patientlu declined as she had counseling in the past.    Next Specialty Pharmacy Visit: in ~4 weeks.

## 2020-08-16 DIAGNOSIS — C91.10 CLL (CHRONIC LYMPHOCYTIC LEUKEMIA) (HCC): ICD-10-CM

## 2020-09-12 DIAGNOSIS — C91.10 CLL (CHRONIC LYMPHOCYTIC LEUKEMIA) (HCC): Primary | ICD-10-CM

## 2020-10-11 DIAGNOSIS — C91.10 CLL (CHRONIC LYMPHOCYTIC LEUKEMIA) (HCC): Primary | ICD-10-CM

## 2020-10-16 ENCOUNTER — OFFICE VISIT (OUTPATIENT)
Dept: ENDOCRINOLOGY | Facility: CLINIC | Age: 55
End: 2020-10-16

## 2020-10-16 VITALS
DIASTOLIC BLOOD PRESSURE: 66 MMHG | HEART RATE: 62 BPM | BODY MASS INDEX: 25.62 KG/M2 | SYSTOLIC BLOOD PRESSURE: 128 MMHG | TEMPERATURE: 97.8 F | WEIGHT: 153.8 LBS | OXYGEN SATURATION: 96 % | HEIGHT: 65 IN | RESPIRATION RATE: 16 BRPM

## 2020-10-16 DIAGNOSIS — Z79.4 TYPE 2 DIABETES MELLITUS WITH COMPLICATION, WITH LONG-TERM CURRENT USE OF INSULIN (HCC): Primary | ICD-10-CM

## 2020-10-16 DIAGNOSIS — E11.8 TYPE 2 DIABETES MELLITUS WITH COMPLICATION, WITH LONG-TERM CURRENT USE OF INSULIN (HCC): Primary | ICD-10-CM

## 2020-10-16 LAB — HBA1C MFR BLD: 14 %

## 2020-10-16 PROCEDURE — 99214 OFFICE O/P EST MOD 30 MIN: CPT | Performed by: INTERNAL MEDICINE

## 2020-10-16 PROCEDURE — 83036 HEMOGLOBIN GLYCOSYLATED A1C: CPT | Performed by: INTERNAL MEDICINE

## 2020-10-16 RX ORDER — FLASH GLUCOSE SENSOR
1 KIT MISCELLANEOUS
Qty: 2 EACH | Refills: 11 | Status: SHIPPED | OUTPATIENT
Start: 2020-10-16 | End: 2020-10-22

## 2020-10-16 NOTE — PROGRESS NOTES
"     Office Note      Date: 10/16/2020  Patient Name: Melania Mae  MRN: [id number]  : 1965    Chief Complaint   Patient presents with   • Diabetes       History of Present Illness:   Melania Mae is a 55 y.o. female who presents for Diabetes type 1. Diagnosed in: . Treated in past with insulin. Current treatments: insulin . Number of insulin shots per day: 4. Checks blood sugar 4 times a day. Has low blood sugar: rare. She  Has lymphoma  And is on chemoa    Last A1c:  Hemoglobin A1C   Date Value Ref Range Status   10/16/2020 14.0 % Final   2017 6.80 (H) 4.50 - 5.70 % Final       Changes in health since last visit: none . Last eye exam 2018 .    Subjective      Diabetic Complications:  Eyes: No  Kidneys: No  Feet: Yes, describe: has numbness   Heart: No    Diet and Exercise:  Meals per day: 1  Minutes of exercise per week: 150 mins.    Review of Systems:   Review of Systems   Constitutional: Positive for fatigue and unexpected weight change.   Endocrine: Positive for polydipsia and polyuria. Negative for polyphagia.   Neurological: Positive for dizziness.   Hematological: Negative.        The following portions of the patient's history were reviewed and updated as appropriate: allergies, current medications, past family history, past medical history, past social history, past surgical history and problem list.    Objective     Visit Vitals  /66   Pulse 62   Temp 97.8 °F (36.6 °C) (Infrared)   Resp 16   Ht 165.1 cm (65\")   Wt 69.8 kg (153 lb 12.8 oz)   SpO2 96%   BMI 25.59 kg/m²       Labs:    CMP  Lab Results   Component Value Date    GLUCOSE 164 (H) 2020    BUN 7 2020    CREATININE 0.77 2020    EGFRIFNONA 78 2020    EGFRIFAFRI  2016      Comment:      <15 Indicative of kidney failure.    BCR 9.1 2020    K 4.2 2020    CO2 22.5 2020    CALCIUM 9.2 2020    PROTENTOTREF 6.6 2015    LABIL2 1.6 2016    AST 9 2020    " Called  mom confirmed Virtual Visit for today.  Mom given directions how to log on to [facility].  Mom verbalized understanding.  Provider notified.   ALT 8 06/11/2020        CBC w/DIFF  Lab Results   Component Value Date    WBC 5.86 06/11/2020    RBC 3.67 (L) 06/11/2020    HGB 11.9 (L) 06/11/2020    HCT 35.8 06/11/2020    MCV 97.5 (H) 06/11/2020    MCH 32.4 06/11/2020    MCHC 33.2 06/11/2020    RDW 12.9 06/11/2020    RDWSD 45.8 06/11/2020    MPV 10.1 06/11/2020     06/11/2020    NEUTRORELPCT 69.9 06/11/2020    LYMPHORELPCT 21.8 06/11/2020    MONORELPCT 6.1 06/11/2020    EOSRELPCT 1.4 06/11/2020    BASORELPCT 0.3 06/11/2020    AUTOIGPER 0.5 06/11/2020    NEUTROABS 4.09 06/11/2020    LYMPHSABS 1.28 06/11/2020    MONOSABS 0.36 06/11/2020    EOSABS 0.08 06/11/2020    BASOSABS 0.02 06/11/2020    AUTOIGNUM 0.03 06/11/2020    NRBC 0.0 06/11/2020       Physical Exam:  Physical Exam  Vitals signs reviewed.   Constitutional:       Appearance: She is ill-appearing.   Feet:      Right foot:      Protective Sensation: 10 sites tested. 7 sites sensed.      Skin integrity: Erythema and dry skin present.      Toenail Condition: Right toenails are abnormally thick.      Left foot:      Protective Sensation: 10 sites tested. 7 sites sensed.      Skin integrity: Erythema and dry skin present.      Toenail Condition: Left toenails are abnormally thick.      Comments: Diabetic Foot Exam Performed and Monofilament Test Performed  Neurological:      Mental Status: She is alert.   Psychiatric:         Mood and Affect: Mood normal.         Thought Content: Thought content normal.         Judgment: Judgment normal.          Assessment / Plan      Assessment & Plan:  Problem List Items Addressed This Visit        Endocrine    Type 2 diabetes mellitus with complication, with long-term current use of insulin (CMS/[facility]) - Primary    Current Assessment & Plan     Extremely hyperglycemic with severe exacerbation. Needs to increase levemir per protocol and need a ellen to check her blood sugar . .         Relevant Medications    Insulin Aspart (NOVOLOG FLEXPEN SC)    Insulin Detemir  (LEVEMIR FLEXPEN SC)    Continuous Blood Gluc Sensor (FreeStyle Carol 14 Day Sensor) misc    Other Relevant Orders    POC Glycosylated Hemoglobin (Hb A1C) (Completed)           Marty Almaraz MD   10/16/2020

## 2020-10-16 NOTE — ASSESSMENT & PLAN NOTE
Extremely hyperglycemic with severe exacerbation. Needs to increase levemir per protocol and need a ellen to check her blood sugar . .

## 2020-10-19 ENCOUNTER — TELEPHONE (OUTPATIENT)
Dept: ENDOCRINOLOGY | Facility: CLINIC | Age: 55
End: 2020-10-19

## 2020-10-19 DIAGNOSIS — E11.8 TYPE 2 DIABETES MELLITUS WITH COMPLICATION, WITH LONG-TERM CURRENT USE OF INSULIN (HCC): Primary | ICD-10-CM

## 2020-10-19 DIAGNOSIS — Z79.4 TYPE 2 DIABETES MELLITUS WITH COMPLICATION, WITH LONG-TERM CURRENT USE OF INSULIN (HCC): Primary | ICD-10-CM

## 2020-10-21 NOTE — TELEPHONE ENCOUNTER
Unable to leave voicemail for patient as mailbox is full. Will continue to try and reach patient.

## 2020-10-21 NOTE — TELEPHONE ENCOUNTER
Called and was unable to leave message. We will continue to try to reach patient.  We do not PA [employer] systems Per Negrita. They will have to pay out of pocket.

## 2020-10-22 RX ORDER — PROCHLORPERAZINE 25 MG/1
SUPPOSITORY RECTAL
Qty: 3 EACH | Refills: 11 | Status: SHIPPED | OUTPATIENT
Start: 2020-10-22 | End: 2020-10-23 | Stop reason: SDUPTHER

## 2020-10-22 RX ORDER — PROCHLORPERAZINE 25 MG/1
1 SUPPOSITORY RECTAL
Qty: 1 EACH | Refills: 3 | Status: SHIPPED | OUTPATIENT
Start: 2020-10-22 | End: 2020-10-23 | Stop reason: SDUPTHER

## 2020-10-22 RX ORDER — PROCHLORPERAZINE 25 MG/1
1 SUPPOSITORY RECTAL
Qty: 1 DEVICE | Refills: 0 | Status: SHIPPED | OUTPATIENT
Start: 2020-10-22 | End: 2020-10-23 | Stop reason: SDUPTHER

## 2020-10-23 DIAGNOSIS — Z79.4 TYPE 2 DIABETES MELLITUS WITH COMPLICATION, WITH LONG-TERM CURRENT USE OF INSULIN (HCC): ICD-10-CM

## 2020-10-23 DIAGNOSIS — E11.8 TYPE 2 DIABETES MELLITUS WITH COMPLICATION, WITH LONG-TERM CURRENT USE OF INSULIN (HCC): ICD-10-CM

## 2020-10-23 RX ORDER — PROCHLORPERAZINE 25 MG/1
1 SUPPOSITORY RECTAL
Qty: 1 EACH | Refills: 3 | Status: SHIPPED | OUTPATIENT
Start: 2020-10-23

## 2020-10-23 RX ORDER — PROCHLORPERAZINE 25 MG/1
SUPPOSITORY RECTAL
Qty: 3 EACH | Refills: 11 | Status: SHIPPED | OUTPATIENT
Start: 2020-10-23

## 2020-10-23 RX ORDER — PROCHLORPERAZINE 25 MG/1
1 SUPPOSITORY RECTAL
Qty: 1 DEVICE | Refills: 0 | Status: SHIPPED | OUTPATIENT
Start: 2020-10-23

## 2020-11-02 ENCOUNTER — APPOINTMENT (OUTPATIENT)
Dept: CT IMAGING | Facility: HOSPITAL | Age: 55
End: 2020-11-02

## 2020-11-03 RX ORDER — SULFAMETHOXAZOLE AND TRIMETHOPRIM 400; 80 MG/1; MG/1
TABLET ORAL
Qty: 30 TABLET | Refills: 3 | Status: SHIPPED | OUTPATIENT
Start: 2020-11-03 | End: 2021-03-12

## 2020-11-03 RX ORDER — VALACYCLOVIR HYDROCHLORIDE 500 MG/1
TABLET, FILM COATED ORAL
Qty: 30 TABLET | Refills: 3 | Status: SHIPPED | OUTPATIENT
Start: 2020-11-03 | End: 2021-03-12

## 2020-11-08 DIAGNOSIS — C91.10 CLL (CHRONIC LYMPHOCYTIC LEUKEMIA) (HCC): Primary | ICD-10-CM

## 2020-12-10 DIAGNOSIS — C91.10 CLL (CHRONIC LYMPHOCYTIC LEUKEMIA) (HCC): ICD-10-CM

## 2020-12-14 ENCOUNTER — TELEPHONE (OUTPATIENT)
Dept: ONCOLOGY | Facility: CLINIC | Age: 55
End: 2020-12-14

## 2020-12-14 ENCOUNTER — TELEPHONE (OUTPATIENT)
Dept: ONCOLOGY | Facility: HOSPITAL | Age: 55
End: 2020-12-14

## 2020-12-14 ENCOUNTER — SPECIALTY PHARMACY (OUTPATIENT)
Dept: PHARMACY | Facility: HOSPITAL | Age: 55
End: 2020-12-14

## 2020-12-14 DIAGNOSIS — C91.10 CLL (CHRONIC LYMPHOCYTIC LEUKEMIA) (HCC): ICD-10-CM

## 2020-12-14 NOTE — TELEPHONE ENCOUNTER
Patient called to schedule missed appts after her 12/29 CT scan appts    Best call back number [phone]

## 2020-12-14 NOTE — TELEPHONE ENCOUNTER
Informed Lucila Calix (pharmacist) of pt's needed Imbruvica refill.  RF to be taken care of and Lucila will be contacting patient.

## 2020-12-29 ENCOUNTER — APPOINTMENT (OUTPATIENT)
Dept: CT IMAGING | Facility: HOSPITAL | Age: 55
End: 2020-12-29

## 2020-12-29 DIAGNOSIS — I10 HYPERTENSION, UNSPECIFIED TYPE: ICD-10-CM

## 2020-12-29 DIAGNOSIS — I65.22 CAROTID STENOSIS, ASYMPTOMATIC, LEFT: Primary | ICD-10-CM

## 2020-12-29 DIAGNOSIS — I65.21 STENOSIS OF RIGHT CAROTID ARTERY: ICD-10-CM

## 2020-12-29 DIAGNOSIS — I67.1 CEREBRAL ANEURYSM, NONRUPTURED: ICD-10-CM

## 2020-12-29 DIAGNOSIS — I74.09 OTHER ARTERIAL EMBOLISM AND THROMBOSIS OF ABDOMINAL AORTA (HCC): ICD-10-CM

## 2021-01-11 ENCOUNTER — HOSPITAL ENCOUNTER (OUTPATIENT)
Dept: CT IMAGING | Facility: HOSPITAL | Age: 56
Discharge: HOME | End: 2021-01-11

## 2021-01-11 DIAGNOSIS — C91.10 CLL (CHRONIC LYMPHOCYTIC LEUKEMIA) (HCC): ICD-10-CM

## 2021-01-11 PROCEDURE — 82565 ASSAY OF CREATININE: CPT

## 2021-01-11 PROCEDURE — 71260 CT THORAX DX C+: CPT | Performed by: RADIOLOGY

## 2021-01-11 PROCEDURE — 71260 CT THORAX DX C+: CPT

## 2021-01-11 PROCEDURE — 70491 CT SOFT TISSUE NECK W/DYE: CPT

## 2021-01-11 PROCEDURE — 74177 CT ABD & PELVIS W/CONTRAST: CPT | Performed by: RADIOLOGY

## 2021-01-11 PROCEDURE — 74177 CT ABD & PELVIS W/CONTRAST: CPT

## 2021-01-11 PROCEDURE — 25010000002 IOPAMIDOL 61 % SOLUTION: Performed by: INTERNAL MEDICINE

## 2021-01-11 PROCEDURE — 70491 CT SOFT TISSUE NECK W/DYE: CPT | Performed by: RADIOLOGY

## 2021-01-11 RX ADMIN — IOPAMIDOL 100 ML: 612 INJECTION, SOLUTION INTRAVENOUS at 13:40

## 2021-01-13 RX ORDER — PROCHLORPERAZINE MALEATE 10 MG
10 TABLET ORAL EVERY 6 HOURS PRN
Qty: 60 TABLET | Refills: 4 | Status: SHIPPED | OUTPATIENT
Start: 2021-01-13

## 2021-01-15 NOTE — PROGRESS NOTES
NAME: Melania Mae    : 1965    DATE:  2021    DIAGNOSIS:  SLL - initially diagnosed with Mineral Stage II Disease in May 2012, based on excisional biopsy of a L supraclavicular LN. Bone marrow was involved.     CHIEF COMPLAINT:  CLL / SLL    TREATMENT HISTORY:  1. Received 6 cycles of Treanda without Rituxan between 10-24-12 and 3-21-13 because of anaphylactic reaction to Rituximab with her 1st cycle of treatment . Received Neulasta support. (Delivered by Dr. Koehler)     2. Imbruvica started 18      HISTORY OF PRESENT ILLNESS:   Ms. Mae was referred by Gillian Harrison for evaluation and treatment of CLL / SLL. She was reportedly diagnosed in May of 2012 when she saw Dr. Carbajal for biopsy of a L supraclavicular LN. At that time she had excessive fatigue & a 40 lb weight loss. She saw Dr. Koehler at  at time who performed bone marrow exam (bone marrow was involved) and started treatment with Bendamustine / Rituximab. Unfortunately with her 1st treatment, she had an anaphylactic reaction to Rituximab so this was not given with subsequent cycles of therapy. Her last cycle of treatment was 3-21-13. Per Dr. Koehler' s records, she had a complete response to treatment. She never had a significant leukocytosis or lymphocytosis, but had predominantly a lymphomatous presentation of her disease.     INTERVAL HISTORY:  Ms. Mae is here today for follow up of CLL/SLL. She continues to take Imbruvica and is tolerating this well. She says she has been working with her PCP to get better control over her blood sugars.  She has also been seeing her vascular surgeon and says she is undergoing testing and may need another stent.  Her psychiatrist started her on Seroquel in addition to Zoloft and she says he told her to take Compazine instead of Zofran when possible because of concern for drug interactions. She says she gets nauseated 1-2x/week which she thinks is probably related to all of her medications.        PAST MEDICAL HISTORY:  Past Medical History:   Diagnosis Date   • Aneurysm (CMS/HCC) 8/223/17    Cavernous left ICA aneurysm   • Asthma    • Carotid artery occlusion    • Chronic back pain    • CLL (chronic lymphocytic leukemia) (CMS/HCC)     OCTOBER OF 2012   • COPD (chronic obstructive pulmonary disease) (CMS/HCC)    • Degenerative arthritis    • Depression    • Diabetes mellitus (CMS/HCC)     type 2   • Hyperlipidemia    • Hypertension    • Lymphadenopathy    • Non Hodgkin's lymphoma (CMS/HCC)        PAST SURGICAL HISTORY:  Past Surgical History:   Procedure Laterality Date   • APPENDECTOMY     • BACK SURGERY      2002 (work accident) c-spine surgery 4/14   • CAROTID STENT Right 08/23/2017   • CEREBRAL ANGIOGRAM N/A 8/23/2017    Diagnostic Carotid/Cerebral Angiogram   • CHOLECYSTECTOMY     • HYSTERECTOMY      for endometriosis/ovarian bleed   • NECK SURGERY     • OTHER SURGICAL HISTORY      pac insertion and removal   • TONSILLECTOMY         FAMILY HISTORY:  Family History   Problem Relation Age of Onset   • Lung cancer Father 72   • Hypertension Father    • Heart disease Father    • Cancer Father    • Diabetes Father    • Other Brother 46        MI   • Heart disease Brother    • Breast cancer Paternal Aunt    • Colon cancer Maternal Grandfather    • Other Cousin         CLL (dx 46 yo)   • Hypertension Mother      Social History     Socioeconomic History   • Marital status:      Spouse name: Not on file   • Number of children: Not on file   • Years of education: Not on file   • Highest education level: Not on file   Tobacco Use   • Smoking status: Current Every Day Smoker     Packs/day: 0.50     Years: 35.00     Pack years: 17.50     Types: Cigarettes   • Smokeless tobacco: Never Used   • Tobacco comment: 0.5-1 PPD   Substance and Sexual Activity   • Alcohol use: No   • Drug use: No   • Sexual activity: Defer     REVIEW OF SYSTEMS:    A comprehensive 14 point review of systems was performed and was  negative except as mentioned    MEDICATIONS:  The current medication list was reviewed in the EMR    Current Outpatient Medications:   •  albuterol (PROVENTIL HFA;VENTOLIN HFA) 108 (90 Base) MCG/ACT inhaler, Inhale 2 puffs As Needed for Wheezing., Disp: , Rfl:   •  ALPRAZolam (XANAX) 0.5 MG tablet, Take 0.5 mg by mouth 3 (Three) Times a Day As Needed for Anxiety., Disp: , Rfl:   •  aspirin 81 MG EC tablet, Take 1 tablet by mouth Daily., Disp: 30 tablet, Rfl: 5  •  B-D UF III MINI PEN NEEDLES 31G X 5 MM misc, , Disp: , Rfl:   •  CloNIDine (CATAPRES) 0.1 MG tablet, 0.1 mg As Needed., Disp: , Rfl:   •  clopidogrel (PLAVIX) 75 MG tablet, Take 1 tablet by mouth Daily., Disp: 30 tablet, Rfl: 5  •  Continuous Blood Gluc  (Dexcom G6 ) device, 1 kit Every 3 (Three) Months., Disp: 1 Device, Rfl: 0  •  Continuous Blood Gluc Sensor (Dexcom G6 Sensor), Every 10 (Ten) Days., Disp: 3 each, Rfl: 11  •  Continuous Blood Gluc Transmit (Dexcom G6 Transmitter) misc, 1 kit Every 3 (Three) Months., Disp: 1 each, Rfl: 3  •  gabapentin (NEURONTIN) 800 MG tablet, Take 800 mg by mouth 4 (Four) Times a Day., Disp: , Rfl:   •  ibrutinib (IMBRUVICA) 420 MG tablet tablet, Take 1 tablet by mouth Daily., Disp: 28 tablet, Rfl: 1  •  Ibuprofen (ADVIL PO), Take 400 mg by mouth 3 (Three) Times a Day. LIQUIGELS, Disp: , Rfl:   •  Insulin Aspart (NOVOLOG FLEXPEN SC), Inject 8-12 Units under the skin 3 (Three) Times a Day Before Meals. PER SLIDING SCALE, Disp: , Rfl:   •  Insulin Detemir (LEVEMIR FLEXPEN SC), Inject 8-12 Units under the skin Every Night. SLIDING SCALE, Disp: , Rfl:   •  lisinopril (PRINIVIL,ZESTRIL) 20 MG tablet, Take 20 mg by mouth Every Morning., Disp: , Rfl:   •  loratadine (CLARITIN) 10 MG tablet, Take 10 mg by mouth Every Night., Disp: , Rfl:   •  metoprolol tartrate (LOPRESSOR) 25 MG tablet, 25 mg 2 (Two) Times a Day., Disp: , Rfl:   •  oxyCODONE-acetaminophen (PERCOCET)  MG per tablet, Take 1 tablet by mouth  "Every 6 (Six) Hours As Needed for Moderate Pain ., Disp: , Rfl:   •  prochlorperazine (COMPAZINE) 10 MG tablet, Take 1 tablet by mouth Every 6 (Six) Hours As Needed for Nausea or Vomiting., Disp: 60 tablet, Rfl: 4  •  sertraline (ZOLOFT) 100 MG tablet, Take 100 mg by mouth Every Night., Disp: , Rfl:   •  sulfamethoxazole-trimethoprim (BACTRIM,SEPTRA) 400-80 MG tablet, TAKE 1 TABLET BY MOUTH EVERY DAY, Disp: 30 tablet, Rfl: 3  •  valACYclovir (VALTREX) 500 MG tablet, TAKE 1 TABLET BY MOUTH EVERY DAY, Disp: 30 tablet, Rfl: 3    ALLERGIES:    Allergies   Allergen Reactions   • Rituxan [Rituximab] Other (See Comments)     \"THROAT RAWNESS; FELT LIKE MY THROAT WAS CLOSING UP\"   • Codeine Other (See Comments)     \"UNKNOW  CHILDHOOD REACTION\"   • Penicillins Other (See Comments)     \"UNKNOWN CHILDHOOD ALLERGY\"       PHYSICAL EXAM:  Visit Vitals  /68   Pulse 64   Temp 97.8 °F (36.6 °C) (Temporal)   Resp 18   Wt 73.4 kg (161 lb 12.8 oz)   SpO2 97%   BMI 26.92 kg/m²     Pain Score    01/18/21 0951   PainSc: 0-No pain   ECOG score: 0       General: Alert and oriented. Appears well  HEENT: Pupils are equal, round and reactive to light and accommodation, Extraocular movements full, oropharynx clear  Neck: no jvd or thyromegaly   Cardiovascular: regular rate and rhythm, she has a soft systolic murmur I-II/VI, no rubs or gallops.   Pulmonary: clear to auscultation bilaterally, no crackles  Abdomen: soft, non-tender, non-distended, normal active bowel sounds present, no organomegaly   Extremities: No clubbing, cyanosis or edema   Lymph: Previously had cj cervical, L supraclavicular, cj axillary and cj inguinal adenopathy.  This has all resolved.    Neurologic: MS as above, CN II-XII intact. Grossly non-focal.    PATHOLOGY:  5-25-12 L Supraclavicular LN Biopsy:   - Extensive small lymphocytic lymphoma / chronic lymphocytic leukemia involvement.   Flow cytometry shows monoclonal Kappa B-cell population which coexpresses CD5 " and CD23 representing 30% of gated cells. There is dim surface light chain and dim CD20 expression. No CD38 expression. Neoplastic cells are positive for CD20 (dim), PAX-5, CCD5, CD23, CD43. B cells are + for CD3. BCL1 stain negative.    BM Biopsy 6-29-12 ([facility])   - Limited BM specimen with scattered hematopoietic cells.   - Flow cytometry revealed peripheral blood with minute CLL/SLL population (0.5%) with normal  FISH studies.     Cervical Disectomy 04-18-14:   - Fragments of fibrohyaline cartilage, no acute inflammation or metastatic disease identified.     IMAGING:  PET-CT 6-19-12:   - Mild to moderate bilateral axillary LAD and mediastinal LAD with minimal associated hypermetabolism.     PET-CT 10-09-12:   - Multiple areas of abnormal hypermetabolism in the neck bilaterally, new from the prior study. R posterior trangle focus with SUV 3.1. L supraclavicular focus SUV 5.0 (LN 1.8 cm, prior 1.2 cm)   - Carl axillary LAD - L axilla 2.8 cm LN (prior 1.6 cm) with SUV 3.6, R axillary ln new 2.0 cm, SUV 2.6   - Mediastinal LAD noted. R prevascular LN 1.5 cm with SUV 2.1   - Carl external iliac LNs present ( R 3.2 cm with SUV 3.1)   - Overall worsening areas of hypermetabolism corresponding to enlarging nodes c/w worsening neoplastic involvement.     PET-CT 1-16-13:   - No PET-CT findings of active disease related to the patient's lymphoma. Interval resolution of hypermetabolic adenopathy described on previous examination. Spleen is normal.     PET-CT 1-21-14:   - No abnormal hypermetabolism to suggest neoplastic involvement. No mass or adenopathy noted. Spleen is normal.     04-03-14 MRI Spine:   - Cervical: Degenerative disk disease in the cervical disk spaces at the level of C4-C7. At 4-5, there is a disk herniation associated with bulging. There was spinal stenosis at C5-6 but no disk herniation. At C6-7, the disk bulging is not felt to be significant. The postcontrasted scans in the cervical spine showed no areas  of abnormal enhancement.   - Thoracic: negative MRI examination of the thoracic spine. A source of the patient's t horacic spine pain was not demonstrated.     Chest Xray 04-17-15:   - The lungs are clear, the heart is normal. There is no active disease in the chest.     CT CAP/neck w/ contrast 05-13-14:   - No neck lymphadenopathy by CT size criteria   - No intrathoracic or abdominal LAD     CTAP, neck w/ contrast 03-04-15:   - No enlarged lymph nodes were demonstrated. The bile ducts in the liver and the extrahepatic biliary tree is slightly dilated.   - Clinical correlation with laboratory evaluation for obstruction suggested. This, however, is not significantly changed from a previous CT from 05/2014. No retroperitoneal lymphadenopathy is identified.   - Negative CT of the of the soft tissues of the neck. No abnormally enlarged lymph nodes are demonstrated.     CT Neck, CAP 3-15-16:   - There is a change in the CT scan. There are prominent lymph nodes in the neck bilaterally. The largest are in the supraclavicular area and measure greater than a centimeter. Most of the nodes are in the 1cm size range.   - In the chest, most of the nodes are in the subcen timeter size range. The largest nodes are in the pretacheal area and measure 15 mm. There were no enlarged hilar nodes. On the lung windows, there are no pleural effusions. No pulmonary or parenchymal lung nodules or masses were demonstrated. .   There are m ore prominent lymph nodes in the retroperitoneum some of which are enlarged today consistent with recurrence. Again, most of these are in the 1 cm size range, but a few measure up to 17-18 mm. No mesenteric adenopathy demonstrated.     CTCAP, Neck 05-10-16:   - Chest: Persistent stable adenopathy in the mediastinum and axillary regions. The lungs remain clear.   - A/P: The lymph nodes are stable in comparing with the previous CT done in March.   - Neck: Persistent but stable adenopathy throughout the jugular  lymph node chains in both sides of the neck.     CTCAP neck 08-16-16:  - Stable mediastinal enlarged lymph nodes including an AP window lymph node measuring 9 mm  - A left axillary region lymph node measures 1.9 cm and was 1.9 cm. Stable right axillary region lymph adenopathy.   - Stable retroperitoneal and mesenteric lymphadenopathy   - persistent lymphadenopathy throughout the neck that is stable in size.     CTCAP 06-07-17:  - Stable bilateral jugular chain lymphadenopathy.  - Stable bilateral axillary and mediastinal region lymphadenopathy.  - Stable retroperitoneal lymphadenopathy.    [facility], Neck 02-27-18:  - Grossly stable bilateral jugular chain lymphadenopathy.  - LUNGS: A NEW RIGHT UPPER LOBE PULMONARY NODULE MEASURES 8.8 MM. RIGHT MIDDLE LOBE PULMONARY PERIBRONCHIAL VASCULAR NODULARITY MAY BE INFECTIOUS OR INFLAMMATORY  - MEDIASTINUM: AGAIN THERE IS MEDIASTINAL BORDERLINE LYMPHADENOPATHY WITHIN AP WINDOW LYMPH NODE NOW MEASURING 1 CM AND WAS 8 MM WITH SIMILAR INCREASE IN SIZE OF MULTIPLE BILATERAL AXILLARY LYMPH NODES WITH THE LARGEST RIGHT MEASURING 4.4 CM OF THE CONGLOMERATE THAT WAS ABOUT 3 CM  AND A LEFT AXILLARY REGION LYMPH NODE MEASURING 2.7 CM THAT WAS 2.2 CM.  - SPLEEN: SPLENOMEGALY IS AGAIN NOTED.  -LYMPH NODES: Extensive retroperitoneal lymphadenopathy with a right para-aortic lymph node measuring about 4.5 cm and was 2.5 cm.    CTCAP 04-12-18:  - There are numerous enlarged supraclavicular lymph nodes.  - These have progressed in size in comparing with the earlier CT. Several of the lymph nodes now measure up to 3 cm in diameter. The lymph nodes extend into the axillary regions. There are some lymph nodes in the mediastinum that are relatively stable in comparing with the earlier exam. The lungs are both well aerated and clear.  - The liver was normal in size and shape. There is mild dilatation of the bile ducts within the liver. The gallbladder is surgically absent. The common hepatic duct was  also dilated in the pancreatic head, this is unchanged from the earlier exam. The spleen is homogeneous in density and is stable in size. There continues to be fairly marked adenopathy in the retroperitoneum. The lymph node size continues to increase slightly by a few millimeters. The largest bulk of lymph nodes is in the gastrohepatic ligament region. There are also lymph nodes along the aorta. These are larger in size as well. Some of the periaortic lymph nodes now measure up to 3 cm in diameter.   - There are enlarged lymph nodes in the mesentery. There are enlarged lymph nodes along the iliac lymph node chains and some borderline enlarged lymph nodes in both groins. The bowel shows no evidence of obstruction, the kidneys enhance appropriately.    US Pelvis, limited 5/2/18  IMPRESSION:  - There is a 8 mm subcutaneous walled collection suggestive of tiny abscess in the suprapubic region of interest.    08-28-18 CT Chest With Contrast   FINDINGS:   Today's chest CT is compared with a previous chest CT done in April. Contrast was administered intravenously and scans were obtained from the apices of the lung and extended to the diaphragm. Both mediastinal and lung windows were evaluated. On the mediastinal windows the adenopathy seen on the earlier exam in the supraclavicular area is significantly decreased. The lymph nodes are now less than a centimeter in diameter. The bilateral axillary lymph nodes have also decreased significantly in size. There are a few nodes that are greater than a  centimeter in diameter, but they have fatty hilar areas. The lymph nodes in the mediastinum have also significantly decreased in size, and they are now all less than a centimeter in diameter. On the lung windows, there were increased interstitial markings in the lungs. No pulmonary parenchymal lung nodules or masses were identified.     IMPRESSION:  Significant improvement in the chest. Lymph nodes in the  supraclavicular area,  axillary regions, and mediastinum have all  significantly decreased in size. The lymph nodes that are larger than a  centimeter in diameter have fatty changes in the hilum.    08-28-18 CT Abdomen Pelvis With Contrast   FINDINGS:   - Contrast was administered both orally and intravenously. Scans were obtained from the diaphragm and extended through the pelvis. The liver is normal in size and shape. No focal lesions were seen within the liver. The gallbladder is surgically absent. The spleen was homogeneous in density and normal in size. There were no masses in the adrenal glands. The kidneys show normal cortical enhancement. The abdominal aorta was normal in caliber. There has been marked improvement in the adenopathy in the retroperitoneum. Only a few enlarged lymph nodes remain. The mesenteric lymph nodes are also almost completely resolved.  In the pelvis, no enlarged lymph nodes are demonstrated on the current exam. Urinary bladder was smooth in contour. The inguinal adenopathy is also resolved. The bowel shows no evidence of obstruction. There is no ascites or free air. There is a moderate amount of stool throughout the colon suggesting constipation.     IMPRESSION:  Significant improvement in the adenopathy in the abdomen or pelvis. There are only a few scattered lymph nodes in the area of the gastrohepatic ligament that are greater than a centimeter in diameter.    CTCAP 09-04-19  Findings  LUNGS: Unremarkable. No parenchymal soft tissue nodules.  No focal air  space disease.     HEART: Unremarkable.     PERICARDIUM: No effusion.     MEDIASTINUM: STABLE BILATERAL AXILLARY REGION NONENLARGED LYMPH NODES AS WELL WAS NOT ENLARGED LYMPH NODES WITHIN THE SUPERIOR MEDIASTINUM.     PLEURA: No pleural effusion. No pleural mass or abnormal calcification.     MAJOR AIRWAYS: Clear. No intrinsic mass.     VASCULATURE: No evidence of aneurysm.     VISUALIZED UPPER ABDOMEN:  LIVER: Homogeneous. No focal hepatic mass or  ductal dilatation.  SPLEEN: Homogeneous. No splenomegaly.  ADRENALS: No mass.  KIDNEYS: No mass. No obstructive uropathy.  No evidence of  urolithiasis.  GI TRACT: Non-dilated. No definite wall thickening.  PERITONEUM: No free air. No free fluid or loculated fluid  collections.  ABDOMINAL WALL: No focal hernia or mass.     OTHER: None.     BONES: No acute bony abnormality.     IMPRESSION:  Stable appearance of the chest.    Findings  LOWER THORAX: Clear. No effusions.     ABDOMEN:  LIVER: Homogeneous. No focal hepatic mass or ductal dilatation.  GALLBLADDER: CHOLECYSTECTOMY CLIPS ARE NOTED. CHOLECYSTECTOMY CLIPS ARE NOTED AND THERE IS AGAIN ENLARGEMENT OF THE COMMON BILE DUCT.  PANCREAS: Unremarkable. No mass or ductal dilatation.  SPLEEN: Homogeneous. No splenomegaly.  ADRENALS: No mass.  KIDNEYS: No mass. No obstructive uropathy.  No evidence of  urolithiasis.  GI TRACT: ABUNDANT STOOL THROUGHOUT THE COLON.  PERITONEUM: No free air. No free fluid or loculated fluid  collections.  MESENTERY: Unremarkable.  LYMPH NODES: STABLE BORDERLINE ENLARGED MESENTERIC LYMPH NODES.  VASCULATURE: ATHEROSCLEROTIC VASCULAR CALCIFICATION.  ABDOMINAL WALL: No focal hernia or mass.     OTHER: None.     PELVIS:  BLADDER: No focal mass or significant wall thickening  REPRODUCTIVE: Unremarkable as visualized.  APPENDIX: Nondistended. No surrounding inflammation.     BONES: No acute bony abnormality.     IMPRESSION:  Grossly stable appearance of the abdomen..    CT Neck 09-04-19  FINDINGS:      No soft tissue masses.   Marked improvement since 02/27/2018 of bilateral lymphadenopathy that is  now only seen to be nonenlarged bilateral slightly bulky lymph nodes.   A few nonenlarged bilateral parotid gland lymph nodes are noted.   There is no parapharyngeal mass or fluid collection.   No pharyngeal mucosal asymmetry.   Bone windows demonstrate no acute osseous abnormality.   The sinuses are clear.   Traversing blood vessels incidentally  appear unremarkable.     IMPRESSION:  Marked improvement since 02/27/2018 of bilateral  lymphadenopathy that is now only seen to be nonenlarged bilateral  slightly bulky lymph nodes.       CT Neck 02-24-20  FINDINGS:  There is evidence of atherosclerotic vascular disease.     Stent in the right carotid.     No adenopathy.     No drainable fluid collection.     Postoperative anterior fusion device at C4-C6.     IMPRESSION:  No evidence of metastatic disease.      CTCAP 02-24-20  FINDINGS:  Adenopathy:No evidence of mediastinal or hilar lymph node enlargement.  No axillary lymph node enlargement.     Fluid:There are no pericardial or pleural effusions.     LUNGS:No parenchymal soft tissue nodules or masses are present.     Skeletal:No acute or destructive bony abnormality is identified.     Coronary artery calcifications are present.     IMPRESSION:  Stable appearance of the chest. No evidence of metastatic disease.    FINDINGS:   The lung bases are clear. There are no pleural effusions.     The liver is homogeneous. There is no evidence of focal hepatic mass.     The spleen is homogeneous.     There is no peripancreatic stranding or pancreatic head mass.     There is no adrenal enlargement.     The kidneys show no evidence of hydronephrosis or hydroureter. I do not  see any distal ureteral stones.      Otherwise I do not see any free fluid or walled off fluid collections.     There is no bowel wall thickening or mesenteric stranding.     Distended loops of colon. Moderate to large volume stool.     Extensive atherosclerotic vascular disease.     IMPRESSION:  1. No evidence of metastatic disease.  2. Colonic distention. Moderate stool in the colon.  3. Evidence of atherosclerotic vascular disease.      CT Neck 01-11-21  FINDINGS: Contrast was injected intravenously and spiral scans were  obtained from the base of the skull and extended to the thoracic inlet.  CT shows no abnormally enlarged lymph nodes in the neck.  The salivary  glands were appropriate. There is no evidence of mass in the nasal or  oropharynx region.     IMPRESSION:  No lymphadenopathy was demonstrated in the neck.       CTCAP 01-11-21  FINDINGS: Spiral scans were obtained from the apices of the lung and  extended to the diaphragm. Lung and mediastinal windows were archived.  On the mediastinal windows, no enlarged lymph nodes are noted in the  mediastinum or in the hilar areas. No supraclavicular lymphadenopathy  was identified. The heart was not enlarged. There were no pericardial  effusions. There were no pleural effusions. On the lung windows, no  inflammatory infiltrates are noted in the lungs. No masses or lung  nodules are identified.      IMPRESSION:  Negative CT scan of the thorax. No evidence of  lymphadenopathy.     CT FINDINGS: The liver shows some mild decreased density as compared to  the spleen, consistent with fatty change. No focal liver lesions were  demonstrated. Gallbladder is surgically absent. The spleen is not  enlarged. There were no masses in the adrenal glands. The kidneys  enhance appropriately. No enlarged retroperitoneal lymph nodes were  demonstrated. There was no ascites. There is a moderate amount of gas  and stool throughout the colon. In the pelvis, no masses or fluid  collections were demonstrated.     IMPRESSION:  Stable CT scan of the abdomen and pelvis. No lymphadenopathy  was demonstrated. There was no splenomegaly. There is mild fatty change  in the liver.       RECENT LABS:  Lab Results   Component Value Date    WBC 7.40 01/18/2021    HGB 12.8 01/18/2021    HCT 38.8 01/18/2021    MCV 98.7 (H) 01/18/2021    RDW 13.9 01/18/2021     01/18/2021    NEUTRORELPCT 60.6 01/18/2021    LYMPHORELPCT 29.3 01/18/2021    MONORELPCT 7.2 01/18/2021    EOSRELPCT 1.6 01/18/2021    BASORELPCT 0.5 01/18/2021    NEUTROABS 4.48 01/18/2021    LYMPHSABS 2.17 01/18/2021       Lab Results   Component Value Date     (L) 01/18/2021     K 4.4 01/18/2021    CO2 22.5 01/18/2021     01/18/2021    BUN 10 01/18/2021    CREATININE 0.85 01/18/2021    GLUCOSE 255 (H) 01/18/2021    CALCIUM 8.8 01/18/2021    ALKPHOS 81 01/18/2021    AST 8 01/18/2021    ALT 8 01/18/2021    BILITOT 0.2 01/18/2021    ALBUMIN 3.89 01/18/2021    PROTEINTOT 6.4 01/18/2021       Lab Results   Component Value Date     09/13/2019    URICACID 3.1 09/13/2019         Date  SPEP/MONE Mspike Free K Free L  K/L Ratio   02-12-15 MONE normal Not observed   06-25-15 MONE normal Not observed 22.10  14.93  1.48  11-17-15 MONE normal Not observed 17.17  14.41  1.19    Date  IgG IgA IgM  02-12-15 744 110 43  06-25-15 776 94 39  11-17-15 812 99 41  03-29-17 771 103 34  08-04-17 831 94 27  11-13-17 791 95 21  02-27-18 879 109 25     CLL profile, FISH:  The CLL interphase fluorescence in situ hybridization   (FISH) panel analysis was normal. There were no cells with   CCND1-IGH fusion. No extra signals or deletions of OLAF,   chromosome 12, 13q, or TP53 were observed.       SPECIFIC FISH RESULTS:     CCND1/IGH: NORMAL   .         nuc kenzie 11q13(AZCP1w3),14q32(IGHx2)[100]       OLAF: NORMAL         nuc kenzie 11q22.3(ATMx2)[100] .     12cen: NORMAL     .         nuc kenzie 12cen(G43K7x5)[100] .     13q: NORMAL  .         nuc kenzie 13q14.3(DLEUx2),13q34(GYIW3p2)[100] .     TP53: NORMAL  .         nuc kenzie 17p13.1(TP53x2)[100]     Acute Hepatitis Panel: Negative    ASSESSMENT & PLAN:  Ms. Mae is a 55 y.o.  female with a history of Stage II SLL diagnosed in May of 2012 by L supraclavicular LN biopsy.     1. Small Lymphocytic Lymphoma:   - Treated as above with 6 cycles of Bendamustine (after anaphylactic reaction to Rituximab with 1st cycle of treatment) with complete radiographic response.   - She subsequently developed adenopathy in the cj cervical, supraclavicular, axillary and cj inguinal areas c/w CLL/SLL. She also had developed small mediastinal nodes and retroperitoneal LAD.  She has  had continued growth of the lymph nodes, and had been losing weight.  She had some night sweats which may or may not be related.  -  Recommended consideration of treatment given worsening disease.    -  Given rituxan allergy, recommended treatment with Imbruvica 420 mg daily with Bactrim SS daily and Valtrex 500 mg daily for prophylaxis.  -  She has tolerated treatment really well with excellent disease control.  She has a complete clinical and radiographic response.  CBC is benign.   -  Will continue current treatment.  RTC for exam and bloodwork in 3 months with tentative plan for imaging after 6 months.    2.  Signficant vascular disease:  -  She is seeing Dr. Rodriguez and says he may need to put in a new stent in the future...  -  She has been working with her PCP on better BP control.     3.  Concern for drug interactions:  -  On Seroquel and Zoloft prescribed by psychiatry.  Takes Compazine and Zofran at times for nausea.  Will check EKG to evaluate QT interval.     4.  Tobacco use:  She continues to smoke.  She says she wants to quit but isn't ready to do so at this time.    5.  Degenerative disc disease with worsening LLE and BUE symptoms:  Follows with Dr. Soto.    6.  Anxiety/Depression: Her prior psychiatrist relocated but she has found a new provider.  She continues with significant anxiety.She is taking Seroquel in addition to Zofran.    7.   Prophylaxis:  She received  Prevnar vaccine 11-13-17.  She has had 2019 influenza vaccine..  Previously recommended HAV vaccine as well as Shingrix vaccine.    8.  ACO / ZOYA/Other  Quality measures  - Melania Mae did not receive 2020 flu vaccine.  Will give this to her today.  - Melania Mae reports a pain score of 0.  Given her pain assessment as noted, treatment options were discussed and the following options were decided upon as a follow-up plan to address the patient's pain: continued f/u with PCP for non-neoplasm related pain..  - Current outpatient  and discharge medications have been reconciled for the patient.  Reviewed by: Thea Sorto MD       9. Follow up:   - MD follow up in 3 months with labs including CBCD, CMP, immunoglobulin levels.       This note was scribed for Thea Sorto MD by Violeta Fong RN.    I, Thea Sorto MD, personally performed the services described in this documentation as scribed by the above named individual in my presence, and it is both accurate and complete.  01/18/2021     I spent 25 minutes with Melania Mae today.  In the office today, more than 50% of this time was spent face-to-face with her  in counseling / coordination of care, reviewing her interim medical history and counseling on the current treatment plan.  All questions were answered to her satisfaction.          Electronically Signed by: Thea Sorto MD    CC:   Gillian Harrison APRN Dr. Donald Brown

## 2021-01-18 ENCOUNTER — OFFICE VISIT (OUTPATIENT)
Dept: ONCOLOGY | Facility: CLINIC | Age: 56
End: 2021-01-18

## 2021-01-18 ENCOUNTER — LAB (OUTPATIENT)
Dept: ONCOLOGY | Facility: CLINIC | Age: 56
End: 2021-01-18

## 2021-01-18 ENCOUNTER — HOSPITAL ENCOUNTER (OUTPATIENT)
Dept: RESPIRATORY THERAPY | Facility: HOSPITAL | Age: 56
Discharge: HOME | End: 2021-01-18
Admitting: INTERNAL MEDICINE

## 2021-01-18 VITALS
HEART RATE: 64 BPM | BODY MASS INDEX: 26.92 KG/M2 | WEIGHT: 161.8 LBS | DIASTOLIC BLOOD PRESSURE: 68 MMHG | RESPIRATION RATE: 18 BRPM | TEMPERATURE: 97.8 F | OXYGEN SATURATION: 97 % | SYSTOLIC BLOOD PRESSURE: 138 MMHG

## 2021-01-18 DIAGNOSIS — Z91.89 AT RISK FOR CARDIAC ARRHYTHMIA: Primary | ICD-10-CM

## 2021-01-18 DIAGNOSIS — C91.10 CLL (CHRONIC LYMPHOCYTIC LEUKEMIA) (HCC): ICD-10-CM

## 2021-01-18 DIAGNOSIS — R11.2 NON-INTRACTABLE VOMITING WITH NAUSEA, UNSPECIFIED VOMITING TYPE: ICD-10-CM

## 2021-01-18 DIAGNOSIS — Z91.89 AT RISK FOR CARDIAC ARRHYTHMIA: ICD-10-CM

## 2021-01-18 DIAGNOSIS — F41.9 ANXIETY: ICD-10-CM

## 2021-01-18 LAB
ALBUMIN SERPL-MCNC: 3.89 G/DL (ref 3.5–5.2)
ALBUMIN/GLOB SERPL: 1.5 G/DL
ALP SERPL-CCNC: 81 U/L (ref 39–117)
ALT SERPL W P-5'-P-CCNC: 8 U/L (ref 1–33)
ANION GAP SERPL CALCULATED.3IONS-SCNC: 8.5 MMOL/L (ref 5–15)
AST SERPL-CCNC: 8 U/L (ref 1–32)
BASOPHILS # BLD AUTO: 0.04 10*3/MM3 (ref 0–0.2)
BASOPHILS NFR BLD AUTO: 0.5 % (ref 0–1.5)
BILIRUB SERPL-MCNC: 0.2 MG/DL (ref 0–1.2)
BUN SERPL-MCNC: 10 MG/DL (ref 6–20)
BUN/CREAT SERPL: 11.8 (ref 7–25)
CALCIUM SPEC-SCNC: 8.8 MG/DL (ref 8.6–10.5)
CHLORIDE SERPL-SCNC: 102 MMOL/L (ref 98–107)
CO2 SERPL-SCNC: 22.5 MMOL/L (ref 22–29)
CREAT SERPL-MCNC: 0.85 MG/DL (ref 0.57–1)
DEPRECATED RDW RBC AUTO: 50.8 FL (ref 37–54)
EOSINOPHIL # BLD AUTO: 0.12 10*3/MM3 (ref 0–0.4)
EOSINOPHIL NFR BLD AUTO: 1.6 % (ref 0.3–6.2)
ERYTHROCYTE [DISTWIDTH] IN BLOOD BY AUTOMATED COUNT: 13.9 % (ref 12.3–15.4)
GFR SERPL CREATININE-BSD FRML MDRD: 69 ML/MIN/1.73
GLOBULIN UR ELPH-MCNC: 2.5 GM/DL
GLUCOSE SERPL-MCNC: 255 MG/DL (ref 65–99)
HCT VFR BLD AUTO: 38.8 % (ref 34–46.6)
HGB BLD-MCNC: 12.8 G/DL (ref 12–15.9)
IMM GRANULOCYTES # BLD AUTO: 0.06 10*3/MM3 (ref 0–0.05)
IMM GRANULOCYTES NFR BLD AUTO: 0.8 % (ref 0–0.5)
LYMPHOCYTES # BLD AUTO: 2.17 10*3/MM3 (ref 0.7–3.1)
LYMPHOCYTES NFR BLD AUTO: 29.3 % (ref 19.6–45.3)
MCH RBC QN AUTO: 32.6 PG (ref 26.6–33)
MCHC RBC AUTO-ENTMCNC: 33 G/DL (ref 31.5–35.7)
MCV RBC AUTO: 98.7 FL (ref 79–97)
MONOCYTES # BLD AUTO: 0.53 10*3/MM3 (ref 0.1–0.9)
MONOCYTES NFR BLD AUTO: 7.2 % (ref 5–12)
NEUTROPHILS NFR BLD AUTO: 4.48 10*3/MM3 (ref 1.7–7)
NEUTROPHILS NFR BLD AUTO: 60.6 % (ref 42.7–76)
NRBC BLD AUTO-RTO: 0 /100 WBC (ref 0–0.2)
PLATELET # BLD AUTO: 196 10*3/MM3 (ref 140–450)
PMV BLD AUTO: 11.2 FL (ref 6–12)
POTASSIUM SERPL-SCNC: 4.4 MMOL/L (ref 3.5–5.2)
PROT SERPL-MCNC: 6.4 G/DL (ref 6–8.5)
QT INTERVAL: 426 MS
QTC INTERVAL: 411 MS
RBC # BLD AUTO: 3.93 10*6/MM3 (ref 3.77–5.28)
SODIUM SERPL-SCNC: 133 MMOL/L (ref 136–145)
WBC # BLD AUTO: 7.4 10*3/MM3 (ref 3.4–10.8)

## 2021-01-18 PROCEDURE — 93010 ELECTROCARDIOGRAM REPORT: CPT | Performed by: INTERNAL MEDICINE

## 2021-01-18 PROCEDURE — G0008 ADMIN INFLUENZA VIRUS VAC: HCPCS | Performed by: INTERNAL MEDICINE

## 2021-01-18 PROCEDURE — 85025 COMPLETE CBC W/AUTO DIFF WBC: CPT | Performed by: INTERNAL MEDICINE

## 2021-01-18 PROCEDURE — 99214 OFFICE O/P EST MOD 30 MIN: CPT | Performed by: INTERNAL MEDICINE

## 2021-01-18 PROCEDURE — 90694 VACC AIIV4 NO PRSRV 0.5ML IM: CPT | Performed by: INTERNAL MEDICINE

## 2021-01-18 PROCEDURE — 93005 ELECTROCARDIOGRAM TRACING: CPT | Performed by: INTERNAL MEDICINE

## 2021-01-18 PROCEDURE — 80053 COMPREHEN METABOLIC PANEL: CPT | Performed by: INTERNAL MEDICINE

## 2021-01-18 PROCEDURE — 36415 COLL VENOUS BLD VENIPUNCTURE: CPT

## 2021-01-19 LAB — CREAT BLDA-MCNC: 0.7 MG/DL (ref 0.6–1.3)

## 2021-01-20 ENCOUNTER — OFFICE VISIT (OUTPATIENT)
Dept: NEUROSURGERY | Facility: CLINIC | Age: 56
End: 2021-01-20

## 2021-01-20 ENCOUNTER — HOSPITAL ENCOUNTER (OUTPATIENT)
Dept: CARDIOLOGY | Facility: HOSPITAL | Age: 56
Discharge: HOME | End: 2021-01-20
Admitting: NEUROLOGICAL SURGERY

## 2021-01-20 VITALS
DIASTOLIC BLOOD PRESSURE: 60 MMHG | BODY MASS INDEX: 26.82 KG/M2 | WEIGHT: 161 LBS | TEMPERATURE: 97.8 F | SYSTOLIC BLOOD PRESSURE: 140 MMHG | HEIGHT: 65 IN

## 2021-01-20 DIAGNOSIS — I65.22 CAROTID STENOSIS, ASYMPTOMATIC, LEFT: Primary | ICD-10-CM

## 2021-01-20 DIAGNOSIS — I73.9 PERIPHERAL VASCULAR DISEASE OF EXTREMITY WITH CLAUDICATION (HCC): ICD-10-CM

## 2021-01-20 DIAGNOSIS — I67.1 CEREBRAL ANEURYSM, NONRUPTURED: ICD-10-CM

## 2021-01-20 DIAGNOSIS — I65.22 CAROTID STENOSIS, ASYMPTOMATIC, LEFT: ICD-10-CM

## 2021-01-20 DIAGNOSIS — I65.21 STENOSIS OF RIGHT CAROTID ARTERY: ICD-10-CM

## 2021-01-20 DIAGNOSIS — I10 HYPERTENSION, UNSPECIFIED TYPE: ICD-10-CM

## 2021-01-20 DIAGNOSIS — I74.09 OTHER ARTERIAL EMBOLISM AND THROMBOSIS OF ABDOMINAL AORTA (HCC): ICD-10-CM

## 2021-01-20 PROCEDURE — 99214 OFFICE O/P EST MOD 30 MIN: CPT | Performed by: RADIOLOGY

## 2021-01-20 PROCEDURE — 93880 EXTRACRANIAL BILAT STUDY: CPT

## 2021-01-20 PROCEDURE — 93880 EXTRACRANIAL BILAT STUDY: CPT | Performed by: INTERNAL MEDICINE

## 2021-01-20 NOTE — PROGRESS NOTES
"NAME: KALI STILL   DOS: 2021  : 1965  PCP: Gillian Harrison APRN    Chief Complaint:    Chief Complaint   Patient presents with   • Carotid Artery Disease     s/p right carotid stent placement        History of Present Illness:  55 y.o. female who is well-known to the neuro interventional service, having undergone prior angioplasty/stent placement in 2017 for asymptomatic, high-grade right carotid stenosis.  She does have contralateral left carotid disease which has been managed medically, and followed with serial carotid duplex examinations.  She is done well from a neurovascular standpoint, with no stroke or TIA-like symptoms.  She presents today for routine follow-up.    She is on chronic dual antiplatelet therapy, and tolerating this very well.  She does continue to smoke, albeit she states that she has \"cut back\" to a half a pack per day.      She has a history of left cavernous segment ICA aneurysm, being managed conservatively.  She denies any singular headache to suggest a subarachnoid or intracranial hemorrhage.  No diplopia or evidence of cranial nerve palsy.    She also has extensive history of peripheral vascular disease, and now complains of bilateral (left greater than right) claudication symptoms with walking.  She has a history of diabetes, and is currently being managed by an endocrinologist, as her hemoglobin A1c was greater than 14 in 2020.  She has a history of lymphoma/leukemia, and is currently in remission on maintenance Imbruvica.      Past Medical History:  Past Medical History:   Diagnosis Date   • Aneurysm (CMS/[facility])     Cavernous left ICA aneurysm   • Asthma    • Carotid artery occlusion    • Chronic back pain    • CLL (chronic lymphocytic leukemia) (CMS/[facility])     2012   • COPD (chronic obstructive pulmonary disease) (CMS/[facility])    • Degenerative arthritis    • Depression    • Diabetes mellitus (CMS/[facility])     type 2   • " Hyperlipidemia    • Hypertension    • Lymphadenopathy    • Non Hodgkin's lymphoma (CMS/[facility])        Past Surgical History:  Past Surgical History:   Procedure Laterality Date   • APPENDECTOMY     • BACK SURGERY      2002 (work accident) c-spine surgery 4/14   • CAROTID STENT Right 08/23/2017   • CEREBRAL ANGIOGRAM N/A 8/23/2017    Diagnostic Carotid/Cerebral Angiogram   • CHOLECYSTECTOMY     • HYSTERECTOMY      for endometriosis/ovarian bleed   • NECK SURGERY     • OTHER SURGICAL HISTORY      pac insertion and removal   • TONSILLECTOMY         Review of Systems:        Review of Systems   Constitutional: Positive for chills and fatigue.   Eyes: Positive for photophobia.   Respiratory: Positive for shortness of breath.    Cardiovascular: Positive for chest pain, palpitations and leg swelling.   Gastrointestinal: Positive for nausea and vomiting.   Endocrine: Positive for cold intolerance, heat intolerance, polydipsia and polyphagia.   Genitourinary: Positive for frequency and urgency.   Musculoskeletal: Positive for back pain, myalgias, neck pain and neck stiffness.   Neurological: Positive for dizziness, weakness, numbness and headaches.   Psychiatric/Behavioral: Positive for agitation and dysphoric mood. The patient is nervous/anxious.    All other systems reviewed and are negative.       Medications    Current Outpatient Medications:   •  albuterol (PROVENTIL HFA;VENTOLIN HFA) 108 (90 Base) MCG/ACT inhaler, Inhale 2 puffs As Needed for Wheezing., Disp: , Rfl:   •  ALPRAZolam (XANAX) 0.5 MG tablet, Take 0.5 mg by mouth 3 (Three) Times a Day As Needed for Anxiety., Disp: , Rfl:   •  aspirin 81 MG EC tablet, Take 1 tablet by mouth Daily., Disp: 30 tablet, Rfl: 5  •  B-D UF III MINI PEN NEEDLES 31G X 5 MM misc, , Disp: , Rfl:   •  CloNIDine (CATAPRES) 0.1 MG tablet, 0.1 mg As Needed., Disp: , Rfl:   •  clopidogrel (PLAVIX) 75 MG tablet, Take 1 tablet by mouth Daily., Disp: 30 tablet, Rfl: 5  •  Continuous Blood Gluc  " (Dexcom G6 ) device, 1 kit Every 3 (Three) Months., Disp: 1 Device, Rfl: 0  •  Continuous Blood Gluc Sensor (Dexcom G6 Sensor), Every 10 (Ten) Days., Disp: 3 each, Rfl: 11  •  Continuous Blood Gluc Transmit (Dexcom G6 Transmitter) misc, 1 kit Every 3 (Three) Months., Disp: 1 each, Rfl: 3  •  gabapentin (NEURONTIN) 800 MG tablet, Take 800 mg by mouth 4 (Four) Times a Day., Disp: , Rfl:   •  ibrutinib (IMBRUVICA) 420 MG tablet tablet, Take 1 tablet by mouth Daily., Disp: 28 tablet, Rfl: 1  •  Ibuprofen (ADVIL PO), Take 400 mg by mouth 3 (Three) Times a Day. LIQUIGELS, Disp: , Rfl:   •  Insulin Aspart (NOVOLOG FLEXPEN SC), Inject 8-12 Units under the skin 3 (Three) Times a Day Before Meals. PER SLIDING SCALE, Disp: , Rfl:   •  Insulin Detemir (LEVEMIR FLEXPEN SC), Inject 8-12 Units under the skin Every Night. SLIDING SCALE, Disp: , Rfl:   •  lisinopril (PRINIVIL,ZESTRIL) 20 MG tablet, Take 20 mg by mouth Every Morning., Disp: , Rfl:   •  loratadine (CLARITIN) 10 MG tablet, Take 10 mg by mouth Every Night., Disp: , Rfl:   •  metoprolol tartrate (LOPRESSOR) 25 MG tablet, 25 mg 2 (Two) Times a Day., Disp: , Rfl:   •  oxyCODONE-acetaminophen (PERCOCET)  MG per tablet, Take 1 tablet by mouth Every 6 (Six) Hours As Needed for Moderate Pain ., Disp: , Rfl:   •  prochlorperazine (COMPAZINE) 10 MG tablet, Take 1 tablet by mouth Every 6 (Six) Hours As Needed for Nausea or Vomiting., Disp: 60 tablet, Rfl: 4  •  sertraline (ZOLOFT) 100 MG tablet, Take 100 mg by mouth Every Night., Disp: , Rfl:   •  sulfamethoxazole-trimethoprim (BACTRIM,SEPTRA) 400-80 MG tablet, TAKE 1 TABLET BY MOUTH EVERY DAY, Disp: 30 tablet, Rfl: 3  •  valACYclovir (VALTREX) 500 MG tablet, TAKE 1 TABLET BY MOUTH EVERY DAY, Disp: 30 tablet, Rfl: 3    Allergies:  Allergies   Allergen Reactions   • Rituxan [Rituximab] Other (See Comments)     \"THROAT RAWNESS; FELT LIKE MY THROAT WAS CLOSING UP\"   • Codeine Other (See Comments)     \"UNKNOW  " "CHILDHOOD REACTION\"   • Penicillins Other (See Comments)     \"UNKNOWN CHILDHOOD ALLERGY\"       Social Hx:  Social History     Tobacco Use   • Smoking status: Current Every Day Smoker     Packs/day: 0.50     Years: 35.00     Pack years: 17.50     Types: Cigarettes   • Smokeless tobacco: Never Used   • Tobacco comment: 0.5-1 PPD   Substance Use Topics   • Alcohol use: No   • Drug use: No       Family Hx:  Family History   Problem Relation Age of Onset   • Lung cancer Father 72   • Hypertension Father    • Heart disease Father    • Cancer Father    • Diabetes Father    • Other Brother 46        MI   • Heart disease Brother    • Breast cancer Paternal Aunt    • Colon cancer Maternal Grandfather    • Other Cousin         CLL (dx 46 yo)   • Hypertension Mother        Review of Imaging:  Carotid duplex dated 1/20/2021 was reviewed along with its corresponding radiologic report.  Comparison is made to multiple prior carotid duplex examinations at [facility], the most recent being in December 2019.  The patient's right carotid stent is widely patent, without recurrent hemodynamically significant disease.  Peak velocities within the right carotid vasculature are 141/38 cm/s (was 159/35 cm/s).  There remains extensive plaque formation within the left common carotid and internal carotid artery, but this appears largely stable and has been previously shown to represent nonhemodynamically significant (less than 60%) stenosis.  Peak velocities within the left carotid vasculature are 285/72 cm/s (was 232/73 cm/s in 2019, and was in excess of 300 cm/s in 2018).      Physical Examination:  Vitals:    01/20/21 1505   BP: 140/60   Temp: 97.8 °F (36.6 °C)        General Appearance:   Well developed, well nourished, well groomed, alert, and cooperative.  Cardiovascular: Regular rate and rhythm.  Harsh bilateral (right greater than left) carotid bruits      Neurological examination:  Neurologic Exam     Mental Status "   Oriented to person, place, and time.   Speech: speech is normal   Level of consciousness: alert    Cranial Nerves   Cranial nerves II through XII intact.     Motor Exam     Strength   Strength 5/5 throughout.     Sensory Exam   Light touch normal.     Gait, Coordination, and Reflexes     Gait  Gait: normal      Diagnoses/Plan:    Ms. Mae is a 55 y.o. female status post right carotid angioplasty/stent placement in August 2017.  Her right carotid stent remains widely patent, and there is no evidence of recurrent hemodynamically significant disease on today's carotid duplex.  Similarly, plaque formation at the left carotid bifurcation appears at least stable, and represents nonhemodynamically significant lesion (likely 60% or less).    She will remain on her dual antiplatelet therapy, as this should result in some element of stroke risk reduction for her carotid disease as well.      Ms. Mae also has a cavernous segment left ICA cerebral aneurysm, and she remains asymptomatic from this, denying any singular headache to suggest a subarachnoid or intracranial hemorrhage, no cranial nerve palsy/diplopia.    I plan on seeing her back in 6 months time with a CT angiogram of the head neck, to ensure stability of her cerebral aneurysm and exclude any progression/change that might necessitate further treatment/intervention.  We will also be able to further interrogate her carotid vasculature, to similarly ensure stability.    Ms. Mae does feel like her lower extremity claudication symptoms are progressing, and thus we will also obtain a lower extremity arterial duplex to evaluate for any focal disease, in particular involving her left lower extremity, that might account for her symptoms and/or be amenable to revascularization.      Melania Mae  reports that she has been smoking cigarettes. She has a 17.50 pack-year smoking history. She has never used smokeless tobacco.. I have educated her on the risk of  diseases from using tobacco products such as cancer, COPD, heart disease and progression of carotid disease, and stroke..     I advised her to quit and she is not willing to quit.    I spent 3.5 minutes counseling the patient.

## 2021-01-21 LAB
BH CV DISTAL RIGHT ICA HIDDEN LRR: 1 CM
BH CV ECHO MEAS - BSA(HAYCOCK): 1.8 M^2
BH CV ECHO MEAS - BSA: 1.8 M^2
BH CV ECHO MEAS - BZI_BMI: 26.8 KILOGRAMS/M^2
BH CV ECHO MEAS - BZI_METRIC_HEIGHT: 165.1 CM
BH CV ECHO MEAS - BZI_METRIC_WEIGHT: 73 KG
BH CV MID RIGHT ICA HIDDEN LRR: 1 CM
BH CV VAS CAROTID RIGHT DISTAL STENT EDV: 38 CM/S
BH CV VAS CAROTID RIGHT DISTAL STENT PSV: 138 CM/S
BH CV VAS CAROTID RIGHT DISTAL TO STENT NATIVE VESSEL E: 42 CM/S
BH CV VAS CAROTID RIGHT DISTAL TO STENT PSV: 144 CM/S
BH CV VAS CAROTID RIGHT MID STENT EDV: 38 CM/S
BH CV VAS CAROTID RIGHT MID STENT PSV: 133 CM/S
BH CV VAS CAROTID RIGHT PROXIMAL STENT EDV: 39 CM/S
BH CV VAS CAROTID RIGHT PROXIMAL STENT PSV: 117 CM/S
BH CV VAS CAROTID RIGHT STENT NATIVE VESSEL PROXIMAL EDV: 34 CM/S
BH CV VAS CAROTID RIGHT STENT NATIVE VESSEL PROXIMAL PS: 103 CM/S
BH CV XLRA MEAS LEFT DIST CCA EDV: 70 CM/SEC
BH CV XLRA MEAS LEFT DIST CCA PSV: 334 CM/SEC
BH CV XLRA MEAS LEFT DIST ICA EDV: 44 CM/SEC
BH CV XLRA MEAS LEFT DIST ICA PSV: 118 CM/SEC
BH CV XLRA MEAS LEFT ICA/CCA RATIO: 1.7
BH CV XLRA MEAS LEFT MID CCA EDV: 35.8 CM/SEC
BH CV XLRA MEAS LEFT MID CCA PSV: 171.4 CM/SEC
BH CV XLRA MEAS LEFT MID ICA EDV: 83.2 CM/SEC
BH CV XLRA MEAS LEFT MID ICA PSV: 266.9 CM/SEC
BH CV XLRA MEAS LEFT PROX CCA EDV: 36 CM/SEC
BH CV XLRA MEAS LEFT PROX CCA PSV: 157 CM/SEC
BH CV XLRA MEAS LEFT PROX ECA EDV: 0 CM/SEC
BH CV XLRA MEAS LEFT PROX ECA PSV: 218 CM/SEC
BH CV XLRA MEAS LEFT PROX ICA EDV: 72.7 CM/SEC
BH CV XLRA MEAS LEFT PROX ICA PSV: 285.4 CM/SEC
BH CV XLRA MEAS LEFT PROX SCLA PSV: 204.6 CM/SEC
BH CV XLRA MEAS LEFT VERTEBRAL A EDV: 28.6 CM/SEC
BH CV XLRA MEAS LEFT VERTEBRAL A PSV: 123.6 CM/SEC
BH CV XLRA MEAS RIGHT DIST CCA EDV: 33.7 CM/SEC
BH CV XLRA MEAS RIGHT DIST CCA PSV: 111 CM/SEC
BH CV XLRA MEAS RIGHT DIST ICA EDV: 29.9 CM/SEC
BH CV XLRA MEAS RIGHT DIST ICA PSV: 102.4 CM/SEC
BH CV XLRA MEAS RIGHT ICA/CCA RATIO: 1.2
BH CV XLRA MEAS RIGHT MID CCA EDV: 26.7 CM/SEC
BH CV XLRA MEAS RIGHT MID CCA PSV: 113.1 CM/SEC
BH CV XLRA MEAS RIGHT MID ICA EDV: 38.4 CM/SEC
BH CV XLRA MEAS RIGHT MID ICA PSV: 141.4 CM/SEC
BH CV XLRA MEAS RIGHT PROX CCA EDV: 34.3 CM/SEC
BH CV XLRA MEAS RIGHT PROX CCA PSV: 170.1 CM/SEC
BH CV XLRA MEAS RIGHT PROX ECA EDV: 12.6 CM/SEC
BH CV XLRA MEAS RIGHT PROX ECA PSV: 137.5 CM/SEC
BH CV XLRA MEAS RIGHT PROX ICA EDV: 31.1 CM/SEC
BH CV XLRA MEAS RIGHT PROX ICA PSV: 139 CM/SEC
BH CV XLRA MEAS RIGHT PROX SCLA PSV: 259.9 CM/SEC
BH CV XLRA MEAS RIGHT VERTEBRAL A EDV: 40 CM/SEC
BH CV XLRA MEAS RIGHT VERTEBRAL A PSV: 156 CM/SEC
BH CVPROX RIGHT ICA HIDDEN LRR: 1 CM

## 2021-02-02 DIAGNOSIS — C91.10 CLL (CHRONIC LYMPHOCYTIC LEUKEMIA) (HCC): Primary | ICD-10-CM

## 2021-02-05 ENCOUNTER — SPECIALTY PHARMACY (OUTPATIENT)
Dept: PHARMACY | Facility: HOSPITAL | Age: 56
End: 2021-02-05

## 2021-02-05 DIAGNOSIS — C91.10 CLL (CHRONIC LYMPHOCYTIC LEUKEMIA) (HCC): ICD-10-CM

## 2021-02-28 DIAGNOSIS — C91.10 CLL (CHRONIC LYMPHOCYTIC LEUKEMIA) (HCC): Primary | ICD-10-CM

## 2021-03-04 RX ORDER — CLINDAMYCIN HYDROCHLORIDE 300 MG/1
300 CAPSULE ORAL 3 TIMES DAILY
Qty: 30 CAPSULE | Refills: 0 | Status: SHIPPED | OUTPATIENT
Start: 2021-03-04

## 2021-03-04 NOTE — TELEPHONE ENCOUNTER
----- Message from Gwendolyn Hatch MA sent at 3/4/2021 12:24 PM EST -----  Patient called wants to know if Dr. Sorto will call her in an antibiotic. She has an abscess in her jaw. She said she couldn't get into a dentist and she hadn't seen her pcp in over a year due to coivd so she wanted to ask if we would. Her call back is  [phone] and if we do she uses [facility] in [place]. Thanks

## 2021-03-12 RX ORDER — SULFAMETHOXAZOLE AND TRIMETHOPRIM 400; 80 MG/1; MG/1
TABLET ORAL
Qty: 30 TABLET | Refills: 3 | Status: SHIPPED | OUTPATIENT
Start: 2021-03-12 | End: 2021-06-07 | Stop reason: SDUPTHER

## 2021-03-12 RX ORDER — VALACYCLOVIR HYDROCHLORIDE 500 MG/1
TABLET, FILM COATED ORAL
Qty: 30 TABLET | Refills: 3 | Status: SHIPPED | OUTPATIENT
Start: 2021-03-12 | End: 2021-06-07 | Stop reason: SDUPTHER

## 2021-03-16 ENCOUNTER — BULK ORDERING (OUTPATIENT)
Dept: CASE MANAGEMENT | Facility: OTHER | Age: 56
End: 2021-03-16

## 2021-03-16 DIAGNOSIS — Z23 IMMUNIZATION DUE: ICD-10-CM

## 2021-03-29 DIAGNOSIS — C91.10 CLL (CHRONIC LYMPHOCYTIC LEUKEMIA) (HCC): Primary | ICD-10-CM

## 2021-04-20 ENCOUNTER — OFFICE VISIT (OUTPATIENT)
Dept: ONCOLOGY | Facility: CLINIC | Age: 56
End: 2021-04-20

## 2021-04-20 ENCOUNTER — LAB (OUTPATIENT)
Dept: ONCOLOGY | Facility: CLINIC | Age: 56
End: 2021-04-20

## 2021-04-20 VITALS
DIASTOLIC BLOOD PRESSURE: 75 MMHG | OXYGEN SATURATION: 98 % | BODY MASS INDEX: 26.09 KG/M2 | HEART RATE: 67 BPM | TEMPERATURE: 97.8 F | SYSTOLIC BLOOD PRESSURE: 136 MMHG | WEIGHT: 156.8 LBS | RESPIRATION RATE: 18 BRPM

## 2021-04-20 DIAGNOSIS — C91.10 CLL (CHRONIC LYMPHOCYTIC LEUKEMIA) (HCC): Primary | ICD-10-CM

## 2021-04-20 DIAGNOSIS — C91.10 CLL (CHRONIC LYMPHOCYTIC LEUKEMIA) (HCC): ICD-10-CM

## 2021-04-20 LAB
ALBUMIN SERPL-MCNC: 3.78 G/DL (ref 3.5–5.2)
ALBUMIN/GLOB SERPL: 1.4 G/DL
ALP SERPL-CCNC: 97 U/L (ref 39–117)
ALT SERPL W P-5'-P-CCNC: 19 U/L (ref 1–33)
ANION GAP SERPL CALCULATED.3IONS-SCNC: 8.6 MMOL/L (ref 5–15)
AST SERPL-CCNC: 16 U/L (ref 1–32)
BASOPHILS # BLD AUTO: 0.03 10*3/MM3 (ref 0–0.2)
BASOPHILS NFR BLD AUTO: 0.4 % (ref 0–1.5)
BILIRUB SERPL-MCNC: 0.2 MG/DL (ref 0–1.2)
BUN SERPL-MCNC: 7 MG/DL (ref 6–20)
BUN/CREAT SERPL: 8.4 (ref 7–25)
CALCIUM SPEC-SCNC: 8.7 MG/DL (ref 8.6–10.5)
CHLORIDE SERPL-SCNC: 103 MMOL/L (ref 98–107)
CO2 SERPL-SCNC: 20.4 MMOL/L (ref 22–29)
CREAT SERPL-MCNC: 0.83 MG/DL (ref 0.57–1)
DEPRECATED RDW RBC AUTO: 47.5 FL (ref 37–54)
EOSINOPHIL # BLD AUTO: 0.08 10*3/MM3 (ref 0–0.4)
EOSINOPHIL NFR BLD AUTO: 1.1 % (ref 0.3–6.2)
ERYTHROCYTE [DISTWIDTH] IN BLOOD BY AUTOMATED COUNT: 13.2 % (ref 12.3–15.4)
GFR SERPL CREATININE-BSD FRML MDRD: 71 ML/MIN/1.73
GLOBULIN UR ELPH-MCNC: 2.7 GM/DL
GLUCOSE SERPL-MCNC: 240 MG/DL (ref 65–99)
HCT VFR BLD AUTO: 42 % (ref 34–46.6)
HGB BLD-MCNC: 13.6 G/DL (ref 12–15.9)
IMM GRANULOCYTES # BLD AUTO: 0.04 10*3/MM3 (ref 0–0.05)
IMM GRANULOCYTES NFR BLD AUTO: 0.5 % (ref 0–0.5)
LYMPHOCYTES # BLD AUTO: 1.51 10*3/MM3 (ref 0.7–3.1)
LYMPHOCYTES NFR BLD AUTO: 20.7 % (ref 19.6–45.3)
MCH RBC QN AUTO: 31.9 PG (ref 26.6–33)
MCHC RBC AUTO-ENTMCNC: 32.4 G/DL (ref 31.5–35.7)
MCV RBC AUTO: 98.6 FL (ref 79–97)
MONOCYTES # BLD AUTO: 0.38 10*3/MM3 (ref 0.1–0.9)
MONOCYTES NFR BLD AUTO: 5.2 % (ref 5–12)
NEUTROPHILS NFR BLD AUTO: 5.26 10*3/MM3 (ref 1.7–7)
NEUTROPHILS NFR BLD AUTO: 72.1 % (ref 42.7–76)
NRBC BLD AUTO-RTO: 0 /100 WBC (ref 0–0.2)
PLATELET # BLD AUTO: 181 10*3/MM3 (ref 140–450)
PMV BLD AUTO: 11 FL (ref 6–12)
POTASSIUM SERPL-SCNC: 5 MMOL/L (ref 3.5–5.2)
PROT SERPL-MCNC: 6.5 G/DL (ref 6–8.5)
RBC # BLD AUTO: 4.26 10*6/MM3 (ref 3.77–5.28)
SODIUM SERPL-SCNC: 132 MMOL/L (ref 136–145)
WBC # BLD AUTO: 7.3 10*3/MM3 (ref 3.4–10.8)

## 2021-04-20 PROCEDURE — 80053 COMPREHEN METABOLIC PANEL: CPT | Performed by: INTERNAL MEDICINE

## 2021-04-20 PROCEDURE — 85025 COMPLETE CBC W/AUTO DIFF WBC: CPT | Performed by: INTERNAL MEDICINE

## 2021-04-20 PROCEDURE — 82784 ASSAY IGA/IGD/IGG/IGM EACH: CPT | Performed by: INTERNAL MEDICINE

## 2021-04-20 PROCEDURE — 99214 OFFICE O/P EST MOD 30 MIN: CPT | Performed by: INTERNAL MEDICINE

## 2021-04-20 NOTE — PROGRESS NOTES
NAME: Melania Mae    : 1965    DATE:  2021    DIAGNOSIS:  SLL - initially diagnosed with [place] Stage II Disease in May 2012, based on excisional biopsy of a L supraclavicular LN. Bone marrow was involved.     CHIEF COMPLAINT:  CLL / SLL    TREATMENT HISTORY:  1. Received 6 cycles of Treanda without Rituxan between 10-24-12 and 3-21-13 because of anaphylactic reaction to Rituximab with her 1st cycle of treatment . Received Neulasta support. (Delivered by Dr. Koehler)     2. Imbruvica started 18      HISTORY OF PRESENT ILLNESS:   Ms. Mae was referred by Gillian Harrison for evaluation and treatment of CLL / SLL. She was reportedly diagnosed in May of 2012 when she saw Dr. Carbajal for biopsy of a L supraclavicular LN. At that time she had excessive fatigue & a 40 lb weight loss. She saw Dr. Koehler at  at time who performed bone marrow exam (bone marrow was involved) and started treatment with Bendamustine / Rituximab. Unfortunately with her 1st treatment, she had an anaphylactic reaction to Rituximab so this was not given with subsequent cycles of therapy. Her last cycle of treatment was 3-21-13. Per Dr. Koehler' s records, she had a complete response to treatment. She never had a significant leukocytosis or lymphocytosis, but had predominantly a lymphomatous presentation of her disease.     INTERVAL HISTORY:  Ms. Mae is here today for follow up of CLL/SLL. She continues to take Imbruvica and is tolerating this well.  She continues to f/u with her vascular surgeon and has plans for stent placement in early .  Round Up Case has been settled and details will be known soon.  She is otherwise doing well without complaint.    PAST MEDICAL HISTORY:  Past Medical History:   Diagnosis Date   • Aneurysm (CMS/HCC)     Cavernous left ICA aneurysm   • Asthma    • Carotid artery occlusion    • Chronic back pain    • CLL (chronic lymphocytic leukemia) (CMS/HCC)     2012   •  COPD (chronic obstructive pulmonary disease) (CMS/HCC)    • Degenerative arthritis    • Depression    • Diabetes mellitus (CMS/HCC)     type 2   • Hyperlipidemia    • Hypertension    • Lymphadenopathy    • Non Hodgkin's lymphoma (CMS/HCC)        PAST SURGICAL HISTORY:  Past Surgical History:   Procedure Laterality Date   • APPENDECTOMY     • BACK SURGERY      2002 (work accident) c-spine surgery 4/14   • CAROTID STENT Right 08/23/2017   • CEREBRAL ANGIOGRAM N/A 8/23/2017    Diagnostic Carotid/Cerebral Angiogram   • CHOLECYSTECTOMY     • HYSTERECTOMY      for endometriosis/ovarian bleed   • NECK SURGERY     • OTHER SURGICAL HISTORY      pac insertion and removal   • TONSILLECTOMY         FAMILY HISTORY:  Family History   Problem Relation Age of Onset   • Lung cancer Father 72   • Hypertension Father    • Heart disease Father    • Cancer Father    • Diabetes Father    • Other Brother 46        MI   • Heart disease Brother    • Breast cancer Paternal Aunt    • Colon cancer Maternal Grandfather    • Other Cousin         CLL (dx 48 yo)   • Hypertension Mother      Social History     Socioeconomic History   • Marital status:      Spouse name: Not on file   • Number of children: Not on file   • Years of education: Not on file   • Highest education level: Not on file   Tobacco Use   • Smoking status: Current Every Day Smoker     Packs/day: 0.50     Years: 35.00     Pack years: 17.50     Types: Cigarettes   • Smokeless tobacco: Never Used   • Tobacco comment: 0.5-1 PPD   Vaping Use   • Vaping Use: Never used   Substance and Sexual Activity   • Alcohol use: No   • Drug use: No   • Sexual activity: Defer     REVIEW OF SYSTEMS:    A comprehensive 14 point review of systems was performed and was negative except as mentioned    MEDICATIONS:  The current medication list was reviewed in the EMR    Current Outpatient Medications:   •  albuterol (PROVENTIL HFA;VENTOLIN HFA) 108 (90 Base) MCG/ACT inhaler, Inhale 2 puffs As  Needed for Wheezing., Disp: , Rfl:   •  ALPRAZolam (XANAX) 0.5 MG tablet, Take 0.5 mg by mouth 3 (Three) Times a Day As Needed for Anxiety., Disp: , Rfl:   •  aspirin 81 MG EC tablet, Take 1 tablet by mouth Daily., Disp: 30 tablet, Rfl: 5  •  B-D UF III MINI PEN NEEDLES 31G X 5 MM misc, , Disp: , Rfl:   •  clindamycin (CLEOCIN) 300 MG capsule, Take 1 capsule by mouth 3 (Three) Times a Day., Disp: 30 capsule, Rfl: 0  •  CloNIDine (CATAPRES) 0.1 MG tablet, 0.1 mg As Needed., Disp: , Rfl:   •  clopidogrel (PLAVIX) 75 MG tablet, Take 1 tablet by mouth Daily., Disp: 30 tablet, Rfl: 5  •  Continuous Blood Gluc  (Dexcom G6 ) device, 1 kit Every 3 (Three) Months., Disp: 1 Device, Rfl: 0  •  Continuous Blood Gluc Sensor (Dexcom G6 Sensor), Every 10 (Ten) Days., Disp: 3 each, Rfl: 11  •  Continuous Blood Gluc Transmit (Dexcom G6 Transmitter) misc, 1 kit Every 3 (Three) Months., Disp: 1 each, Rfl: 3  •  gabapentin (NEURONTIN) 800 MG tablet, Take 800 mg by mouth 4 (Four) Times a Day., Disp: , Rfl:   •  ibrutinib (IMBRUVICA) 420 MG tablet tablet, Take 1 tablet by mouth Daily., Disp: 28 tablet, Rfl: 5  •  Ibuprofen (ADVIL PO), Take 400 mg by mouth 3 (Three) Times a Day. LIQUIGELS, Disp: , Rfl:   •  Insulin Aspart (NOVOLOG FLEXPEN SC), Inject 8-12 Units under the skin 3 (Three) Times a Day Before Meals. PER SLIDING SCALE, Disp: , Rfl:   •  Insulin Detemir (LEVEMIR FLEXPEN SC), Inject 8-12 Units under the skin Every Night. SLIDING SCALE, Disp: , Rfl:   •  lisinopril (PRINIVIL,ZESTRIL) 20 MG tablet, Take 20 mg by mouth Every Morning., Disp: , Rfl:   •  loratadine (CLARITIN) 10 MG tablet, Take 10 mg by mouth Every Night., Disp: , Rfl:   •  metoprolol tartrate (LOPRESSOR) 25 MG tablet, 25 mg 2 (Two) Times a Day., Disp: , Rfl:   •  oxyCODONE-acetaminophen (PERCOCET)  MG per tablet, Take 1 tablet by mouth Every 6 (Six) Hours As Needed for Moderate Pain ., Disp: , Rfl:   •  prochlorperazine (COMPAZINE) 10 MG tablet,  "Take 1 tablet by mouth Every 6 (Six) Hours As Needed for Nausea or Vomiting., Disp: 60 tablet, Rfl: 4  •  sertraline (ZOLOFT) 100 MG tablet, Take 100 mg by mouth Every Night., Disp: , Rfl:   •  sulfamethoxazole-trimethoprim (BACTRIM,SEPTRA) 400-80 MG tablet, TAKE 1 TABLET BY MOUTH EVERY DAY FOR INFECTION, Disp: 30 tablet, Rfl: 3  •  valACYclovir (VALTREX) 500 MG tablet, TAKE 1 TABLET BY MOUTH EVERY DAY, Disp: 30 tablet, Rfl: 3    ALLERGIES:    Allergies   Allergen Reactions   • Rituxan [Rituximab] Other (See Comments)     \"THROAT RAWNESS; FELT LIKE MY THROAT WAS CLOSING UP\"   • Codeine Other (See Comments)     \"UNKNOW  CHILDHOOD REACTION\"   • Penicillins Other (See Comments)     \"UNKNOWN CHILDHOOD ALLERGY\"       PHYSICAL EXAM:  Visit Vitals  /75   Pulse 67   Temp 97.8 °F (36.6 °C) (Temporal)   Resp 18   Wt 71.1 kg (156 lb 12.8 oz)   SpO2 98%   BMI 26.09 kg/m²     Pain Score    04/20/21 1507   PainSc:   5   ECOG score: 0       General: Alert and oriented. Appears well  HEENT: Pupils are equal, round and reactive to light and accommodation, Extraocular movements full, oropharynx clear  Neck: no jvd or thyromegaly   Cardiovascular: regular rate and rhythm, she has a soft systolic murmur I-II/VI, no rubs or gallops.   Pulmonary: clear to auscultation bilaterally, no wheezing  Abdomen: soft, non-tender, non-distended, normal active bowel sounds present, no organomegaly   Extremities: No clubbing, cyanosis or edema   Lymph: Previously had cj cervical, L supraclavicular, cj axillary and cj inguinal adenopathy.  This has all resolved.    Neurologic: MS as above, CN II-XII intact. Grossly non-focal.    PATHOLOGY:  5-25-12 L Supraclavicular LN Biopsy:   - Extensive small lymphocytic lymphoma / chronic lymphocytic leukemia involvement.   Flow cytometry shows monoclonal Kappa B-cell population which coexpresses CD5 and CD23 representing 30% of gated cells. There is dim surface light chain and dim CD20 expression. No " CD38 expression. Neoplastic cells are positive for CD20 (dim), PAX-5, CCD5, CD23, CD43. B cells are + for CD3. BCL1 stain negative.    BM Biopsy 6-29-12 ([facility])   - Limited BM specimen with scattered hematopoietic cells.   - Flow cytometry revealed peripheral blood with minute CLL/SLL population (0.5%) with normal  FISH studies.     Cervical Disectomy 04-18-14:   - Fragments of fibrohyaline cartilage, no acute inflammation or metastatic disease identified.     IMAGING:  PET-CT 6-19-12:   - Mild to moderate bilateral axillary LAD and mediastinal LAD with minimal associated hypermetabolism.     PET-CT 10-09-12:   - Multiple areas of abnormal hypermetabolism in the neck bilaterally, new from the prior study. R posterior trangle focus with SUV 3.1. L supraclavicular focus SUV 5.0 (LN 1.8 cm, prior 1.2 cm)   - Carl axillary LAD - L axilla 2.8 cm LN (prior 1.6 cm) with SUV 3.6, R axillary ln new 2.0 cm, SUV 2.6   - Mediastinal LAD noted. R prevascular LN 1.5 cm with SUV 2.1   - Carl external iliac LNs present ( R 3.2 cm with SUV 3.1)   - Overall worsening areas of hypermetabolism corresponding to enlarging nodes c/w worsening neoplastic involvement.     PET-CT 1-16-13:   - No PET-CT findings of active disease related to the patient's lymphoma. Interval resolution of hypermetabolic adenopathy described on previous examination. Spleen is normal.     PET-CT 1-21-14:   - No abnormal hypermetabolism to suggest neoplastic involvement. No mass or adenopathy noted. Spleen is normal.     04-03-14 MRI Spine:   - Cervical: Degenerative disk disease in the cervical disk spaces at the level of C4-C7. At 4-5, there is a disk herniation associated with bulging. There was spinal stenosis at C5-6 but no disk herniation. At C6-7, the disk bulging is not felt to be significant. The postcontrasted scans in the cervical spine showed no areas of abnormal enhancement.   - Thoracic: negative MRI examination of the thoracic spine. A source of the  patient's t horacic spine pain was not demonstrated.     Chest Xray 04-17-15:   - The lungs are clear, the heart is normal. There is no active disease in the chest.     CT CAP/neck w/ contrast 05-13-14:   - No neck lymphadenopathy by CT size criteria   - No intrathoracic or abdominal LAD     CTAP, neck w/ contrast 03-04-15:   - No enlarged lymph nodes were demonstrated. The bile ducts in the liver and the extrahepatic biliary tree is slightly dilated.   - Clinical correlation with laboratory evaluation for obstruction suggested. This, however, is not significantly changed from a previous CT from 05/2014. No retroperitoneal lymphadenopathy is identified.   - Negative CT of the of the soft tissues of the neck. No abnormally enlarged lymph nodes are demonstrated.     CT Neck, CAP 3-15-16:   - There is a change in the CT scan. There are prominent lymph nodes in the neck bilaterally. The largest are in the supraclavicular area and measure greater than a centimeter. Most of the nodes are in the 1cm size range.   - In the chest, most of the nodes are in the subcen timeter size range. The largest nodes are in the pretacheal area and measure 15 mm. There were no enlarged hilar nodes. On the lung windows, there are no pleural effusions. No pulmonary or parenchymal lung nodules or masses were demonstrated. .   There are m ore prominent lymph nodes in the retroperitoneum some of which are enlarged today consistent with recurrence. Again, most of these are in the 1 cm size range, but a few measure up to 17-18 mm. No mesenteric adenopathy demonstrated.     CTCAP, Neck 05-10-16:   - Chest: Persistent stable adenopathy in the mediastinum and axillary regions. The lungs remain clear.   - A/P: The lymph nodes are stable in comparing with the previous CT done in March.   - Neck: Persistent but stable adenopathy throughout the jugular lymph node chains in both sides of the neck.     CTCAP neck 08-16-16:  - Stable mediastinal enlarged  lymph nodes including an AP window lymph node measuring 9 mm  - A left axillary region lymph node measures 1.9 cm and was 1.9 cm. Stable right axillary region lymph adenopathy.   - Stable retroperitoneal and mesenteric lymphadenopathy   - persistent lymphadenopathy throughout the neck that is stable in size.     CTCAP 06-07-17:  - Stable bilateral jugular chain lymphadenopathy.  - Stable bilateral axillary and mediastinal region lymphadenopathy.  - Stable retroperitoneal lymphadenopathy.    CTCAP, Neck 02-27-18:  - Grossly stable bilateral jugular chain lymphadenopathy.  - LUNGS: A NEW RIGHT UPPER LOBE PULMONARY NODULE MEASURES 8.8 MM. RIGHT MIDDLE LOBE PULMONARY PERIBRONCHIAL VASCULAR NODULARITY MAY BE INFECTIOUS OR INFLAMMATORY  - MEDIASTINUM: AGAIN THERE IS MEDIASTINAL BORDERLINE LYMPHADENOPATHY WITHIN AP WINDOW LYMPH NODE NOW MEASURING 1 CM AND WAS 8 MM WITH SIMILAR INCREASE IN SIZE OF MULTIPLE BILATERAL AXILLARY LYMPH NODES WITH THE LARGEST RIGHT MEASURING 4.4 CM OF THE CONGLOMERATE THAT WAS ABOUT 3 CM  AND A LEFT AXILLARY REGION LYMPH NODE MEASURING 2.7 CM THAT WAS 2.2 CM.  - SPLEEN: SPLENOMEGALY IS AGAIN NOTED.  -LYMPH NODES: Extensive retroperitoneal lymphadenopathy with a right para-aortic lymph node measuring about 4.5 cm and was 2.5 cm.    CTCAP 04-12-18:  - There are numerous enlarged supraclavicular lymph nodes.  - These have progressed in size in comparing with the earlier CT. Several of the lymph nodes now measure up to 3 cm in diameter. The lymph nodes extend into the axillary regions. There are some lymph nodes in the mediastinum that are relatively stable in comparing with the earlier exam. The lungs are both well aerated and clear.  - The liver was normal in size and shape. There is mild dilatation of the bile ducts within the liver. The gallbladder is surgically absent. The common hepatic duct was also dilated in the pancreatic head, this is unchanged from the earlier exam. The spleen is  homogeneous in density and is stable in size. There continues to be fairly marked adenopathy in the retroperitoneum. The lymph node size continues to increase slightly by a few millimeters. The largest bulk of lymph nodes is in the gastrohepatic ligament region. There are also lymph nodes along the aorta. These are larger in size as well. Some of the periaortic lymph nodes now measure up to 3 cm in diameter.   - There are enlarged lymph nodes in the mesentery. There are enlarged lymph nodes along the iliac lymph node chains and some borderline enlarged lymph nodes in both groins. The bowel shows no evidence of obstruction, the kidneys enhance appropriately.    US Pelvis, limited 5/2/18  IMPRESSION:  - There is a 8 mm subcutaneous walled collection suggestive of tiny abscess in the suprapubic region of interest.    08-28-18 CT Chest With Contrast   FINDINGS:   Today's chest CT is compared with a previous chest CT done in April. Contrast was administered intravenously and scans were obtained from the apices of the lung and extended to the diaphragm. Both mediastinal and lung windows were evaluated. On the mediastinal windows the adenopathy seen on the earlier exam in the supraclavicular area is significantly decreased. The lymph nodes are now less than a centimeter in diameter. The bilateral axillary lymph nodes have also decreased significantly in size. There are a few nodes that are greater than a  centimeter in diameter, but they have fatty hilar areas. The lymph nodes in the mediastinum have also significantly decreased in size, and they are now all less than a centimeter in diameter. On the lung windows, there were increased interstitial markings in the lungs. No pulmonary parenchymal lung nodules or masses were identified.     IMPRESSION:  Significant improvement in the chest. Lymph nodes in the  supraclavicular area, axillary regions, and mediastinum have all  significantly decreased in size. The lymph nodes  that are larger than a  centimeter in diameter have fatty changes in the hilum.    08-28-18 CT Abdomen Pelvis With Contrast   FINDINGS:   - Contrast was administered both orally and intravenously. Scans were obtained from the diaphragm and extended through the pelvis. The liver is normal in size and shape. No focal lesions were seen within the liver. The gallbladder is surgically absent. The spleen was homogeneous in density and normal in size. There were no masses in the adrenal glands. The kidneys show normal cortical enhancement. The abdominal aorta was normal in caliber. There has been marked improvement in the adenopathy in the retroperitoneum. Only a few enlarged lymph nodes remain. The mesenteric lymph nodes are also almost completely resolved.  In the pelvis, no enlarged lymph nodes are demonstrated on the current exam. Urinary bladder was smooth in contour. The inguinal adenopathy is also resolved. The bowel shows no evidence of obstruction. There is no ascites or free air. There is a moderate amount of stool throughout the colon suggesting constipation.     IMPRESSION:  Significant improvement in the adenopathy in the abdomen or pelvis. There are only a few scattered lymph nodes in the area of the gastrohepatic ligament that are greater than a centimeter in diameter.    CTCAP 09-04-19  Findings  LUNGS: Unremarkable. No parenchymal soft tissue nodules.  No focal air  space disease.     HEART: Unremarkable.     PERICARDIUM: No effusion.     MEDIASTINUM: STABLE BILATERAL AXILLARY REGION NONENLARGED LYMPH NODES AS WELL WAS NOT ENLARGED LYMPH NODES WITHIN THE SUPERIOR MEDIASTINUM.     PLEURA: No pleural effusion. No pleural mass or abnormal calcification.     MAJOR AIRWAYS: Clear. No intrinsic mass.     VASCULATURE: No evidence of aneurysm.     VISUALIZED UPPER ABDOMEN:  LIVER: Homogeneous. No focal hepatic mass or ductal dilatation.  SPLEEN: Homogeneous. No splenomegaly.  ADRENALS: No mass.  KIDNEYS: No mass.  No obstructive uropathy.  No evidence of  urolithiasis.  GI TRACT: Non-dilated. No definite wall thickening.  PERITONEUM: No free air. No free fluid or loculated fluid  collections.  ABDOMINAL WALL: No focal hernia or mass.     OTHER: None.     BONES: No acute bony abnormality.     IMPRESSION:  Stable appearance of the chest.    Findings  LOWER THORAX: Clear. No effusions.     ABDOMEN:  LIVER: Homogeneous. No focal hepatic mass or ductal dilatation.  GALLBLADDER: CHOLECYSTECTOMY CLIPS ARE NOTED. CHOLECYSTECTOMY CLIPS ARE NOTED AND THERE IS AGAIN ENLARGEMENT OF THE COMMON BILE DUCT.  PANCREAS: Unremarkable. No mass or ductal dilatation.  SPLEEN: Homogeneous. No splenomegaly.  ADRENALS: No mass.  KIDNEYS: No mass. No obstructive uropathy.  No evidence of  urolithiasis.  GI TRACT: ABUNDANT STOOL THROUGHOUT THE COLON.  PERITONEUM: No free air. No free fluid or loculated fluid  collections.  MESENTERY: Unremarkable.  LYMPH NODES: STABLE BORDERLINE ENLARGED MESENTERIC LYMPH NODES.  VASCULATURE: ATHEROSCLEROTIC VASCULAR CALCIFICATION.  ABDOMINAL WALL: No focal hernia or mass.     OTHER: None.     PELVIS:  BLADDER: No focal mass or significant wall thickening  REPRODUCTIVE: Unremarkable as visualized.  APPENDIX: Nondistended. No surrounding inflammation.     BONES: No acute bony abnormality.     IMPRESSION:  Grossly stable appearance of the abdomen..    CT Neck 09-04-19  FINDINGS:      No soft tissue masses.   Marked improvement since 02/27/2018 of bilateral lymphadenopathy that is  now only seen to be nonenlarged bilateral slightly bulky lymph nodes.   A few nonenlarged bilateral parotid gland lymph nodes are noted.   There is no parapharyngeal mass or fluid collection.   No pharyngeal mucosal asymmetry.   Bone windows demonstrate no acute osseous abnormality.   The sinuses are clear.   Traversing blood vessels incidentally appear unremarkable.     IMPRESSION:  Marked improvement since 02/27/2018 of  bilateral  lymphadenopathy that is now only seen to be nonenlarged bilateral  slightly bulky lymph nodes.       CT Neck 02-24-20  FINDINGS:  There is evidence of atherosclerotic vascular disease.     Stent in the right carotid.     No adenopathy.     No drainable fluid collection.     Postoperative anterior fusion device at C4-C6.     IMPRESSION:  No evidence of metastatic disease.      CTCAP 02-24-20  FINDINGS:  Adenopathy:No evidence of mediastinal or hilar lymph node enlargement.  No axillary lymph node enlargement.     Fluid:There are no pericardial or pleural effusions.     LUNGS:No parenchymal soft tissue nodules or masses are present.     Skeletal:No acute or destructive bony abnormality is identified.     Coronary artery calcifications are present.     IMPRESSION:  Stable appearance of the chest. No evidence of metastatic disease.    FINDINGS:   The lung bases are clear. There are no pleural effusions.     The liver is homogeneous. There is no evidence of focal hepatic mass.     The spleen is homogeneous.     There is no peripancreatic stranding or pancreatic head mass.     There is no adrenal enlargement.     The kidneys show no evidence of hydronephrosis or hydroureter. I do not  see any distal ureteral stones.      Otherwise I do not see any free fluid or walled off fluid collections.     There is no bowel wall thickening or mesenteric stranding.     Distended loops of colon. Moderate to large volume stool.     Extensive atherosclerotic vascular disease.     IMPRESSION:  1. No evidence of metastatic disease.  2. Colonic distention. Moderate stool in the colon.  3. Evidence of atherosclerotic vascular disease.      CT Neck 01-11-21  FINDINGS: Contrast was injected intravenously and spiral scans were  obtained from the base of the skull and extended to the thoracic inlet.  CT shows no abnormally enlarged lymph nodes in the neck. The salivary  glands were appropriate. There is no evidence of mass in the  nasal or  oropharynx region.     IMPRESSION:  No lymphadenopathy was demonstrated in the neck.       CTCAP 01-11-21  FINDINGS: Spiral scans were obtained from the apices of the lung and  extended to the diaphragm. Lung and mediastinal windows were archived.  On the mediastinal windows, no enlarged lymph nodes are noted in the  mediastinum or in the hilar areas. No supraclavicular lymphadenopathy  was identified. The heart was not enlarged. There were no pericardial  effusions. There were no pleural effusions. On the lung windows, no  inflammatory infiltrates are noted in the lungs. No masses or lung  nodules are identified.      IMPRESSION:  Negative CT scan of the thorax. No evidence of  lymphadenopathy.     CT FINDINGS: The liver shows some mild decreased density as compared to  the spleen, consistent with fatty change. No focal liver lesions were  demonstrated. Gallbladder is surgically absent. The spleen is not  enlarged. There were no masses in the adrenal glands. The kidneys  enhance appropriately. No enlarged retroperitoneal lymph nodes were  demonstrated. There was no ascites. There is a moderate amount of gas  and stool throughout the colon. In the pelvis, no masses or fluid  collections were demonstrated.     IMPRESSION:  Stable CT scan of the abdomen and pelvis. No lymphadenopathy  was demonstrated. There was no splenomegaly. There is mild fatty change  in the liver.       RECENT LABS:  Lab Results   Component Value Date    WBC 7.30 04/20/2021    HGB 13.6 04/20/2021    HCT 42.0 04/20/2021    MCV 98.6 (H) 04/20/2021    RDW 13.2 04/20/2021     04/20/2021    NEUTRORELPCT 72.1 04/20/2021    LYMPHORELPCT 20.7 04/20/2021    MONORELPCT 5.2 04/20/2021    EOSRELPCT 1.1 04/20/2021    BASORELPCT 0.4 04/20/2021    NEUTROABS 5.26 04/20/2021    LYMPHSABS 1.51 04/20/2021       Lab Results   Component Value Date     (L) 04/20/2021    K 5.0 04/20/2021    CO2 20.4 (L) 04/20/2021     04/20/2021    BUN 7  04/20/2021    CREATININE 0.83 04/20/2021    GLUCOSE 240 (H) 04/20/2021    CALCIUM 8.7 04/20/2021    ALKPHOS 97 04/20/2021    AST 16 04/20/2021    ALT 19 04/20/2021    BILITOT 0.2 04/20/2021    ALBUMIN 3.78 04/20/2021    PROTEINTOT 6.5 04/20/2021       Lab Results   Component Value Date     09/13/2019    URICACID 3.1 09/13/2019         Date  SPEP/MONE Mspike Free K Free L  K/L Ratio   02-12-15 MONE normal Not observed   06-25-15 MONE normal Not observed 22.10  14.93  1.48  11-17-15 MONE normal Not observed 17.17  14.41  1.19    Date  IgG IgA IgM  02-12-15 744 110 43  06-25-15 776 94 39  11-17-15 812 99 41  03-29-17 771 103 34  08-04-17 831 94 27  11-13-17 791 95 21  02-27-18 [phone]     CLL profile, FISH:  The CLL interphase fluorescence in situ hybridization   (FISH) panel analysis was normal. There were no cells with   CCND1-IGH fusion. No extra signals or deletions of OLAF,   chromosome 12, 13q, or TP53 were observed.       SPECIFIC FISH RESULTS:     CCND1/IGH: NORMAL   .         nuc kenzie 11q13(MDND6v8),14q32(IGHx2)[100]       OLAF: NORMAL         nuc kenzie 11q22.3(ATMx2)[100] .     12cen: NORMAL     .         nuc kenzie 12cen(N82Q4h2)[100] .     13q: NORMAL  .         nuc kenzie 13q14.3(DLEUx2),13q34(GXAP3k3)[100] .     TP53: NORMAL  .         nuc kenzie 17p13.1(TP53x2)[100]     Acute Hepatitis Panel: Negative    ASSESSMENT & PLAN:  Ms. Mae is a 56 y.o.  female with a history of Stage II SLL diagnosed in May of 2012 by L supraclavicular LN biopsy.     1. Small Lymphocytic Lymphoma:   - Treated as above with 6 cycles of Bendamustine (after anaphylactic reaction to Rituximab with 1st cycle of treatment) with complete radiographic response.   - She subsequently developed adenopathy in the cj cervical, supraclavicular, axillary and cj inguinal areas c/w CLL/SLL. She also had developed small mediastinal nodes and retroperitoneal LAD.  She has had continued growth of the lymph nodes, and had been losing weight.   She had some night sweats which may or may not be related.  -  Recommended consideration of treatment given worsening disease.    -  Given rituxan allergy, recommended treatment with Imbruvica 420 mg daily with Bactrim SS daily and Valtrex 500 mg daily for prophylaxis.  -  She has tolerated treatment really well with excellent disease control.  She has a complete clinical and radiographic response.  CBC is benign.   -  Will continue current treatment.  RTC for exam and bloodwork and repeat imaging after 3 months.    2.  Signficant vascular disease:  -  She is seeing Dr. Rodriguez and says he is planning on stent placement in early June.  -  She has been working with her PCP on better BP control.     3.  Tobacco use:  She continues to smoke.  She says she wants to quit but isn't ready to do so at this time.    4.  Degenerative disc disease with worsening LLE and BUE symptoms:  Follows with Dr. Soto.    5.  Anxiety/Depression: Her prior psychiatrist relocated but she has found a new provider.  She continues with significant anxiety.She is taking Seroquel in addition to Zofran.    6.   Prophylaxis:  She received  Prevnar vaccine 11-13-17.  She has had 2020 influenza vaccine..  Previously recommended HAV vaccine as well as Shingrix vaccine.  She has questions about COVID vaccine.  I recommended she get this done and gave her instructions for scheduling.     7.  ACO / ZOYA/Other  Quality measures  - Melania Mae received 2020 flu vaccine.     - Melania Mae reports a pain score of 0.  Given her pain assessment as noted, treatment options were discussed and the following options were decided upon as a follow-up plan to address the patient's pain: continued f/u with PCP for non-neoplasm related pain..  - Current outpatient and discharge medications have been reconciled for the patient.  Reviewed by: Thea Sorto MD       8. Follow up:   - MD follow up in 3 months with labs including CBCD, CMP,  immunoglobulin levels and CT Neck, CAP prior.     I spent 25 minutes with Melania Chau Mae today.  In the office today, more than 50% of this time was spent face-to-face with her  in counseling / coordination of care, reviewing her interim medical history and counseling on the current treatment plan.  All questions were answered to her satisfaction.          Electronically Signed by: Thea Sorto MD    CC:   Gillian Harrison APRN Dr. Donald Brown

## 2021-04-23 LAB
IGA1 MFR SER: 102 MG/DL (ref 70–400)
IGG1 SER-MCNC: 641 MG/DL (ref 700–1600)
IGM SERPL-MCNC: 34 MG/DL (ref 40–230)

## 2021-04-25 DIAGNOSIS — C91.10 CLL (CHRONIC LYMPHOCYTIC LEUKEMIA) (HCC): Primary | ICD-10-CM

## 2021-05-21 DIAGNOSIS — C91.10 CLL (CHRONIC LYMPHOCYTIC LEUKEMIA) (HCC): Primary | ICD-10-CM

## 2021-06-07 RX ORDER — VALACYCLOVIR HYDROCHLORIDE 500 MG/1
500 TABLET, FILM COATED ORAL DAILY
Qty: 30 TABLET | Refills: 3 | Status: SHIPPED | OUTPATIENT
Start: 2021-06-07 | End: 2021-09-03

## 2021-06-07 RX ORDER — SULFAMETHOXAZOLE AND TRIMETHOPRIM 400; 80 MG/1; MG/1
1 TABLET ORAL DAILY
Qty: 30 TABLET | Refills: 3 | Status: SHIPPED | OUTPATIENT
Start: 2021-06-07

## 2021-06-20 DIAGNOSIS — C91.10 CLL (CHRONIC LYMPHOCYTIC LEUKEMIA) (HCC): Primary | ICD-10-CM

## 2021-06-28 NOTE — PROGRESS NOTES
Specialty Pharmacy Note      Name:  Melania Mae  :  1965  Date:  2021         Past Medical History:   Diagnosis Date   • Aneurysm (CMS/HCC)     Cavernous left ICA aneurysm   • Asthma    • Carotid artery occlusion    • Chronic back pain    • CLL (chronic lymphocytic leukemia) (CMS/HCC)     2012   • COPD (chronic obstructive pulmonary disease) (CMS/HCC)    • Degenerative arthritis    • Depression    • Diabetes mellitus (CMS/HCC)     type 2   • Hyperlipidemia    • Hypertension    • Lymphadenopathy    • Non Hodgkin's lymphoma (CMS/HCC)        Past Surgical History:   Procedure Laterality Date   • APPENDECTOMY     • BACK SURGERY       (work accident) c-spine surgery    • CAROTID STENT Right 2017   • CEREBRAL ANGIOGRAM N/A 2017    Diagnostic Carotid/Cerebral Angiogram   • CHOLECYSTECTOMY     • HYSTERECTOMY      for endometriosis/ovarian bleed   • NECK SURGERY     • OTHER SURGICAL HISTORY      pac insertion and removal   • TONSILLECTOMY         Social History     Socioeconomic History   • Marital status:      Spouse name: Not on file   • Number of children: Not on file   • Years of education: Not on file   • Highest education level: Not on file   Tobacco Use   • Smoking status: Current Every Day Smoker     Packs/day: 0.50     Years: 35.00     Pack years: 17.50     Types: Cigarettes   • Smokeless tobacco: Never Used   • Tobacco comment: 0.5-1 PPD   Vaping Use   • Vaping Use: Never used   Substance and Sexual Activity   • Alcohol use: No   • Drug use: No   • Sexual activity: Defer       Family History   Problem Relation Age of Onset   • Lung cancer Father 72   • Hypertension Father    • Heart disease Father    • Cancer Father    • Diabetes Father    • Other Brother 46        MI   • Heart disease Brother    • Breast cancer Paternal Aunt    • Colon cancer Maternal Grandfather    • Other Cousin         CLL (dx 48 yo)   • Hypertension Mother   "      Allergies   Allergen Reactions   • Rituxan [Rituximab] Other (See Comments)     \"THROAT RAWNESS; FELT LIKE MY THROAT WAS CLOSING UP\"   • Codeine Other (See Comments)     \"UNKNOW  CHILDHOOD REACTION\"   • Penicillins Other (See Comments)     \"UNKNOWN CHILDHOOD ALLERGY\"       Current Outpatient Medications   Medication Sig Dispense Refill   • albuterol (PROVENTIL HFA;VENTOLIN HFA) 108 (90 Base) MCG/ACT inhaler Inhale 2 puffs As Needed for Wheezing.     • ALPRAZolam (XANAX) 0.5 MG tablet Take 0.5 mg by mouth 3 (Three) Times a Day As Needed for Anxiety.     • aspirin 81 MG EC tablet Take 1 tablet by mouth Daily. 30 tablet 5   • B-D UF III MINI PEN NEEDLES 31G X 5 MM misc      • clindamycin (CLEOCIN) 300 MG capsule Take 1 capsule by mouth 3 (Three) Times a Day. 30 capsule 0   • CloNIDine (CATAPRES) 0.1 MG tablet 0.1 mg As Needed.     • clopidogrel (PLAVIX) 75 MG tablet Take 1 tablet by mouth Daily. 30 tablet 5   • Continuous Blood Gluc  (Dexcom G6 ) device 1 kit Every 3 (Three) Months. 1 Device 0   • Continuous Blood Gluc Sensor (Dexcom G6 Sensor) Every 10 (Ten) Days. 3 each 11   • Continuous Blood Gluc Transmit (Dexcom G6 Transmitter) misc 1 kit Every 3 (Three) Months. 1 each 3   • gabapentin (NEURONTIN) 800 MG tablet Take 800 mg by mouth 4 (Four) Times a Day.     • ibrutinib (IMBRUVICA) 420 MG tablet tablet Take 1 tablet by mouth Daily. 28 tablet 5   • Ibuprofen (ADVIL PO) Take 400 mg by mouth 3 (Three) Times a Day. LIQUIGELS     • Insulin Aspart (NOVOLOG FLEXPEN SC) Inject 8-12 Units under the skin 3 (Three) Times a Day Before Meals. PER SLIDING SCALE     • Insulin Detemir (LEVEMIR FLEXPEN SC) Inject 8-12 Units under the skin Every Night. SLIDING SCALE     • lisinopril (PRINIVIL,ZESTRIL) 20 MG tablet Take 20 mg by mouth Every Morning.     • loratadine (CLARITIN) 10 MG tablet Take 10 mg by mouth Every Night.     • metoprolol tartrate (LOPRESSOR) 25 MG tablet 25 mg 2 (Two) Times a Day.     • " oxyCODONE-acetaminophen (PERCOCET)  MG per tablet Take 1 tablet by mouth Every 6 (Six) Hours As Needed for Moderate Pain .     • prochlorperazine (COMPAZINE) 10 MG tablet Take 1 tablet by mouth Every 6 (Six) Hours As Needed for Nausea or Vomiting. 60 tablet 4   • sertraline (ZOLOFT) 100 MG tablet Take 100 mg by mouth Every Night.     • sulfamethoxazole-trimethoprim (BACTRIM,SEPTRA) 400-80 MG tablet Take 1 tablet by mouth Daily. 30 tablet 3   • valACYclovir (VALTREX) 500 MG tablet Take 1 tablet by mouth Daily. 30 tablet 3     No current facility-administered medications for this visit.         ASSESSMENT/PLAN:    Patient Update Assessment (new medications, allergies, medical history): Updated    Medication(s): Imbruvica    Currently Taking Medication(s): Yes    Effectiveness of Medication: Per progress note, she has excellent disease control.    Experiencing Side Effects: tolerating well    Prior Authorization Status: Active    Financial Assistance Status: None needed at this time    Any Issues Identified: No refills. New Rx was obtained and sent to [facility] pharmacy.    Appropriate to Process Prescription(s): Yes, after chart review and patient discussion, it is appropriate to process.    Counseling Offered: Patient declined     Next Specialty Pharmacy Visit: In ~ 4 weeks

## 2021-07-19 ENCOUNTER — HOSPITAL ENCOUNTER (OUTPATIENT)
Dept: CT IMAGING | Facility: HOSPITAL | Age: 56
Discharge: HOME | End: 2021-07-19
Admitting: RADIOLOGY

## 2021-07-19 ENCOUNTER — OFFICE VISIT (OUTPATIENT)
Dept: NEUROSURGERY | Facility: CLINIC | Age: 56
End: 2021-07-19

## 2021-07-19 VITALS
HEIGHT: 65 IN | DIASTOLIC BLOOD PRESSURE: 58 MMHG | WEIGHT: 150 LBS | SYSTOLIC BLOOD PRESSURE: 132 MMHG | TEMPERATURE: 97.8 F | BODY MASS INDEX: 24.99 KG/M2

## 2021-07-19 DIAGNOSIS — I65.22 CAROTID STENOSIS, ASYMPTOMATIC, LEFT: ICD-10-CM

## 2021-07-19 DIAGNOSIS — I67.1 CEREBRAL ANEURYSM, NONRUPTURED: ICD-10-CM

## 2021-07-19 DIAGNOSIS — E11.8 TYPE 2 DIABETES MELLITUS WITH COMPLICATION, WITH LONG-TERM CURRENT USE OF INSULIN (HCC): Primary | ICD-10-CM

## 2021-07-19 DIAGNOSIS — Z79.4 TYPE 2 DIABETES MELLITUS WITH COMPLICATION, WITH LONG-TERM CURRENT USE OF INSULIN (HCC): Primary | ICD-10-CM

## 2021-07-19 LAB — CREAT BLDA-MCNC: 0.9 MG/DL (ref 0.6–1.3)

## 2021-07-19 PROCEDURE — 99214 OFFICE O/P EST MOD 30 MIN: CPT | Performed by: PHYSICIAN ASSISTANT

## 2021-07-19 PROCEDURE — 70496 CT ANGIOGRAPHY HEAD: CPT

## 2021-07-19 PROCEDURE — 70498 CT ANGIOGRAPHY NECK: CPT

## 2021-07-19 PROCEDURE — 0 IOPAMIDOL PER 1 ML: Performed by: RADIOLOGY

## 2021-07-19 PROCEDURE — 82565 ASSAY OF CREATININE: CPT

## 2021-07-19 RX ADMIN — IOPAMIDOL 75 ML: 755 INJECTION, SOLUTION INTRAVENOUS at 13:05

## 2021-07-19 NOTE — PROGRESS NOTES
"NAME: KALI STILL   DOS: 2021  : 1965  PCP: Gillian Harrison APRN    Chief Complaint:  carotid stenosis      History of Present Illness: Ms. Still is a 56 y.o. female who is well-known to the neuro interventional service.  She has a notable past medical history of lymphoma/leukemia and is currently in remission on maintenance Imbruvica.     She underwent a prior angioplasty/stent placement in 2017 for asymptomatic, high-grade right carotid stenosis.  She does have contralateral left carotid disease which has been managed medically, and followed with serial carotid duplex examinations.  She is done well from a neurovascular standpoint, with no stroke or TIA-like symptoms.   She is on chronic dual antiplatelet therapy, and tolerating this very well.  She does continue to smoke, albeit she states that she has \"cut back\" to a half a pack per day.       She has a history of left cavernous segment ICA aneurysm, being managed conservatively.  She denies any singular headache to suggest a subarachnoid or intracranial hemorrhage.  No diplopia or evidence of cranial nerve palsy.     She also has extensive history of peripheral vascular disease, and now complains of bilateral (left greater than right) claudication symptoms with walking.  She has a history of diabetes, and is currently being managed by an endocrinologist, as her hemoglobin A1c was greater than 14 in 2020.        Past Medical History:   Diagnosis Date   • Aneurysm (CMS/[facility])     Cavernous left ICA aneurysm   • Asthma    • Carotid artery occlusion    • Chronic back pain    • CLL (chronic lymphocytic leukemia) (CMS/[facility])     2012   • COPD (chronic obstructive pulmonary disease) (CMS/[facility])    • Degenerative arthritis    • Depression    • Diabetes mellitus (CMS/HCC)     type 2   • Hyperlipidemia    • Hypertension    • Lymphadenopathy    • Non Hodgkin's lymphoma (CMS/HCC)        Past Surgical History: "   Procedure Laterality Date   • APPENDECTOMY     • BACK SURGERY      2002 (work accident) c-spine surgery 4/14   • CAROTID STENT Right 08/23/2017   • CEREBRAL ANGIOGRAM N/A 8/23/2017    Diagnostic Carotid/Cerebral Angiogram   • CHOLECYSTECTOMY     • HYSTERECTOMY      for endometriosis/ovarian bleed   • NECK SURGERY     • OTHER SURGICAL HISTORY      pac insertion and removal   • TONSILLECTOMY               Review of Systems   Constitutional: Positive for fatigue.   Eyes: Positive for photophobia and visual disturbance.   Neurological: Positive for dizziness, syncope, weakness, light-headedness and headaches.   Psychiatric/Behavioral: Positive for sleep disturbance. The patient is nervous/anxious.    All other systems reviewed and are negative.           Medications:    Current Outpatient Medications:   •  albuterol (PROVENTIL HFA;VENTOLIN HFA) 108 (90 Base) MCG/ACT inhaler, Inhale 2 puffs As Needed for Wheezing., Disp: , Rfl:   •  ALPRAZolam (XANAX) 0.5 MG tablet, Take 0.5 mg by mouth 3 (Three) Times a Day As Needed for Anxiety., Disp: , Rfl:   •  aspirin 81 MG EC tablet, Take 1 tablet by mouth Daily., Disp: 30 tablet, Rfl: 5  •  B-D UF III MINI PEN NEEDLES 31G X 5 MM misc, , Disp: , Rfl:   •  clindamycin (CLEOCIN) 300 MG capsule, Take 1 capsule by mouth 3 (Three) Times a Day., Disp: 30 capsule, Rfl: 0  •  CloNIDine (CATAPRES) 0.1 MG tablet, 0.1 mg As Needed., Disp: , Rfl:   •  clopidogrel (PLAVIX) 75 MG tablet, Take 1 tablet by mouth Daily., Disp: 30 tablet, Rfl: 5  •  Continuous Blood Gluc  (Dexcom G6 ) device, 1 kit Every 3 (Three) Months., Disp: 1 Device, Rfl: 0  •  Continuous Blood Gluc Sensor (Dexcom G6 Sensor), Every 10 (Ten) Days., Disp: 3 each, Rfl: 11  •  Continuous Blood Gluc Transmit (Dexcom G6 Transmitter) misc, 1 kit Every 3 (Three) Months., Disp: 1 each, Rfl: 3  •  gabapentin (NEURONTIN) 800 MG tablet, Take 800 mg by mouth 4 (Four) Times a Day., Disp: , Rfl:   •  ibrutinib (IMBRUVICA)  "420 MG tablet tablet, Take 1 tablet by mouth Daily., Disp: 28 tablet, Rfl: 5  •  Ibuprofen (ADVIL PO), Take 400 mg by mouth 3 (Three) Times a Day. LIQUIGELS, Disp: , Rfl:   •  Insulin Aspart (NOVOLOG FLEXPEN SC), Inject 8-12 Units under the skin 3 (Three) Times a Day Before Meals. PER SLIDING SCALE, Disp: , Rfl:   •  Insulin Detemir (LEVEMIR FLEXPEN SC), Inject 8-12 Units under the skin Every Night. SLIDING SCALE, Disp: , Rfl:   •  lisinopril (PRINIVIL,ZESTRIL) 20 MG tablet, Take 20 mg by mouth Every Morning., Disp: , Rfl:   •  loratadine (CLARITIN) 10 MG tablet, Take 10 mg by mouth Every Night., Disp: , Rfl:   •  metoprolol tartrate (LOPRESSOR) 25 MG tablet, 25 mg 2 (Two) Times a Day., Disp: , Rfl:   •  oxyCODONE-acetaminophen (PERCOCET)  MG per tablet, Take 1 tablet by mouth Every 6 (Six) Hours As Needed for Moderate Pain ., Disp: , Rfl:   •  prochlorperazine (COMPAZINE) 10 MG tablet, Take 1 tablet by mouth Every 6 (Six) Hours As Needed for Nausea or Vomiting., Disp: 60 tablet, Rfl: 4  •  sertraline (ZOLOFT) 100 MG tablet, Take 100 mg by mouth Every Night., Disp: , Rfl:   •  sulfamethoxazole-trimethoprim (BACTRIM,SEPTRA) 400-80 MG tablet, Take 1 tablet by mouth Daily., Disp: 30 tablet, Rfl: 3  •  valACYclovir (VALTREX) 500 MG tablet, Take 1 tablet by mouth Daily., Disp: 30 tablet, Rfl: 3    Allergies:  Allergies   Allergen Reactions   • Rituxan [Rituximab] Other (See Comments)     \"THROAT RAWNESS; FELT LIKE MY THROAT WAS CLOSING UP\"   • Codeine Other (See Comments)     \"UNKNOW  CHILDHOOD REACTION\"   • Penicillins Other (See Comments)     \"UNKNOWN CHILDHOOD ALLERGY\"       Social History     Tobacco Use   • Smoking status: Current Every Day Smoker     Packs/day: 0.50     Years: 35.00     Pack years: 17.50     Types: Cigarettes   • Smokeless tobacco: Never Used   • Tobacco comment: 0.5-1 PPD   Vaping Use   • Vaping Use: Never used   Substance Use Topics   • Alcohol use: No   • Drug use: No       Family History "   Problem Relation Age of Onset   • Lung cancer Father 72   • Hypertension Father    • Heart disease Father    • Cancer Father    • Diabetes Father    • Other Brother 46        MI   • Heart disease Brother    • Breast cancer Paternal Aunt    • Colon cancer Maternal Grandfather    • Other Cousin         CLL (dx 48 yo)   • Hypertension Mother        Review of Imaging:  CTA of the head and neck that was performed on 7/19/2021 was reviewed with Dr. Ackerman along with the corresponding radiology report.  There is noted narrowing within the left internal carotid artery and right carotid stent.  Also is to be noted that the patient's left cavernous aneurysm is stable when compared to prior imaging.    Left arterial duplex scan that was performed on 7/19/2021 was reviewed.  Study demonstrates abnormal study of the right iliofemoral, left iliac femoral and abdominal aorta.  Noted velocities of the right lower arterial examination of 409 cm/sec. and 306 cm/sec. of the left lower arterial.     Vitals:    07/19/21 1509   BP: 132/58   Temp: 97.8 °F (36.6 °C)     Body mass index is 24.96 kg/m².    Physical Exam  Constitutional:       Appearance: Normal appearance.   Cardiovascular:      Pulses:           Carotid pulses are on the right side with bruit and on the left side with bruit.       Dorsalis pedis pulses are 1+ on the right side and 1+ on the left side.        Posterior tibial pulses are 1+ on the right side and 1+ on the left side.   Neurological:      Mental Status: She is alert and oriented to person, place, and time.      Gait: Gait is intact.      Deep Tendon Reflexes: Strength normal.   Psychiatric:         Speech: Speech normal.       Neurologic Exam     Mental Status   Oriented to person, place, and time.   Speech: speech is normal     Cranial Nerves   Cranial nerves II through XII intact.     Motor Exam   Muscle bulk: normal  Overall muscle tone: normal  Right arm pronator drift: absent  Left arm pronator drift:  absent    Strength   Strength 5/5 throughout.     Sensory Exam   Light touch normal.     Gait, Coordination, and Reflexes     Gait  Gait: normal      Diagnoses/Plan:    Ms. Mae is a 56 y.o. female is seen today status post right carotid angioplasty/stent placement in August 2017.  After reviewing her most recent studies, we want patient to obtain a carotid duplex for further evaluation.  Patient was instructed to remain on her dual antiplatelet therapy in the interim.  We'll have her follow-up after the study is completed.  We'll also have her obtain a A1c to continue to evaluate the patient's diabetes.  In addition, patient was instructed to attempt for smoking cessation.    Patient's cavernous segment left ICA cerebral aneurysm has remained asymptomatic and stable on imaging.  Therefore, we will continue to follow her with serial imaging to ensure stability and no further progression of disease that might necessitate further treatment/intervention.    In regards to patient's lower extremity claudication-like symptoms, we will wait till we obtain the carotid duplex to make complete plan of what interventions need to be completed in the patient's vasculature.  Patient was once again educated on diabetes control and smoking cessation.      Patient's Body mass index is 24.96 kg/m². indicating that she is within normal range (BMI 18.5-24.9). No BMI management plan needed..     Melania Mae  reports that she has been smoking cigarettes. She has a 17.50 pack-year smoking history. She has never used smokeless tobacco.. I have educated her on the risk of diseases from using tobacco products such as cancer, COPD and heart disease.     I advised her to quit and she is not willing to quit.          Melania Feliciano PA-C

## 2021-07-20 DIAGNOSIS — C91.10 CLL (CHRONIC LYMPHOCYTIC LEUKEMIA) (HCC): Primary | ICD-10-CM

## 2021-07-21 ENCOUNTER — APPOINTMENT (OUTPATIENT)
Dept: CARDIOLOGY | Facility: HOSPITAL | Age: 56
End: 2021-07-21

## 2021-07-23 ENCOUNTER — HOSPITAL ENCOUNTER (OUTPATIENT)
Dept: CT IMAGING | Facility: HOSPITAL | Age: 56
Discharge: HOME | End: 2021-07-23

## 2021-07-23 DIAGNOSIS — C91.10 CLL (CHRONIC LYMPHOCYTIC LEUKEMIA) (HCC): ICD-10-CM

## 2021-07-23 PROCEDURE — 70491 CT SOFT TISSUE NECK W/DYE: CPT

## 2021-07-23 PROCEDURE — 70491 CT SOFT TISSUE NECK W/DYE: CPT | Performed by: RADIOLOGY

## 2021-07-23 PROCEDURE — 25010000002 IOPAMIDOL 61 % SOLUTION: Performed by: INTERNAL MEDICINE

## 2021-07-23 PROCEDURE — 74177 CT ABD & PELVIS W/CONTRAST: CPT

## 2021-07-23 PROCEDURE — 74177 CT ABD & PELVIS W/CONTRAST: CPT | Performed by: RADIOLOGY

## 2021-07-23 PROCEDURE — 71260 CT THORAX DX C+: CPT | Performed by: RADIOLOGY

## 2021-07-23 PROCEDURE — 71260 CT THORAX DX C+: CPT

## 2021-07-23 RX ADMIN — IOPAMIDOL 100 ML: 612 INJECTION, SOLUTION INTRAVENOUS at 13:45

## 2021-07-29 ENCOUNTER — SPECIALTY PHARMACY (OUTPATIENT)
Dept: PHARMACY | Facility: HOSPITAL | Age: 56
End: 2021-07-29

## 2021-08-02 ENCOUNTER — TELEPHONE (OUTPATIENT)
Dept: NEUROSURGERY | Facility: CLINIC | Age: 56
End: 2021-08-02

## 2021-08-02 ENCOUNTER — OFFICE VISIT (OUTPATIENT)
Dept: ONCOLOGY | Facility: CLINIC | Age: 56
End: 2021-08-02

## 2021-08-02 ENCOUNTER — LAB (OUTPATIENT)
Dept: ONCOLOGY | Facility: CLINIC | Age: 56
End: 2021-08-02

## 2021-08-02 VITALS
SYSTOLIC BLOOD PRESSURE: 153 MMHG | WEIGHT: 154 LBS | TEMPERATURE: 98.2 F | BODY MASS INDEX: 25.66 KG/M2 | DIASTOLIC BLOOD PRESSURE: 70 MMHG | OXYGEN SATURATION: 97 % | HEART RATE: 72 BPM | HEIGHT: 65 IN | RESPIRATION RATE: 18 BRPM

## 2021-08-02 DIAGNOSIS — E87.1 HYPONATREMIA: ICD-10-CM

## 2021-08-02 DIAGNOSIS — C91.10 CLL (CHRONIC LYMPHOCYTIC LEUKEMIA) (HCC): Primary | ICD-10-CM

## 2021-08-02 DIAGNOSIS — Z87.39 H/O DEGENERATIVE DISC DISEASE: ICD-10-CM

## 2021-08-02 DIAGNOSIS — F32.A DEPRESSION, UNSPECIFIED DEPRESSION TYPE: ICD-10-CM

## 2021-08-02 DIAGNOSIS — F41.9 ANXIETY: ICD-10-CM

## 2021-08-02 DIAGNOSIS — C91.10 CLL (CHRONIC LYMPHOCYTIC LEUKEMIA) (HCC): ICD-10-CM

## 2021-08-02 DIAGNOSIS — I99.9 VASCULAR DISEASE: ICD-10-CM

## 2021-08-02 LAB
ALBUMIN SERPL-MCNC: 3.82 G/DL (ref 3.5–5.2)
ALBUMIN/GLOB SERPL: 1.5 G/DL
ALP SERPL-CCNC: 65 U/L (ref 39–117)
ALT SERPL W P-5'-P-CCNC: 10 U/L (ref 1–33)
ANION GAP SERPL CALCULATED.3IONS-SCNC: 10.6 MMOL/L (ref 5–15)
AST SERPL-CCNC: 10 U/L (ref 1–32)
BASOPHILS # BLD AUTO: 0.03 10*3/MM3 (ref 0–0.2)
BASOPHILS NFR BLD AUTO: 0.4 % (ref 0–1.5)
BILIRUB SERPL-MCNC: 0.2 MG/DL (ref 0–1.2)
BUN SERPL-MCNC: 8 MG/DL (ref 6–20)
BUN/CREAT SERPL: 10.4 (ref 7–25)
CALCIUM SPEC-SCNC: 8.9 MG/DL (ref 8.6–10.5)
CHLORIDE SERPL-SCNC: 98 MMOL/L (ref 98–107)
CO2 SERPL-SCNC: 20.4 MMOL/L (ref 22–29)
CREAT SERPL-MCNC: 0.77 MG/DL (ref 0.57–1)
DEPRECATED RDW RBC AUTO: 50.5 FL (ref 37–54)
EOSINOPHIL # BLD AUTO: 0.08 10*3/MM3 (ref 0–0.4)
EOSINOPHIL NFR BLD AUTO: 1.1 % (ref 0.3–6.2)
ERYTHROCYTE [DISTWIDTH] IN BLOOD BY AUTOMATED COUNT: 13.9 % (ref 12.3–15.4)
GFR SERPL CREATININE-BSD FRML MDRD: 77 ML/MIN/1.73
GLOBULIN UR ELPH-MCNC: 2.6 GM/DL
GLUCOSE SERPL-MCNC: 114 MG/DL (ref 65–99)
HCT VFR BLD AUTO: 36.3 % (ref 34–46.6)
HGB BLD-MCNC: 12.1 G/DL (ref 12–15.9)
IMM GRANULOCYTES # BLD AUTO: 0.03 10*3/MM3 (ref 0–0.05)
IMM GRANULOCYTES NFR BLD AUTO: 0.4 % (ref 0–0.5)
LYMPHOCYTES # BLD AUTO: 1.96 10*3/MM3 (ref 0.7–3.1)
LYMPHOCYTES NFR BLD AUTO: 27.2 % (ref 19.6–45.3)
MCH RBC QN AUTO: 33 PG (ref 26.6–33)
MCHC RBC AUTO-ENTMCNC: 33.3 G/DL (ref 31.5–35.7)
MCV RBC AUTO: 98.9 FL (ref 79–97)
MONOCYTES # BLD AUTO: 0.48 10*3/MM3 (ref 0.1–0.9)
MONOCYTES NFR BLD AUTO: 6.7 % (ref 5–12)
NEUTROPHILS NFR BLD AUTO: 4.62 10*3/MM3 (ref 1.7–7)
NEUTROPHILS NFR BLD AUTO: 64.2 % (ref 42.7–76)
NRBC BLD AUTO-RTO: 0 /100 WBC (ref 0–0.2)
PLATELET # BLD AUTO: 230 10*3/MM3 (ref 140–450)
PMV BLD AUTO: 9.3 FL (ref 6–12)
POTASSIUM SERPL-SCNC: 4.8 MMOL/L (ref 3.5–5.2)
PROT SERPL-MCNC: 6.4 G/DL (ref 6–8.5)
RBC # BLD AUTO: 3.67 10*6/MM3 (ref 3.77–5.28)
SODIUM SERPL-SCNC: 129 MMOL/L (ref 136–145)
WBC # BLD AUTO: 7.2 10*3/MM3 (ref 3.4–10.8)

## 2021-08-02 PROCEDURE — 85025 COMPLETE CBC W/AUTO DIFF WBC: CPT | Performed by: INTERNAL MEDICINE

## 2021-08-02 PROCEDURE — 80053 COMPREHEN METABOLIC PANEL: CPT | Performed by: INTERNAL MEDICINE

## 2021-08-02 PROCEDURE — 99214 OFFICE O/P EST MOD 30 MIN: CPT | Performed by: NURSE PRACTITIONER

## 2021-08-02 PROCEDURE — 82784 ASSAY IGA/IGD/IGG/IGM EACH: CPT | Performed by: INTERNAL MEDICINE

## 2021-08-02 NOTE — PROGRESS NOTES
NAME: Melania Mae    : 1965    DATE:  2021    DIAGNOSIS:  SLL - initially diagnosed with [place] Stage II Disease in May 2012, based on excisional biopsy of a L supraclavicular LN. Bone marrow was involved.     CHIEF COMPLAINT:  Follow up of CLL / SLL and recent imaging    TREATMENT HISTORY:  1. Received 6 cycles of Treanda without Rituxan between 10-24-12 and 3-21-13 because of anaphylactic reaction to Rituximab with her 1st cycle of treatment . Received Neulasta support. (Delivered by Dr. Koehler)     2. Imbruvica started 18      HISTORY OF PRESENT ILLNESS:   Ms. Mae was referred by Gillian Harrison for evaluation and treatment of CLL / SLL. She was reportedly diagnosed in May of 2012 when she saw Dr. Carbajal for biopsy of a L supraclavicular LN. At that time she had excessive fatigue & a 40 lb weight loss. She saw Dr. Koehler at  at time who performed bone marrow exam (bone marrow was involved) and started treatment with Bendamustine / Rituximab. Unfortunately with her 1st treatment, she had an anaphylactic reaction to Rituximab so this was not given with subsequent cycles of therapy. Her last cycle of treatment was 3-21-13. Per Dr. Koehler' s records, she had a complete response to treatment. She never had a significant leukocytosis or lymphocytosis, but had predominantly a lymphomatous presentation of her disease.     INTERVAL HISTORY:  Ms. Mae is here today for follow up of CLL/SLL and recent imaging. Since her last visit, overall, she has been doing well. She continues to take Imbruvica and is tolerating this well with no noticeable side effects.  She denies having any fevers/chills or drenching NS. She does have intermittent hot flashes following previous hysterectomy. She denies any tender/enlarged LN. She reports good appetite. She has occasional nausea and takes zofran or compazine prn. She continues to f/u with her vascular surgeon and has plans for stent placement but says  this has not been scheduled. She has chronic back pain which is stable. She is otherwise doing well without complaint.    PAST MEDICAL HISTORY:  Past Medical History:   Diagnosis Date   • Aneurysm (CMS/HCC) 8/223/17    Cavernous left ICA aneurysm   • Asthma    • Carotid artery occlusion    • Chronic back pain    • CLL (chronic lymphocytic leukemia) (CMS/HCC)     OCTOBER OF 2012   • COPD (chronic obstructive pulmonary disease) (CMS/HCC)    • Degenerative arthritis    • Depression    • Diabetes mellitus (CMS/[facility])     type 2   • Hyperlipidemia    • Hypertension    • Lymphadenopathy    • Non Hodgkin's lymphoma (CMS/HCC)        PAST SURGICAL HISTORY:  Past Surgical History:   Procedure Laterality Date   • APPENDECTOMY     • BACK SURGERY      2002 (work accident) c-spine surgery 4/14   • CAROTID STENT Right 08/23/2017   • CEREBRAL ANGIOGRAM N/A 8/23/2017    Diagnostic Carotid/Cerebral Angiogram   • CHOLECYSTECTOMY     • HYSTERECTOMY      for endometriosis/ovarian bleed   • NECK SURGERY     • OTHER SURGICAL HISTORY      pac insertion and removal   • TONSILLECTOMY         FAMILY HISTORY:  Family History   Problem Relation Age of Onset   • Lung cancer Father 72   • Hypertension Father    • Heart disease Father    • Cancer Father    • Diabetes Father    • Other Brother 46        MI   • Heart disease Brother    • Breast cancer Paternal Aunt    • Colon cancer Maternal Grandfather    • Other Cousin         CLL (dx 46 yo)   • Hypertension Mother      Social History     Socioeconomic History   • Marital status:      Spouse name: Not on file   • Number of children: Not on file   • Years of education: Not on file   • Highest education level: Not on file   Tobacco Use   • Smoking status: Current Every Day Smoker     Packs/day: 0.50     Years: 35.00     Pack years: 17.50     Types: Cigarettes   • Smokeless tobacco: Never Used   • Tobacco comment: 0.5-1 PPD   Vaping Use   • Vaping Use: Never used   Substance and Sexual Activity    • Alcohol use: No   • Drug use: No   • Sexual activity: Defer     REVIEW OF SYSTEMS:    A comprehensive 14 point review of systems was performed and was negative except as mentioned    MEDICATIONS:  The current medication list was reviewed in the EMR    Current Outpatient Medications:   •  albuterol (PROVENTIL HFA;VENTOLIN HFA) 108 (90 Base) MCG/ACT inhaler, Inhale 2 puffs As Needed for Wheezing., Disp: , Rfl:   •  ALPRAZolam (XANAX) 0.5 MG tablet, Take 0.5 mg by mouth 3 (Three) Times a Day As Needed for Anxiety., Disp: , Rfl:   •  aspirin 81 MG EC tablet, Take 1 tablet by mouth Daily., Disp: 30 tablet, Rfl: 5  •  B-D UF III MINI PEN NEEDLES 31G X 5 MM misc, , Disp: , Rfl:   •  clindamycin (CLEOCIN) 300 MG capsule, Take 1 capsule by mouth 3 (Three) Times a Day., Disp: 30 capsule, Rfl: 0  •  CloNIDine (CATAPRES) 0.1 MG tablet, 0.1 mg As Needed., Disp: , Rfl:   •  clopidogrel (PLAVIX) 75 MG tablet, Take 1 tablet by mouth Daily., Disp: 30 tablet, Rfl: 5  •  Continuous Blood Gluc  (Dexcom G6 ) device, 1 kit Every 3 (Three) Months., Disp: 1 Device, Rfl: 0  •  Continuous Blood Gluc Sensor (Dexcom G6 Sensor), Every 10 (Ten) Days., Disp: 3 each, Rfl: 11  •  Continuous Blood Gluc Transmit (Dexcom G6 Transmitter) misc, 1 kit Every 3 (Three) Months., Disp: 1 each, Rfl: 3  •  gabapentin (NEURONTIN) 800 MG tablet, Take 800 mg by mouth 4 (Four) Times a Day., Disp: , Rfl:   •  ibrutinib (IMBRUVICA) 420 MG tablet tablet, Take 1 tablet by mouth Daily., Disp: 28 tablet, Rfl: 5  •  Ibuprofen (ADVIL PO), Take 400 mg by mouth 3 (Three) Times a Day. LIQUIGELS, Disp: , Rfl:   •  Insulin Aspart (NOVOLOG FLEXPEN SC), Inject 8-12 Units under the skin 3 (Three) Times a Day Before Meals. PER SLIDING SCALE, Disp: , Rfl:   •  Insulin Detemir (LEVEMIR FLEXPEN SC), Inject 8-12 Units under the skin Every Night. SLIDING SCALE, Disp: , Rfl:   •  lisinopril (PRINIVIL,ZESTRIL) 20 MG tablet, Take 20 mg by mouth Every Morning., Disp: ,  "Rfl:   •  loratadine (CLARITIN) 10 MG tablet, Take 10 mg by mouth Every Night., Disp: , Rfl:   •  metoprolol tartrate (LOPRESSOR) 25 MG tablet, 25 mg 2 (Two) Times a Day., Disp: , Rfl:   •  oxyCODONE-acetaminophen (PERCOCET)  MG per tablet, Take 1 tablet by mouth Every 6 (Six) Hours As Needed for Moderate Pain ., Disp: , Rfl:   •  prochlorperazine (COMPAZINE) 10 MG tablet, Take 1 tablet by mouth Every 6 (Six) Hours As Needed for Nausea or Vomiting., Disp: 60 tablet, Rfl: 4  •  sertraline (ZOLOFT) 100 MG tablet, Take 100 mg by mouth Every Night., Disp: , Rfl:   •  sulfamethoxazole-trimethoprim (BACTRIM,SEPTRA) 400-80 MG tablet, Take 1 tablet by mouth Daily., Disp: 30 tablet, Rfl: 3  •  valACYclovir (VALTREX) 500 MG tablet, Take 1 tablet by mouth Daily., Disp: 30 tablet, Rfl: 3    ALLERGIES:    Allergies   Allergen Reactions   • Rituxan [Rituximab] Other (See Comments)     \"THROAT RAWNESS; FELT LIKE MY THROAT WAS CLOSING UP\"   • Codeine Other (See Comments)     \"UNKNOW  CHILDHOOD REACTION\"   • Penicillins Other (See Comments)     \"UNKNOWN CHILDHOOD ALLERGY\"       PHYSICAL EXAM:  Visit Vitals  /70   Pulse 72   Temp 98.2 °F (36.8 °C) (Temporal)   Resp 18   Ht 165.1 cm (65\")   Wt 69.9 kg (154 lb)   SpO2 97%   BMI 25.63 kg/m²     Pain Score    08/02/21 1329   PainSc:   5   PainLoc: Hip   ECOG score: 0   General: Alert and oriented. Appears well in good spirits.   HEENT: Pupils are equal, round and reactive to light and accommodation, Extraocular movements full, oropharynx clear,mmm  Neck: no jvd or thyromegaly   Cardiovascular: RRR, she has a systolic murmur I-II/VI, no rubs or gallops.   Pulmonary: CTAB  Abdomen: soft, non-tender, non-distended, normal active bowel sounds present  Extremities: No clubbing, cyanosis or edema   Lymph: No palpable cervical, supraclavicular or axillary LAD  Neurologic: MS as above, Grossly non-focal.    PATHOLOGY:  5-25-12 L Supraclavicular LN Biopsy:   - Extensive small " lymphocytic lymphoma / chronic lymphocytic leukemia involvement.   Flow cytometry shows monoclonal Kappa B-cell population which coexpresses CD5 and CD23 representing 30% of gated cells. There is dim surface light chain and dim CD20 expression. No CD38 expression. Neoplastic cells are positive for CD20 (dim), PAX-5, CCD5, CD23, CD43. B cells are + for CD3. BCL1 stain negative.    BM [facility] 6-29-12 [facility])   - Limited BM specimen with scattered hematopoietic cells.   - Flow cytometry revealed peripheral blood with minute CLL/SLL population (0.5%) with normal  FISH studies.     Cervical Disectomy 04-18-14:   - Fragments of fibrohyaline cartilage, no acute inflammation or metastatic disease identified.     IMAGING:  PET-CT 6-19-12:   - Mild to moderate bilateral axillary LAD and mediastinal LAD with minimal associated hypermetabolism.     PET-CT 10-09-12:   - Multiple areas of abnormal hypermetabolism in the neck bilaterally, new from the prior study. R posterior trangle focus with SUV 3.1. L supraclavicular focus SUV 5.0 (LN 1.8 cm, prior 1.2 cm)   - Carl axillary LAD - L axilla 2.8 cm LN (prior 1.6 cm) with SUV 3.6, R axillary ln new 2.0 cm, SUV 2.6   - Mediastinal LAD noted. R prevascular LN 1.5 cm with SUV 2.1   - Carl external iliac LNs present ( R 3.2 cm with SUV 3.1)   - Overall worsening areas of hypermetabolism corresponding to enlarging nodes c/w worsening neoplastic involvement.     PET-CT 1-16-13:   - No PET-CT findings of active disease related to the patient's lymphoma. Interval resolution of hypermetabolic adenopathy described on previous examination. Spleen is normal.     PET-CT 1-21-14:   - No abnormal hypermetabolism to suggest neoplastic involvement. No mass or adenopathy noted. Spleen is normal.     04-03-14 MRI Spine:   - Cervical: Degenerative disk disease in the cervical disk spaces at the level of C4-C7. At 4-5, there is a disk herniation associated with bulging. There was spinal stenosis at C5-6  but no disk herniation. At C6-7, the disk bulging is not felt to be significant. The postcontrasted scans in the cervical spine showed no areas of abnormal enhancement.   - Thoracic: negative MRI examination of the thoracic spine. A source of the patient's t horacic spine pain was not demonstrated.     Chest Xray 04-17-15:   - The lungs are clear, the heart is normal. There is no active disease in the chest.     CT CAP/neck w/ contrast 05-13-14:   - No neck lymphadenopathy by CT size criteria   - No intrathoracic or abdominal LAD     CTAP, neck w/ contrast 03-04-15:   - No enlarged lymph nodes were demonstrated. The bile ducts in the liver and the extrahepatic biliary tree is slightly dilated.   - Clinical correlation with laboratory evaluation for obstruction suggested. This, however, is not significantly changed from a previous CT from 05/2014. No retroperitoneal lymphadenopathy is identified.   - Negative CT of the of the soft tissues of the neck. No abnormally enlarged lymph nodes are demonstrated.     CT Neck, CAP 3-15-16:   - There is a change in the CT scan. There are prominent lymph nodes in the neck bilaterally. The largest are in the supraclavicular area and measure greater than a centimeter. Most of the nodes are in the 1cm size range.   - In the chest, most of the nodes are in the subcen timeter size range. The largest nodes are in the pretacheal area and measure 15 mm. There were no enlarged hilar nodes. On the lung windows, there are no pleural effusions. No pulmonary or parenchymal lung nodules or masses were demonstrated. .   There are m ore prominent lymph nodes in the retroperitoneum some of which are enlarged today consistent with recurrence. Again, most of these are in the 1 cm size range, but a few measure up to 17-18 mm. No mesenteric adenopathy demonstrated.     CTCAP, Neck 05-10-16:   - Chest: Persistent stable adenopathy in the mediastinum and axillary regions. The lungs remain clear.   -  A/P: The lymph nodes are stable in comparing with the previous CT done in March.   - Neck: Persistent but stable adenopathy throughout the jugular lymph node chains in both sides of the neck.     [facility] neck 08-16-16:  - Stable mediastinal enlarged lymph nodes including an AP window lymph node measuring 9 mm  - A left axillary region lymph node measures 1.9 cm and was 1.9 cm. Stable right axillary region lymph adenopathy.   - Stable retroperitoneal and mesenteric lymphadenopathy   - persistent lymphadenopathy throughout the neck that is stable in size.     CTCAP 06-07-17:  - Stable bilateral jugular chain lymphadenopathy.  - Stable bilateral axillary and mediastinal region lymphadenopathy.  - Stable retroperitoneal lymphadenopathy.    [facility], Neck 02-27-18:  - Grossly stable bilateral jugular chain lymphadenopathy.  - LUNGS: A NEW RIGHT UPPER LOBE PULMONARY NODULE MEASURES 8.8 MM. RIGHT MIDDLE LOBE PULMONARY PERIBRONCHIAL VASCULAR NODULARITY MAY BE INFECTIOUS OR INFLAMMATORY  - MEDIASTINUM: AGAIN THERE IS MEDIASTINAL BORDERLINE LYMPHADENOPATHY WITHIN AP WINDOW LYMPH NODE NOW MEASURING 1 CM AND WAS 8 MM WITH SIMILAR INCREASE IN SIZE OF MULTIPLE BILATERAL AXILLARY LYMPH NODES WITH THE LARGEST RIGHT MEASURING 4.4 CM OF THE CONGLOMERATE THAT WAS ABOUT 3 CM  AND A LEFT AXILLARY REGION LYMPH NODE MEASURING 2.7 CM THAT WAS 2.2 CM.  - SPLEEN: SPLENOMEGALY IS AGAIN NOTED.  -LYMPH NODES: Extensive retroperitoneal lymphadenopathy with a right para-aortic lymph node measuring about 4.5 cm and was 2.5 cm.    CTCAP 04-12-18:  - There are numerous enlarged supraclavicular lymph nodes.  - These have progressed in size in comparing with the earlier CT. Several of the lymph nodes now measure up to 3 cm in diameter. The lymph nodes extend into the axillary regions. There are some lymph nodes in the mediastinum that are relatively stable in comparing with the earlier exam. The lungs are both well aerated and clear.  - The liver was  normal in size and shape. There is mild dilatation of the bile ducts within the liver. The gallbladder is surgically absent. The common hepatic duct was also dilated in the pancreatic head, this is unchanged from the earlier exam. The spleen is homogeneous in density and is stable in size. There continues to be fairly marked adenopathy in the retroperitoneum. The lymph node size continues to increase slightly by a few millimeters. The largest bulk of lymph nodes is in the gastrohepatic ligament region. There are also lymph nodes along the aorta. These are larger in size as well. Some of the periaortic lymph nodes now measure up to 3 cm in diameter.   - There are enlarged lymph nodes in the mesentery. There are enlarged lymph nodes along the iliac lymph node chains and some borderline enlarged lymph nodes in both groins. The bowel shows no evidence of obstruction, the kidneys enhance appropriately.    US Pelvis, limited 5/2/18  IMPRESSION:  - There is a 8 mm subcutaneous walled collection suggestive of tiny abscess in the suprapubic region of interest.    08-28-18 CT Chest With Contrast   FINDINGS:   Today's chest CT is compared with a previous chest CT done in April. Contrast was administered intravenously and scans were obtained from the apices of the lung and extended to the diaphragm. Both mediastinal and lung windows were evaluated. On the mediastinal windows the adenopathy seen on the earlier exam in the supraclavicular area is significantly decreased. The lymph nodes are now less than a centimeter in diameter. The bilateral axillary lymph nodes have also decreased significantly in size. There are a few nodes that are greater than a  centimeter in diameter, but they have fatty hilar areas. The lymph nodes in the mediastinum have also significantly decreased in size, and they are now all less than a centimeter in diameter. On the lung windows, there were increased interstitial markings in the lungs. No pulmonary  parenchymal lung nodules or masses were identified.     IMPRESSION:  Significant improvement in the chest. Lymph nodes in the  supraclavicular area, axillary regions, and mediastinum have all  significantly decreased in size. The lymph nodes that are larger than a  centimeter in diameter have fatty changes in the hilum.    08-28-18 CT Abdomen Pelvis With Contrast   FINDINGS:   - Contrast was administered both orally and intravenously. Scans were obtained from the diaphragm and extended through the pelvis. The liver is normal in size and shape. No focal lesions were seen within the liver. The gallbladder is surgically absent. The spleen was homogeneous in density and normal in size. There were no masses in the adrenal glands. The kidneys show normal cortical enhancement. The abdominal aorta was normal in caliber. There has been marked improvement in the adenopathy in the retroperitoneum. Only a few enlarged lymph nodes remain. The mesenteric lymph nodes are also almost completely resolved.  In the pelvis, no enlarged lymph nodes are demonstrated on the current exam. Urinary bladder was smooth in contour. The inguinal adenopathy is also resolved. The bowel shows no evidence of obstruction. There is no ascites or free air. There is a moderate amount of stool throughout the colon suggesting constipation.     IMPRESSION:  Significant improvement in the adenopathy in the abdomen or pelvis. There are only a few scattered lymph nodes in the area of the gastrohepatic ligament that are greater than a centimeter in diameter.    CTCAP 09-04-19  Findings  LUNGS: Unremarkable. No parenchymal soft tissue nodules.  No focal air  space disease.     HEART: Unremarkable.     PERICARDIUM: No effusion.     MEDIASTINUM: STABLE BILATERAL AXILLARY REGION NONENLARGED LYMPH NODES AS WELL WAS NOT ENLARGED LYMPH NODES WITHIN THE SUPERIOR MEDIASTINUM.     PLEURA: No pleural effusion. No pleural mass or abnormal calcification.     MAJOR AIRWAYS:  Clear. No intrinsic mass.     VASCULATURE: No evidence of aneurysm.     VISUALIZED UPPER ABDOMEN:  LIVER: Homogeneous. No focal hepatic mass or ductal dilatation.  SPLEEN: Homogeneous. No splenomegaly.  ADRENALS: No mass.  KIDNEYS: No mass. No obstructive uropathy.  No evidence of  urolithiasis.  GI TRACT: Non-dilated. No definite wall thickening.  PERITONEUM: No free air. No free fluid or loculated fluid  collections.  ABDOMINAL WALL: No focal hernia or mass.     OTHER: None.     BONES: No acute bony abnormality.     IMPRESSION:  Stable appearance of the chest.    Findings  LOWER THORAX: Clear. No effusions.     ABDOMEN:  LIVER: Homogeneous. No focal hepatic mass or ductal dilatation.  GALLBLADDER: CHOLECYSTECTOMY CLIPS ARE NOTED. CHOLECYSTECTOMY CLIPS ARE NOTED AND THERE IS AGAIN ENLARGEMENT OF THE COMMON BILE DUCT.  PANCREAS: Unremarkable. No mass or ductal dilatation.  SPLEEN: Homogeneous. No splenomegaly.  ADRENALS: No mass.  KIDNEYS: No mass. No obstructive uropathy.  No evidence of  urolithiasis.  GI TRACT: ABUNDANT STOOL THROUGHOUT THE COLON.  PERITONEUM: No free air. No free fluid or loculated fluid  collections.  MESENTERY: Unremarkable.  LYMPH NODES: STABLE BORDERLINE ENLARGED MESENTERIC LYMPH NODES.  VASCULATURE: ATHEROSCLEROTIC VASCULAR CALCIFICATION.  ABDOMINAL WALL: No focal hernia or mass.     OTHER: None.     PELVIS:  BLADDER: No focal mass or significant wall thickening  REPRODUCTIVE: Unremarkable as visualized.  APPENDIX: Nondistended. No surrounding inflammation.     BONES: No acute bony abnormality.     IMPRESSION:  Grossly stable appearance of the abdomen..    CT Neck 09-04-19  FINDINGS:      No soft tissue masses.   Marked improvement since 02/27/2018 of bilateral lymphadenopathy that is  now only seen to be nonenlarged bilateral slightly bulky lymph nodes.   A few nonenlarged bilateral parotid gland lymph nodes are noted.   There is no parapharyngeal mass or fluid collection.   No pharyngeal  mucosal asymmetry.   Bone windows demonstrate no acute osseous abnormality.   The sinuses are clear.   Traversing blood vessels incidentally appear unremarkable.     IMPRESSION:  Marked improvement since 02/27/2018 of bilateral  lymphadenopathy that is now only seen to be nonenlarged bilateral  slightly bulky lymph nodes.       CT Neck 02-24-20  FINDINGS:  There is evidence of atherosclerotic vascular disease.     Stent in the right carotid.     No adenopathy.     No drainable fluid collection.     Postoperative anterior fusion device at C4-C6.     IMPRESSION:  No evidence of metastatic disease.      CTCAP 02-24-20  FINDINGS:  Adenopathy:No evidence of mediastinal or hilar lymph node enlargement.  No axillary lymph node enlargement.     Fluid:There are no pericardial or pleural effusions.     LUNGS:No parenchymal soft tissue nodules or masses are present.     Skeletal:No acute or destructive bony abnormality is identified.     Coronary artery calcifications are present.     IMPRESSION:  Stable appearance of the chest. No evidence of metastatic disease.    FINDINGS:   The lung bases are clear. There are no pleural effusions.     The liver is homogeneous. There is no evidence of focal hepatic mass.     The spleen is homogeneous.     There is no peripancreatic stranding or pancreatic head mass.     There is no adrenal enlargement.     The kidneys show no evidence of hydronephrosis or hydroureter. I do not  see any distal ureteral stones.      Otherwise I do not see any free fluid or walled off fluid collections.     There is no bowel wall thickening or mesenteric stranding.     Distended loops of colon. Moderate to large volume stool.     Extensive atherosclerotic vascular disease.     IMPRESSION:  1. No evidence of metastatic disease.  2. Colonic distention. Moderate stool in the colon.  3. Evidence of atherosclerotic vascular disease.      CT Neck 01-11-21  FINDINGS: Contrast was injected intravenously and spiral  scans were  obtained from the base of the skull and extended to the thoracic inlet.  CT shows no abnormally enlarged lymph nodes in the neck. The salivary  glands were appropriate. There is no evidence of mass in the nasal or  oropharynx region.     IMPRESSION:  No lymphadenopathy was demonstrated in the neck.       CTCAP 01-11-21  FINDINGS: Spiral scans were obtained from the apices of the lung and  extended to the diaphragm. Lung and mediastinal windows were archived.  On the mediastinal windows, no enlarged lymph nodes are noted in the  mediastinum or in the hilar areas. No supraclavicular lymphadenopathy  was identified. The heart was not enlarged. There were no pericardial  effusions. There were no pleural effusions. On the lung windows, no  inflammatory infiltrates are noted in the lungs. No masses or lung  nodules are identified.      IMPRESSION:  Negative CT scan of the thorax. No evidence of  lymphadenopathy.     CT FINDINGS: The liver shows some mild decreased density as compared to  the spleen, consistent with fatty change. No focal liver lesions were  demonstrated. Gallbladder is surgically absent. The spleen is not  enlarged. There were no masses in the adrenal glands. The kidneys  enhance appropriately. No enlarged retroperitoneal lymph nodes were  demonstrated. There was no ascites. There is a moderate amount of gas  and stool throughout the colon. In the pelvis, no masses or fluid  collections were demonstrated.     IMPRESSION:  Stable CT scan of the abdomen and pelvis. No lymphadenopathy  was demonstrated. There was no splenomegaly. There is mild fatty change  in the liver.     CT neck with contrast 7/23/21  IMPRESSION:  1.  Stable exam demonstrating scattered unenlarged cervical lymph nodes  but no adenopathy by CT size criteria.  2.  Advanced atherosclerotic disease of the carotid vasculature.  Consider dedicated imaging with CTA or MRA if not artery performed.     CT chest with contrast  7/23/21  IMPRESSION:  1.  Stable exam demonstrating no thoracic lymphadenopathy by CT size  criteria.  2.  No acute thoracic findings are noted.  3.  Other incidental and nonacute findings as above. 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    CT Abdomen/Pelvis with contrast 7/23/21  IMPRESSION:  1.  No change in size of enlarged retroperitoneal lymph nodes and  mesenteric lymph nodes. There is no adenopathy by CT size criteria.  2.  Spleen is within normal limits for size.  3.  Marked constipation.  4.  Urinary bladder wall thickening. Cystitis versus incomplete  distention.  5.  Stable advanced atherosclerosis. Other nonacute findings described  above.       RECENT LABS:  Lab Results   Component Value Date    WBC 7.20 08/02/2021    HGB 12.1 08/02/2021    HCT 36.3 08/02/2021    MCV 98.9 (H) 08/02/2021    RDW 13.9 08/02/2021     08/02/2021    NEUTRORELPCT 64.2 08/02/2021    LYMPHORELPCT 27.2 08/02/2021    MONORELPCT 6.7 08/02/2021    EOSRELPCT 1.1 08/02/2021    BASORELPCT 0.4 08/02/2021    NEUTROABS 4.62 08/02/2021    LYMPHSABS 1.96 08/02/2021       Lab Results   Component Value Date     (L) 08/02/2021    K 4.8 08/02/2021    CO2 20.4 (L) 08/02/2021    CL 98 08/02/2021    BUN 8 08/02/2021    CREATININE 0.77 08/02/2021    GLUCOSE 114 (H) 08/02/2021    CALCIUM 8.9 08/02/2021    ALKPHOS 65 08/02/2021    AST 10 08/02/2021    ALT 10 08/02/2021    BILITOT 0.2 08/02/2021    ALBUMIN 3.82 08/02/2021    PROTEINTOT 6.4 08/02/2021       Lab Results   Component Value Date     09/13/2019    URICACID 3.1 09/13/2019         Date  SPEP/MONE Mspike Free K Free L  K/L Ratio   02-12-15 MONE normal Not observed   06-25-15 MONE normal Not observed 22.10  14.93  1.48  11-17-15 MONE normal Not  observed 17.17  14.41  1.19    Date  IgG IgA IgM  02-12-15 744 110 43  06-25-15 776 94 39  11-17-15 812 99 41  03-29-17 771 103 34  08-04-17 831 94 27  11-13-17 791 95 21  02-27-18 879 109 25     CLL profile, FISH:  The CLL interphase fluorescence in situ hybridization   (FISH) panel analysis was normal. There were no cells with   CCND1-IGH fusion. No extra signals or deletions of OLAF,   chromosome 12, 13q, or TP53 were observed.       SPECIFIC FISH RESULTS:     CCND1/IGH: NORMAL   .         nuc kenzie 11q13(OABK4k2),14q32(IGHx2)[100]       OLAF: NORMAL         nuc kenzie 11q22.3(ATMx2)[100] .     12cen: NORMAL     .         nuc kenzie 12cen([id number])[100] .     13q: NORMAL  .         nuc kenzie 13q14.3(DLEUx2),13q34(WIND1o8)[100] .     TP53: NORMAL  .         nuc kenzie 17p13.1(TP53x2)[100]     Acute Hepatitis Panel: Negative    ASSESSMENT & PLAN:  Ms. Mae is a 56 y.o.  female with a history of Stage II SLL diagnosed in May of 2012 by L supraclavicular LN biopsy.     1. Small Lymphocytic Lymphoma:   - Treated as above with 6 cycles of Bendamustine (after anaphylactic reaction to Rituximab with 1st cycle of treatment) with complete radiographic response.   - She subsequently developed adenopathy in the cj cervical, supraclavicular, axillary and cj inguinal areas c/w CLL/SLL. She also developed small mediastinal nodes and retroperitoneal LAD.  She had continued growth of the lymph nodes, was losing weight and had some night sweats which may or may not be related.  -  Recommended consideration of treatment given worsening disease.    -  Given rituxan allergy, recommended treatment with Imbruvica 420 mg daily with Bactrim SS daily and Valtrex 500 mg daily for prophylaxis.  -Overall, she has been tolerating her treatment well with no noticeable side effects with excellent disease control. Repeat CT imaging done 7/23/21 and summarized above again shows she has a complete clinical and radiographic response.    -CBC from  today showed normal counts. Immunoglobulin levels are pending.  -Will continue with current treatment and continue to monitor.   She will follow up with MD in 3 months with repeat labs.       2.  Signficant vascular disease:  -  She is following with Dr. Rodriguez and says he is planning on stent placement but this is not scheduled.     3.  Degenerative disc disease:  Ongoing management per Dr. Soto.    4.  Anxiety/Depression: Ongoing management per psychiatry. She is taking Seroquel and doing well in this regard.     5. Hyponatremia:   -This is chronic but dropped to 129 today. She is asymptomatic. She is drinking >5 bottles of water/day. She was advised to reduce water intake. Will continue to monitor and advised to follow up with PCP as well.     6.   Prophylaxis:  She received  Prevnar vaccine 11-13-17.  She has had 2020 influenza vaccine. Previously recommended HAV vaccine as well as Shingrix vaccine. Recommended COVID vaccine.     ACO / ZOYA/Other  Quality measures  -  Melania Mae received 2020 flu vaccine.  -  Melania Mae reports a pain score of 5.  Given her pain assessment as noted, treatment options were discussed and the following options were decided upon as a follow-up plan to address the patient's pain: continuation of current treatment plan for pain.  -  Current outpatient and discharge medications have been reconciled for the patient.  Reviewed by: BRANDON Carrion      7. Follow up:   - MD follow up in 3 months with labs including CBCD, CMP, immunoglobulin levels    I spent 30 minutes with Melania Mae today.  In the office today, more than 50% of this time was spent face-to-face with her  in counseling / coordination of care, reviewing her interim medical history and counseling on the current treatment plan.  All questions were answered to her satisfaction.          Electronically Signed by: BRANDON Golden    CC:   Gillian Harrison APRN Dr.  Kobi Amaral

## 2021-08-03 LAB
IGA1 MFR SER: 86 MG/DL (ref 70–400)
IGG1 SER-MCNC: 587 MG/DL (ref 700–1600)
IGM SERPL-MCNC: 22 MG/DL (ref 40–230)

## 2021-08-16 DIAGNOSIS — C91.10 CLL (CHRONIC LYMPHOCYTIC LEUKEMIA) (HCC): Primary | ICD-10-CM

## 2021-08-19 ENCOUNTER — APPOINTMENT (OUTPATIENT)
Dept: CARDIOLOGY | Facility: HOSPITAL | Age: 56
End: 2021-08-19

## 2021-08-23 ENCOUNTER — SPECIALTY PHARMACY (OUTPATIENT)
Dept: PHARMACY | Facility: HOSPITAL | Age: 56
End: 2021-08-23

## 2021-08-23 DIAGNOSIS — C91.10 CLL (CHRONIC LYMPHOCYTIC LEUKEMIA) (HCC): ICD-10-CM

## 2021-08-23 NOTE — PROGRESS NOTES
Specialty Pharmacy Note      Name:  Melania Mae  :  1965  Date:  2021         Past Medical History:   Diagnosis Date   • Aneurysm (CMS/HCC)     Cavernous left ICA aneurysm   • Asthma    • Carotid artery occlusion    • Chronic back pain    • CLL (chronic lymphocytic leukemia) (CMS/HCC)     2012   • COPD (chronic obstructive pulmonary disease) (CMS/HCC)    • Degenerative arthritis    • Depression    • Diabetes mellitus (CMS/HCC)     type 2   • Hyperlipidemia    • Hypertension    • Lymphadenopathy    • Non Hodgkin's lymphoma (CMS/HCC)        Past Surgical History:   Procedure Laterality Date   • APPENDECTOMY     • BACK SURGERY       (work accident) c-spine surgery    • CAROTID STENT Right 2017   • CEREBRAL ANGIOGRAM N/A 2017    Diagnostic Carotid/Cerebral Angiogram   • CHOLECYSTECTOMY     • HYSTERECTOMY      for endometriosis/ovarian bleed   • NECK SURGERY     • OTHER SURGICAL HISTORY      pac insertion and removal   • TONSILLECTOMY         Social History     Socioeconomic History   • Marital status:      Spouse name: Not on file   • Number of children: Not on file   • Years of education: Not on file   • Highest education level: Not on file   Tobacco Use   • Smoking status: Current Every Day Smoker     Packs/day: 0.50     Years: 35.00     Pack years: 17.50     Types: Cigarettes   • Smokeless tobacco: Never Used   • Tobacco comment: 0.5-1 PPD   Vaping Use   • Vaping Use: Never used   Substance and Sexual Activity   • Alcohol use: No   • Drug use: No   • Sexual activity: Defer       Family History   Problem Relation Age of Onset   • Lung cancer Father 72   • Hypertension Father    • Heart disease Father    • Cancer Father    • Diabetes Father    • Other Brother 46        MI   • Heart disease Brother    • Breast cancer Paternal Aunt    • Colon cancer Maternal Grandfather    • Other Cousin         CLL (dx 46 yo)   • Hypertension Mother   "      Allergies   Allergen Reactions   • Rituxan [Rituximab] Other (See Comments)     \"THROAT RAWNESS; FELT LIKE MY THROAT WAS CLOSING UP\"   • Codeine Other (See Comments)     \"UNKNOW  CHILDHOOD REACTION\"   • Penicillins Other (See Comments)     \"UNKNOWN CHILDHOOD ALLERGY\"       Current Outpatient Medications   Medication Sig Dispense Refill   • albuterol (PROVENTIL HFA;VENTOLIN HFA) 108 (90 Base) MCG/ACT inhaler Inhale 2 puffs As Needed for Wheezing.     • ALPRAZolam (XANAX) 0.5 MG tablet Take 0.5 mg by mouth 3 (Three) Times a Day As Needed for Anxiety.     • aspirin 81 MG EC tablet Take 1 tablet by mouth Daily. 30 tablet 5   • B-D UF III MINI PEN NEEDLES 31G X 5 MM misc      • clindamycin (CLEOCIN) 300 MG capsule Take 1 capsule by mouth 3 (Three) Times a Day. 30 capsule 0   • CloNIDine (CATAPRES) 0.1 MG tablet 0.1 mg As Needed.     • clopidogrel (PLAVIX) 75 MG tablet Take 1 tablet by mouth Daily. 30 tablet 5   • Continuous Blood Gluc  (Dexcom G6 ) device 1 kit Every 3 (Three) Months. 1 Device 0   • Continuous Blood Gluc Sensor (Dexcom G6 Sensor) Every 10 (Ten) Days. 3 each 11   • Continuous Blood Gluc Transmit (Dexcom G6 Transmitter) misc 1 kit Every 3 (Three) Months. 1 each 3   • gabapentin (NEURONTIN) 800 MG tablet Take 800 mg by mouth 4 (Four) Times a Day.     • ibrutinib (IMBRUVICA) 420 MG tablet tablet Take 1 tablet by mouth Daily. 28 tablet 5   • Ibuprofen (ADVIL PO) Take 400 mg by mouth 3 (Three) Times a Day. LIQUIGELS     • Insulin Aspart (NOVOLOG FLEXPEN SC) Inject 8-12 Units under the skin 3 (Three) Times a Day Before Meals. PER SLIDING SCALE     • Insulin Detemir (LEVEMIR FLEXPEN SC) Inject 8-12 Units under the skin Every Night. SLIDING SCALE     • lisinopril (PRINIVIL,ZESTRIL) 20 MG tablet Take 20 mg by mouth Every Morning.     • loratadine (CLARITIN) 10 MG tablet Take 10 mg by mouth Every Night.     • metoprolol tartrate (LOPRESSOR) 25 MG tablet 25 mg 2 (Two) Times a Day.     • " oxyCODONE-acetaminophen (PERCOCET)  MG per tablet Take 1 tablet by mouth Every 6 (Six) Hours As Needed for Moderate Pain .     • prochlorperazine (COMPAZINE) 10 MG tablet Take 1 tablet by mouth Every 6 (Six) Hours As Needed for Nausea or Vomiting. 60 tablet 4   • sertraline (ZOLOFT) 100 MG tablet Take 100 mg by mouth Every Night.     • sulfamethoxazole-trimethoprim (BACTRIM,SEPTRA) 400-80 MG tablet Take 1 tablet by mouth Daily. 30 tablet 3   • valACYclovir (VALTREX) 500 MG tablet Take 1 tablet by mouth Daily. 30 tablet 3     No current facility-administered medications for this visit.         LABORATORY:    Lab Results   Component Value Date    WBC 7.20 08/02/2021    HGB 12.1 08/02/2021    HCT 36.3 08/02/2021    MCV 98.9 (H) 08/02/2021    RDW 13.9 08/02/2021     08/02/2021    NEUTRORELPCT 64.2 08/02/2021    LYMPHORELPCT 27.2 08/02/2021    MONORELPCT 6.7 08/02/2021    EOSRELPCT 1.1 08/02/2021    BASORELPCT 0.4 08/02/2021    NEUTROABS 4.62 08/02/2021    LYMPHSABS 1.96 08/02/2021       Lab Results   Component Value Date     (L) 08/02/2021    K 4.8 08/02/2021    CO2 20.4 (L) 08/02/2021    CL 98 08/02/2021    BUN 8 08/02/2021    CREATININE 0.77 08/02/2021    GLUCOSE 114 (H) 08/02/2021    CALCIUM 8.9 08/02/2021    ALKPHOS 65 08/02/2021    AST 10 08/02/2021    ALT 10 08/02/2021    BILITOT 0.2 08/02/2021    ALBUMIN 3.82 08/02/2021    PROTEINTOT 6.4 08/02/2021       Lab Results   Component Value Date     09/13/2019    URICACID 3.1 09/13/2019        Imaging Results (Last 24 Hours)     ** No results found for the last 24 hours. **          ASSESSMENT/PLAN:     Patient Update Assessment (new medications, allergies, medical history): No updates reported     Medication(s): Imbruvica     Currently Taking Medication(s): As directed     Prior Authorization Status: Approved     Financial Assistance Status: None needed - $0 co-pay     Any Issues Identified: None     Appropriate to Process Prescription(s):  Refill due     Counseling Offered: Declined     Next Specialty Pharmacy Visit: ~28 days

## 2021-08-26 ENCOUNTER — HOSPITAL ENCOUNTER (OUTPATIENT)
Dept: CARDIOLOGY | Facility: HOSPITAL | Age: 56
Discharge: HOME | End: 2021-08-26
Admitting: PHYSICIAN ASSISTANT

## 2021-08-26 PROCEDURE — 93880 EXTRACRANIAL BILAT STUDY: CPT

## 2021-08-26 PROCEDURE — 93880 EXTRACRANIAL BILAT STUDY: CPT | Performed by: RADIOLOGY

## 2021-08-30 ENCOUNTER — OFFICE VISIT (OUTPATIENT)
Dept: NEUROSURGERY | Facility: CLINIC | Age: 56
End: 2021-08-30

## 2021-08-30 VITALS — HEIGHT: 65 IN | BODY MASS INDEX: 25.66 KG/M2 | TEMPERATURE: 97.6 F | WEIGHT: 154 LBS

## 2021-08-30 DIAGNOSIS — I65.22 CAROTID STENOSIS, ASYMPTOMATIC, LEFT: ICD-10-CM

## 2021-08-30 DIAGNOSIS — I67.1 CEREBRAL ANEURYSM, NONRUPTURED: ICD-10-CM

## 2021-08-30 DIAGNOSIS — I73.9 PERIPHERAL VASCULAR DISEASE OF EXTREMITY WITH CLAUDICATION (HCC): Primary | ICD-10-CM

## 2021-08-30 PROCEDURE — 99214 OFFICE O/P EST MOD 30 MIN: CPT | Performed by: PHYSICIAN ASSISTANT

## 2021-09-01 DIAGNOSIS — I73.9 PERIPHERAL VASCULAR DISEASE OF EXTREMITY WITH CLAUDICATION (HCC): Primary | ICD-10-CM

## 2021-09-01 DIAGNOSIS — I67.1 CEREBRAL ANEURYSM, NONRUPTURED: ICD-10-CM

## 2021-09-01 DIAGNOSIS — I65.22 CAROTID STENOSIS, ASYMPTOMATIC, LEFT: ICD-10-CM

## 2021-09-01 RX ORDER — SODIUM CHLORIDE 9 MG/ML
100 INJECTION, SOLUTION INTRAVENOUS CONTINUOUS
Status: CANCELLED | OUTPATIENT
Start: 2021-09-01

## 2021-09-01 NOTE — PATIENT INSTRUCTIONS

## 2021-09-03 RX ORDER — VALACYCLOVIR HYDROCHLORIDE 500 MG/1
TABLET, FILM COATED ORAL
Qty: 30 TABLET | Refills: 2 | Status: SHIPPED | OUTPATIENT
Start: 2021-09-03 | End: 2022-01-07

## 2021-09-03 RX ORDER — SULFAMETHOXAZOLE AND TRIMETHOPRIM 800; 160 MG/1; MG/1
TABLET ORAL
Qty: 30 TABLET | Refills: 2 | Status: SHIPPED | OUTPATIENT
Start: 2021-09-03 | End: 2022-01-07

## 2021-09-12 DIAGNOSIS — C91.10 CLL (CHRONIC LYMPHOCYTIC LEUKEMIA) (HCC): Primary | ICD-10-CM

## 2021-09-15 RX ORDER — FLURBIPROFEN SODIUM 0.3 MG/ML
SOLUTION/ DROPS OPHTHALMIC
Qty: 150 EACH | Refills: 0 | Status: SHIPPED | OUTPATIENT
Start: 2021-09-15

## 2021-09-17 NOTE — PROGRESS NOTES
Specialty Pharmacy Note      Name:  Melania Mae  :  1965  Date:  2021         Past Medical History:   Diagnosis Date   • Aneurysm (CMS/HCC)     Cavernous left ICA aneurysm   • Asthma    • Carotid artery occlusion    • Chronic back pain    • CLL (chronic lymphocytic leukemia) (CMS/HCC)     2012   • COPD (chronic obstructive pulmonary disease) (CMS/HCC)    • Degenerative arthritis    • Depression    • Diabetes mellitus (CMS/HCC)     type 2   • Hyperlipidemia    • Hypertension    • Lymphadenopathy    • Non Hodgkin's lymphoma (CMS/HCC)        Past Surgical History:   Procedure Laterality Date   • APPENDECTOMY     • BACK SURGERY       (work accident) c-spine surgery    • CAROTID STENT Right 2017   • CEREBRAL ANGIOGRAM N/A 2017    Diagnostic Carotid/Cerebral Angiogram   • CHOLECYSTECTOMY     • HYSTERECTOMY      for endometriosis/ovarian bleed   • NECK SURGERY     • OTHER SURGICAL HISTORY      pac insertion and removal   • TONSILLECTOMY         Social History     Socioeconomic History   • Marital status:      Spouse name: Not on file   • Number of children: Not on file   • Years of education: Not on file   • Highest education level: Not on file   Tobacco Use   • Smoking status: Current Every Day Smoker     Packs/day: 0.50     Years: 35.00     Pack years: 17.50     Types: Cigarettes   • Smokeless tobacco: Never Used   • Tobacco comment: 0.5-1 PPD   Vaping Use   • Vaping Use: Never used   Substance and Sexual Activity   • Alcohol use: No   • Drug use: No   • Sexual activity: Defer       Family History   Problem Relation Age of Onset   • Lung cancer Father 72   • Hypertension Father    • Heart disease Father    • Cancer Father    • Diabetes Father    • Other Brother 46        MI   • Heart disease Brother    • Breast cancer Paternal Aunt    • Colon cancer Maternal Grandfather    • Other Cousin         CLL (dx 48 yo)   • Hypertension Mother   "      Allergies   Allergen Reactions   • Rituxan [Rituximab] Other (See Comments)     \"THROAT RAWNESS; FELT LIKE MY THROAT WAS CLOSING UP\"   • Codeine Other (See Comments)     \"UNKNOW  CHILDHOOD REACTION\"   • Penicillins Other (See Comments)     \"UNKNOWN CHILDHOOD ALLERGY\"       Current Outpatient Medications   Medication Sig Dispense Refill   • albuterol (PROVENTIL HFA;VENTOLIN HFA) 108 (90 Base) MCG/ACT inhaler Inhale 2 puffs As Needed for Wheezing.     • ALPRAZolam (XANAX) 0.5 MG tablet Take 0.5 mg by mouth 3 (Three) Times a Day As Needed for Anxiety.     • aspirin 81 MG EC tablet Take 1 tablet by mouth Daily. 30 tablet 5   • clindamycin (CLEOCIN) 300 MG capsule Take 1 capsule by mouth 3 (Three) Times a Day. 30 capsule 0   • CloNIDine (CATAPRES) 0.1 MG tablet 0.1 mg As Needed.     • clopidogrel (PLAVIX) 75 MG tablet Take 1 tablet by mouth Daily. 30 tablet 5   • Continuous Blood Gluc  (Dexcom G6 ) device 1 kit Every 3 (Three) Months. 1 Device 0   • Continuous Blood Gluc Sensor (Dexcom G6 Sensor) Every 10 (Ten) Days. 3 each 11   • Continuous Blood Gluc Transmit (Dexcom G6 Transmitter) misc 1 kit Every 3 (Three) Months. 1 each 3   • gabapentin (NEURONTIN) 800 MG tablet Take 800 mg by mouth 4 (Four) Times a Day.     • ibrutinib (IMBRUVICA) 420 MG tablet tablet Take 1 tablet by mouth Daily. 28 tablet 5   • Ibuprofen (ADVIL PO) Take 400 mg by mouth 3 (Three) Times a Day. LIQUIGELS     • Insulin Aspart (NOVOLOG FLEXPEN SC) Inject 8-12 Units under the skin 3 (Three) Times a Day Before Meals. PER SLIDING SCALE     • Insulin Detemir (LEVEMIR FLEXPEN SC) Inject 8-12 Units under the skin Every Night. SLIDING SCALE     • Insulin Pen Needle (B-D UF III MINI PEN NEEDLES) 31G X 5 MM misc 4 daily NEEDS AN APPT FOR REFILLS 150 each 0   • lisinopril (PRINIVIL,ZESTRIL) 20 MG tablet Take 20 mg by mouth Every Morning.     • loratadine (CLARITIN) 10 MG tablet Take 10 mg by mouth Every Night.     • metoprolol tartrate " (LOPRESSOR) 25 MG tablet 25 mg 2 (Two) Times a Day.     • oxyCODONE-acetaminophen (PERCOCET)  MG per tablet Take 1 tablet by mouth Every 6 (Six) Hours As Needed for Moderate Pain .     • prochlorperazine (COMPAZINE) 10 MG tablet Take 1 tablet by mouth Every 6 (Six) Hours As Needed for Nausea or Vomiting. 60 tablet 4   • sertraline (ZOLOFT) 100 MG tablet Take 100 mg by mouth Every Night.     • sulfamethoxazole-trimethoprim (BACTRIM DS,SEPTRA DS) 800-160 MG per tablet TAKE 1 TABLET BY MOUTH EVERY DAY 30 tablet 2   • sulfamethoxazole-trimethoprim (BACTRIM,SEPTRA) 400-80 MG tablet Take 1 tablet by mouth Daily. 30 tablet 3   • valACYclovir (VALTREX) 500 MG tablet TAKE 1 TABLET BY MOUTH EVERY DAY 30 tablet 2     No current facility-administered medications for this visit.         LABORATORY:    Lab Results   Component Value Date    WBC 7.20 08/02/2021    HGB 12.1 08/02/2021    HCT 36.3 08/02/2021    MCV 98.9 (H) 08/02/2021    RDW 13.9 08/02/2021     08/02/2021    NEUTRORELPCT 64.2 08/02/2021    LYMPHORELPCT 27.2 08/02/2021    MONORELPCT 6.7 08/02/2021    EOSRELPCT 1.1 08/02/2021    BASORELPCT 0.4 08/02/2021    NEUTROABS 4.62 08/02/2021    LYMPHSABS 1.96 08/02/2021       Lab Results   Component Value Date     (L) 08/02/2021    K 4.8 08/02/2021    CO2 20.4 (L) 08/02/2021    CL 98 08/02/2021    BUN 8 08/02/2021    CREATININE 0.77 08/02/2021    GLUCOSE 114 (H) 08/02/2021    CALCIUM 8.9 08/02/2021    ALKPHOS 65 08/02/2021    AST 10 08/02/2021    ALT 10 08/02/2021    BILITOT 0.2 08/02/2021    ALBUMIN 3.82 08/02/2021    PROTEINTOT 6.4 08/02/2021       Lab Results   Component Value Date     09/13/2019    URICACID 3.1 09/13/2019        Imaging Results (Last 24 Hours)     ** No results found for the last 24 hours. **          ASSESSMENT/PLAN:    Patient Update Assessment (new medications, allergies, medical history): No updates reported    Medication(s): Imbruvica 420 mg    Currently Taking Medication(s):  Patient is currently taking medication as prescribed.    Effectiveness of Medication: Effective    Experiencing Side Effects: None reported    Prior Authorization Status: Approved    Financial Assistance Status: None needed at this time.      Any Issues Identified: None     Appropriate to Process Prescription(s): Refill due    Counseling Offered: Declined    Next Specialty Pharmacy Visit: ~28 days

## 2021-09-20 ENCOUNTER — SPECIALTY PHARMACY (OUTPATIENT)
Dept: PHARMACY | Facility: HOSPITAL | Age: 56
End: 2021-09-20

## 2021-10-10 DIAGNOSIS — C91.10 CLL (CHRONIC LYMPHOCYTIC LEUKEMIA) (HCC): Primary | ICD-10-CM

## 2021-10-13 NOTE — PROGRESS NOTES
"      Specialty Pharmacy Note      Name:  Melania Mae  :  1965  Date:  10/13/2021         Past Medical History:   Diagnosis Date   • Aneurysm (CMS/HCC)     Cavernous left ICA aneurysm   • Asthma    • Carotid artery occlusion    • Chronic back pain    • CLL (chronic lymphocytic leukemia) (CMS/HCC)     2012   • COPD (chronic obstructive pulmonary disease) (CMS/HCC)    • Degenerative arthritis    • Depression    • Diabetes mellitus (CMS/HCC)     type 2   • Hyperlipidemia    • Hypertension    • Lymphadenopathy    • Non Hodgkin's lymphoma (CMS/HCC)        Past Surgical History:   Procedure Laterality Date   • APPENDECTOMY     • BACK SURGERY       (work accident) c-spine surgery    • CAROTID STENT Right 2017   • CEREBRAL ANGIOGRAM N/A 2017    Diagnostic Carotid/Cerebral Angiogram   • CHOLECYSTECTOMY     • HYSTERECTOMY      for endometriosis/ovarian bleed   • NECK SURGERY     • OTHER SURGICAL HISTORY      pac insertion and removal   • TONSILLECTOMY         Social History     Socioeconomic History   • Marital status:    Tobacco Use   • Smoking status: Current Every Day Smoker     Packs/day: 0.50     Years: 35.00     Pack years: 17.50     Types: Cigarettes   • Smokeless tobacco: Never Used   • Tobacco comment: 0.5-1 PPD   Vaping Use   • Vaping Use: Never used   Substance and Sexual Activity   • Alcohol use: No   • Drug use: No   • Sexual activity: Defer       Family History   Problem Relation Age of Onset   • Lung cancer Father 72   • Hypertension Father    • Heart disease Father    • Cancer Father    • Diabetes Father    • Other Brother 46        MI   • Heart disease Brother    • Breast cancer Paternal Aunt    • Colon cancer Maternal Grandfather    • Other Cousin         CLL (dx 46 yo)   • Hypertension Mother        Allergies   Allergen Reactions   • Rituxan [Rituximab] Other (See Comments)     \"THROAT RAWNESS; FELT LIKE MY THROAT WAS CLOSING UP\"   • Codeine " "Other (See Comments)     \"UNKNOW  CHILDHOOD REACTION\"   • Penicillins Other (See Comments)     \"UNKNOWN CHILDHOOD ALLERGY\"       Current Outpatient Medications   Medication Sig Dispense Refill   • albuterol (PROVENTIL HFA;VENTOLIN HFA) 108 (90 Base) MCG/ACT inhaler Inhale 2 puffs As Needed for Wheezing.     • ALPRAZolam (XANAX) 0.5 MG tablet Take 0.5 mg by mouth 3 (Three) Times a Day As Needed for Anxiety.     • aspirin 81 MG EC tablet Take 1 tablet by mouth Daily. 30 tablet 5   • clindamycin (CLEOCIN) 300 MG capsule Take 1 capsule by mouth 3 (Three) Times a Day. 30 capsule 0   • CloNIDine (CATAPRES) 0.1 MG tablet 0.1 mg As Needed.     • clopidogrel (PLAVIX) 75 MG tablet Take 1 tablet by mouth Daily. 30 tablet 5   • Continuous Blood Gluc  (Dexcom G6 ) device 1 kit Every 3 (Three) Months. 1 Device 0   • Continuous Blood Gluc Sensor (Dexcom G6 Sensor) Every 10 (Ten) Days. 3 each 11   • Continuous Blood Gluc Transmit (Dexcom G6 Transmitter) misc 1 kit Every 3 (Three) Months. 1 each 3   • gabapentin (NEURONTIN) 800 MG tablet Take 800 mg by mouth 4 (Four) Times a Day.     • ibrutinib (IMBRUVICA) 420 MG tablet tablet Take 1 tablet by mouth Daily. 28 tablet 5   • Ibuprofen (ADVIL PO) Take 400 mg by mouth 3 (Three) Times a Day. LIQUIGELS     • Insulin Aspart (NOVOLOG FLEXPEN SC) Inject 8-12 Units under the skin 3 (Three) Times a Day Before Meals. PER SLIDING SCALE     • Insulin Detemir (LEVEMIR FLEXPEN SC) Inject 8-12 Units under the skin Every Night. SLIDING SCALE     • Insulin Pen Needle (B-D UF III MINI PEN NEEDLES) 31G X 5 MM misc 4 daily NEEDS AN APPT FOR REFILLS 150 each 0   • lisinopril (PRINIVIL,ZESTRIL) 20 MG tablet Take 20 mg by mouth Every Morning.     • loratadine (CLARITIN) 10 MG tablet Take 10 mg by mouth Every Night.     • metoprolol tartrate (LOPRESSOR) 25 MG tablet 25 mg 2 (Two) Times a Day.     • oxyCODONE-acetaminophen (PERCOCET)  MG per tablet Take 1 tablet by mouth Every 6 (Six) " Hours As Needed for Moderate Pain .     • prochlorperazine (COMPAZINE) 10 MG tablet Take 1 tablet by mouth Every 6 (Six) Hours As Needed for Nausea or Vomiting. 60 tablet 4   • sertraline (ZOLOFT) 100 MG tablet Take 100 mg by mouth Every Night.     • sulfamethoxazole-trimethoprim (BACTRIM DS,SEPTRA DS) 800-160 MG per tablet TAKE 1 TABLET BY MOUTH EVERY DAY 30 tablet 2   • sulfamethoxazole-trimethoprim (BACTRIM,SEPTRA) 400-80 MG tablet Take 1 tablet by mouth Daily. 30 tablet 3   • valACYclovir (VALTREX) 500 MG tablet TAKE 1 TABLET BY MOUTH EVERY DAY 30 tablet 2     No current facility-administered medications for this visit.         LABORATORY:    Lab Results   Component Value Date    WBC 7.20 08/02/2021    HGB 12.1 08/02/2021    HCT 36.3 08/02/2021    MCV 98.9 (H) 08/02/2021    RDW 13.9 08/02/2021     08/02/2021    NEUTRORELPCT 64.2 08/02/2021    LYMPHORELPCT 27.2 08/02/2021    MONORELPCT 6.7 08/02/2021    EOSRELPCT 1.1 08/02/2021    BASORELPCT 0.4 08/02/2021    NEUTROABS 4.62 08/02/2021    LYMPHSABS 1.96 08/02/2021       Lab Results   Component Value Date     (L) 08/02/2021    K 4.8 08/02/2021    CO2 20.4 (L) 08/02/2021    CL 98 08/02/2021    BUN 8 08/02/2021    CREATININE 0.77 08/02/2021    GLUCOSE 114 (H) 08/02/2021    CALCIUM 8.9 08/02/2021    ALKPHOS 65 08/02/2021    AST 10 08/02/2021    ALT 10 08/02/2021    BILITOT 0.2 08/02/2021    ALBUMIN 3.82 08/02/2021    PROTEINTOT 6.4 08/02/2021       Lab Results   Component Value Date     09/13/2019    URICACID 3.1 09/13/2019        Imaging Results (Last 24 Hours)     ** No results found for the last 24 hours. **          ASSESSMENT/PLAN:    Patient Update Assessment (new medications, allergies, medical history): No updates reported at this time.    Medication(s): Imbruvica 420 mg tablet    Currently Taking Medication(s): Patient is currently taking medication as prescribed.    Effectiveness of Medication: Effective    Experiencing Side Effects: No  side effects reported at this time.    Prior Authorization Status: Approved    Financial Assistance Status: None needed at this time. ($0 patient co-pay)    Any Issues Identified: None    Appropriate to Process Prescription(s): Yes, medication refill is due.    Counseling Offered: Declined    Next Specialty Pharmacy Visit: ~28 days

## 2021-10-14 ENCOUNTER — SPECIALTY PHARMACY (OUTPATIENT)
Dept: PHARMACY | Facility: HOSPITAL | Age: 56
End: 2021-10-14

## 2021-11-03 ENCOUNTER — SPECIALTY PHARMACY (OUTPATIENT)
Dept: PHARMACY | Facility: HOSPITAL | Age: 56
End: 2021-11-03

## 2021-11-08 DIAGNOSIS — C91.10 CLL (CHRONIC LYMPHOCYTIC LEUKEMIA) (HCC): Primary | ICD-10-CM

## 2021-11-17 ENCOUNTER — SPECIALTY PHARMACY (OUTPATIENT)
Dept: PHARMACY | Facility: HOSPITAL | Age: 56
End: 2021-11-17

## 2021-11-17 NOTE — PROGRESS NOTES
"      Specialty Pharmacy Note      Name:  Melania Mae  :  1965  Date:  2021         Past Medical History:   Diagnosis Date   • Aneurysm (CMS/HCC)     Cavernous left ICA aneurysm   • Asthma    • Carotid artery occlusion    • Chronic back pain    • CLL (chronic lymphocytic leukemia) (CMS/HCC)     2012   • COPD (chronic obstructive pulmonary disease) (CMS/HCC)    • Degenerative arthritis    • Depression    • Diabetes mellitus (CMS/HCC)     type 2   • Hyperlipidemia    • Hypertension    • Lymphadenopathy    • Non Hodgkin's lymphoma (CMS/HCC)        Past Surgical History:   Procedure Laterality Date   • APPENDECTOMY     • BACK SURGERY       (work accident) c-spine surgery    • CAROTID STENT Right 2017   • CEREBRAL ANGIOGRAM N/A 2017    Diagnostic Carotid/Cerebral Angiogram   • CHOLECYSTECTOMY     • HYSTERECTOMY      for endometriosis/ovarian bleed   • NECK SURGERY     • OTHER SURGICAL HISTORY      pac insertion and removal   • TONSILLECTOMY         Social History     Socioeconomic History   • Marital status:    Tobacco Use   • Smoking status: Current Every Day Smoker     Packs/day: 0.50     Years: 35.00     Pack years: 17.50     Types: Cigarettes   • Smokeless tobacco: Never Used   • Tobacco comment: 0.5-1 PPD   Vaping Use   • Vaping Use: Never used   Substance and Sexual Activity   • Alcohol use: No   • Drug use: No   • Sexual activity: Defer       Family History   Problem Relation Age of Onset   • Lung cancer Father 72   • Hypertension Father    • Heart disease Father    • Cancer Father    • Diabetes Father    • Other Brother 46        MI   • Heart disease Brother    • Breast cancer Paternal Aunt    • Colon cancer Maternal Grandfather    • Other Cousin         CLL (dx 48 yo)   • Hypertension Mother        Allergies   Allergen Reactions   • Rituxan [Rituximab] Other (See Comments)     \"THROAT RAWNESS; FELT LIKE MY THROAT WAS CLOSING UP\"   • Codeine " "Other (See Comments)     \"UNKNOW  CHILDHOOD REACTION\"   • Penicillins Other (See Comments)     \"UNKNOWN CHILDHOOD ALLERGY\"       Current Outpatient Medications   Medication Sig Dispense Refill   • albuterol (PROVENTIL HFA;VENTOLIN HFA) 108 (90 Base) MCG/ACT inhaler Inhale 2 puffs As Needed for Wheezing.     • ALPRAZolam (XANAX) 0.5 MG tablet Take 0.5 mg by mouth 3 (Three) Times a Day As Needed for Anxiety.     • aspirin 81 MG EC tablet Take 1 tablet by mouth Daily. 30 tablet 5   • clindamycin (CLEOCIN) 300 MG capsule Take 1 capsule by mouth 3 (Three) Times a Day. 30 capsule 0   • CloNIDine (CATAPRES) 0.1 MG tablet 0.1 mg As Needed.     • clopidogrel (PLAVIX) 75 MG tablet Take 1 tablet by mouth Daily. 30 tablet 5   • Continuous Blood Gluc  (Dexcom G6 ) device 1 kit Every 3 (Three) Months. 1 Device 0   • Continuous Blood Gluc Sensor (Dexcom G6 Sensor) Every 10 (Ten) Days. 3 each 11   • Continuous Blood Gluc Transmit (Dexcom G6 Transmitter) misc 1 kit Every 3 (Three) Months. 1 each 3   • gabapentin (NEURONTIN) 800 MG tablet Take 800 mg by mouth 4 (Four) Times a Day.     • ibrutinib (IMBRUVICA) 420 MG tablet tablet Take 1 tablet by mouth Daily. 28 tablet 5   • Ibuprofen (ADVIL PO) Take 400 mg by mouth 3 (Three) Times a Day. LIQUIGELS     • Insulin Aspart (NOVOLOG FLEXPEN SC) Inject 8-12 Units under the skin 3 (Three) Times a Day Before Meals. PER SLIDING SCALE     • Insulin Detemir (LEVEMIR FLEXPEN SC) Inject 8-12 Units under the skin Every Night. SLIDING SCALE     • Insulin Pen Needle (B-D UF III MINI PEN NEEDLES) 31G X 5 MM misc 4 daily NEEDS AN APPT FOR REFILLS 150 each 0   • lisinopril (PRINIVIL,ZESTRIL) 20 MG tablet Take 20 mg by mouth Every Morning.     • loratadine (CLARITIN) 10 MG tablet Take 10 mg by mouth Every Night.     • metoprolol tartrate (LOPRESSOR) 25 MG tablet 25 mg 2 (Two) Times a Day.     • oxyCODONE-acetaminophen (PERCOCET)  MG per tablet Take 1 tablet by mouth Every 6 (Six) " Hours As Needed for Moderate Pain .     • prochlorperazine (COMPAZINE) 10 MG tablet Take 1 tablet by mouth Every 6 (Six) Hours As Needed for Nausea or Vomiting. 60 tablet 4   • sertraline (ZOLOFT) 100 MG tablet Take 100 mg by mouth Every Night.     • sulfamethoxazole-trimethoprim (BACTRIM DS,SEPTRA DS) 800-160 MG per tablet TAKE 1 TABLET BY MOUTH EVERY DAY 30 tablet 2   • sulfamethoxazole-trimethoprim (BACTRIM,SEPTRA) 400-80 MG tablet Take 1 tablet by mouth Daily. 30 tablet 3   • valACYclovir (VALTREX) 500 MG tablet TAKE 1 TABLET BY MOUTH EVERY DAY 30 tablet 2     No current facility-administered medications for this visit.         LABORATORY:    Lab Results   Component Value Date    WBC 7.20 08/02/2021    HGB 12.1 08/02/2021    HCT 36.3 08/02/2021    MCV 98.9 (H) 08/02/2021    RDW 13.9 08/02/2021     08/02/2021    NEUTRORELPCT 64.2 08/02/2021    LYMPHORELPCT 27.2 08/02/2021    MONORELPCT 6.7 08/02/2021    EOSRELPCT 1.1 08/02/2021    BASORELPCT 0.4 08/02/2021    NEUTROABS 4.62 08/02/2021    LYMPHSABS 1.96 08/02/2021       Lab Results   Component Value Date     (L) 08/02/2021    K 4.8 08/02/2021    CO2 20.4 (L) 08/02/2021    CL 98 08/02/2021    BUN 8 08/02/2021    CREATININE 0.77 08/02/2021    GLUCOSE 114 (H) 08/02/2021    CALCIUM 8.9 08/02/2021    ALKPHOS 65 08/02/2021    AST 10 08/02/2021    ALT 10 08/02/2021    BILITOT 0.2 08/02/2021    ALBUMIN 3.82 08/02/2021    PROTEINTOT 6.4 08/02/2021       Lab Results   Component Value Date     09/13/2019    URICACID 3.1 09/13/2019        Imaging Results (Last 24 Hours)     ** No results found for the last 24 hours. **          ASSESSMENT/PLAN:    Patient Update Assessment (new medications, allergies, medical history): No updates reported at this time.    Medication(s): Imbruvica 420 mg tablet    Currently Taking Medication(s): Patient is currently taking medication as prescribed.    Effectiveness of Medication: Effective    Experiencing Side Effects: No  side effects reported at this time.    Prior Authorization Status: Approved    Financial Assistance Status: None needed at this time.    Any Issues Identified: None    Appropriate to Process Prescription(s): Yes, medication refill is due.    Counseling Offered: Declined    Next Specialty Pharmacy Visit: ~28 days                Specialty Pharmacy Refill Coordination Note     Melania is a 56 y.o. female contacted today regarding refills of  One specialty medication(s).    Reviewed and verified with patient: Allergies  Meds       Specialty medication(s) and dose(s) confirmed: yes    Refill Questions      Most Recent Value   Changes to allergies? No   Changes to medications? No   New conditions since last clinic visit No   Unplanned office visit, urgent care, ED, or hospital admission in the last 4 weeks  No   How does patient/caregiver feel medication is working? Very good   Financial problems or insurance changes  No   How many doses of your specialty medications were missed in the last 4 weeks? Patient reports missing a couple doses of her Imbruvica due to the loss of her daughter.          Delivery Questions      Most Recent Value   Delivery method FedEx   Delivery address correct? Yes   Preferred delivery time? Anytime   Number of medications in delivery 1   Medication being filled and delivered Imbruvica   Is there any medication that is due not being filled? No   Supplies needed? No supplies needed   Cooler needed? No   Do any medications need mixed or dated? No   Questions or concerns for the pharmacist? No   Are any medications first time fills? No            Medication Adherence    Any gaps in refill history greater than 2 weeks in the last 3 months: no  Demonstrates understanding of importance of adherence: yes  Informant: patient  Reliability of informant: reliable  Provider-estimated medication adherence level: %   Other non-adherence reason: Patient reported missing a couple doses of Imbruvica  due to the loss of her daughter.   Support network for adherence: family member          Follow-up: 28 day(s)     Mary Arthur, Pharmacy Technician  Specialty Pharmacy Technician

## 2021-12-09 ENCOUNTER — SPECIALTY PHARMACY (OUTPATIENT)
Dept: PHARMACY | Facility: TELEHEALTH | Age: 56
End: 2021-12-09

## 2021-12-09 NOTE — PROGRESS NOTES
"      Specialty Pharmacy Note      Name:  Melania Mae  :  1965  Date:  2021         Past Medical History:   Diagnosis Date   • Aneurysm (CMS/HCC)     Cavernous left ICA aneurysm   • Asthma    • Carotid artery occlusion    • Chronic back pain    • CLL (chronic lymphocytic leukemia) (CMS/HCC)     2012   • COPD (chronic obstructive pulmonary disease) (CMS/HCC)    • Degenerative arthritis    • Depression    • Diabetes mellitus (CMS/HCC)     type 2   • Hyperlipidemia    • Hypertension    • Lymphadenopathy    • Non Hodgkin's lymphoma (CMS/HCC)        Past Surgical History:   Procedure Laterality Date   • APPENDECTOMY     • BACK SURGERY       (work accident) c-spine surgery    • CAROTID STENT Right 2017   • CEREBRAL ANGIOGRAM N/A 2017    Diagnostic Carotid/Cerebral Angiogram   • CHOLECYSTECTOMY     • HYSTERECTOMY      for endometriosis/ovarian bleed   • NECK SURGERY     • OTHER SURGICAL HISTORY      pac insertion and removal   • TONSILLECTOMY         Social History     Socioeconomic History   • Marital status:    Tobacco Use   • Smoking status: Current Every Day Smoker     Packs/day: 0.50     Years: 35.00     Pack years: 17.50     Types: Cigarettes   • Smokeless tobacco: Never Used   • Tobacco comment: 0.5-1 PPD   Vaping Use   • Vaping Use: Never used   Substance and Sexual Activity   • Alcohol use: No   • Drug use: No   • Sexual activity: Defer       Family History   Problem Relation Age of Onset   • Lung cancer Father 72   • Hypertension Father    • Heart disease Father    • Cancer Father    • Diabetes Father    • Other Brother 46        MI   • Heart disease Brother    • Breast cancer Paternal Aunt    • Colon cancer Maternal Grandfather    • Other Cousin         CLL (dx 48 yo)   • Hypertension Mother        Allergies   Allergen Reactions   • Rituxan [Rituximab] Other (See Comments)     \"THROAT RAWNESS; FELT LIKE MY THROAT WAS CLOSING UP\"   • Codeine " "Other (See Comments)     \"UNKNOW  CHILDHOOD REACTION\"   • Penicillins Other (See Comments)     \"UNKNOWN CHILDHOOD ALLERGY\"       Current Outpatient Medications   Medication Sig Dispense Refill   • albuterol (PROVENTIL HFA;VENTOLIN HFA) 108 (90 Base) MCG/ACT inhaler Inhale 2 puffs As Needed for Wheezing.     • ALPRAZolam (XANAX) 0.5 MG tablet Take 0.5 mg by mouth 3 (Three) Times a Day As Needed for Anxiety.     • aspirin 81 MG EC tablet Take 1 tablet by mouth Daily. 30 tablet 5   • clindamycin (CLEOCIN) 300 MG capsule Take 1 capsule by mouth 3 (Three) Times a Day. 30 capsule 0   • CloNIDine (CATAPRES) 0.1 MG tablet 0.1 mg As Needed.     • clopidogrel (PLAVIX) 75 MG tablet Take 1 tablet by mouth Daily. 30 tablet 5   • Continuous Blood Gluc  (Dexcom G6 ) device 1 kit Every 3 (Three) Months. 1 Device 0   • Continuous Blood Gluc Sensor (Dexcom G6 Sensor) Every 10 (Ten) Days. 3 each 11   • Continuous Blood Gluc Transmit (Dexcom G6 Transmitter) misc 1 kit Every 3 (Three) Months. 1 each 3   • gabapentin (NEURONTIN) 800 MG tablet Take 800 mg by mouth 4 (Four) Times a Day.     • ibrutinib (IMBRUVICA) 420 MG tablet tablet Take 1 tablet by mouth Daily. 28 tablet 5   • Ibuprofen (ADVIL PO) Take 400 mg by mouth 3 (Three) Times a Day. LIQUIGELS     • Insulin Aspart (NOVOLOG FLEXPEN SC) Inject 8-12 Units under the skin 3 (Three) Times a Day Before Meals. PER SLIDING SCALE     • Insulin Detemir (LEVEMIR FLEXPEN SC) Inject 8-12 Units under the skin Every Night. SLIDING SCALE     • Insulin Pen Needle (B-D UF III MINI PEN NEEDLES) 31G X 5 MM misc 4 daily NEEDS AN APPT FOR REFILLS 150 each 0   • lisinopril (PRINIVIL,ZESTRIL) 20 MG tablet Take 20 mg by mouth Every Morning.     • loratadine (CLARITIN) 10 MG tablet Take 10 mg by mouth Every Night.     • metoprolol tartrate (LOPRESSOR) 25 MG tablet 25 mg 2 (Two) Times a Day.     • oxyCODONE-acetaminophen (PERCOCET)  MG per tablet Take 1 tablet by mouth Every 6 (Six) " Hours As Needed for Moderate Pain .     • prochlorperazine (COMPAZINE) 10 MG tablet Take 1 tablet by mouth Every 6 (Six) Hours As Needed for Nausea or Vomiting. 60 tablet 4   • sertraline (ZOLOFT) 100 MG tablet Take 100 mg by mouth Every Night.     • sulfamethoxazole-trimethoprim (BACTRIM DS,SEPTRA DS) 800-160 MG per tablet TAKE 1 TABLET BY MOUTH EVERY DAY 30 tablet 2   • sulfamethoxazole-trimethoprim (BACTRIM,SEPTRA) 400-80 MG tablet Take 1 tablet by mouth Daily. 30 tablet 3   • valACYclovir (VALTREX) 500 MG tablet TAKE 1 TABLET BY MOUTH EVERY DAY 30 tablet 2     No current facility-administered medications for this visit.         LABORATORY:    Lab Results   Component Value Date    WBC 7.20 08/02/2021    HGB 12.1 08/02/2021    HCT 36.3 08/02/2021    MCV 98.9 (H) 08/02/2021    RDW 13.9 08/02/2021     08/02/2021    NEUTRORELPCT 64.2 08/02/2021    LYMPHORELPCT 27.2 08/02/2021    MONORELPCT 6.7 08/02/2021    EOSRELPCT 1.1 08/02/2021    BASORELPCT 0.4 08/02/2021    NEUTROABS 4.62 08/02/2021    LYMPHSABS 1.96 08/02/2021       Lab Results   Component Value Date     (L) 08/02/2021    K 4.8 08/02/2021    CO2 20.4 (L) 08/02/2021    CL 98 08/02/2021    BUN 8 08/02/2021    CREATININE 0.77 08/02/2021    GLUCOSE 114 (H) 08/02/2021    CALCIUM 8.9 08/02/2021    ALKPHOS 65 08/02/2021    AST 10 08/02/2021    ALT 10 08/02/2021    BILITOT 0.2 08/02/2021    ALBUMIN 3.82 08/02/2021    PROTEINTOT 6.4 08/02/2021       Lab Results   Component Value Date     09/13/2019    URICACID 3.1 09/13/2019        Imaging Results (Last 24 Hours)     ** No results found for the last 24 hours. **          ASSESSMENT/PLAN:    Patient Update Assessment (new medications, allergies, medical history): No updates reported at this time.    Medication(s): Imbruvica 420 mg tablet    Currently Taking Medication(s): Patient is currently taking medication as prescribed.    Effectiveness of Medication: Effective    Experiencing Side Effects: No  side effects reported at this time.    Prior Authorization Status: Approved    Financial Assistance Status: None needed at this time.    Any Issues Identified: None    Appropriate to Process Prescription(s): Yes, medication refill is due.    Counseling Offered: Declined    Next Specialty Pharmacy Visit: ~28 days                Specialty Pharmacy Refill Coordination Note     Melania is a 56 y.o. female contacted today regarding refills of  one specialty medication(s).    Reviewed and verified with patient: Allergies  Meds       Specialty medication(s) and dose(s) confirmed: yes    Refill Questions      Most Recent Value   Changes to allergies? No   Changes to medications? No   New conditions since last clinic visit No   Unplanned office visit, urgent care, ED, or hospital admission in the last 4 weeks  No   How does patient/caregiver feel medication is working? Very good   Financial problems or insurance changes  No   How many doses of your specialty medications were missed in the last 4 weeks? Patient states she probably missed a couple doses.   Why were doses missed? Patient has lost her daughter and is grieving.          Delivery Questions      Most Recent Value   Delivery method FedEx   Delivery address correct? Yes   Preferred delivery time? Anytime   Number of medications in delivery 1   Medication being filled and delivered Imbruvica   Is there any medication that is due not being filled? No   Supplies needed? No supplies needed   Cooler needed? No   Do any medications need mixed or dated? No   Questions or concerns for the pharmacist? No   Are any medications first time fills? No            Medication Adherence    Any gaps in refill history greater than 2 weeks in the last 3 months: no  Demonstrates understanding of importance of adherence: yes  Informant: patient  Reliability of informant: reliable  Provider-estimated medication adherence level: %   Other non-adherence reason: Patient has lost her  daughter.   Support network for adherence: family member          Follow-up: 28 day(s)     Mary Arthur, Pharmacy Technician  Specialty Pharmacy Technician

## 2022-01-03 ENCOUNTER — SPECIALTY PHARMACY (OUTPATIENT)
Dept: PHARMACY | Facility: HOSPITAL | Age: 57
End: 2022-01-03

## 2022-01-03 NOTE — PROGRESS NOTES
"    Specialty Pharmacy Refill Coordination Note      Name:  Melania Mae  :  1965  Date:  1/3/2022         Past Medical History:   Diagnosis Date   • Aneurysm (CMS/HCC)     Cavernous left ICA aneurysm   • Asthma    • Carotid artery occlusion    • Chronic back pain    • CLL (chronic lymphocytic leukemia) (CMS/HCC)     2012   • COPD (chronic obstructive pulmonary disease) (CMS/HCC)    • Degenerative arthritis    • Depression    • Diabetes mellitus (CMS/HCC)     type 2   • Hyperlipidemia    • Hypertension    • Lymphadenopathy    • Non Hodgkin's lymphoma (CMS/HCC)        Past Surgical History:   Procedure Laterality Date   • APPENDECTOMY     • BACK SURGERY       (work accident) c-spine surgery    • CAROTID STENT Right 2017   • CEREBRAL ANGIOGRAM N/A 2017    Diagnostic Carotid/Cerebral Angiogram   • CHOLECYSTECTOMY     • HYSTERECTOMY      for endometriosis/ovarian bleed   • NECK SURGERY     • OTHER SURGICAL HISTORY      pac insertion and removal   • TONSILLECTOMY         Social History     Socioeconomic History   • Marital status:    Tobacco Use   • Smoking status: Current Every Day Smoker     Packs/day: 0.50     Years: 35.00     Pack years: 17.50     Types: Cigarettes   • Smokeless tobacco: Never Used   • Tobacco comment: 0.5-1 PPD   Vaping Use   • Vaping Use: Never used   Substance and Sexual Activity   • Alcohol use: No   • Drug use: No   • Sexual activity: Defer       Family History   Problem Relation Age of Onset   • Lung cancer Father 72   • Hypertension Father    • Heart disease Father    • Cancer Father    • Diabetes Father    • Other Brother 46        MI   • Heart disease Brother    • Breast cancer Paternal Aunt    • Colon cancer Maternal Grandfather    • Other Cousin         CLL (dx 48 yo)   • Hypertension Mother        Allergies   Allergen Reactions   • Rituxan [Rituximab] Other (See Comments)     \"THROAT RAWNESS; FELT LIKE MY THROAT WAS CLOSING " "UP\"   • Codeine Other (See Comments)     \"UNKNOW  CHILDHOOD REACTION\"   • Penicillins Other (See Comments)     \"UNKNOWN CHILDHOOD ALLERGY\"       Current Outpatient Medications   Medication Sig Dispense Refill   • albuterol (PROVENTIL HFA;VENTOLIN HFA) 108 (90 Base) MCG/ACT inhaler Inhale 2 puffs As Needed for Wheezing.     • ALPRAZolam (XANAX) 0.5 MG tablet Take 0.5 mg by mouth 3 (Three) Times a Day As Needed for Anxiety.     • aspirin 81 MG EC tablet Take 1 tablet by mouth Daily. 30 tablet 5   • clindamycin (CLEOCIN) 300 MG capsule Take 1 capsule by mouth 3 (Three) Times a Day. 30 capsule 0   • CloNIDine (CATAPRES) 0.1 MG tablet 0.1 mg As Needed.     • clopidogrel (PLAVIX) 75 MG tablet Take 1 tablet by mouth Daily. 30 tablet 5   • Continuous Blood Gluc  (Dexcom G6 ) device 1 kit Every 3 (Three) Months. 1 Device 0   • Continuous Blood Gluc Sensor (Dexcom G6 Sensor) Every 10 (Ten) Days. 3 each 11   • Continuous Blood Gluc Transmit (Dexcom G6 Transmitter) misc 1 kit Every 3 (Three) Months. 1 each 3   • gabapentin (NEURONTIN) 800 MG tablet Take 800 mg by mouth 4 (Four) Times a Day.     • ibrutinib (IMBRUVICA) 420 MG tablet tablet Take 1 tablet by mouth Daily. 28 tablet 5   • Ibuprofen (ADVIL PO) Take 400 mg by mouth 3 (Three) Times a Day. LIQUIGELS     • Insulin Aspart (NOVOLOG FLEXPEN SC) Inject 8-12 Units under the skin 3 (Three) Times a Day Before Meals. PER SLIDING SCALE     • Insulin Detemir (LEVEMIR FLEXPEN SC) Inject 8-12 Units under the skin Every Night. SLIDING SCALE     • Insulin Pen Needle (B-D UF III MINI PEN NEEDLES) 31G X 5 MM misc 4 daily NEEDS AN APPT FOR REFILLS 150 each 0   • lisinopril (PRINIVIL,ZESTRIL) 20 MG tablet Take 20 mg by mouth Every Morning.     • loratadine (CLARITIN) 10 MG tablet Take 10 mg by mouth Every Night.     • metoprolol tartrate (LOPRESSOR) 25 MG tablet 25 mg 2 (Two) Times a Day.     • oxyCODONE-acetaminophen (PERCOCET)  MG per tablet Take 1 tablet by mouth " Every 6 (Six) Hours As Needed for Moderate Pain .     • prochlorperazine (COMPAZINE) 10 MG tablet Take 1 tablet by mouth Every 6 (Six) Hours As Needed for Nausea or Vomiting. 60 tablet 4   • sertraline (ZOLOFT) 100 MG tablet Take 100 mg by mouth Every Night.     • sulfamethoxazole-trimethoprim (BACTRIM DS,SEPTRA DS) 800-160 MG per tablet TAKE 1 TABLET BY MOUTH EVERY DAY 30 tablet 2   • sulfamethoxazole-trimethoprim (BACTRIM,SEPTRA) 400-80 MG tablet Take 1 tablet by mouth Daily. 30 tablet 3   • valACYclovir (VALTREX) 500 MG tablet TAKE 1 TABLET BY MOUTH EVERY DAY 30 tablet 2     No current facility-administered medications for this visit.         ASSESSMENT/PLAN:      Melania is a 56 y.o. female contacted today regarding refills of  one specialty medication(s).    Reviewed and verified with patient:  Allergies  Meds  Problems       Specialty medication(s) and dose(s) confirmed: yes    Refill Questions      Most Recent Value   Changes to allergies? No   Changes to medications? No   New conditions since last clinic visit No   Unplanned office visit, urgent care, ED, or hospital admission in the last 4 weeks  No   How does patient/caregiver feel medication is working? Very good   Financial problems or insurance changes  No   Does this patient require a clinical escalation to a pharmacist? No          Delivery Questions      Most Recent Value   Delivery method FedEx   Delivery address correct? No  [[address], [place] [address]]   Preferred delivery time? Anytime   Number of medications in delivery 1   Medication being filled and delivered Imbruvica   Is there any medication that is due not being filled? No   Supplies needed? No supplies needed   Cooler needed? No   Do any medications need mixed or dated? No   Copay form of payment --  [$0 co-pay]   Questions or concerns for the pharmacist? No   Are any medications first time fills? No            Medication Adherence    Any gaps in refill history greater than 2  weeks in the last 3 months: no  Demonstrates understanding of importance of adherence: yes  Informant: patient  Reliability of informant: reliable  Provider-estimated medication adherence level: %  Reasons for non-adherence: no problems identified  Support network for adherence: family member          Follow-up: 28 day(s)     Mary Arthur, Pharmacy Technician  Specialty Pharmacy Technician

## 2022-01-07 RX ORDER — SULFAMETHOXAZOLE AND TRIMETHOPRIM 800; 160 MG/1; MG/1
TABLET ORAL
Qty: 30 TABLET | Refills: 0 | Status: SHIPPED | OUTPATIENT
Start: 2022-01-07

## 2022-01-07 RX ORDER — VALACYCLOVIR HYDROCHLORIDE 500 MG/1
TABLET, FILM COATED ORAL
Qty: 30 TABLET | Refills: 0 | Status: SHIPPED | OUTPATIENT
Start: 2022-01-07

## 2022-02-10 ENCOUNTER — TELEPHONE (OUTPATIENT)
Dept: NEUROSURGERY | Facility: CLINIC | Age: 57
End: 2022-02-10

## 2022-02-10 NOTE — TELEPHONE ENCOUNTER
Caller: KALI     Relationship: SELF     Best call back number: [phone]    What is the best time to reach you:     Who are you requesting to speak with (clinical staff, provider,  specific staff member):       Do you know the name of the person who called:     What was the call regarding:     PT IS HAVING PAINS IN CHEST AND LEFT ARM. PT HAS BEEN TAKING THE NITROL WHICH HELP FOR A SHORT PERIOD OF TIME. PT DEALING SWELLING IN  LEGS AND FEET. PT BACK HAS FLARE UPS AT TIME. PT PAIN LEVEL 6/10. PT GOING TO PAIN MANAGEMENT WITH ANT RUTH.    PT NEVER HAD APPT TO DEAL WITH BLOCKAGE IN LEFT LEG.      PLEASE CALL AND ADVISE ON NEXT STEP       THANK YOU

## 2022-02-10 NOTE — TELEPHONE ENCOUNTER
2/10/2022 3:29 PM    I have called to telephone numbers for the patient and the number for her significant other without any answers, voicemail was full, unable to leave a call back request.    If someone gets in touch with the patient or the patient calls back if she is having chest pain, left arm pain and has taken nitroglycerin and still having pain she needs to go to the emergency room.    Pamela Ortega PA-C

## 2022-02-10 NOTE — TELEPHONE ENCOUNTER
I called patient's home number, mobile number and patient's daughter's number, as well. No answer. No way to LVM due to mailboxes being full.

## 2022-02-10 NOTE — TELEPHONE ENCOUNTER
Provider:  Given  Surgery:  NA  Surgery Date:    Last visit:   Office Visit with Melania Feliciano PA-C (08/30/2021)    Next visit: NA       Reason for call:    Pt c/o chest pain, LUE pain, bilateral leg and foot swelling.      Pt denies ever having blockage in LLE corrected.     Pt taking Nitro for chest pain - helps little.      Pt would like to discuss treatment plan.

## 2022-02-10 NOTE — TELEPHONE ENCOUNTER
Attempted to contact pt multiple times as well as the daughter Gianna Frank ([phone]) and the EC in the chart.  No answer. All voicemails are full.

## 2022-02-11 ENCOUNTER — PATIENT MESSAGE (OUTPATIENT)
Dept: NEUROSURGERY | Facility: CLINIC | Age: 57
End: 2022-02-11

## 2022-02-11 NOTE — TELEPHONE ENCOUNTER
2/11/22    Her daughter got sick and she passed away in November from colon cancer. The patient called our office, she has been having CP/ left arm pain and taking NRG. I have encouraged her to call her COR on Monday, she will then call me and we will schedule her new studies in [place] and see her on the same day.    Pamela Ortega PA-C

## 2022-02-16 NOTE — TELEPHONE ENCOUNTER
S/w pt. She states she has not had chest pain for 3-4 days and is doing ok right now.  She s/w her cardiologist and scheduled an appt for March 2nd.  She is scheduled to see her PCP this coming Monday.  She will call our office after she sees her PCP with an update.

## 2022-04-25 ENCOUNTER — TRANSCRIBE ORDERS (OUTPATIENT)
Dept: ADMINISTRATIVE | Facility: HOSPITAL | Age: 57
End: 2022-04-25

## 2022-04-25 DIAGNOSIS — C91.10 LEUKEMIA, LYMPHOCYTIC, CHRONIC: Primary | ICD-10-CM

## 2022-05-02 ENCOUNTER — APPOINTMENT (OUTPATIENT)
Dept: CT IMAGING | Facility: HOSPITAL | Age: 57
End: 2022-05-02

## 2022-05-25 ENCOUNTER — APPOINTMENT (OUTPATIENT)
Dept: CT IMAGING | Facility: HOSPITAL | Age: 57
End: 2022-05-25

## 2022-06-22 ENCOUNTER — HOSPITAL ENCOUNTER (OUTPATIENT)
Dept: CT IMAGING | Facility: HOSPITAL | Age: 57
Discharge: HOME | End: 2022-06-22

## 2022-06-22 ENCOUNTER — TRANSCRIBE ORDERS (OUTPATIENT)
Dept: ADMINISTRATIVE | Facility: HOSPITAL | Age: 57
End: 2022-06-22

## 2022-06-22 ENCOUNTER — LAB (OUTPATIENT)
Dept: LAB | Facility: HOSPITAL | Age: 57
End: 2022-06-22

## 2022-06-22 DIAGNOSIS — C91.10 LEUKEMIA, LYMPHOCYTIC, CHRONIC: ICD-10-CM

## 2022-06-22 DIAGNOSIS — C91.10 LEUKEMIA, LYMPHOCYTIC, CHRONIC: Primary | ICD-10-CM

## 2022-06-22 LAB — CREAT BLDA-MCNC: 0.8 MG/DL (ref 0.6–1.3)

## 2022-06-22 PROCEDURE — 70491 CT SOFT TISSUE NECK W/DYE: CPT | Performed by: RADIOLOGY

## 2022-06-22 PROCEDURE — 85025 COMPLETE CBC W/AUTO DIFF WBC: CPT

## 2022-06-22 PROCEDURE — 71260 CT THORAX DX C+: CPT

## 2022-06-22 PROCEDURE — 82565 ASSAY OF CREATININE: CPT

## 2022-06-22 PROCEDURE — 36415 COLL VENOUS BLD VENIPUNCTURE: CPT

## 2022-06-22 PROCEDURE — 71260 CT THORAX DX C+: CPT | Performed by: RADIOLOGY

## 2022-06-22 PROCEDURE — 74177 CT ABD & PELVIS W/CONTRAST: CPT | Performed by: RADIOLOGY

## 2022-06-22 PROCEDURE — 83615 LACTATE (LD) (LDH) ENZYME: CPT

## 2022-06-22 PROCEDURE — 70491 CT SOFT TISSUE NECK W/DYE: CPT

## 2022-06-22 PROCEDURE — 25010000002 IOPAMIDOL 61 % SOLUTION: Performed by: INTERNAL MEDICINE

## 2022-06-22 PROCEDURE — 80053 COMPREHEN METABOLIC PANEL: CPT

## 2022-06-22 PROCEDURE — 74177 CT ABD & PELVIS W/CONTRAST: CPT

## 2022-06-22 RX ADMIN — IOPAMIDOL 87 ML: 612 INJECTION, SOLUTION INTRAVENOUS at 14:57

## 2022-06-23 LAB
ALBUMIN SERPL-MCNC: 3.6 G/DL (ref 3.5–5.2)
ALBUMIN/GLOB SERPL: 2 G/DL
ALP SERPL-CCNC: 66 U/L (ref 39–117)
ALT SERPL W P-5'-P-CCNC: 10 U/L (ref 1–33)
ANION GAP SERPL CALCULATED.3IONS-SCNC: 7.9 MMOL/L (ref 5–15)
AST SERPL-CCNC: 10 U/L (ref 1–32)
BASOPHILS # BLD AUTO: 0.01 10*3/MM3 (ref 0–0.2)
BASOPHILS NFR BLD AUTO: 0.2 % (ref 0–1.5)
BILIRUB SERPL-MCNC: <0.2 MG/DL (ref 0–1.2)
BUN SERPL-MCNC: 8 MG/DL (ref 6–20)
BUN/CREAT SERPL: 10.4 (ref 7–25)
CALCIUM SPEC-SCNC: 8.5 MG/DL (ref 8.6–10.5)
CHLORIDE SERPL-SCNC: 96 MMOL/L (ref 98–107)
CO2 SERPL-SCNC: 20.1 MMOL/L (ref 22–29)
CREAT SERPL-MCNC: 0.77 MG/DL (ref 0.57–1)
DEPRECATED RDW RBC AUTO: 49.5 FL (ref 37–54)
EGFRCR SERPLBLD CKD-EPI 2021: 89.5 ML/MIN/1.73
EOSINOPHIL # BLD AUTO: 0.05 10*3/MM3 (ref 0–0.4)
EOSINOPHIL NFR BLD AUTO: 0.8 % (ref 0.3–6.2)
ERYTHROCYTE [DISTWIDTH] IN BLOOD BY AUTOMATED COUNT: 14.5 % (ref 12.3–15.4)
GLOBULIN UR ELPH-MCNC: 1.8 GM/DL
GLUCOSE SERPL-MCNC: 65 MG/DL (ref 65–99)
HCT VFR BLD AUTO: 33 % (ref 34–46.6)
HGB BLD-MCNC: 11.9 G/DL (ref 12–15.9)
IMM GRANULOCYTES # BLD AUTO: 0.02 10*3/MM3 (ref 0–0.05)
IMM GRANULOCYTES NFR BLD AUTO: 0.3 % (ref 0–0.5)
LDH SERPL-CCNC: 146 U/L (ref 135–214)
LYMPHOCYTES # BLD AUTO: 1.12 10*3/MM3 (ref 0.7–3.1)
LYMPHOCYTES NFR BLD AUTO: 17.2 % (ref 19.6–45.3)
MCH RBC QN AUTO: 34.4 PG (ref 26.6–33)
MCHC RBC AUTO-ENTMCNC: 36.1 G/DL (ref 31.5–35.7)
MCV RBC AUTO: 95.4 FL (ref 79–97)
MONOCYTES # BLD AUTO: 0.24 10*3/MM3 (ref 0.1–0.9)
MONOCYTES NFR BLD AUTO: 3.7 % (ref 5–12)
NEUTROPHILS NFR BLD AUTO: 5.08 10*3/MM3 (ref 1.7–7)
NEUTROPHILS NFR BLD AUTO: 77.8 % (ref 42.7–76)
NRBC BLD AUTO-RTO: 0 /100 WBC (ref 0–0.2)
PLATELET # BLD AUTO: 189 10*3/MM3 (ref 140–450)
PMV BLD AUTO: 9.3 FL (ref 6–12)
POTASSIUM SERPL-SCNC: 5.4 MMOL/L (ref 3.5–5.2)
PROT SERPL-MCNC: 5.4 G/DL (ref 6–8.5)
RBC # BLD AUTO: 3.46 10*6/MM3 (ref 3.77–5.28)
SODIUM SERPL-SCNC: 124 MMOL/L (ref 136–145)
WBC NRBC COR # BLD: 6.52 10*3/MM3 (ref 3.4–10.8)

## 2022-09-27 ENCOUNTER — TRANSCRIBE ORDERS (OUTPATIENT)
Dept: ADMINISTRATIVE | Facility: HOSPITAL | Age: 57
End: 2022-09-27

## 2022-09-27 DIAGNOSIS — C91.10 LEUKEMIA, LYMPHOCYTIC, CHRONIC: Primary | ICD-10-CM

## 2022-11-02 ENCOUNTER — HOSPITAL ENCOUNTER (OUTPATIENT)
Dept: CT IMAGING | Facility: HOSPITAL | Age: 57
Discharge: HOME | End: 2022-11-02

## 2022-11-02 DIAGNOSIS — C91.10 LEUKEMIA, LYMPHOCYTIC, CHRONIC: ICD-10-CM

## 2022-11-02 LAB — CREAT BLDA-MCNC: 0.7 MG/DL (ref 0.6–1.3)

## 2022-11-02 PROCEDURE — 82565 ASSAY OF CREATININE: CPT

## 2022-11-02 PROCEDURE — 71260 CT THORAX DX C+: CPT

## 2022-11-02 PROCEDURE — 74177 CT ABD & PELVIS W/CONTRAST: CPT

## 2022-11-02 PROCEDURE — 25010000002 IOPAMIDOL 61 % SOLUTION: Performed by: INTERNAL MEDICINE

## 2022-11-02 PROCEDURE — 70491 CT SOFT TISSUE NECK W/DYE: CPT | Performed by: RADIOLOGY

## 2022-11-02 PROCEDURE — 74177 CT ABD & PELVIS W/CONTRAST: CPT | Performed by: RADIOLOGY

## 2022-11-02 PROCEDURE — 71260 CT THORAX DX C+: CPT | Performed by: RADIOLOGY

## 2022-11-02 PROCEDURE — 70491 CT SOFT TISSUE NECK W/DYE: CPT

## 2022-11-02 RX ADMIN — IOPAMIDOL 90 ML: 612 INJECTION, SOLUTION INTRAVENOUS at 09:56

## 2022-11-16 ENCOUNTER — TRANSCRIBE ORDERS (OUTPATIENT)
Dept: ADMINISTRATIVE | Facility: HOSPITAL | Age: 57
End: 2022-11-16

## 2022-11-16 DIAGNOSIS — C91.10 LEUKEMIA, LYMPHOCYTIC, CHRONIC: Primary | ICD-10-CM

## 2022-11-16 DIAGNOSIS — Z79.899 ENCOUNTER FOR LONG-TERM (CURRENT) USE OF OTHER MEDICATIONS: ICD-10-CM

## 2023-03-16 ENCOUNTER — HOSPITAL ENCOUNTER (OUTPATIENT)
Dept: CT IMAGING | Facility: HOSPITAL | Age: 58
Discharge: HOME | End: 2023-03-16
Payer: MEDICARE

## 2023-03-16 ENCOUNTER — LAB (OUTPATIENT)
Dept: LAB | Facility: HOSPITAL | Age: 58
End: 2023-03-16
Payer: MEDICARE

## 2023-03-16 ENCOUNTER — TRANSCRIBE ORDERS (OUTPATIENT)
Dept: ADMINISTRATIVE | Facility: HOSPITAL | Age: 58
End: 2023-03-16
Payer: MEDICARE

## 2023-03-16 DIAGNOSIS — C91.10 LEUKEMIA, LYMPHOCYTIC, CHRONIC: ICD-10-CM

## 2023-03-16 DIAGNOSIS — Z79.899 ENCOUNTER FOR LONG-TERM (CURRENT) USE OF OTHER MEDICATIONS: ICD-10-CM

## 2023-03-16 DIAGNOSIS — C91.10 LEUKEMIA, LYMPHOCYTIC, CHRONIC: Primary | ICD-10-CM

## 2023-03-16 LAB — CREAT BLDA-MCNC: 0.8 MG/DL (ref 0.6–1.3)

## 2023-03-16 PROCEDURE — 70491 CT SOFT TISSUE NECK W/DYE: CPT | Performed by: RADIOLOGY

## 2023-03-16 PROCEDURE — 36415 COLL VENOUS BLD VENIPUNCTURE: CPT

## 2023-03-16 PROCEDURE — 70491 CT SOFT TISSUE NECK W/DYE: CPT

## 2023-03-16 PROCEDURE — 74177 CT ABD & PELVIS W/CONTRAST: CPT

## 2023-03-16 PROCEDURE — 82565 ASSAY OF CREATININE: CPT

## 2023-03-16 PROCEDURE — 83615 LACTATE (LD) (LDH) ENZYME: CPT

## 2023-03-16 PROCEDURE — 71260 CT THORAX DX C+: CPT | Performed by: RADIOLOGY

## 2023-03-16 PROCEDURE — 25510000001 IOPAMIDOL 61 % SOLUTION: Performed by: INTERNAL MEDICINE

## 2023-03-16 PROCEDURE — 80053 COMPREHEN METABOLIC PANEL: CPT

## 2023-03-16 PROCEDURE — 74177 CT ABD & PELVIS W/CONTRAST: CPT | Performed by: RADIOLOGY

## 2023-03-16 PROCEDURE — 85025 COMPLETE CBC W/AUTO DIFF WBC: CPT

## 2023-03-16 PROCEDURE — 71260 CT THORAX DX C+: CPT

## 2023-03-16 RX ADMIN — IOPAMIDOL 80 ML: 612 INJECTION, SOLUTION INTRAVENOUS at 14:32

## 2023-03-17 LAB
ALBUMIN SERPL-MCNC: 3.8 G/DL (ref 3.5–5.2)
ALBUMIN/GLOB SERPL: 1.7 G/DL
ALP SERPL-CCNC: 67 U/L (ref 39–117)
ALT SERPL W P-5'-P-CCNC: 15 U/L (ref 1–33)
ANION GAP SERPL CALCULATED.3IONS-SCNC: 11.3 MMOL/L (ref 5–15)
AST SERPL-CCNC: 7 U/L (ref 1–32)
BASOPHILS # BLD AUTO: 0.03 10*3/MM3 (ref 0–0.2)
BASOPHILS NFR BLD AUTO: 0.4 % (ref 0–1.5)
BILIRUB SERPL-MCNC: <0.2 MG/DL (ref 0–1.2)
BUN SERPL-MCNC: 11 MG/DL (ref 6–20)
BUN/CREAT SERPL: 13.8 (ref 7–25)
CALCIUM SPEC-SCNC: 9.1 MG/DL (ref 8.6–10.5)
CHLORIDE SERPL-SCNC: 99 MMOL/L (ref 98–107)
CO2 SERPL-SCNC: 19.7 MMOL/L (ref 22–29)
CREAT SERPL-MCNC: 0.8 MG/DL (ref 0.57–1)
DEPRECATED RDW RBC AUTO: 48.1 FL (ref 37–54)
EGFRCR SERPLBLD CKD-EPI 2021: 85 ML/MIN/1.73
EOSINOPHIL # BLD AUTO: 0.08 10*3/MM3 (ref 0–0.4)
EOSINOPHIL NFR BLD AUTO: 1.1 % (ref 0.3–6.2)
ERYTHROCYTE [DISTWIDTH] IN BLOOD BY AUTOMATED COUNT: 13.1 % (ref 12.3–15.4)
GLOBULIN UR ELPH-MCNC: 2.2 GM/DL
GLUCOSE SERPL-MCNC: 128 MG/DL (ref 65–99)
HCT VFR BLD AUTO: 36.6 % (ref 34–46.6)
HGB BLD-MCNC: 12.5 G/DL (ref 12–15.9)
IMM GRANULOCYTES # BLD AUTO: 0.04 10*3/MM3 (ref 0–0.05)
IMM GRANULOCYTES NFR BLD AUTO: 0.6 % (ref 0–0.5)
LDH SERPL-CCNC: 191 U/L (ref 135–214)
LYMPHOCYTES # BLD AUTO: 1.46 10*3/MM3 (ref 0.7–3.1)
LYMPHOCYTES NFR BLD AUTO: 20.8 % (ref 19.6–45.3)
MCH RBC QN AUTO: 34.2 PG (ref 26.6–33)
MCHC RBC AUTO-ENTMCNC: 34.2 G/DL (ref 31.5–35.7)
MCV RBC AUTO: 100.3 FL (ref 79–97)
MONOCYTES # BLD AUTO: 0.4 10*3/MM3 (ref 0.1–0.9)
MONOCYTES NFR BLD AUTO: 5.7 % (ref 5–12)
NEUTROPHILS NFR BLD AUTO: 5 10*3/MM3 (ref 1.7–7)
NEUTROPHILS NFR BLD AUTO: 71.4 % (ref 42.7–76)
NRBC BLD AUTO-RTO: 0 /100 WBC (ref 0–0.2)
PLATELET # BLD AUTO: 223 10*3/MM3 (ref 140–450)
PMV BLD AUTO: 11.1 FL (ref 6–12)
POTASSIUM SERPL-SCNC: 4.6 MMOL/L (ref 3.5–5.2)
PROT SERPL-MCNC: 6 G/DL (ref 6–8.5)
RBC # BLD AUTO: 3.65 10*6/MM3 (ref 3.77–5.28)
SODIUM SERPL-SCNC: 130 MMOL/L (ref 136–145)
WBC NRBC COR # BLD: 7.01 10*3/MM3 (ref 3.4–10.8)

## 2023-04-10 ENCOUNTER — TRANSCRIBE ORDERS (OUTPATIENT)
Dept: ADMINISTRATIVE | Facility: HOSPITAL | Age: 58
End: 2023-04-10
Payer: MEDICARE

## 2023-04-10 DIAGNOSIS — C91.10 CHRONIC LYMPHOGENOUS LEUKEMIA: Primary | ICD-10-CM

## 2023-06-16 ENCOUNTER — TELEPHONE (OUTPATIENT)
Dept: NEUROSURGERY | Facility: CLINIC | Age: 58
End: 2023-06-16
Payer: MEDICARE

## 2023-06-16 DIAGNOSIS — I73.9 PERIPHERAL VASCULAR DISEASE OF EXTREMITY WITH CLAUDICATION: Primary | ICD-10-CM

## 2023-06-16 DIAGNOSIS — I67.1 CEREBRAL ANEURYSM, NONRUPTURED: ICD-10-CM

## 2023-06-16 DIAGNOSIS — I65.22 CAROTID STENOSIS, ASYMPTOMATIC, LEFT: ICD-10-CM

## 2023-06-16 NOTE — TELEPHONE ENCOUNTER
Caller: Melania Mae    Relationship to patient: Self    Best call back number: [phone]     Patient is needing:  PATIENT CALLED AND WOULD LIKE TO SCHEDULE A FOLLOW UP IN THE OFFICE WITH DR DELACRUZ, WOULD LIKE TO KNOW IF CAROTID BILATERAL DOPPLER AND CT OF HEAD AND NECK ARE NEEDED PRIOR TO OFFICE VISIT.      PLEASE CALL TO ADVISE

## 2023-06-19 DIAGNOSIS — I67.858 OTHER HEREDITARY CEREBROVASCULAR DISEASE: ICD-10-CM

## 2023-06-19 DIAGNOSIS — I67.1 CEREBRAL ANEURYSM, NONRUPTURED: ICD-10-CM

## 2023-06-19 DIAGNOSIS — I65.22 CAROTID STENOSIS, ASYMPTOMATIC, LEFT: Primary | ICD-10-CM

## 2023-06-19 DIAGNOSIS — I65.21 CAROTID STENOSIS, RIGHT: ICD-10-CM

## 2023-06-19 DIAGNOSIS — I73.9 PERIPHERAL VASCULAR DISEASE OF EXTREMITY WITH CLAUDICATION: Primary | ICD-10-CM

## 2023-06-19 DIAGNOSIS — I73.9 PERIPHERAL VASCULAR DISEASE OF EXTREMITY WITH CLAUDICATION: ICD-10-CM

## 2023-06-19 RX ORDER — CLOPIDOGREL BISULFATE 75 MG/1
75 TABLET ORAL DAILY
Qty: 30 TABLET | Refills: 5 | Status: SHIPPED | OUTPATIENT
Start: 2023-06-19

## 2023-06-19 NOTE — TELEPHONE ENCOUNTER
Provider:  Given  Surgery/Procedure:  Duplex Carotid US CAR  Surgery/Procedure Date:  8-  Last visit:   8-  Next visit: TBD     Reason for call:  Patient is wanting to schedule a follow up appointment to come in and wants to know what  scans she might need. CIRILO Feliciano  put in Cath Lab 48 9-1-2021 that was never done. I have pended the same if approved.      Please Advise. Thank you.

## 2023-08-02 ENCOUNTER — HOSPITAL ENCOUNTER (OUTPATIENT)
Facility: HOSPITAL | Age: 58
Setting detail: HOSPITAL OUTPATIENT SURGERY
Discharge: HOME | End: 2023-08-02
Attending: RADIOLOGY | Admitting: RADIOLOGY
Payer: MEDICARE

## 2023-08-02 VITALS
WEIGHT: 143.6 LBS | RESPIRATION RATE: 20 BRPM | TEMPERATURE: 97.6 F | HEART RATE: 65 BPM | DIASTOLIC BLOOD PRESSURE: 69 MMHG | OXYGEN SATURATION: 97 % | BODY MASS INDEX: 23.93 KG/M2 | SYSTOLIC BLOOD PRESSURE: 171 MMHG | HEIGHT: 65 IN

## 2023-08-02 DIAGNOSIS — I65.22 CAROTID STENOSIS, ASYMPTOMATIC, LEFT: Primary | ICD-10-CM

## 2023-08-02 DIAGNOSIS — I67.1 CEREBRAL ANEURYSM, NONRUPTURED: ICD-10-CM

## 2023-08-02 DIAGNOSIS — I67.858 OTHER HEREDITARY CEREBROVASCULAR DISEASE: ICD-10-CM

## 2023-08-02 DIAGNOSIS — I65.21 CAROTID STENOSIS, RIGHT: ICD-10-CM

## 2023-08-02 DIAGNOSIS — I73.9 PERIPHERAL VASCULAR DISEASE OF EXTREMITY WITH CLAUDICATION: ICD-10-CM

## 2023-08-02 LAB
ANION GAP SERPL CALCULATED.3IONS-SCNC: 11 MMOL/L (ref 5–15)
BUN SERPL-MCNC: 9 MG/DL (ref 6–20)
BUN/CREAT SERPL: 13.6 (ref 7–25)
CALCIUM SPEC-SCNC: 8.9 MG/DL (ref 8.6–10.5)
CHLORIDE SERPL-SCNC: 98 MMOL/L (ref 98–107)
CO2 SERPL-SCNC: 20 MMOL/L (ref 22–29)
CREAT BLDA-MCNC: 0.7 MG/DL (ref 0.6–1.3)
CREAT SERPL-MCNC: 0.66 MG/DL (ref 0.57–1)
DEPRECATED RDW RBC AUTO: 48.6 FL (ref 37–54)
EGFRCR SERPLBLD CKD-EPI 2021: 101.2 ML/MIN/1.73
ERYTHROCYTE [DISTWIDTH] IN BLOOD BY AUTOMATED COUNT: 13.3 % (ref 12.3–15.4)
GLUCOSE SERPL-MCNC: 152 MG/DL (ref 65–99)
HCT VFR BLD AUTO: 38 % (ref 34–46.6)
HGB BLD-MCNC: 13.1 G/DL (ref 12–15.9)
MCH RBC QN AUTO: 34.3 PG (ref 26.6–33)
MCHC RBC AUTO-ENTMCNC: 34.5 G/DL (ref 31.5–35.7)
MCV RBC AUTO: 99.5 FL (ref 79–97)
PLATELET # BLD AUTO: 216 10*3/MM3 (ref 140–450)
PMV BLD AUTO: 9.9 FL (ref 6–12)
POTASSIUM SERPL-SCNC: 4.5 MMOL/L (ref 3.5–5.2)
RBC # BLD AUTO: 3.82 10*6/MM3 (ref 3.77–5.28)
SODIUM SERPL-SCNC: 129 MMOL/L (ref 136–145)
WBC NRBC COR # BLD: 7.83 10*3/MM3 (ref 3.4–10.8)

## 2023-08-02 PROCEDURE — 25010000002 MIDAZOLAM PER 1 MG: Performed by: RADIOLOGY

## 2023-08-02 PROCEDURE — 36223 PLACE CATH CAROTID/INOM ART: CPT | Performed by: RADIOLOGY

## 2023-08-02 PROCEDURE — 80048 BASIC METABOLIC PNL TOTAL CA: CPT | Performed by: RADIOLOGY

## 2023-08-02 PROCEDURE — 25010000002 HEPARIN (PORCINE) PER 1000 UNITS: Performed by: RADIOLOGY

## 2023-08-02 PROCEDURE — 25010000002 NITROGLYCERIN 100-5 MCG/ML-% SOLUTION: Performed by: RADIOLOGY

## 2023-08-02 PROCEDURE — 75630 X-RAY AORTA LEG ARTERIES: CPT | Performed by: RADIOLOGY

## 2023-08-02 PROCEDURE — 76937 US GUIDE VASCULAR ACCESS: CPT | Performed by: RADIOLOGY

## 2023-08-02 PROCEDURE — 99153 MOD SED SAME PHYS/QHP EA: CPT | Performed by: RADIOLOGY

## 2023-08-02 PROCEDURE — 85027 COMPLETE CBC AUTOMATED: CPT | Performed by: RADIOLOGY

## 2023-08-02 PROCEDURE — 82565 ASSAY OF CREATININE: CPT

## 2023-08-02 PROCEDURE — C1769 GUIDE WIRE: HCPCS

## 2023-08-02 PROCEDURE — 0 IODIXANOL PER 1 ML: Performed by: RADIOLOGY

## 2023-08-02 PROCEDURE — C1887 CATHETER, GUIDING: HCPCS | Performed by: RADIOLOGY

## 2023-08-02 PROCEDURE — S0260 H&P FOR SURGERY: HCPCS

## 2023-08-02 PROCEDURE — 25010000002 FENTANYL CITRATE (PF) 50 MCG/ML SOLUTION: Performed by: RADIOLOGY

## 2023-08-02 PROCEDURE — 36226 PLACE CATH VERTEBRAL ART: CPT | Performed by: RADIOLOGY

## 2023-08-02 PROCEDURE — C1769 GUIDE WIRE: HCPCS | Performed by: RADIOLOGY

## 2023-08-02 PROCEDURE — 99152 MOD SED SAME PHYS/QHP 5/>YRS: CPT | Performed by: RADIOLOGY

## 2023-08-02 RX ORDER — FENTANYL CITRATE 50 UG/ML
INJECTION, SOLUTION INTRAMUSCULAR; INTRAVENOUS
Status: DISCONTINUED | OUTPATIENT
Start: 2023-08-02 | End: 2023-08-02 | Stop reason: HOSPADM

## 2023-08-02 RX ORDER — NICARDIPINE HCL-0.9% SOD CHLOR 1 MG/10 ML
SYRINGE (ML) INTRAVENOUS
Status: DISCONTINUED | OUTPATIENT
Start: 2023-08-02 | End: 2023-08-02 | Stop reason: HOSPADM

## 2023-08-02 RX ORDER — MIDAZOLAM HYDROCHLORIDE 1 MG/ML
INJECTION INTRAMUSCULAR; INTRAVENOUS
Status: DISCONTINUED | OUTPATIENT
Start: 2023-08-02 | End: 2023-08-02 | Stop reason: HOSPADM

## 2023-08-02 RX ORDER — SODIUM CHLORIDE 9 MG/ML
75 INJECTION, SOLUTION INTRAVENOUS CONTINUOUS
Status: ACTIVE | OUTPATIENT
Start: 2023-08-02 | End: 2023-08-02

## 2023-08-02 RX ORDER — NITROGLYCERIN 0.4 MG/1
0.4 TABLET SUBLINGUAL
Status: DISCONTINUED | OUTPATIENT
Start: 2023-08-02 | End: 2023-08-02 | Stop reason: HOSPADM

## 2023-08-02 RX ORDER — HEPARIN SODIUM 1000 [USP'U]/ML
INJECTION, SOLUTION INTRAVENOUS; SUBCUTANEOUS
Status: DISCONTINUED | OUTPATIENT
Start: 2023-08-02 | End: 2023-08-02 | Stop reason: HOSPADM

## 2023-08-02 RX ORDER — IODIXANOL 320 MG/ML
INJECTION, SOLUTION INTRAVASCULAR
Status: DISCONTINUED | OUTPATIENT
Start: 2023-08-02 | End: 2023-08-02 | Stop reason: HOSPADM

## 2023-08-02 RX ORDER — NICOTINE 21 MG/24HR
1 PATCH, TRANSDERMAL 24 HOURS TRANSDERMAL EVERY 24 HOURS
Status: DISCONTINUED | OUTPATIENT
Start: 2023-08-02 | End: 2023-08-02 | Stop reason: HOSPADM

## 2023-08-02 RX ORDER — LIDOCAINE HYDROCHLORIDE 10 MG/ML
INJECTION, SOLUTION EPIDURAL; INFILTRATION; INTRACAUDAL; PERINEURAL
Status: DISCONTINUED | OUTPATIENT
Start: 2023-08-02 | End: 2023-08-02 | Stop reason: HOSPADM

## 2023-08-02 NOTE — H&P
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  She underwent angioplasty/stent placement in 2017 for asymptomatic high-grade right carotid stenosis she is a current everyday smoker.  She does have contralateral left carotid disease which has been managed medically with aspirin and Plavix and was followed by serial carotid duplex examinations.  Her last office visit was in , her carotid duplex at that time was limited and repeat evaluation of her CTA at the time does continue to show concern for severe stenosis of the left ICA.  She was scheduled to have a diagnostic cerebral angiogram performed as well as a lower extremity runoff for evaluation of her peripheral artery disease/claudication-like symptoms with noted elevated velocities on her prior duplex.  Unfortunately, due to several deaths in the patient's family she was lost to follow-up and never proceeded with these diagnostic procedures.  She has called our office several times in  complaining of what sounds to be like angina type symptoms with accompanying chest tightness and shortness of breath she is instructed to take nitroglycerin and aspirin and report to the emergency department if persistent, he is also instructed follow-up with her cardiologist.  Saw the patient the bedside this morning, she is alert oriented x3 able to converse me appropriately.  She denies any overt TIA or strokelike symptoms but does complain of occasional \"dizziness\", she is complains " of shortness of breath, chest tightness, occasional chest pains, and pain in her left arm pain/chest in bilateral lower extremities especially when walking or standing long periods of time.  She denies any blurry vision and she has been taking her Plavix and aspirin as prescribed.  She does continue to smoke.  All risk and benefits of diagnostic procedure were discussed with patient at bedside patient elected to proceed.      Review of Systems   All negative less noted per HPI      Personal History     Past Medical History:   Diagnosis Date    Aneurysm 8/223/17    Cavernous left ICA aneurysm    Asthma     Carotid artery occlusion     Chronic back pain     CLL (chronic lymphocytic leukemia)     OCTOBER OF 2012    COPD (chronic obstructive pulmonary disease)     Degenerative arthritis     Depression     Diabetes mellitus     type 2    Hyperlipidemia     Hypertension     Lymphadenopathy     Non Hodgkin's lymphoma        Past Surgical History:   Procedure Laterality Date    APPENDECTOMY      BACK SURGERY      2002 (work accident) c-spine surgery 4/14    CAROTID STENT Right 08/23/2017    CEREBRAL ANGIOGRAM N/A 8/23/2017    Diagnostic Carotid/Cerebral Angiogram    CHOLECYSTECTOMY      HYSTERECTOMY      for endometriosis/ovarian bleed    NECK SURGERY      OTHER SURGICAL HISTORY      pac insertion and removal    TONSILLECTOMY         Family History: family history includes Breast cancer in her paternal aunt; Cancer in her father; Colon cancer in her maternal grandfather; Diabetes in her father; Heart disease in her brother and father; Hypertension in her father and mother; Lung cancer (age of onset: 72) in her father; Other in her cousin; Other (age of onset: 46) in her brother. Otherwise pertinent FHx was reviewed and not pertinent to current issue.    Social History:  reports that she has been smoking cigarettes. She has a 17.50 pack-year smoking history. She has never used smokeless tobacco. She reports that she does not  "drink alcohol and does not use drugs.    Home Medications:  ALPRAZolam, Dexcom G6 , Dexcom G6 Sensor, Dexcom G6 Transmitter, Ibuprofen, Insulin Aspart, Insulin Detemir, Insulin Pen Needle, albuterol sulfate HFA, aspirin, cloNIDine, clopidogrel, gabapentin, ibrutinib, lisinopril, loratadine, metoprolol tartrate, oxyCODONE-acetaminophen, prochlorperazine, sertraline, sulfamethoxazole-trimethoprim, and valACYclovir    Allergies:  Allergies   Allergen Reactions    Rituxan [Rituximab] Other (See Comments)     \"THROAT RAWNESS; FELT LIKE MY THROAT WAS CLOSING UP\"    Codeine Other (See Comments)     \"UNKNOW  CHILDHOOD REACTION\"    Penicillins Other (See Comments)     \"UNKNOWN CHILDHOOD ALLERGY\"       Objective    Objective     Vitals:   Temp:  [97.6 øF (36.4 øC)] 97.6 øF (36.4 øC)  Heart Rate:  [66] 66  Resp:  [18] 18  BP: (147-164)/(67-77) 164/77    Physical Exam    Result Review    Result Review:  I have personally reviewed the results from the time of this admission to 8/2/2023 08:13 EDT and agree with these findings:  []  Laboratory list / accordion  []  Microbiology  []  Radiology  []  EKG/Telemetry   []  Cardiology/Vascular   []  Pathology  []  Old records  [x]  Other:  Most notable findings include: Carotid duplex that was performed on 8/26/2021 was reviewed along with the corresponding radiology report.  Comparison was made to patient's carotid duplex that was performed on 1/20/2021.  Study demonstrates peak velocities of the right vasculature as 185/39 cm/second with an ICA/CCA ratio of 1.4 (141/38 cm/s).  Peak velocities of the left vasculature as 275/70 cm/s with an ICA/CCA ratio of 1.7 (285/72 cm/s).   CTA of the head and neck that was performed on 7/19/2021 was reviewed along with the corresponding radiology report.  There is noted narrowing within the left internal carotid artery and right carotid stent.  Also is to be noted that the patient's left cavernous aneurysm is stable when compared to prior " "imaging.     Left arterial duplex scan that was performed on 7/19/2021 was reviewed.  Study demonstrates abnormal study of the right iliofemoral, left iliac femoral and abdominal aorta.  Noted velocities of the right lower arterial examination of 409 cm/sec. and 306 cm/sec. of the left lower arterial.          Assessment & Plan   Assessment / Plan     Active Hospital Problems:  Active Hospital Problems    Diagnosis     Peripheral vascular disease of extremity with claudication     Carotid stenosis, asymptomatic, left     Cerebral aneurysm, nonruptured     Carotid stenosis, right, s/p stent 8/23/17     Cerebrovascular accident (CVA)      Plan: 58-year-old female who is well-known to the neurointerventional service for having right carotid artery angioplasty/stent placement in 2017, contralateral left-sided carotid artery disease as well as tiny petrous segment left ICA aneurysm managed medically with Plavix and aspirin.  Patient is a current everyday smoker, she denies any TIA or strokelike symptoms recently, but does have a history of TIA/CVA, patient does complain of some what sounds to be anginal type symptoms, chest tightness shortness of breath, left arm pain/chest pain, as well as bilateral lower extremity pain known history of vascular claudication type symptoms/peripheral artery disease.  Her left arm pain could reflect some ongoing subclavian steal syndrome, she also complains of some \"dizziness\".  All risk and benefits of the diagnostic procedure discussed the patient at bedside patient like to proceed.  We discussed further treatment plan/follow-up with the patient after the angiography is completed.      DVT prophylaxis:  No DVT prophylaxis order currently exists.    CODE STATUS:         Martín Zapata PA-C  "

## 2023-08-02 NOTE — Clinical Note
The DP pulses are detected w/ doppler bilaterally. The PT pulses are detected w/ doppler bilaterally. The right radial pulse is +2.

## 2023-08-29 ENCOUNTER — HOSPITAL ENCOUNTER (OUTPATIENT)
Dept: CT IMAGING | Facility: HOSPITAL | Age: 58
Discharge: HOME | End: 2023-08-29
Payer: MEDICARE

## 2023-08-29 ENCOUNTER — OFFICE VISIT (OUTPATIENT)
Dept: ENDOCRINOLOGY | Facility: CLINIC | Age: 58
End: 2023-08-29
Payer: MEDICARE

## 2023-08-29 VITALS
BODY MASS INDEX: 24.62 KG/M2 | DIASTOLIC BLOOD PRESSURE: 74 MMHG | SYSTOLIC BLOOD PRESSURE: 138 MMHG | WEIGHT: 144.2 LBS | HEART RATE: 68 BPM | HEIGHT: 64 IN | OXYGEN SATURATION: 97 %

## 2023-08-29 DIAGNOSIS — C91.10 CHRONIC LYMPHOGENOUS LEUKEMIA: ICD-10-CM

## 2023-08-29 DIAGNOSIS — E11.65 TYPE 2 DIABETES MELLITUS WITH HYPERGLYCEMIA, UNSPECIFIED WHETHER LONG TERM INSULIN USE: Primary | ICD-10-CM

## 2023-08-29 LAB
EXPIRATION DATE: NORMAL
GLUCOSE BLDC GLUCOMTR-MCNC: 127 MG/DL (ref 70–130)
HBA1C MFR BLD: 6.4 %
Lab: NORMAL

## 2023-08-29 PROCEDURE — 71260 CT THORAX DX C+: CPT

## 2023-08-29 PROCEDURE — 74177 CT ABD & PELVIS W/CONTRAST: CPT

## 2023-08-29 PROCEDURE — 70491 CT SOFT TISSUE NECK W/DYE: CPT

## 2023-08-29 PROCEDURE — 25510000001 IOPAMIDOL 61 % SOLUTION: Performed by: INTERNAL MEDICINE

## 2023-08-29 RX ORDER — BLOOD-GLUCOSE METER
1 KIT MISCELLANEOUS ONCE
Qty: 1 EACH | Refills: 0 | Status: SHIPPED | OUTPATIENT
Start: 2023-08-29 | End: 2023-08-29

## 2023-08-29 RX ORDER — LANCETS 30 GAUGE
1 EACH MISCELLANEOUS 2 TIMES DAILY
Qty: 60 EACH | Refills: 2 | Status: SHIPPED | OUTPATIENT
Start: 2023-08-29

## 2023-08-29 RX ADMIN — IOPAMIDOL 100 ML: 612 INJECTION, SOLUTION INTRAVENOUS at 13:36

## 2023-08-29 NOTE — ASSESSMENT & PLAN NOTE
-Diabetes is at goal with A1c 6.4.  -Discussed dietary and exercise guidelines with patient.  -Discussed the importance of yearly eye exams.  -Discussed the importance of checking BG's regularly.    -Continue without medication.  -S/S hypoglycemia reviewed with Rule of 15's advised.  -Follow-up in 3 months as she may be starting on medication for her depression.

## 2023-08-29 NOTE — PROGRESS NOTES
On blood thinners and has hard time getting to stop bleeding     Surgery consult next week for blockack in leg     Not taking meds   1-2 times per week   120s whithout medicine

## 2023-08-29 NOTE — PROGRESS NOTES
Chief Complaint   Patient presents with    Diabetes        Referring Provider  No ref. provider found     HPI   Melania Mae is a 58 y.o. female had concerns including Diabetes.\  T2DM.    She was seen by Dr Almaraz in 2020, but has not been seen since then.  Her daughter passed away in 11/2021 and now has a son with diabetes that recently had a toe amputation. She has T2DM.  It was suspected to be T1, but is actually T2. She reports that she used to be uncontrolled and took a large amount of medication.  She is not currently taking any medication for diabetes in the last 2 years.  She rarely check her BG's.  She reports that she does not eat a lot and has been dealing with depression since the death of her daughter. She does feel like she may be having some mild post meal hyperglycemic episodes but has not been checking her BG's.  She is working with psychiatry and a therapist for her depression.     The following portions of the patient's history were reviewed and updated as appropriate: allergies, current medications, past family history, past medical history, past social history, past surgical history, and problem list.      Past Medical History:   Diagnosis Date    Aneurysm 8/223/17    Cavernous left ICA aneurysm    Asthma     Carotid artery occlusion     Chronic back pain     CLL (chronic lymphocytic leukemia)     OCTOBER OF 2012    COPD (chronic obstructive pulmonary disease)     Degenerative arthritis     Depression     Diabetes mellitus     type 2    Hyperlipidemia     Hypertension     Lymphadenopathy     Non Hodgkin's lymphoma      Past Surgical History:   Procedure Laterality Date    APPENDECTOMY      BACK SURGERY      2002 (work accident) c-spine surgery 4/14    CAROTID STENT Right 08/23/2017    CEREBRAL ANGIOGRAM N/A 08/23/2017    Diagnostic Carotid/Cerebral Angiogram    CHOLECYSTECTOMY      HYSTERECTOMY      for endometriosis/ovarian bleed    INTERVENTIONAL RADIOLOGY PROCEDURE N/A 8/2/2023     "Procedure: Abdominal Aortagram with Runoff;  Surgeon: Jeffry Ackerman MD;  Location:  [facility];  Service: Interventional Radiology;  Laterality: N/A;    INTERVENTIONAL RADIOLOGY PROCEDURE Bilateral 8/2/2023    Procedure: Carotid Cerebral Angiogram;  Surgeon: Jeffry Ackerman MD;  Location:  [facility];  Service: Interventional Radiology;  Laterality: Bilateral;    NECK SURGERY      OTHER SURGICAL HISTORY      pac insertion and removal    OTHER SURGICAL HISTORY      CAROTID /CEREBRAL ANGIOGRAM 08/02/2023 PER DR. ACKERMAN    OTHER SURGICAL HISTORY      AA WITH RO 08/02/2023 PER DR. ACKERMAN    TONSILLECTOMY        Family History   Problem Relation Age of Onset    Lung cancer Father 72    Hypertension Father     Heart disease Father     Cancer Father     Diabetes Father     Other Brother 46        MI    Heart disease Brother     Breast cancer Paternal Aunt     Colon cancer Maternal Grandfather     Other Cousin         CLL (dx 48 yo)    Hypertension Mother       Social History     Socioeconomic History    Marital status:    Tobacco Use    Smoking status: Every Day     Packs/day: 0.50     Years: 35.00     Pack years: 17.50     Types: Cigarettes    Smokeless tobacco: Never    Tobacco comments:     0.5-1 PPD   Vaping Use    Vaping Use: Never used   Substance and Sexual Activity    Alcohol use: No    Drug use: No    Sexual activity: Defer      Allergies   Allergen Reactions    Rituxan [Rituximab] Other (See Comments)     \"THROAT RAWNESS; FELT LIKE MY THROAT WAS CLOSING UP\"    Codeine Other (See Comments)     \"UNKNOW  CHILDHOOD REACTION\"    Penicillins Other (See Comments)     \"UNKNOWN CHILDHOOD ALLERGY\"      Current Outpatient Medications on File Prior to Visit   Medication Sig Dispense Refill    albuterol (PROVENTIL HFA;VENTOLIN HFA) 108 (90 Base) MCG/ACT inhaler Inhale 2 puffs As Needed for Wheezing.      ALPRAZolam (XANAX) 0.5 MG tablet Take 1 tablet by mouth 3 (Three) Times a Day " As Needed for Anxiety.      aspirin 81 MG EC tablet Take 1 tablet by mouth Daily. 30 tablet 5    clopidogrel (PLAVIX) 75 MG tablet Take 1 tablet by mouth Daily. 30 tablet 5    gabapentin (NEURONTIN) 800 MG tablet Take 1 tablet by mouth 4 (Four) Times a Day.      Ibuprofen (ADVIL PO) Take 400 mg by mouth 3 (Three) Times a Day. LIQUIGELS      Insulin Pen Needle (B-D UF III MINI PEN NEEDLES) 31G X 5 MM misc 4 daily NEEDS AN APPT FOR REFILLS 150 each 0    lisinopril (PRINIVIL,ZESTRIL) 20 MG tablet Take 0.5 tablets by mouth Every Morning.      oxyCODONE-acetaminophen (PERCOCET)  MG per tablet Take 1 tablet by mouth Every 6 (Six) Hours As Needed for Moderate Pain.      prochlorperazine (COMPAZINE) 10 MG tablet Take 1 tablet by mouth Every 6 (Six) Hours As Needed for Nausea or Vomiting. 60 tablet 4    sertraline (ZOLOFT) 100 MG tablet Take 1 tablet by mouth Every Night.      sulfamethoxazole-trimethoprim (BACTRIM DS,SEPTRA DS) 800-160 MG per tablet TAKE 1 TABLET BY MOUTH EVERY DAY (Patient taking differently: Take 1 tablet by mouth 2 (Two) Times a Day. Takes twice a day on Saturday  and Sunday.) 30 tablet 0    valACYclovir (VALTREX) 500 MG tablet TAKE 1 TABLET BY MOUTH EVERY DAY 30 tablet 0    Continuous Blood Gluc  (Dexcom G6 ) device 1 kit Every 3 (Three) Months. (Patient not taking: Reported on 8/29/2023) 1 Device 0    Continuous Blood Gluc Sensor (Dexcom G6 Sensor) Every 10 (Ten) Days. (Patient not taking: Reported on 8/29/2023) 3 each 11    Continuous Blood Gluc Transmit (Dexcom G6 Transmitter) misc 1 kit Every 3 (Three) Months. (Patient not taking: Reported on 8/29/2023) 1 each 3    ibrutinib (IMBRUVICA) 420 MG tablet tablet Take 1 tablet by mouth Daily. 28 tablet 5    Insulin Aspart (NOVOLOG FLEXPEN SC) Inject 8-12 Units under the skin 3 (Three) Times a Day Before Meals. PER SLIDING SCALE (Patient not taking: Reported on 8/29/2023)      Insulin Detemir (LEVEMIR FLEXPEN SC) Inject 8-12 Units  "under the skin Every Night. SLIDING SCALE (Patient not taking: Reported on 8/29/2023)      loratadine (CLARITIN) 10 MG tablet Take 1 tablet by mouth Every Night. (Patient not taking: Reported on 8/29/2023)       Current Facility-Administered Medications on File Prior to Visit   Medication Dose Route Frequency Provider Last Rate Last Admin    [COMPLETED] iopamidol (ISOVUE-300) 61 % injection 100 mL  100 mL Intravenous Once in imaging Kimo Jorge, DO   100 mL at 08/29/23 1336        Review of Systems   Constitutional:  Positive for fatigue and unexpected weight loss.   Eyes:  Positive for blurred vision.   Endocrine: Positive for polydipsia and polyuria. Negative for polyphagia.   Psychiatric/Behavioral:  Positive for sleep disturbance and depressed mood.    All other systems reviewed and are negative.     /74 (BP Location: Left arm, Patient Position: Sitting, Cuff Size: Adult)   Pulse 68   Ht 162.6 cm (64\")   Wt 65.4 kg (144 lb 3.2 oz)   SpO2 97%   BMI 24.75 kg/mý      Physical Exam  Vitals reviewed.   Constitutional:       Appearance: Normal appearance.   Eyes:      Extraocular Movements: Extraocular movements intact.   Cardiovascular:      Rate and Rhythm: Normal rate.   Pulmonary:      Effort: Pulmonary effort is normal.   Skin:     General: Skin is warm.   Neurological:      General: No focal deficit present.      Mental Status: She is alert and oriented to person, place, and time.   Psychiatric:         Mood and Affect: Mood normal.         Behavior: Behavior normal.         Thought Content: Thought content normal.         Judgment: Judgment normal.     CMP:  Lab Results   Component Value Date    BUN 9 08/02/2023    CREATININE 0.70 08/02/2023    EGFRIFNONA 77 08/02/2021    EGFRIFAFRI  08/29/2016      Comment:      <15 Indicative of kidney failure.    BCR 13.6 08/02/2023     (L) 08/02/2023    K 4.5 08/02/2023    CO2 20.0 (L) 08/02/2023    CALCIUM 8.9 08/02/2023    PROTENTOTREF 6.6 " 11/17/2015    ALBUMIN 3.8 03/16/2023    LABGLOBREF 2.5 11/17/2015    LABIL2 1.6 05/18/2016    BILITOT <0.2 03/16/2023    ALKPHOS 67 03/16/2023    AST 7 03/16/2023    ALT 15 03/16/2023     Lipid Panel:  Lab Results   Component Value Date    CHOL 251 (H) 11/13/2017    TRIG 179 (H) 11/13/2017    HDL 45 (L) 11/13/2017    VLDL 35.8 11/13/2017     (H) 11/13/2017     HbA1c:  Lab Results   Component Value Date    HGBA1C 6.4 08/29/2023    HGBA1C 14.0 10/16/2020     Glucose:  Lab Results   Component Value Date    POCGLU 127 08/29/2023     Microalbumin:  No results found for: MALBCRERATIO  TSH:  Lab Results   Component Value Date    TSH 1.036 11/13/2017       Assessment and Plan    Diagnoses and all orders for this visit:    1. Type 2 diabetes mellitus with hyperglycemia, unspecified whether long term insulin use (Primary)  Assessment & Plan:  -Diabetes is at goal with A1c 6.4.  -Discussed dietary and exercise guidelines with patient.  -Discussed the importance of yearly eye exams.  -Discussed the importance of checking BG's regularly.    -Continue without medication.  -S/S hypoglycemia reviewed with Rule of 15's advised.  -Follow-up in 3 months as she may be starting on medication for her depression.    Orders:  -     POC Glucose, Blood  -     POC Glycosylated Hemoglobin (Hb A1C)    Other orders  -     glucose monitor monitoring kit; Use 1 each 1 (One) Time for 1 dose.  Dispense: 1 each; Refill: 0  -     glucose blood test strip; 1 each by Other route 2 (Two) Times a Day.  Dispense: 60 each; Refill: 2  -     Lancets misc; Use 1 each 2 (Two) Times a Day.  Dispense: 60 each; Refill: 2         Return in about 3 months (around 11/29/2023) for Follow-up appointment, A1C. The patient was instructed to contact the clinic with any interval questions or concerns.        This document has been electronically signed by BRANDON Sanders  August 29, 2023 16:43 EDT   Endocrinology

## 2023-09-05 ENCOUNTER — TRANSCRIBE ORDERS (OUTPATIENT)
Dept: ADMINISTRATIVE | Facility: HOSPITAL | Age: 58
End: 2023-09-05
Payer: MEDICARE

## 2023-09-05 DIAGNOSIS — C91.10 CHRONIC LYMPHOCYTIC LEUKEMIA OF B-CELL TYPE NOT HAVING ACHIEVED REMISSION: Primary | ICD-10-CM

## 2023-09-18 RX ORDER — OXYCODONE 18 MG/1
CAPSULE, EXTENDED RELEASE ORAL
COMMUNITY
Start: 2023-08-11

## 2023-09-20 ENCOUNTER — HOSPITAL ENCOUNTER (OUTPATIENT)
Facility: HOSPITAL | Age: 58
LOS: 1 days | Discharge: HOME | End: 2023-09-21
Attending: SURGERY | Admitting: SURGERY
Payer: MEDICARE

## 2023-09-20 ENCOUNTER — ANESTHESIA (OUTPATIENT)
Dept: PERIOP | Facility: HOSPITAL | Age: 58
End: 2023-09-20
Payer: MEDICARE

## 2023-09-20 ENCOUNTER — ANESTHESIA EVENT (OUTPATIENT)
Dept: PERIOP | Facility: HOSPITAL | Age: 58
End: 2023-09-20
Payer: MEDICARE

## 2023-09-20 DIAGNOSIS — I20.0 UNSTABLE ANGINA: Primary | ICD-10-CM

## 2023-09-20 PROBLEM — R07.2 PRECORDIAL CHEST PAIN: Status: ACTIVE | Noted: 2023-09-20

## 2023-09-20 PROBLEM — I25.110 CORONARY ARTERY DISEASE INVOLVING NATIVE CORONARY ARTERY OF NATIVE HEART WITH UNSTABLE ANGINA PECTORIS: Status: ACTIVE | Noted: 2023-09-20

## 2023-09-20 LAB
ANION GAP SERPL CALCULATED.3IONS-SCNC: 9 MMOL/L (ref 5–15)
BUN SERPL-MCNC: 12 MG/DL (ref 6–20)
BUN/CREAT SERPL: 16.7 (ref 7–25)
CALCIUM SPEC-SCNC: 9.3 MG/DL (ref 8.6–10.5)
CHLORIDE SERPL-SCNC: 96 MMOL/L (ref 98–107)
CHOLEST SERPL-MCNC: 192 MG/DL (ref 0–200)
CO2 SERPL-SCNC: 22 MMOL/L (ref 22–29)
CREAT SERPL-MCNC: 0.72 MG/DL (ref 0.57–1)
DEPRECATED RDW RBC AUTO: 50.9 FL (ref 37–54)
EGFRCR SERPLBLD CKD-EPI 2021: 96.5 ML/MIN/1.73
ERYTHROCYTE [DISTWIDTH] IN BLOOD BY AUTOMATED COUNT: 13.4 % (ref 12.3–15.4)
GEN 5 2HR TROPONIN T REFLEX: 62 NG/L
GLUCOSE BLDC GLUCOMTR-MCNC: 97 MG/DL (ref 70–130)
GLUCOSE SERPL-MCNC: 96 MG/DL (ref 65–99)
HCT VFR BLD AUTO: 39.1 % (ref 34–46.6)
HDLC SERPL-MCNC: 53 MG/DL (ref 40–60)
HGB BLD-MCNC: 13 G/DL (ref 12–15.9)
LDLC SERPL CALC-MCNC: 125 MG/DL (ref 0–100)
LDLC/HDLC SERPL: 2.32 {RATIO}
MCH RBC QN AUTO: 33.8 PG (ref 26.6–33)
MCHC RBC AUTO-ENTMCNC: 33.2 G/DL (ref 31.5–35.7)
MCV RBC AUTO: 101.6 FL (ref 79–97)
PLATELET # BLD AUTO: 215 10*3/MM3 (ref 140–450)
PMV BLD AUTO: 9.6 FL (ref 6–12)
POTASSIUM SERPL-SCNC: 4.9 MMOL/L (ref 3.5–5.2)
QT INTERVAL: 392 MS
QTC INTERVAL: 420 MS
RBC # BLD AUTO: 3.85 10*6/MM3 (ref 3.77–5.28)
SODIUM SERPL-SCNC: 127 MMOL/L (ref 136–145)
TRIGL SERPL-MCNC: 79 MG/DL (ref 0–150)
TROPONIN T DELTA: 12 NG/L
TROPONIN T SERPL HS-MCNC: 50 NG/L
VLDLC SERPL-MCNC: 14 MG/DL (ref 5–40)
WBC NRBC COR # BLD: 6.96 10*3/MM3 (ref 3.4–10.8)

## 2023-09-20 PROCEDURE — 25010000002 HEPARIN (PORCINE) PER 1000 UNITS: Performed by: INTERNAL MEDICINE

## 2023-09-20 PROCEDURE — 25010000002 VANCOMYCIN PER 500 MG: Performed by: SURGERY

## 2023-09-20 PROCEDURE — 93458 L HRT ARTERY/VENTRICLE ANGIO: CPT | Performed by: INTERNAL MEDICINE

## 2023-09-20 PROCEDURE — 25010000002 FENTANYL CITRATE (PF) 50 MCG/ML SOLUTION: Performed by: INTERNAL MEDICINE

## 2023-09-20 PROCEDURE — 63710000001 ROSUVASTATIN 10 MG TABLET: Performed by: INTERNAL MEDICINE

## 2023-09-20 PROCEDURE — C1769 GUIDE WIRE: HCPCS | Performed by: INTERNAL MEDICINE

## 2023-09-20 PROCEDURE — A9270 NON-COVERED ITEM OR SERVICE: HCPCS | Performed by: HOSPITALIST

## 2023-09-20 PROCEDURE — G0463 HOSPITAL OUTPT CLINIC VISIT: HCPCS | Performed by: SURGERY

## 2023-09-20 PROCEDURE — 63710000001 GABAPENTIN 400 MG CAPSULE: Performed by: INTERNAL MEDICINE

## 2023-09-20 PROCEDURE — G0378 HOSPITAL OBSERVATION PER HR: HCPCS

## 2023-09-20 PROCEDURE — 0 LIDOCAINE 1 % SOLUTION: Performed by: SURGERY

## 2023-09-20 PROCEDURE — 80061 LIPID PANEL: CPT | Performed by: INTERNAL MEDICINE

## 2023-09-20 PROCEDURE — 93005 ELECTROCARDIOGRAM TRACING: CPT | Performed by: ANESTHESIOLOGY

## 2023-09-20 PROCEDURE — 99204 OFFICE O/P NEW MOD 45 MIN: CPT | Performed by: INTERNAL MEDICINE

## 2023-09-20 PROCEDURE — 63710000001 ISOSORBIDE MONONITRATE 60 MG TABLET SUSTAINED-RELEASE 24 HOUR: Performed by: HOSPITALIST

## 2023-09-20 PROCEDURE — A9270 NON-COVERED ITEM OR SERVICE: HCPCS | Performed by: INTERNAL MEDICINE

## 2023-09-20 PROCEDURE — 80048 BASIC METABOLIC PNL TOTAL CA: CPT | Performed by: SURGERY

## 2023-09-20 PROCEDURE — 93005 ELECTROCARDIOGRAM TRACING: CPT | Performed by: INTERNAL MEDICINE

## 2023-09-20 PROCEDURE — 84484 ASSAY OF TROPONIN QUANT: CPT | Performed by: INTERNAL MEDICINE

## 2023-09-20 PROCEDURE — 85027 COMPLETE CBC AUTOMATED: CPT | Performed by: SURGERY

## 2023-09-20 PROCEDURE — 63710000001 SERTRALINE 100 MG TABLET: Performed by: INTERNAL MEDICINE

## 2023-09-20 PROCEDURE — 82948 REAGENT STRIP/BLOOD GLUCOSE: CPT

## 2023-09-20 PROCEDURE — 25510000001 IOPAMIDOL PER 1 ML: Performed by: INTERNAL MEDICINE

## 2023-09-20 PROCEDURE — 63710000001 CLONIDINE 0.1 MG TABLET: Performed by: INTERNAL MEDICINE

## 2023-09-20 PROCEDURE — 25010000002 MIDAZOLAM PER 1 MG: Performed by: INTERNAL MEDICINE

## 2023-09-20 PROCEDURE — 63710000001 LISINOPRIL 10 MG TABLET: Performed by: HOSPITALIST

## 2023-09-20 PROCEDURE — 25010000002 HEPARIN (PORCINE) PER 1000 UNITS: Performed by: SURGERY

## 2023-09-20 PROCEDURE — 63710000001 NICOTINE 14 MG/24HR PATCH 24 HOUR: Performed by: HOSPITALIST

## 2023-09-20 PROCEDURE — C1894 INTRO/SHEATH, NON-LASER: HCPCS | Performed by: INTERNAL MEDICINE

## 2023-09-20 PROCEDURE — 93010 ELECTROCARDIOGRAM REPORT: CPT | Performed by: INTERNAL MEDICINE

## 2023-09-20 RX ORDER — ACETAMINOPHEN 325 MG/1
650 TABLET ORAL EVERY 4 HOURS PRN
Status: DISCONTINUED | OUTPATIENT
Start: 2023-09-20 | End: 2023-09-21 | Stop reason: HOSPADM

## 2023-09-20 RX ORDER — PROCHLORPERAZINE MALEATE 10 MG
10 TABLET ORAL EVERY 6 HOURS PRN
Status: DISCONTINUED | OUTPATIENT
Start: 2023-09-20 | End: 2023-09-21 | Stop reason: HOSPADM

## 2023-09-20 RX ORDER — LIDOCAINE HYDROCHLORIDE 10 MG/ML
INJECTION, SOLUTION EPIDURAL; INFILTRATION; INTRACAUDAL; PERINEURAL
Status: DISCONTINUED | OUTPATIENT
Start: 2023-09-20 | End: 2023-09-20 | Stop reason: HOSPADM

## 2023-09-20 RX ORDER — ALPRAZOLAM 0.5 MG/1
0.5 TABLET ORAL 3 TIMES DAILY PRN
Status: DISCONTINUED | OUTPATIENT
Start: 2023-09-20 | End: 2023-09-21 | Stop reason: HOSPADM

## 2023-09-20 RX ORDER — CLOPIDOGREL BISULFATE 75 MG/1
75 TABLET ORAL DAILY
Status: DISCONTINUED | OUTPATIENT
Start: 2023-09-21 | End: 2023-09-21 | Stop reason: HOSPADM

## 2023-09-20 RX ORDER — NICOTINE 21 MG/24HR
1 PATCH, TRANSDERMAL 24 HOURS TRANSDERMAL ONCE
Status: DISCONTINUED | OUTPATIENT
Start: 2023-09-20 | End: 2023-09-21 | Stop reason: HOSPADM

## 2023-09-20 RX ORDER — ASPIRIN 81 MG/1
81 TABLET ORAL DAILY
Status: DISCONTINUED | OUTPATIENT
Start: 2023-09-20 | End: 2023-09-21 | Stop reason: HOSPADM

## 2023-09-20 RX ORDER — FAMOTIDINE 20 MG/1
20 TABLET, FILM COATED ORAL ONCE
Status: COMPLETED | OUTPATIENT
Start: 2023-09-20 | End: 2023-09-20

## 2023-09-20 RX ORDER — SODIUM CHLORIDE 9 MG/ML
1 INJECTION, SOLUTION INTRAVENOUS CONTINUOUS
Status: ACTIVE | OUTPATIENT
Start: 2023-09-20 | End: 2023-09-20

## 2023-09-20 RX ORDER — NITROGLYCERIN 0.4 MG/1
0.4 TABLET SUBLINGUAL
Status: DISCONTINUED | OUTPATIENT
Start: 2023-09-20 | End: 2023-09-21 | Stop reason: HOSPADM

## 2023-09-20 RX ORDER — CLONIDINE HYDROCHLORIDE 0.1 MG/1
0.1 TABLET ORAL EVERY 6 HOURS PRN
Status: DISCONTINUED | OUTPATIENT
Start: 2023-09-20 | End: 2023-09-21 | Stop reason: HOSPADM

## 2023-09-20 RX ORDER — SERTRALINE HYDROCHLORIDE 100 MG/1
100 TABLET, FILM COATED ORAL NIGHTLY
Status: DISCONTINUED | OUTPATIENT
Start: 2023-09-20 | End: 2023-09-21 | Stop reason: HOSPADM

## 2023-09-20 RX ORDER — ASPIRIN 81 MG/1
81 TABLET ORAL DAILY
Status: DISCONTINUED | OUTPATIENT
Start: 2023-09-21 | End: 2023-09-20

## 2023-09-20 RX ORDER — NICARDIPINE HCL-0.9% SOD CHLOR 1 MG/10 ML
SYRINGE (ML) INTRAVENOUS
Status: DISCONTINUED | OUTPATIENT
Start: 2023-09-20 | End: 2023-09-20 | Stop reason: HOSPADM

## 2023-09-20 RX ORDER — VANCOMYCIN HYDROCHLORIDE 1 G/200ML
15 INJECTION, SOLUTION INTRAVENOUS ONCE
Status: COMPLETED | OUTPATIENT
Start: 2023-09-20 | End: 2023-09-20

## 2023-09-20 RX ORDER — OXYCODONE AND ACETAMINOPHEN 10; 325 MG/1; MG/1
1 TABLET ORAL EVERY 6 HOURS PRN
Status: DISCONTINUED | OUTPATIENT
Start: 2023-09-20 | End: 2023-09-21 | Stop reason: HOSPADM

## 2023-09-20 RX ORDER — ISOSORBIDE MONONITRATE 60 MG/1
60 TABLET, EXTENDED RELEASE ORAL ONCE
Status: COMPLETED | OUTPATIENT
Start: 2023-09-20 | End: 2023-09-20

## 2023-09-20 RX ORDER — FENTANYL CITRATE 50 UG/ML
INJECTION, SOLUTION INTRAMUSCULAR; INTRAVENOUS
Status: DISCONTINUED | OUTPATIENT
Start: 2023-09-20 | End: 2023-09-20 | Stop reason: HOSPADM

## 2023-09-20 RX ORDER — LISINOPRIL 10 MG/1
10 TABLET ORAL ONCE
Status: COMPLETED | OUTPATIENT
Start: 2023-09-20 | End: 2023-09-20

## 2023-09-20 RX ORDER — LIDOCAINE HYDROCHLORIDE 10 MG/ML
INJECTION, SOLUTION INFILTRATION; PERINEURAL AS NEEDED
Status: DISCONTINUED | OUTPATIENT
Start: 2023-09-20 | End: 2023-09-21 | Stop reason: HOSPADM

## 2023-09-20 RX ORDER — GABAPENTIN 400 MG/1
800 CAPSULE ORAL EVERY 8 HOURS SCHEDULED
Status: DISCONTINUED | OUTPATIENT
Start: 2023-09-20 | End: 2023-09-21 | Stop reason: HOSPADM

## 2023-09-20 RX ORDER — HEPARIN SODIUM 1000 [USP'U]/ML
INJECTION, SOLUTION INTRAVENOUS; SUBCUTANEOUS
Status: DISCONTINUED | OUTPATIENT
Start: 2023-09-20 | End: 2023-09-20 | Stop reason: HOSPADM

## 2023-09-20 RX ORDER — SODIUM CHLORIDE, SODIUM LACTATE, POTASSIUM CHLORIDE, CALCIUM CHLORIDE 600; 310; 30; 20 MG/100ML; MG/100ML; MG/100ML; MG/100ML
9 INJECTION, SOLUTION INTRAVENOUS CONTINUOUS
Status: DISCONTINUED | OUTPATIENT
Start: 2023-09-20 | End: 2023-09-20

## 2023-09-20 RX ORDER — LISINOPRIL 10 MG/1
10 TABLET ORAL EVERY MORNING
Status: DISCONTINUED | OUTPATIENT
Start: 2023-09-21 | End: 2023-09-21

## 2023-09-20 RX ORDER — ISOSORBIDE MONONITRATE 60 MG/1
60 TABLET, EXTENDED RELEASE ORAL
Status: DISCONTINUED | OUTPATIENT
Start: 2023-09-21 | End: 2023-09-21 | Stop reason: HOSPADM

## 2023-09-20 RX ORDER — MIDAZOLAM HYDROCHLORIDE 1 MG/ML
INJECTION INTRAMUSCULAR; INTRAVENOUS
Status: DISCONTINUED | OUTPATIENT
Start: 2023-09-20 | End: 2023-09-20 | Stop reason: HOSPADM

## 2023-09-20 RX ORDER — ROSUVASTATIN CALCIUM 10 MG/1
10 TABLET, COATED ORAL NIGHTLY
Status: DISCONTINUED | OUTPATIENT
Start: 2023-09-20 | End: 2023-09-21 | Stop reason: HOSPADM

## 2023-09-20 RX ORDER — NALOXONE HCL 0.4 MG/ML
0.4 VIAL (ML) INJECTION
Status: DISCONTINUED | OUTPATIENT
Start: 2023-09-20 | End: 2023-09-21 | Stop reason: HOSPADM

## 2023-09-20 RX ORDER — LIDOCAINE HYDROCHLORIDE 10 MG/ML
0.5 INJECTION, SOLUTION EPIDURAL; INFILTRATION; INTRACAUDAL; PERINEURAL ONCE
Status: COMPLETED | OUTPATIENT
Start: 2023-09-20 | End: 2023-09-20

## 2023-09-20 RX ADMIN — LIDOCAINE HYDROCHLORIDE 0.5 ML: 10 INJECTION, SOLUTION EPIDURAL; INFILTRATION; INTRACAUDAL; PERINEURAL at 12:15

## 2023-09-20 RX ADMIN — NICOTINE 1 PATCH: 14 PATCH, EXTENDED RELEASE TRANSDERMAL at 20:54

## 2023-09-20 RX ADMIN — VANCOMYCIN HYDROCHLORIDE 1000 MG: 1 INJECTION, SOLUTION INTRAVENOUS at 13:01

## 2023-09-20 RX ADMIN — GABAPENTIN 800 MG: 400 CAPSULE ORAL at 20:54

## 2023-09-20 RX ADMIN — SERTRALINE HYDROCHLORIDE 100 MG: 100 TABLET ORAL at 20:54

## 2023-09-20 RX ADMIN — ROSUVASTATIN 10 MG: 10 TABLET, FILM COATED ORAL at 20:54

## 2023-09-20 RX ADMIN — FAMOTIDINE 20 MG: 20 TABLET ORAL at 12:40

## 2023-09-20 RX ADMIN — CLONIDINE HYDROCHLORIDE 0.1 MG: 0.1 TABLET ORAL at 21:24

## 2023-09-20 RX ADMIN — ASPIRIN 81 MG: 81 TABLET, CHEWABLE ORAL at 14:41

## 2023-09-20 RX ADMIN — LISINOPRIL 10 MG: 10 TABLET ORAL at 19:31

## 2023-09-20 RX ADMIN — SODIUM CHLORIDE, POTASSIUM CHLORIDE, SODIUM LACTATE AND CALCIUM CHLORIDE 9 ML/HR: 600; 310; 30; 20 INJECTION, SOLUTION INTRAVENOUS at 12:15

## 2023-09-20 RX ADMIN — ISOSORBIDE MONONITRATE 60 MG: 60 TABLET, EXTENDED RELEASE ORAL at 19:30

## 2023-09-20 NOTE — CONSULTS
[facility] at [facility]  Cardiovascular Consultation Note    Reason for consultation: 1 chest pain in a patient with documented severe peripheral vascular disease with EKG changes    History of Present Illness:  58-year-old female with diabetes, smoker, hypertension, hyperlipidemia, non-Hodgkin's lymphoma, previous carotid stenting and peripheral vascular disease presented today for a vascular procedure and complained of chest pain.  The patient states she has been having chest discomfort for about 3 months.  She describes it as pressure going down her left arm.  This has been increasing in frequency over the last 3 months where she is having it multiple times daily.  She is also even had some at rest.  She had 1 episode where she became diaphoretic with it.  She also has had significant increased fatigue and dyspnea on exertion which is new.  She denies palpitations.  She has never had a stroke TIA or neurologic event.  She is never had any cardiac testing I am aware    Cardiac risk factors: #1 smoker #2 diabetes #3 hypertension #4 hyperlipidemia #5 diffuse vascular disease    Past medical and surgical history, social and family history reviewed in EMR.    REVIEW OF SYSTEMS:     Past Medical History:   Diagnosis Date    Aneurysm 8/223/17    Cavernous left ICA aneurysm    Asthma     Carotid artery occlusion     Chronic back pain     CLL (chronic lymphocytic leukemia)     OCTOBER OF 2012    COPD (chronic obstructive pulmonary disease)     Degenerative arthritis     Depression     Diabetes mellitus     type 2    Heart murmur     Hyperlipidemia     Hypertension     Lymphadenopathy     Non Hodgkin's lymphoma     PVD (peripheral vascular disease)      Past Surgical History:   Procedure Laterality Date    APPENDECTOMY      BACK SURGERY      2002 (work accident) c-spine surgery 4/14    CAROTID STENT Right 08/23/2017    CEREBRAL ANGIOGRAM N/A 08/23/2017    Diagnostic Carotid/Cerebral Angiogram     CHOLECYSTECTOMY      COLONOSCOPY      HYSTERECTOMY      for endometriosis/ovarian bleed    INTERVENTIONAL RADIOLOGY PROCEDURE N/A 08/02/2023    Procedure: Abdominal Aortagram with Runoff;  Surgeon: Jeffry Ackerman MD;  Location:  [facility];  Service: Interventional Radiology;  Laterality: N/A;    INTERVENTIONAL RADIOLOGY PROCEDURE Bilateral 08/02/2023    Procedure: Carotid Cerebral Angiogram;  Surgeon: Jeffry Ackerman MD;  Location:  [facility];  Service: Interventional Radiology;  Laterality: Bilateral;    NECK SURGERY      Neck hardware    OOPHORECTOMY Left     OTHER SURGICAL HISTORY      pac insertion and removal    TONSILLECTOMY       Social History     Socioeconomic History    Marital status:    Tobacco Use    Smoking status: Every Day     Packs/day: 0.50     Years: 35.00     Pack years: 17.50     Types: Cigarettes     Passive exposure: Current    Smokeless tobacco: Never    Tobacco comments:     0.5-1 PPD   Vaping Use    Vaping Use: Never used   Substance and Sexual Activity    Alcohol use: Yes     Comment: RARELY PER PT    Drug use: No    Sexual activity: Defer     Family History   Problem Relation Age of Onset    Lung cancer Father 72    Hypertension Father     Heart disease Father     Cancer Father     Diabetes Father     Other Brother 46        MI    Heart disease Brother     Breast cancer Paternal Aunt     Colon cancer Maternal Grandfather     Other Cousin         CLL (dx 46 yo)    Hypertension Mother        H&P ROS reviewed and pertinent CV ROS as noted in HPI.    Cardiac: Patient complains of chest pain consistent with unstable angina.  She complains of lower extremity edema  Respiratory: Complains of dyspnea on exertion.  She still smokes  Lower Extremities: Has significant peripheral neuropathy.  GI: Complains of postprandial pain  Neuro: No history of stroke TIA or neurologic event  Hematology: Patient has a history of lymphoma and she is on maintenance  therapy.  She has never had chest radiation  Renal: No renal disease  Musculoskeletal does not complain of any specific joint pain  Endocrine: Has diabetes but no hypothyroidism  Constitutional: No fever chills or weight loss.  Psych: No depression or suicidal ideation      Physical Exam: General very pleasant thin female in bed at a 75 degree angle sleeping when I entered the room but awakens easily.  Not dyspneic not tachypneic       HEENT: Bilateral harsh carotid bruits are present.  No icterus.  No JVP       Respiratory: Equal bilateral symmetrical expansion clear auscultation       Cardiovascular: Regular rate and rhythm with a grade 2-3 over systolic ejection murmur and 1+ pitting edema to palpation.       GI: Soft and flat positive bowel sounds with abdominal bruits       Lower Extremities: Stasis abnormalities are present with edema       Neuro: Facial expressions are symmetrical moves all 4 extremities       Skin: Warm and dry with pitting edema palpation       Psych: Pleasant affect oriented x3    Results Review: EKG shows sinus rhythm ST elevation in lead V1 and V2 with lateral ST abnormalities suspicious for ischemia.  These changes are more prominent than previous EKG           Vital Sign Min/Max for last 24 hours  Temp  Min: 97.1 °F (36.2 °C)  Max: 97.1 °F (36.2 °C)   BP  Min: 183/71  Max: 183/71   Pulse  Min: 74  Max: 74   Resp  Min: 18  Max: 18   SpO2  Min: 98 %  Max: 98 %   No data recorded    No intake or output data in the 24 hours ending 09/20/23 1354          Current Facility-Administered Medications:     lactated ringers infusion, 9 mL/hr, Intravenous, Continuous, Xander Rosas MD, Last Rate: 9 mL/hr at 09/20/23 1215, 9 mL/hr at 09/20/23 1215    lidocaine (XYLOCAINE) 1 % injection, , , PRN, Filiberto Alcantara MD, 50 mL at 09/20/23 1253    sodium chloride 500 mL with heparin (porcine) 1,000 Units mixture, , , PRN, Filiberto Alcantara MD, Given at 09/20/23 1253    vancomycin (VANCOCIN) 1000 mg/200 mL  dextrose 5% IVPB, 15 mg/kg, Intravenous, Once, Filiberto Alcantara MD, 1,000 mg at 09/20/23 1301    Lab Review:   Results from last 7 days   Lab Units 09/20/23  1220   WBC 10*3/mm3 6.96   HEMOGLOBIN g/dL 13.0   PLATELETS 10*3/mm3 215     Results from last 7 days   Lab Units 09/20/23  1324   SODIUM mmol/L 127*   POTASSIUM mmol/L 4.9   CO2 mmol/L 22.0   BUN mg/dL 12   CREATININE mg/dL 0.72   GLUCOSE mg/dL 96     Estimated Creatinine Clearance: 86.7 mL/min (by C-G formula based on SCr of 0.72 mg/dL).        .lipd  Lab Results   Component Value Date    TRIG 179 (H) 11/13/2017    HDL 45 (L) 11/13/2017    AST 7 03/16/2023    ALT 15 03/16/2023       Radiology Reports:  Imaging Results (Last 72 Hours)       ** No results found for the last 72 hours. **            Assessment/Plan: This patient with severe diffuse vascular disease presents for an radiologic procedure and complaining of chest pain.  The the pain is tightness in her chest going down her left arm with shortness of breath.  Her symptoms are progressive and consistent with unstable angina.  She then had some chest discomfort last night and on her way here to the hospital.  Her EKG shows ST and T wave abnormalities.  I reviewed her CT scan which shows heavy coronary artery calcification.  I recommend the patient undergo cardiac cath.  I discussed the case with the patient and her family member.  I discussed with interventional cardiology who is willing to proceed with cardiac cath today.  She does have a good right radial artery pulse the patient has not had any Plavix today.  I will go ahead and give her a dose of aspirin.      Martín Park MD  09/20/23  13:54 EDT

## 2023-09-20 NOTE — ANESTHESIA PREPROCEDURE EVALUATION
Anesthesia Evaluation                  Airway   Mallampati: I  TM distance: >3 FB  Neck ROM: full  No difficulty expected  Dental      Pulmonary    (+) COPD, asthma,  Cardiovascular     ECG reviewed    (+) hypertension, valvular problems/murmurs murmur, PVD,  carotid artery disease      Neuro/Psych  GI/Hepatic/Renal/Endo    (+) diabetes mellitus    Musculoskeletal     Abdominal    Substance History      OB/GYN          Other   arthritis, blood dyscrasia,   history of cancer                    Anesthesia Plan    ASA 3     MAC     intravenous induction     Anesthetic plan, risks, benefits, and alternatives have been provided, discussed and informed consent has been obtained with: patient.    CODE STATUS:

## 2023-09-21 ENCOUNTER — APPOINTMENT (OUTPATIENT)
Dept: CARDIOLOGY | Facility: HOSPITAL | Age: 58
End: 2023-09-21
Payer: MEDICARE

## 2023-09-21 VITALS
OXYGEN SATURATION: 94 % | BODY MASS INDEX: 24.58 KG/M2 | TEMPERATURE: 98 F | HEIGHT: 64 IN | RESPIRATION RATE: 16 BRPM | DIASTOLIC BLOOD PRESSURE: 57 MMHG | SYSTOLIC BLOOD PRESSURE: 159 MMHG | HEART RATE: 71 BPM | WEIGHT: 143.96 LBS

## 2023-09-21 LAB
ANION GAP SERPL CALCULATED.3IONS-SCNC: 10 MMOL/L (ref 5–15)
BH CV ECHO MEAS - AO MAX PG: 9.9 MMHG
BH CV ECHO MEAS - AO MEAN PG: 5 MMHG
BH CV ECHO MEAS - AO ROOT DIAM: 3 CM
BH CV ECHO MEAS - AO V2 MAX: 157 CM/SEC
BH CV ECHO MEAS - AO V2 VTI: 30.8 CM
BH CV ECHO MEAS - AVA(I,D): 2.8 CM2
BH CV ECHO MEAS - EDV(CUBED): 59.3 ML
BH CV ECHO MEAS - EDV(MOD-SP2): 55.4 ML
BH CV ECHO MEAS - EDV(MOD-SP4): 55.6 ML
BH CV ECHO MEAS - EF(MOD-BP): 62.4 %
BH CV ECHO MEAS - EF(MOD-SP2): 62.1 %
BH CV ECHO MEAS - EF(MOD-SP4): 64.2 %
BH CV ECHO MEAS - ESV(CUBED): 9.3 ML
BH CV ECHO MEAS - ESV(MOD-SP2): 21 ML
BH CV ECHO MEAS - ESV(MOD-SP4): 19.9 ML
BH CV ECHO MEAS - FS: 46.2 %
BH CV ECHO MEAS - IVS/LVPW: 1 CM
BH CV ECHO MEAS - IVSD: 1.3 CM
BH CV ECHO MEAS - LA DIMENSION: 3.6 CM
BH CV ECHO MEAS - LAT PEAK E' VEL: 9.7 CM/SEC
BH CV ECHO MEAS - LV DIASTOLIC VOL/BSA (35-75): 32.7 CM2
BH CV ECHO MEAS - LV MASS(C)D: 179.7 GRAMS
BH CV ECHO MEAS - LV MAX PG: 8.4 MMHG
BH CV ECHO MEAS - LV MEAN PG: 4 MMHG
BH CV ECHO MEAS - LV SYSTOLIC VOL/BSA (12-30): 11.7 CM2
BH CV ECHO MEAS - LV V1 MAX: 145 CM/SEC
BH CV ECHO MEAS - LV V1 VTI: 27 CM
BH CV ECHO MEAS - LVIDD: 3.9 CM
BH CV ECHO MEAS - LVIDS: 2.1 CM
BH CV ECHO MEAS - LVOT AREA: 3.1 CM2
BH CV ECHO MEAS - LVOT DIAM: 2 CM
BH CV ECHO MEAS - LVPWD: 1.3 CM
BH CV ECHO MEAS - MED PEAK E' VEL: 7.1 CM/SEC
BH CV ECHO MEAS - MV A MAX VEL: 138 CM/SEC
BH CV ECHO MEAS - MV DEC SLOPE: 284 CM/SEC2
BH CV ECHO MEAS - MV DEC TIME: 0.29 SEC
BH CV ECHO MEAS - MV E MAX VEL: 82.1 CM/SEC
BH CV ECHO MEAS - MV E/A: 0.59
BH CV ECHO MEAS - MV MAX PG: 7.4 MMHG
BH CV ECHO MEAS - MV MEAN PG: 3 MMHG
BH CV ECHO MEAS - MV P1/2T: 106.2 MSEC
BH CV ECHO MEAS - MV V2 VTI: 39.7 CM
BH CV ECHO MEAS - MVA(P1/2T): 2.07 CM2
BH CV ECHO MEAS - MVA(VTI): 2.14 CM2
BH CV ECHO MEAS - PA ACC TIME: 0.14 SEC
BH CV ECHO MEAS - RAP SYSTOLE: 3 MMHG
BH CV ECHO MEAS - RVSP: 12 MMHG
BH CV ECHO MEAS - SI(MOD-SP2): 20.2 ML/M2
BH CV ECHO MEAS - SI(MOD-SP4): 21 ML/M2
BH CV ECHO MEAS - SV(LVOT): 84.8 ML
BH CV ECHO MEAS - SV(MOD-SP2): 34.4 ML
BH CV ECHO MEAS - SV(MOD-SP4): 35.7 ML
BH CV ECHO MEAS - TAPSE (>1.6): 1.83 CM
BH CV ECHO MEAS - TR MAX PG: 9.2 MMHG
BH CV ECHO MEAS - TR MAX VEL: 152 CM/SEC
BH CV ECHO MEASUREMENTS AVERAGE E/E' RATIO: 9.77
BH CV VAS BP RIGHT ARM: NORMAL MMHG
BH CV XLRA - RV BASE: 3 CM
BH CV XLRA - RV LENGTH: 6.4 CM
BH CV XLRA - RV MID: 2.4 CM
BH CV XLRA - TDI S': 14.1 CM/SEC
BUN SERPL-MCNC: 12 MG/DL (ref 6–20)
BUN/CREAT SERPL: 20.3 (ref 7–25)
CALCIUM SPEC-SCNC: 8.8 MG/DL (ref 8.6–10.5)
CHLORIDE SERPL-SCNC: 100 MMOL/L (ref 98–107)
CO2 SERPL-SCNC: 21 MMOL/L (ref 22–29)
CREAT SERPL-MCNC: 0.59 MG/DL (ref 0.57–1)
DEPRECATED RDW RBC AUTO: 47.7 FL (ref 37–54)
EGFRCR SERPLBLD CKD-EPI 2021: 104 ML/MIN/1.73
ERYTHROCYTE [DISTWIDTH] IN BLOOD BY AUTOMATED COUNT: 13.1 % (ref 12.3–15.4)
GLUCOSE SERPL-MCNC: 119 MG/DL (ref 65–99)
HBA1C MFR BLD: 6.1 % (ref 4.8–5.6)
HCT VFR BLD AUTO: 34.6 % (ref 34–46.6)
HGB BLD-MCNC: 11.8 G/DL (ref 12–15.9)
LEFT ATRIUM VOLUME INDEX: 22.9 ML/M2
MCH RBC QN AUTO: 34 PG (ref 26.6–33)
MCHC RBC AUTO-ENTMCNC: 34.1 G/DL (ref 31.5–35.7)
MCV RBC AUTO: 99.7 FL (ref 79–97)
PA ADP PRP-ACNC: 208 PRU
PLATELET # BLD AUTO: 206 10*3/MM3 (ref 140–450)
PMV BLD AUTO: 9.7 FL (ref 6–12)
POTASSIUM SERPL-SCNC: 4.6 MMOL/L (ref 3.5–5.2)
QT INTERVAL: 364 MS
QTC INTERVAL: 440 MS
RBC # BLD AUTO: 3.47 10*6/MM3 (ref 3.77–5.28)
SODIUM SERPL-SCNC: 131 MMOL/L (ref 136–145)
TROPONIN T SERPL HS-MCNC: 54 NG/L
WBC NRBC COR # BLD: 4.65 10*3/MM3 (ref 3.4–10.8)

## 2023-09-21 PROCEDURE — A9270 NON-COVERED ITEM OR SERVICE: HCPCS | Performed by: INTERNAL MEDICINE

## 2023-09-21 PROCEDURE — 84484 ASSAY OF TROPONIN QUANT: CPT | Performed by: INTERNAL MEDICINE

## 2023-09-21 PROCEDURE — 93306 TTE W/DOPPLER COMPLETE: CPT

## 2023-09-21 PROCEDURE — 99239 HOSP IP/OBS DSCHRG MGMT >30: CPT | Performed by: INTERNAL MEDICINE

## 2023-09-21 PROCEDURE — 80048 BASIC METABOLIC PNL TOTAL CA: CPT | Performed by: INTERNAL MEDICINE

## 2023-09-21 PROCEDURE — 85027 COMPLETE CBC AUTOMATED: CPT | Performed by: INTERNAL MEDICINE

## 2023-09-21 PROCEDURE — 85576 BLOOD PLATELET AGGREGATION: CPT | Performed by: INTERNAL MEDICINE

## 2023-09-21 PROCEDURE — G0378 HOSPITAL OBSERVATION PER HR: HCPCS

## 2023-09-21 PROCEDURE — 63710000001 OXYCODONE-ACETAMINOPHEN 10-325 MG TABLET: Performed by: INTERNAL MEDICINE

## 2023-09-21 PROCEDURE — 93306 TTE W/DOPPLER COMPLETE: CPT | Performed by: INTERNAL MEDICINE

## 2023-09-21 PROCEDURE — 63710000001 ASPIRIN 81 MG TABLET DELAYED-RELEASE: Performed by: INTERNAL MEDICINE

## 2023-09-21 PROCEDURE — 63710000001 ISOSORBIDE MONONITRATE 60 MG TABLET SUSTAINED-RELEASE 24 HOUR: Performed by: INTERNAL MEDICINE

## 2023-09-21 PROCEDURE — 63710000001 METOPROLOL TARTRATE 12.5 MG TABLET: Performed by: INTERNAL MEDICINE

## 2023-09-21 PROCEDURE — 63710000001 LISINOPRIL 10 MG TABLET: Performed by: INTERNAL MEDICINE

## 2023-09-21 PROCEDURE — 63710000001 CLOPIDOGREL 75 MG TABLET: Performed by: INTERNAL MEDICINE

## 2023-09-21 PROCEDURE — 63710000001 GABAPENTIN 400 MG CAPSULE: Performed by: INTERNAL MEDICINE

## 2023-09-21 PROCEDURE — 63710000001 AMLODIPINE 5 MG TABLET: Performed by: INTERNAL MEDICINE

## 2023-09-21 PROCEDURE — 83036 HEMOGLOBIN GLYCOSYLATED A1C: CPT | Performed by: INTERNAL MEDICINE

## 2023-09-21 RX ORDER — NITROGLYCERIN 0.4 MG/1
0.4 TABLET SUBLINGUAL
Qty: 75 TABLET | Refills: 12 | Status: SHIPPED | OUTPATIENT
Start: 2023-09-21

## 2023-09-21 RX ORDER — LISINOPRIL 40 MG/1
40 TABLET ORAL EVERY MORNING
Status: DISCONTINUED | OUTPATIENT
Start: 2023-09-22 | End: 2023-09-21 | Stop reason: HOSPADM

## 2023-09-21 RX ORDER — AMLODIPINE BESYLATE 5 MG/1
2.5 TABLET ORAL
Status: DISCONTINUED | OUTPATIENT
Start: 2023-09-21 | End: 2023-09-21 | Stop reason: HOSPADM

## 2023-09-21 RX ORDER — LISINOPRIL 20 MG/1
20 TABLET ORAL EVERY MORNING
Status: DISCONTINUED | OUTPATIENT
Start: 2023-09-22 | End: 2023-09-21

## 2023-09-21 RX ORDER — ROSUVASTATIN CALCIUM 10 MG/1
20 TABLET, COATED ORAL NIGHTLY
Qty: 90 TABLET | Refills: 3 | Status: SHIPPED | OUTPATIENT
Start: 2023-09-21

## 2023-09-21 RX ORDER — ISOSORBIDE MONONITRATE 60 MG/1
60 TABLET, EXTENDED RELEASE ORAL
Qty: 90 TABLET | Refills: 3 | Status: SHIPPED | OUTPATIENT
Start: 2023-09-22

## 2023-09-21 RX ORDER — LISINOPRIL 40 MG/1
40 TABLET ORAL EVERY MORNING
Qty: 90 TABLET | Refills: 3 | Status: SHIPPED | OUTPATIENT
Start: 2023-09-22

## 2023-09-21 RX ADMIN — LISINOPRIL 10 MG: 10 TABLET ORAL at 06:32

## 2023-09-21 RX ADMIN — ISOSORBIDE MONONITRATE 60 MG: 60 TABLET, EXTENDED RELEASE ORAL at 08:21

## 2023-09-21 RX ADMIN — Medication 12.5 MG: at 08:21

## 2023-09-21 RX ADMIN — ASPIRIN 81 MG: 81 TABLET, CHEWABLE ORAL at 08:21

## 2023-09-21 RX ADMIN — GABAPENTIN 800 MG: 400 CAPSULE ORAL at 06:32

## 2023-09-21 RX ADMIN — OXYCODONE HYDROCHLORIDE AND ACETAMINOPHEN 1 TABLET: 10; 325 TABLET ORAL at 14:21

## 2023-09-21 RX ADMIN — AMLODIPINE BESYLATE 2.5 MG: 5 TABLET ORAL at 08:21

## 2023-09-21 RX ADMIN — CLOPIDOGREL BISULFATE 75 MG: 75 TABLET ORAL at 08:21

## 2023-09-21 RX ADMIN — OXYCODONE HYDROCHLORIDE AND ACETAMINOPHEN 1 TABLET: 10; 325 TABLET ORAL at 04:26

## 2023-09-21 RX ADMIN — OXYCODONE HYDROCHLORIDE AND ACETAMINOPHEN 1 TABLET: 10; 325 TABLET ORAL at 10:18

## 2023-09-21 RX ADMIN — GABAPENTIN 800 MG: 400 CAPSULE ORAL at 14:21

## 2023-09-21 NOTE — PLAN OF CARE
Problem: Adult Inpatient Plan of Care  Goal: Plan of Care Review  Outcome: Met  Goal: Patient-Specific Goal (Individualized)  Outcome: Met  Goal: Absence of Hospital-Acquired Illness or Injury  Outcome: Met  Intervention: Identify and Manage Fall Risk  Recent Flowsheet Documentation  Taken 9/21/2023 1000 by Riddhi Regalado RN  Safety Promotion/Fall Prevention:   assistive device/personal items within reach   clutter free environment maintained   fall prevention program maintained   activity supervised   nonskid shoes/slippers when out of bed   room organization consistent   safety round/check completed  Taken 9/21/2023 0800 by Riddhi Regalado RN  Safety Promotion/Fall Prevention:   assistive device/personal items within reach   clutter free environment maintained   fall prevention program maintained   activity supervised   nonskid shoes/slippers when out of bed   room organization consistent   safety round/check completed  Intervention: Prevent Skin Injury  Recent Flowsheet Documentation  Taken 9/21/2023 1200 by Riddhi Regalado RN  Skin Protection:   adhesive use limited   pouching devices used   protective footwear used   incontinence pads utilized   transparent dressing maintained  Taken 9/21/2023 1000 by Riddhi Regalado RN  Body Position: position changed independently  Skin Protection:   adhesive use limited   incontinence pads utilized   transparent dressing maintained   pouching devices used   protective footwear used  Taken 9/21/2023 0800 by Riddhi Regalado RN  Body Position: position changed independently  Skin Protection:   adhesive use limited   pouching devices used   transparent dressing maintained   incontinence pads utilized   protective footwear used  Intervention: Prevent and Manage VTE (Venous Thromboembolism) Risk  Recent Flowsheet Documentation  Taken 9/21/2023 1000 by Riddhi Regalado RN  Activity Management: activity encouraged  Taken 9/21/2023 0800 by Riddhi Regalado RN  Activity  Management: activity encouraged  Range of Motion: active ROM (range of motion) encouraged  Intervention: Prevent Infection  Recent Flowsheet Documentation  Taken 9/21/2023 1000 by Riddhi Regalado RN  Infection Prevention:   hand hygiene promoted   personal protective equipment utilized   single patient room provided  Taken 9/21/2023 0800 by Riddhi Regalado RN  Infection Prevention:   hand hygiene promoted   personal protective equipment utilized   single patient room provided  Goal: Optimal Comfort and Wellbeing  Outcome: Met  Intervention: Monitor Pain and Promote Comfort  Recent Flowsheet Documentation  Taken 9/21/2023 1200 by Riddhi Regalado RN  Pain Management Interventions: pain management plan reviewed with patient/caregiver  Taken 9/21/2023 1000 by Riddhi Regalado RN  Pain Management Interventions: see MAR  Taken 9/21/2023 0800 by Riddhi Regalado RN  Pain Management Interventions: pain management plan reviewed with patient/caregiver  Intervention: Provide Person-Centered Care  Recent Flowsheet Documentation  Taken 9/21/2023 0800 by Riddhi Regalado RN  Trust Relationship/Rapport:   care explained   thoughts/feelings acknowledged  Goal: Readiness for Transition of Care  Outcome: Met     Problem: Fall Injury Risk  Goal: Absence of Fall and Fall-Related Injury  Outcome: Met  Intervention: Identify and Manage Contributors  Recent Flowsheet Documentation  Taken 9/21/2023 1000 by Riddhi Regalado RN  Medication Review/Management: medications reviewed  Self-Care Promotion: independence encouraged  Taken 9/21/2023 0800 by Riddhi Regalado RN  Medication Review/Management: medications reviewed  Self-Care Promotion: independence encouraged  Intervention: Promote Injury-Free Environment  Recent Flowsheet Documentation  Taken 9/21/2023 1000 by Riddhi Regalado RN  Safety Promotion/Fall Prevention:   assistive device/personal items within reach   clutter free environment maintained   fall prevention program  maintained   activity supervised   nonskid shoes/slippers when out of bed   room organization consistent   safety round/check completed  Taken 9/21/2023 0800 by Riddhi Regalado RN  Safety Promotion/Fall Prevention:   assistive device/personal items within reach   clutter free environment maintained   fall prevention program maintained   activity supervised   nonskid shoes/slippers when out of bed   room organization consistent   safety round/check completed     Problem: Skin Injury Risk Increased  Goal: Skin Health and Integrity  Outcome: Met  Intervention: Optimize Skin Protection  Recent Flowsheet Documentation  Taken 9/21/2023 1200 by Riddhi Regalado RN  Pressure Reduction Techniques: frequent weight shift encouraged  Pressure Reduction Devices:   pressure-redistributing mattress utilized   positioning supports utilized  Skin Protection:   adhesive use limited   pouching devices used   protective footwear used   incontinence pads utilized   transparent dressing maintained  Taken 9/21/2023 1000 by Riddhi Regalado RN  Pressure Reduction Techniques: frequent weight shift encouraged  Head of Bed (HOB) Positioning: HOB at 20-30 degrees  Pressure Reduction Devices:   pressure-redistributing mattress utilized   positioning supports utilized  Skin Protection:   adhesive use limited   incontinence pads utilized   transparent dressing maintained   pouching devices used   protective footwear used  Taken 9/21/2023 0800 by Riddhi Regalado RN  Pressure Reduction Techniques: frequent weight shift encouraged  Head of Bed (HOB) Positioning: HOB at 20-30 degrees  Pressure Reduction Devices:   pressure-redistributing mattress utilized   positioning supports utilized  Skin Protection:   adhesive use limited   pouching devices used   transparent dressing maintained   incontinence pads utilized   protective footwear used   Goal Outcome Evaluation:

## 2023-09-21 NOTE — PROGRESS NOTES
[facility] at [facility]  IP Progress Note      Chief Complaint/Reason for service: Unstable angina with elevated troponins #2 multivessel CAD #3 hypertension    Subjective   Subjective: An EKG was ordered this morning.  The patient states she has not had any tightness or anginal type symptoms since yesterday morning.  She wants very much to go home.  She denies any significant wheezing at home.    Past medical, surgical, social and family history reviewed in the patient's electronic medical record.    Objective     Vital Sign Min/Max for last 24 hours  Temp  Min: 97.1 °F (36.2 °C)  Max: 98.8 °F (37.1 °C)   BP  Min: 129/50  Max: 203/85   Pulse  Min: 73  Max: 91   Resp  Min: 16  Max: 20   SpO2  Min: 93 %  Max: 98 %   No data recorded      Intake/Output Summary (Last 24 hours) at 9/21/2023 0726  Last data filed at 9/21/2023 0338  Gross per 24 hour   Intake 350 ml   Output 1750 ml   Net -1400 ml             Current Facility-Administered Medications:     acetaminophen (TYLENOL) tablet 650 mg, 650 mg, Oral, Q4H PRN, Martín Tobin MD    ALPRAZolam (XANAX) tablet 0.5 mg, 0.5 mg, Oral, TID PRN, Martín Tobin MD    aspirin EC tablet 81 mg, 81 mg, Oral, Daily, Martín Tobin MD, 81 mg at 09/20/23 1441    cloNIDine (CATAPRES) tablet 0.1 mg, 0.1 mg, Oral, Q6H PRN, Martín Tobin MD, 0.1 mg at 09/20/23 2124    clopidogrel (PLAVIX) tablet 75 mg, 75 mg, Oral, Daily, Martín Tobin MD    gabapentin (NEURONTIN) capsule 800 mg, 800 mg, Oral, Q8H, Martín Tobin MD, 800 mg at 09/21/23 0632    HYDROmorphone (DILAUDID) injection 1 mg, 1 mg, Intravenous, Q2H PRN **AND** naloxone (NARCAN) injection 0.4 mg, 0.4 mg, Intravenous, Q5 Min PRN, Martní Tobin MD    isosorbide mononitrate (IMDUR) 24 hr tablet 60 mg, 60 mg, Oral, Q24H, Martín Tobin MD    lidocaine (XYLOCAINE) 1 % injection, , , PRN, Filiberto Alcantara MD, 50 mL at 09/20/23 1253    lisinopril (PRINIVIL,ZESTRIL) tablet 10 mg, 10 mg,  Oral, QAM, Martín Tobin MD, 10 mg at 09/21/23 0632    metoprolol tartrate (LOPRESSOR) half tablet 12.5 mg, 12.5 mg, Oral, Q12H, Martín Park MD    nicotine (NICODERM CQ) 14 MG/24HR patch 1 patch, 1 patch, Transdermal, Once, Dori Bullard, APRN, 1 patch at 09/20/23 2054    nitroglycerin (NITROSTAT) SL tablet 0.4 mg, 0.4 mg, Sublingual, Q5 Min PRN, Martín Tobin MD    oxyCODONE-acetaminophen (PERCOCET)  MG per tablet 1 tablet, 1 tablet, Oral, Q6H PRN, Martín Tobin MD, 1 tablet at 09/21/23 0426    prochlorperazine (COMPAZINE) tablet 10 mg, 10 mg, Oral, Q6H PRN, Martín Tobin MD    rosuvastatin (CRESTOR) tablet 10 mg, 10 mg, Oral, Nightly, Martín Tobin MD, 10 mg at 09/20/23 2054    sertraline (ZOLOFT) tablet 100 mg, 100 mg, Oral, Nightly, Martín Tobin MD, 100 mg at 09/20/23 2054    sodium chloride 500 mL with heparin (porcine) 1,000 Units mixture, , , PRN, Filiberto Alcantara MD, Given at 09/20/23 1253    Physical Exam: General pleasant female in bed current heart rate 83 systolic blood pressure 160.  Not dyspneic tachypneic        HEENT: No JVP.  No icterus       Respiratory: Equal bilateral symmetrical expansion auscultation bilaterally       Cardiovascular: Regular rate and rhythm with a grade 2-3 over systolic ejection murmur no edema palpation       GI: Soft and flat       Lower Extremities: No lesions.  Has stasis abnormalities       Neuro: Facial expressions are symmetrical and she is able to set up on her own       Skin: Warm and dry no edema       Psych: Pleasant affect    Results Review: Output exceeds intake by 1.4 L.  Sodium is improved to 131.  GFR is greater than 100.  Heart rate 73-88.  Blood pressure is quite labile    Radiology Results:  Imaging Results (Last 72 Hours)       ** No results found for the last 72 hours. **            EKG: Sinus rhythm ST segment downsloping depression worse than EKG yesterday    ECHO: Preserved EF    Tele: Sinus rhythm    Assessment    Assessment/Plan: Non-STEMI-multivessel CAD-an EKG was obtained this morning but the patient denies anginal symptoms.  I will start her on metoprolol 12.5 mg twice daily and she was already started on Imdur 60 mg yesterday.  We will check P2 Y12 level to see if she needs to change to one of the other antiplatelet drug  2 hypertension-have ordered metoprolol 12.5 twice daily and will increase lisinopril  Continue high-dose statin  Patient would like to go home today.  I will recheck her this afternoon    Martín Park MD  09/21/23  07:26 EDT

## 2023-09-21 NOTE — PROGRESS NOTES
Pt. Referred for Phase II Cardiac Rehab. Patient was in the restroom at time of visit but their son was present. Staff discussed benefits of exercise, program protocol, and educational material provided to patients son. Teach back verified.  Permission granted from patients son for staff to fax referral information to outlying program at this time. Staff faxed referral info to [facility]

## 2023-09-21 NOTE — DISCHARGE SUMMARY
Cardiology Discharge Note    Patient Name:  Melania Mae  Date of Admission:  9/20/2023  Date of Discharge:  9/21/2023    Discharge Diagnosis: Status post non-STEMI #2 severe multivessel coronary artery disease #3 peripheral vascular disease 4 uncontrolled hypertension    Problem List:    Precordial chest pain    Coronary artery disease involving native coronary artery of native heart with unstable angina pectoris      Presenting Problem/History of Present Illness:  Precordial chest pain [R07.2]    The patient actually presented for a vascular procedure and told the anesthesiologist she was having chest pain.    Hospital Course:  Patient is a 58 y.o. female presented with chest pain.  This patient has known vascular disease including prior stenting of the carotid arteries.  She underwent a recent carotid angiogram which showed bilateral 50% stenosis.  She also was having symptoms of bowel ischemia.  An aortogram with runoff was performed which showed an aortic lesion but no significant disease of the vessels beyond the aorta.  She is referred to vascular surgery for treatment for severe stenosis of the celiac artery.  The patient toad the anesthesiologist that she been having chest pain.  I saw the patient in consult and in route on the day of procedure yesterday she had significant chest pain shortness of breath.  She admits that she has had chest discomfort now for several months which is increasing in frequency.  She is also at increased dyspnea and fatigue.  She underwent a cardiac cath yesterday which showed a completely occluded right coronary artery filling by collaterals.  It was a very large vessel.  LAD had no significant disease the circumflex marginals had severe disease but they were too small for intervention.  The patient did have elevated high sensitive troponins consistent with non-STEMI.  Blood pressure was markedly elevated on admission.  She tells me that she has not been on her  "lisinopril for 2 years.  The patient was started on Crestor, metoprolol, Imdur.  She walked to the bathroom without any further chest pain.  I counseled her on the absolute significance of stopping smoking.        Physical Exam:  Temp:  [97.9 °F (36.6 °C)-98.8 °F (37.1 °C)] 98 °F (36.7 °C)  Heart Rate:  [71-91] 71  Resp:  [16-20] 16  BP: (129-203)/(50-93) 159/57    Neck: No JVP.  There are bilateral bruits    Cardiovascular: Regular rate and rhythm without murmur gallop or click and no edema palpation        Respiratory: Equal bilateral symmetrical expansion\" bilaterally    Neurologic: Facial expressions are symmetrical moves all 4 extremity      Procedures Performed: Cardiac catheterization      Consults:   Consults       Date and Time Order Name Status Description    9/20/2023  1:30 PM Inpatient Consult to Cardiology Completed               Condition on Discharge: Improved      Discharge Disposition: Home    Discharge Medications:     Discharge Medications        New Medications        Instructions Start Date   isosorbide mononitrate 60 MG 24 hr tablet  Commonly known as: IMDUR   60 mg, Oral, Every 24 Hours Scheduled   Start Date: September 22, 2023     metoprolol tartrate 25 MG tablet  Commonly known as: LOPRESSOR   25 mg, Oral, Every 12 Hours Scheduled      nitroglycerin 0.4 MG SL tablet  Commonly known as: NITROSTAT   0.4 mg, Sublingual, Every 5 Minutes PRN, Take no more than 3 doses in 15 minutes.      rosuvastatin 10 MG tablet  Commonly known as: CRESTOR   20 mg, Oral, Nightly             Changes to Medications        Instructions Start Date   lisinopril 40 MG tablet  Commonly known as: PRINIVILZESTRIL  What changed:   medication strength  how much to take   40 mg, Oral, Every Morning   Start Date: September 22, 2023     sulfamethoxazole-trimethoprim 800-160 MG per tablet  Commonly known as: BACTRIM DS,SEPTRA DS  What changed:   when to take this  additional instructions   TAKE 1 TABLET BY MOUTH EVERY DAY    "          Continue These Medications        Instructions Start Date   ADVIL PO   400 mg, Oral, 3 Times Daily, LIQUIGELS      albuterol sulfate  (90 Base) MCG/ACT inhaler  Commonly known as: PROVENTIL HFA;VENTOLIN HFA;PROAIR HFA   2 puffs, Inhalation, As Needed      ALPRAZolam 0.5 MG tablet  Commonly known as: XANAX   0.5 mg, Oral, 3 Times Daily PRN      aspirin 81 MG EC tablet   81 mg, Oral, Daily      CBD oil capsule  Commonly known as: cannabidiol   Oral, Daily      clopidogrel 75 MG tablet  Commonly known as: PLAVIX   75 mg, Oral, Daily      gabapentin 800 MG tablet  Commonly known as: NEURONTIN   800 mg, Oral, 4 Times Daily      Imbruvica 420 MG tablet tablet  Generic drug: ibrutinib   420 mg, Oral, Daily      oxyCODONE-acetaminophen  MG per tablet  Commonly known as: PERCOCET   1 tablet, Oral, Every 6 Hours PRN      prochlorperazine 10 MG tablet  Commonly known as: COMPAZINE   10 mg, Oral, Every 6 Hours PRN      sertraline 100 MG tablet  Commonly known as: ZOLOFT   100 mg, Oral, Nightly      valACYclovir 500 MG tablet  Commonly known as: VALTREX   TAKE 1 TABLET BY MOUTH EVERY DAY      Xtampza ER 18 MG capsule extended-release 12 hour  capsule  Generic drug: oxyCODONE ER   TAKE ONE CAPSULE BY MOUTH EVERY 12 HOURS FOR PAIN               Discharge Diet: Healthy heart    Activity at Discharge: May resume normal activities tomorrow    Follow-up Appointments: Follow-up with Dr. Ned Ferrell in [place] in 4 to 6 weeks  Strongly recommend cessation of smoking      Time: 33 minutes    Martín Park MD,  9/21/2023 - 13:50 EDT

## 2023-09-21 NOTE — CASE MANAGEMENT/SOCIAL WORK
Continued Stay Note  [facility]     Patient Name: Melania Mae  MRN: [id number]  Today's Date: 9/21/2023    Admit Date: 9/20/2023    Plan: Home at DC   Discharge Plan       Row Name 09/21/23 1341       Plan    Plan Home at DC    Plan Comments The pt has been DCed. No DC needs identified.    Final Discharge Disposition Code 01 - home or self-care                   Discharge Codes    No documentation.                 Expected Discharge Date and Time       Expected Discharge Date Expected Discharge Time    Sep 21, 2023               Svitlana Bowling RN

## 2023-09-27 LAB
QT INTERVAL: 392 MS
QTC INTERVAL: 420 MS

## 2023-10-05 ENCOUNTER — TELEPHONE (OUTPATIENT)
Dept: CARDIAC REHAB | Facility: HOSPITAL | Age: 58
End: 2023-10-05
Payer: MEDICARE

## 2023-10-05 NOTE — TELEPHONE ENCOUNTER
Staff spoke with pt regarding cardiac rehab referral. Pt stated that with her back problems she isn't able to do much exercise and isn't even able to walk much. She stated that when she has her back surgery she will call us back to see if she is still able to attend.

## 2023-10-06 ENCOUNTER — TELEPHONE (OUTPATIENT)
Dept: CARDIOLOGY | Facility: CLINIC | Age: 58
End: 2023-10-06
Payer: MEDICARE
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
## 2023-10-06 NOTE — TELEPHONE ENCOUNTER
Caller: Melania Mae    Relationship to patient: Self    Best call back number: [phone]    Patient is needing: TO DISCUSS HER BP MEDICATIONS TO SEE IF THE DOSAGE CAN BE INCREASED. PT STATES HER BP WILL GO DOWN FOR A FEW HOURS AND THEN SHOOT BACK UP. PLEASE ADVISE THANK YOU  176/59 -            136/49  BEFORE            AFTER THE MEDICATION  MEDICATION

## 2023-10-16 ENCOUNTER — TRANSCRIBE ORDERS (OUTPATIENT)
Dept: ADMINISTRATIVE | Facility: HOSPITAL | Age: 58
End: 2023-10-16
Payer: MEDICARE

## 2023-10-16 ENCOUNTER — LAB (OUTPATIENT)
Dept: LAB | Facility: HOSPITAL | Age: 58
End: 2023-10-16
Payer: MEDICARE

## 2023-10-16 DIAGNOSIS — E78.5 HYPERLIPIDEMIA, UNSPECIFIED HYPERLIPIDEMIA TYPE: ICD-10-CM

## 2023-10-16 DIAGNOSIS — E11.9 TYPE 2 DIABETES MELLITUS WITHOUT COMPLICATION, UNSPECIFIED WHETHER LONG TERM INSULIN USE: ICD-10-CM

## 2023-10-16 DIAGNOSIS — R53.83 OTHER FATIGUE: ICD-10-CM

## 2023-10-16 DIAGNOSIS — E55.9 VITAMIN D DEFICIENCY: ICD-10-CM

## 2023-10-16 DIAGNOSIS — I10 ESSENTIAL (PRIMARY) HYPERTENSION: Primary | ICD-10-CM

## 2023-10-16 DIAGNOSIS — I10 ESSENTIAL (PRIMARY) HYPERTENSION: ICD-10-CM

## 2023-10-16 LAB
ALBUMIN SERPL-MCNC: 4.1 G/DL (ref 3.5–5.2)
ALBUMIN/GLOB SERPL: 2 G/DL
ALP SERPL-CCNC: 66 U/L (ref 39–117)
ALT SERPL W P-5'-P-CCNC: 9 U/L (ref 1–33)
ANION GAP SERPL CALCULATED.3IONS-SCNC: 10.6 MMOL/L (ref 5–15)
AST SERPL-CCNC: 10 U/L (ref 1–32)
BASOPHILS # BLD AUTO: 0.02 10*3/MM3 (ref 0–0.2)
BASOPHILS NFR BLD AUTO: 0.2 % (ref 0–1.5)
BILIRUB SERPL-MCNC: 0.2 MG/DL (ref 0–1.2)
BUN SERPL-MCNC: 13 MG/DL (ref 6–20)
BUN/CREAT SERPL: 15.7 (ref 7–25)
CALCIUM SPEC-SCNC: 8.6 MG/DL (ref 8.6–10.5)
CHLORIDE SERPL-SCNC: 101 MMOL/L (ref 98–107)
CHOLEST SERPL-MCNC: 108 MG/DL (ref 0–200)
CO2 SERPL-SCNC: 21.4 MMOL/L (ref 22–29)
CREAT SERPL-MCNC: 0.83 MG/DL (ref 0.57–1)
DEPRECATED RDW RBC AUTO: 52.6 FL (ref 37–54)
EGFRCR SERPLBLD CKD-EPI 2021: 81.3 ML/MIN/1.73
EOSINOPHIL # BLD AUTO: 0.05 10*3/MM3 (ref 0–0.4)
EOSINOPHIL NFR BLD AUTO: 0.6 % (ref 0.3–6.2)
ERYTHROCYTE [DISTWIDTH] IN BLOOD BY AUTOMATED COUNT: 13.3 % (ref 12.3–15.4)
GLOBULIN UR ELPH-MCNC: 2.1 GM/DL
GLUCOSE SERPL-MCNC: 167 MG/DL (ref 65–99)
HBA1C MFR BLD: 6.5 % (ref 4.8–5.6)
HCT VFR BLD AUTO: 38 % (ref 34–46.6)
HDLC SERPL-MCNC: 48 MG/DL (ref 40–60)
HGB BLD-MCNC: 12.2 G/DL (ref 12–15.9)
IMM GRANULOCYTES # BLD AUTO: 0.03 10*3/MM3 (ref 0–0.05)
IMM GRANULOCYTES NFR BLD AUTO: 0.3 % (ref 0–0.5)
LDLC SERPL CALC-MCNC: 41 MG/DL (ref 0–100)
LDLC/HDLC SERPL: 0.82 {RATIO}
LYMPHOCYTES # BLD AUTO: 1.27 10*3/MM3 (ref 0.7–3.1)
LYMPHOCYTES NFR BLD AUTO: 14.8 % (ref 19.6–45.3)
MCH RBC QN AUTO: 34.1 PG (ref 26.6–33)
MCHC RBC AUTO-ENTMCNC: 32.1 G/DL (ref 31.5–35.7)
MCV RBC AUTO: 106.1 FL (ref 79–97)
MONOCYTES # BLD AUTO: 0.36 10*3/MM3 (ref 0.1–0.9)
MONOCYTES NFR BLD AUTO: 4.2 % (ref 5–12)
NEUTROPHILS NFR BLD AUTO: 6.87 10*3/MM3 (ref 1.7–7)
NEUTROPHILS NFR BLD AUTO: 79.9 % (ref 42.7–76)
NRBC BLD AUTO-RTO: 0 /100 WBC (ref 0–0.2)
PLATELET # BLD AUTO: 197 10*3/MM3 (ref 140–450)
PMV BLD AUTO: 9.9 FL (ref 6–12)
POTASSIUM SERPL-SCNC: 4.7 MMOL/L (ref 3.5–5.2)
PROT SERPL-MCNC: 6.2 G/DL (ref 6–8.5)
RBC # BLD AUTO: 3.58 10*6/MM3 (ref 3.77–5.28)
SODIUM SERPL-SCNC: 133 MMOL/L (ref 136–145)
TRIGL SERPL-MCNC: 103 MG/DL (ref 0–150)
TSH SERPL DL<=0.05 MIU/L-ACNC: 0.45 UIU/ML (ref 0.27–4.2)
VLDLC SERPL-MCNC: 19 MG/DL (ref 5–40)
WBC NRBC COR # BLD: 8.6 10*3/MM3 (ref 3.4–10.8)

## 2023-10-16 PROCEDURE — 82306 VITAMIN D 25 HYDROXY: CPT

## 2023-10-16 PROCEDURE — 80053 COMPREHEN METABOLIC PANEL: CPT

## 2023-10-16 PROCEDURE — 84443 ASSAY THYROID STIM HORMONE: CPT

## 2023-10-16 PROCEDURE — 84436 ASSAY OF TOTAL THYROXINE: CPT

## 2023-10-16 PROCEDURE — 80061 LIPID PANEL: CPT

## 2023-10-16 PROCEDURE — 36415 COLL VENOUS BLD VENIPUNCTURE: CPT

## 2023-10-16 PROCEDURE — 85025 COMPLETE CBC W/AUTO DIFF WBC: CPT

## 2023-10-16 PROCEDURE — 84480 ASSAY TRIIODOTHYRONINE (T3): CPT

## 2023-10-16 PROCEDURE — 83036 HEMOGLOBIN GLYCOSYLATED A1C: CPT

## 2023-10-17 LAB
25(OH)D3 SERPL-MCNC: 14.8 NG/ML (ref 30–100)
T3 SERPL-MCNC: 74.2 NG/DL (ref 80–200)
T4 SERPL-MCNC: 6.87 MCG/DL (ref 4.5–11.7)

## 2023-10-17 RX ORDER — OXYCODONE HCL 20 MG/1
20 TABLET, FILM COATED, EXTENDED RELEASE ORAL EVERY 12 HOURS
COMMUNITY

## 2023-10-18 ENCOUNTER — ANESTHESIA EVENT (OUTPATIENT)
Dept: PERIOP | Facility: HOSPITAL | Age: 58
End: 2023-10-18
Payer: MEDICARE

## 2023-10-18 ENCOUNTER — APPOINTMENT (OUTPATIENT)
Dept: GENERAL RADIOLOGY | Facility: HOSPITAL | Age: 58
End: 2023-10-18
Payer: MEDICARE

## 2023-10-18 ENCOUNTER — ANESTHESIA (OUTPATIENT)
Dept: PERIOP | Facility: HOSPITAL | Age: 58
End: 2023-10-18
Payer: MEDICARE

## 2023-10-18 ENCOUNTER — HOSPITAL ENCOUNTER (OUTPATIENT)
Facility: HOSPITAL | Age: 58
Setting detail: SURGERY ADMIT
Discharge: HOME | End: 2023-10-18
Attending: SURGERY | Admitting: SURGERY
Payer: MEDICARE

## 2023-10-18 VITALS
DIASTOLIC BLOOD PRESSURE: 74 MMHG | RESPIRATION RATE: 14 BRPM | OXYGEN SATURATION: 95 % | TEMPERATURE: 97.6 F | WEIGHT: 141.98 LBS | HEIGHT: 64 IN | HEART RATE: 56 BPM | BODY MASS INDEX: 24.24 KG/M2 | SYSTOLIC BLOOD PRESSURE: 160 MMHG

## 2023-10-18 LAB — GLUCOSE BLDC GLUCOMTR-MCNC: 147 MG/DL (ref 70–130)

## 2023-10-18 PROCEDURE — C1894 INTRO/SHEATH, NON-LASER: HCPCS | Performed by: SURGERY

## 2023-10-18 PROCEDURE — C1760 CLOSURE DEV, VASC: HCPCS | Performed by: SURGERY

## 2023-10-18 PROCEDURE — C1887 CATHETER, GUIDING: HCPCS | Performed by: SURGERY

## 2023-10-18 PROCEDURE — 25010000002 PROPOFOL 10 MG/ML EMULSION: Performed by: NURSE ANESTHETIST, CERTIFIED REGISTERED

## 2023-10-18 PROCEDURE — 25010000002 HEPARIN (PORCINE) PER 1000 UNITS: Performed by: NURSE ANESTHETIST, CERTIFIED REGISTERED

## 2023-10-18 PROCEDURE — 25010000002 HEPARIN (PORCINE) PER 1000 UNITS: Performed by: SURGERY

## 2023-10-18 PROCEDURE — C1769 GUIDE WIRE: HCPCS | Performed by: SURGERY

## 2023-10-18 PROCEDURE — 25010000002 MIDAZOLAM PER 1 MG: Performed by: NURSE ANESTHETIST, CERTIFIED REGISTERED

## 2023-10-18 PROCEDURE — 25010000002 FENTANYL CITRATE (PF) 100 MCG/2ML SOLUTION: Performed by: NURSE ANESTHETIST, CERTIFIED REGISTERED

## 2023-10-18 PROCEDURE — C1874 STENT, COATED/COV W/DEL SYS: HCPCS | Performed by: SURGERY

## 2023-10-18 PROCEDURE — 25810000003 LACTATED RINGERS PER 1000 ML: Performed by: ANESTHESIOLOGY

## 2023-10-18 PROCEDURE — 82948 REAGENT STRIP/BLOOD GLUCOSE: CPT

## 2023-10-18 PROCEDURE — 25010000002 CEFAZOLIN IN DEXTROSE 2-4 GM/100ML-% SOLUTION: Performed by: NURSE ANESTHETIST, CERTIFIED REGISTERED

## 2023-10-18 PROCEDURE — 25010000002 PROTAMINE SULFATE PER 10 MG: Performed by: NURSE ANESTHETIST, CERTIFIED REGISTERED

## 2023-10-18 PROCEDURE — 25810000003 LACTATED RINGERS PER 1000 ML: Performed by: NURSE ANESTHETIST, CERTIFIED REGISTERED

## 2023-10-18 PROCEDURE — 0 IODIXANOL PER 1 ML: Performed by: SURGERY

## 2023-10-18 PROCEDURE — 25010000002 FENTANYL CITRATE (PF) 50 MCG/ML SOLUTION

## 2023-10-18 PROCEDURE — 25010000002 LIDOCAINE 1 % SOLUTION: Performed by: SURGERY

## 2023-10-18 PROCEDURE — 25010000002 ONDANSETRON PER 1 MG: Performed by: NURSE ANESTHETIST, CERTIFIED REGISTERED

## 2023-10-18 DEVICE — VIABAHN BX BALLOON EXP ENDO 7MMX59MM 7FR 80CMCATH HEPARIN
Type: IMPLANTABLE DEVICE | Site: ARTERY ILIAC | Status: FUNCTIONAL
Brand: GORE VIABAHN VBX BALLOON EXPANDABLE ENDO

## 2023-10-18 DEVICE — VIABAHN BX BALLOON EXP ENDO 6MMX29MM 7FR 80CMCATH HEPARIN
Type: IMPLANTABLE DEVICE | Site: ARTERIAL | Status: FUNCTIONAL
Brand: GORE VIABAHN VBX BALLOON EXPANDABLE ENDO

## 2023-10-18 RX ORDER — LIDOCAINE HYDROCHLORIDE 10 MG/ML
INJECTION, SOLUTION EPIDURAL; INFILTRATION; INTRACAUDAL; PERINEURAL AS NEEDED
Status: DISCONTINUED | OUTPATIENT
Start: 2023-10-18 | End: 2023-10-18 | Stop reason: SURG

## 2023-10-18 RX ORDER — IODIXANOL 320 MG/ML
INJECTION, SOLUTION INTRAVASCULAR AS NEEDED
Status: DISCONTINUED | OUTPATIENT
Start: 2023-10-18 | End: 2023-10-18 | Stop reason: HOSPADM

## 2023-10-18 RX ORDER — FENTANYL CITRATE 50 UG/ML
INJECTION, SOLUTION INTRAMUSCULAR; INTRAVENOUS AS NEEDED
Status: DISCONTINUED | OUTPATIENT
Start: 2023-10-18 | End: 2023-10-18 | Stop reason: SURG

## 2023-10-18 RX ORDER — ONDANSETRON 4 MG/1
4 TABLET, FILM COATED ORAL EVERY 6 HOURS PRN
Status: DISCONTINUED | OUTPATIENT
Start: 2023-10-18 | End: 2023-10-18 | Stop reason: HOSPADM

## 2023-10-18 RX ORDER — SODIUM CHLORIDE 9 MG/ML
40 INJECTION, SOLUTION INTRAVENOUS AS NEEDED
Status: DISCONTINUED | OUTPATIENT
Start: 2023-10-18 | End: 2023-10-18 | Stop reason: HOSPADM

## 2023-10-18 RX ORDER — HYDROCODONE BITARTRATE AND ACETAMINOPHEN 5; 325 MG/1; MG/1
1 TABLET ORAL EVERY 4 HOURS PRN
Status: DISCONTINUED | OUTPATIENT
Start: 2023-10-18 | End: 2023-10-18 | Stop reason: HOSPADM

## 2023-10-18 RX ORDER — FENTANYL CITRATE 50 UG/ML
INJECTION, SOLUTION INTRAMUSCULAR; INTRAVENOUS
Status: COMPLETED
Start: 2023-10-18 | End: 2023-10-18

## 2023-10-18 RX ORDER — ONDANSETRON 2 MG/ML
4 INJECTION INTRAMUSCULAR; INTRAVENOUS ONCE AS NEEDED
Status: COMPLETED | OUTPATIENT
Start: 2023-10-18 | End: 2023-10-18

## 2023-10-18 RX ORDER — CEFAZOLIN SODIUM 2 G/100ML
INJECTION, SOLUTION INTRAVENOUS AS NEEDED
Status: DISCONTINUED | OUTPATIENT
Start: 2023-10-18 | End: 2023-10-18 | Stop reason: SURG

## 2023-10-18 RX ORDER — SODIUM CHLORIDE 0.9 % (FLUSH) 0.9 %
10 SYRINGE (ML) INJECTION EVERY 12 HOURS SCHEDULED
Status: DISCONTINUED | OUTPATIENT
Start: 2023-10-18 | End: 2023-10-18 | Stop reason: HOSPADM

## 2023-10-18 RX ORDER — MIDAZOLAM HYDROCHLORIDE 1 MG/ML
INJECTION INTRAMUSCULAR; INTRAVENOUS AS NEEDED
Status: DISCONTINUED | OUTPATIENT
Start: 2023-10-18 | End: 2023-10-18 | Stop reason: SURG

## 2023-10-18 RX ORDER — LIDOCAINE HYDROCHLORIDE 10 MG/ML
0.5 INJECTION, SOLUTION EPIDURAL; INFILTRATION; INTRACAUDAL; PERINEURAL ONCE AS NEEDED
Status: COMPLETED | OUTPATIENT
Start: 2023-10-18 | End: 2023-10-18

## 2023-10-18 RX ORDER — ONDANSETRON 2 MG/ML
4 INJECTION INTRAMUSCULAR; INTRAVENOUS EVERY 6 HOURS PRN
Status: DISCONTINUED | OUTPATIENT
Start: 2023-10-18 | End: 2023-10-18 | Stop reason: HOSPADM

## 2023-10-18 RX ORDER — FENTANYL CITRATE 50 UG/ML
50 INJECTION, SOLUTION INTRAMUSCULAR; INTRAVENOUS
Status: DISCONTINUED | OUTPATIENT
Start: 2023-10-18 | End: 2023-10-18 | Stop reason: HOSPADM

## 2023-10-18 RX ORDER — HEPARIN SODIUM 1000 [USP'U]/ML
INJECTION, SOLUTION INTRAVENOUS; SUBCUTANEOUS AS NEEDED
Status: DISCONTINUED | OUTPATIENT
Start: 2023-10-18 | End: 2023-10-18 | Stop reason: SURG

## 2023-10-18 RX ORDER — PHENYLEPHRINE HCL IN 0.9% NACL 1 MG/10 ML
SYRINGE (ML) INTRAVENOUS AS NEEDED
Status: DISCONTINUED | OUTPATIENT
Start: 2023-10-18 | End: 2023-10-18 | Stop reason: SURG

## 2023-10-18 RX ORDER — SODIUM CHLORIDE 0.9 % (FLUSH) 0.9 %
10 SYRINGE (ML) INJECTION AS NEEDED
Status: DISCONTINUED | OUTPATIENT
Start: 2023-10-18 | End: 2023-10-18 | Stop reason: HOSPADM

## 2023-10-18 RX ORDER — SODIUM CHLORIDE, SODIUM LACTATE, POTASSIUM CHLORIDE, CALCIUM CHLORIDE 600; 310; 30; 20 MG/100ML; MG/100ML; MG/100ML; MG/100ML
INJECTION, SOLUTION INTRAVENOUS CONTINUOUS PRN
Status: DISCONTINUED | OUTPATIENT
Start: 2023-10-18 | End: 2023-10-18 | Stop reason: SURG

## 2023-10-18 RX ORDER — PROTAMINE SULFATE 10 MG/ML
INJECTION, SOLUTION INTRAVENOUS AS NEEDED
Status: DISCONTINUED | OUTPATIENT
Start: 2023-10-18 | End: 2023-10-18 | Stop reason: SURG

## 2023-10-18 RX ORDER — ACETAMINOPHEN 325 MG/1
650 TABLET ORAL EVERY 4 HOURS PRN
Status: DISCONTINUED | OUTPATIENT
Start: 2023-10-18 | End: 2023-10-18 | Stop reason: HOSPADM

## 2023-10-18 RX ORDER — HYDROCODONE BITARTRATE AND ACETAMINOPHEN 5; 325 MG/1; MG/1
TABLET ORAL
Status: COMPLETED
Start: 2023-10-18 | End: 2023-10-18

## 2023-10-18 RX ORDER — SODIUM CHLORIDE, SODIUM LACTATE, POTASSIUM CHLORIDE, CALCIUM CHLORIDE 600; 310; 30; 20 MG/100ML; MG/100ML; MG/100ML; MG/100ML
9 INJECTION, SOLUTION INTRAVENOUS CONTINUOUS PRN
Status: DISCONTINUED | OUTPATIENT
Start: 2023-10-18 | End: 2023-10-18 | Stop reason: HOSPADM

## 2023-10-18 RX ORDER — LIDOCAINE HYDROCHLORIDE 10 MG/ML
INJECTION, SOLUTION INFILTRATION; PERINEURAL AS NEEDED
Status: DISCONTINUED | OUTPATIENT
Start: 2023-10-18 | End: 2023-10-18 | Stop reason: HOSPADM

## 2023-10-18 RX ORDER — FAMOTIDINE 20 MG/1
20 TABLET, FILM COATED ORAL
Status: COMPLETED | OUTPATIENT
Start: 2023-10-18 | End: 2023-10-18

## 2023-10-18 RX ORDER — PROPOFOL 10 MG/ML
VIAL (ML) INTRAVENOUS CONTINUOUS PRN
Status: DISCONTINUED | OUTPATIENT
Start: 2023-10-18 | End: 2023-10-18 | Stop reason: SURG

## 2023-10-18 RX ADMIN — PROTAMINE SULFATE 30 MG: 10 INJECTION, SOLUTION INTRAVENOUS at 10:15

## 2023-10-18 RX ADMIN — MIDAZOLAM HYDROCHLORIDE 1 MG: 1 INJECTION, SOLUTION INTRAMUSCULAR; INTRAVENOUS at 09:37

## 2023-10-18 RX ADMIN — FENTANYL CITRATE 50 MCG: 50 INJECTION, SOLUTION INTRAMUSCULAR; INTRAVENOUS at 10:34

## 2023-10-18 RX ADMIN — ONDANSETRON 4 MG: 2 INJECTION INTRAMUSCULAR; INTRAVENOUS at 10:18

## 2023-10-18 RX ADMIN — FAMOTIDINE 20 MG: 20 TABLET, FILM COATED ORAL at 08:38

## 2023-10-18 RX ADMIN — HYDROCODONE BITARTRATE AND ACETAMINOPHEN 1 TABLET: 5; 325 TABLET ORAL at 11:12

## 2023-10-18 RX ADMIN — LIDOCAINE HYDROCHLORIDE 0.5 ML: 10 INJECTION, SOLUTION EPIDURAL; INFILTRATION; INTRACAUDAL; PERINEURAL at 08:38

## 2023-10-18 RX ADMIN — HEPARIN SODIUM 6000 UNITS: 1000 INJECTION, SOLUTION INTRAVENOUS; SUBCUTANEOUS at 09:46

## 2023-10-18 RX ADMIN — SODIUM CHLORIDE, POTASSIUM CHLORIDE, SODIUM LACTATE AND CALCIUM CHLORIDE 9 ML/HR: 600; 310; 30; 20 INJECTION, SOLUTION INTRAVENOUS at 08:38

## 2023-10-18 RX ADMIN — PROPOFOL 100 MCG/KG/MIN: 10 INJECTION, EMULSION INTRAVENOUS at 09:37

## 2023-10-18 RX ADMIN — MIDAZOLAM HYDROCHLORIDE 1 MG: 1 INJECTION, SOLUTION INTRAMUSCULAR; INTRAVENOUS at 09:30

## 2023-10-18 RX ADMIN — LIDOCAINE HYDROCHLORIDE 50 MG: 10 INJECTION, SOLUTION EPIDURAL; INFILTRATION; INTRACAUDAL; PERINEURAL at 09:37

## 2023-10-18 RX ADMIN — Medication 50 MCG: at 09:51

## 2023-10-18 RX ADMIN — CEFAZOLIN SODIUM 2 G: 2 INJECTION, SOLUTION INTRAVENOUS at 09:40

## 2023-10-18 RX ADMIN — SODIUM CHLORIDE, POTASSIUM CHLORIDE, SODIUM LACTATE AND CALCIUM CHLORIDE: 600; 310; 30; 20 INJECTION, SOLUTION INTRAVENOUS at 09:30

## 2023-10-18 RX ADMIN — FENTANYL CITRATE 50 MCG: 50 INJECTION, SOLUTION INTRAMUSCULAR; INTRAVENOUS at 09:37

## 2023-10-18 RX ADMIN — FENTANYL CITRATE 50 MCG: 50 INJECTION, SOLUTION INTRAMUSCULAR; INTRAVENOUS at 09:30

## 2023-10-18 NOTE — OP NOTE
AORTAGRAM WITH OR WITHOUT RUNOFFS POSSIBLE STENT  Procedure Report    Patient Name:  Melania Mae  YOB: 1965    Date of Surgery:  10/18/2023     Indications: This is a 58-year-old female with history of mesenteric angina as well as nearly occluded aortic bifurcation at the iliacs.  She has both debilitating claudication as well as mesenteric angina.  She has been offered simultaneous treatment of both.    Pre-op Diagnosis:   Chronic mesenteric ischemia with aortoiliac occlusive disease and claudication       Post-Op Diagnosis Codes:  Chronic mesenteric ischemia with aortoiliac occlusive disease and claudication    Procedure/CPT® Codes:      Procedure(s):  AORTAGRAM, BILATERAL COMMON ILIAC ARTERY STENTS,   SUPERIOR MESENTERIC  ARTERY STENT    Staff:  Surgeon(s):  Filiberto Alcantara MD    Circulator: Skinny Matos RN  Scrub Person: Michelle Henry; Robbie Otto             Anesthesia: Monitored Anesthesia Care    Estimated Blood Loss: none    Implants:    Implant Name Type Inv. Item Serial No.  Lot No. LRB No. Used Action   STENTGR ENDOPROSTH VIABAHN VBX EXP 7F [id number] 80CM - [id number] - [id number] Implant STENTGR ENDOPROSTH VIABAHN VBX EXP 7F [id number] 80CM [id number] [employer]  Right 1 Implanted   STENTGR ENDOPROSTH VIABAHN VBX EXP 7F [id number] 80CM - [id number] - [id number] Implant STENTGR ENDOPROSTH VIABAHN VBX EXP 7F [id number] 80CM [id number] [employer]  Left 1 Implanted   STENTGR ENDOPROSTH VIABAHN VBX EXP 7F [id number] 80CM - [id number] - [id number] Implant STENTGR ENDOPROSTH VIABAHN VBX EXP 7F [id number] 80CM [id number] [employer]  Left 1 Implanted       Specimen:          None        Findings:    1.  Severe calcific infrarenal aortic bifurcation which was very challenging to cross.  This was treated with kissing iliac stents extending into the aorta  2.  Long segment ostial stenosis involving the superior mesenteric artery was treated with a 6 x 29 Trenary  VBX stent    Complications: None    Description of Procedure: Patient was taken back to the operating room placed in supine position on operating table.  Following IV sedation bilateral groins and abdomen were widely prepped and draped in standard sterile fashion.  Timeout was taken with identification of the patient and procedure.  Under ultrasound guidance bilateral femoral arteries were accessed.  8 Chinese sheaths were placed and heparin was administered.  Initially there was a lot of difficulties advancing the wire into the aorta.  At the iliac bifurcation there was severe stenosis.  This was ultimately successful.  Flush catheter was placed within the abdominal aorta.  Aortography demonstrated there was cessation of flow at the bifurcation initially and then there was some passage afterwards.  This correlates with the severe stenosis seen on CT angiography.  This location was treated with kissing stent placement extending into the aorta.  7 x 59 Oxford VBX stents were placed in kissing fashion.  This had complete resolution of the stenosis.  On the lateral aortography the superior mesenteric artery was identified.  The long segment stenosis was also identified.  This was cannulated and an 8 x 65 cm sheath was advanced into the superior mesenteric artery.  This was next treated with a 6 x 29 Oxford VBX stent.  Complete resolution of stenosis persisted.  Patient had removal of sheaths and Angio-Seal closure device used successfully.      Filiberto Alcantara MD     Date: 10/18/2023  Time: 10:18 EDT

## 2023-10-18 NOTE — ANESTHESIA POSTPROCEDURE EVALUATION
Patient: Melania Mae    Procedure Summary       Date: 10/18/23 Room / Location:  [facility] /  [facility]    Anesthesia Start: 0930 Anesthesia Stop: 1028    Procedure: AORTAGRAM, BILATERAL COMMON ILIAC ARTERY STENTS, SUPERIOR MESENTERIC  ARTERY STENT (Abdomen) Diagnosis:     Surgeons: Filiberto Alcantara MD Provider: Jhoan Rodriguez MD    Anesthesia Type: MAC ASA Status: 4            Anesthesia Type: MAC    Vitals  Vitals Value Taken Time   /57 10/18/23 1026   Temp     Pulse 63 10/18/23 1028   Resp     SpO2 95 % 10/18/23 1028   Vitals shown include unfiled device data.        Post Anesthesia Care and Evaluation    Patient location during evaluation: PACU  Patient participation: complete - patient participated  Level of consciousness: awake and alert  Pain management: adequate    Airway patency: patent  Anesthetic complications: No anesthetic complications  PONV Status: none  Cardiovascular status: hemodynamically stable and acceptable  Respiratory status: nonlabored ventilation, acceptable and nasal cannula  Hydration status: acceptable

## 2023-10-18 NOTE — ANESTHESIA PREPROCEDURE EVALUATION
Anesthesia Evaluation     Patient summary reviewed and Nursing notes reviewed   NPO Solid Status: > 8 hours  NPO Liquid Status: > 2 hours           Airway   Mallampati: II  TM distance: >3 FB  Neck ROM: full  No difficulty expected  Dental    (+) upper dentures and edentulous    Pulmonary    (+) a smoker Current, COPD (MDI) moderate, asthma,  (-) shortness of breath, recent URI, sleep apnea  Cardiovascular     ECG reviewed  Patient on routine beta blocker    (+) hypertension, valvular problems/murmurs (mild see ECHO), CAD (mild or collaterlised - med Rx only), angina (NTG), PVD, hyperlipidemia,  carotid artery disease (sp stented 2017) right carotid  (-) past MI    ROS comment: ECG NSR Biatrial enlargement   ANT changes from 2018 ST TW abn laterl     ECHO 9/223 EF>62% mild concentric LVH. AV calcificn     CATH 9/23   % collaterals.LAD mild lesions OM1 mid 80% stenosis .EF >55%           Neuro/Psych  (+) CVA (Cavenous sinus L ICA aneurysm), psychiatric history  (-) seizures  GI/Hepatic/Renal/Endo    (+) diabetes mellitus (diet controlled A1C <7) type 2  (-) no renal disease, no thyroid disorder    Musculoskeletal     Abdominal    Substance History      OB/GYN          Other   arthritis, blood dyscrasia (CLL  2012),   history of cancer    ROS/Med Hx Other: Plavix  Case cancelled last month for CP w/u which showed collateralised CAD no stents (see cath\)                Anesthesia Plan    ASA 4     MAC     (Stable CAD aim MAP >65  Local per surgeon )  intravenous induction     Anesthetic plan, risks, benefits, and alternatives have been provided, discussed and informed consent has been obtained with: patient.    Plan discussed with CRNA.    CODE STATUS:

## 2023-10-18 NOTE — H&P
"  Pre-Op H&P  Melania Mae  [id number]  1965      Chief complaint: Chest Pain      Subjective:  Patient is a 58 y.o.female presents for scheduled surgery by Dr. Alcantara. She anticipates a AORTAGRAM WITH INTERVENTION today.  The patient endorses having chest pain and left arm pain for a few months.  She endorses having this pain both at rest and with movement.  She denies having chest pain or left arm pain on exam this morning.  Taking nitro does help to alleviate the chest pain.  The last 2 history of Plavix was yesterday, 10/17/2023.    Review of Systems:  Constitutional-- No fever, chills or sweats. No fatigue.  CV-- No palpitations or syncope. + Chest Pain, +HTN, HLD.  Resp-- No SOB, cough, hemoptysis  Skin--No rashes or lesions      Allergies:   Allergies   Allergen Reactions    Codeine Shortness Of Breath    Rituxan [Rituximab] Shortness Of Breath and Other (See Comments)     \"THROAT RAWNESS; FELT LIKE MY THROAT WAS CLOSING UP\"    Penicillins Other (See Comments)     \"UNKNOWN CHILDHOOD ALLERGY\"         Home Meds:  Medications Prior to Admission   Medication Sig Dispense Refill Last Dose    albuterol (PROVENTIL HFA;VENTOLIN HFA) 108 (90 Base) MCG/ACT inhaler Inhale 2 puffs As Needed for Wheezing.       ALPRAZolam (XANAX) 0.5 MG tablet Take 1 tablet by mouth 3 (Three) Times a Day As Needed for Anxiety.       aspirin 81 MG EC tablet Take 1 tablet by mouth Daily. 30 tablet 5     CBD oil (cannabidiol) capsule Take 1 each by mouth Daily.       clopidogrel (PLAVIX) 75 MG tablet Take 1 tablet by mouth Daily. 30 tablet 5     gabapentin (NEURONTIN) 800 MG tablet Take 1 tablet by mouth 4 (Four) Times a Day.       ibrutinib (IMBRUVICA) 420 MG tablet tablet Take 1 tablet by mouth Daily. 28 tablet 5     Ibuprofen (ADVIL PO) Take 400 mg by mouth 3 (Three) Times a Day. LIQUIGELS       isosorbide mononitrate (IMDUR) 60 MG 24 hr tablet Take 1 tablet by mouth Daily. (Patient taking differently: Take 1 tablet by " mouth Daily.) 90 tablet 3     lisinopril (PRINIVIL,ZESTRIL) 40 MG tablet Take 1 tablet by mouth Every Morning. 90 tablet 3     metoprolol tartrate (LOPRESSOR) 25 MG tablet Take 1 tablet by mouth Every 12 (Twelve) Hours. 120 tablet 5     nitroglycerin (NITROSTAT) 0.4 MG SL tablet Place 1 tablet under the tongue Every 5 (Five) Minutes As Needed for Chest Pain (Systolic BP Greater Than 100). Take no more than 3 doses in 15 minutes. 75 tablet 12     oxyCODONE ER (oxyCONTIN) 20 MG 12 hr tablet Take 1 tablet by mouth Every 12 (Twelve) Hours.       oxyCODONE-acetaminophen (PERCOCET)  MG per tablet Take 1 tablet by mouth Every 6 (Six) Hours As Needed for Moderate Pain.       prochlorperazine (COMPAZINE) 10 MG tablet Take 1 tablet by mouth Every 6 (Six) Hours As Needed for Nausea or Vomiting. 60 tablet 4     rosuvastatin (CRESTOR) 10 MG tablet Take 2 tablets by mouth Every Night. 90 tablet 3     sertraline (ZOLOFT) 100 MG tablet Take 1 tablet by mouth Every Night.       sulfamethoxazole-trimethoprim (BACTRIM DS,SEPTRA DS) 800-160 MG per tablet TAKE 1 TABLET BY MOUTH EVERY DAY (Patient taking differently: Take 1 tablet by mouth Take As Directed. Takes twice a day on Saturday  and Sunday.) 30 tablet 0     valACYclovir (VALTREX) 500 MG tablet TAKE 1 TABLET BY MOUTH EVERY DAY (Patient taking differently: Take 1 tablet by mouth Daily.) 30 tablet 0          PMH:   Past Medical History:   Diagnosis Date    Aneurysm 8/223/17    Cavernous left ICA aneurysm    Asthma     Carotid artery occlusion     Chronic back pain     CLL (chronic lymphocytic leukemia)     OCTOBER OF 2012    COPD (chronic obstructive pulmonary disease)     Degenerative arthritis     Depression     Diabetes mellitus     type 2    H/O blood clots     ovary    Heart murmur     Hyperlipidemia     Hypertension     Lymphadenopathy     Non Hodgkin's lymphoma     PVD (peripheral vascular disease)     Urinary frequency      PSH:    Past Surgical History:   Procedure  "Laterality Date    APPENDECTOMY      BACK SURGERY      2002 (work accident) c-spine surgery 4/14    CARDIAC CATHETERIZATION N/A 09/20/2023    Procedure: Left Heart Cath;  Surgeon: Martín Tobin MD;  Location:  [facility];  Service: Cardiovascular;  Laterality: N/A;    CAROTID STENT Right 08/23/2017    CEREBRAL ANGIOGRAM N/A 08/23/2017    Diagnostic Carotid/Cerebral Angiogram    CHOLECYSTECTOMY      COLONOSCOPY      HYSTERECTOMY      for endometriosis/ovarian bleed    INTERVENTIONAL RADIOLOGY PROCEDURE N/A 08/02/2023    Procedure: Abdominal Aortagram with Runoff;  Surgeon: Jeffry Ackerman MD;  Location: [email or web address] CATH INVASIVE LOCATION;  Service: Interventional Radiology;  Laterality: N/A;    INTERVENTIONAL RADIOLOGY PROCEDURE Bilateral 08/02/2023    Procedure: Carotid Cerebral Angiogram;  Surgeon: Jeffry Ackerman MD;  Location:  [facility];  Service: Interventional Radiology;  Laterality: Bilateral;    LUMBAR FUSION      NECK SURGERY      Neck hardware    OOPHORECTOMY Left     OTHER SURGICAL HISTORY      pac insertion and removal    TONSILLECTOMY         Immunization History:  Influenza: No  Pneumococcal: No  Tetanus: No  Covid : No    Social History:   Tobacco:   Social History     Tobacco Use   Smoking Status Every Day    Packs/day: 0.50    Years: 35.00    Additional pack years: 0.00    Total pack years: 17.50    Types: Cigarettes    Passive exposure: Current   Smokeless Tobacco Never   Tobacco Comments    0.5-1 PPD      Alcohol:     Social History     Substance and Sexual Activity   Alcohol Use Yes    Comment: RARELY PER PT         Physical Exam:Ht 162.6 cm (64\")   Wt 64.4 kg (142 lb)   LMP  (LMP Unknown)   BMI 24.37 kg/m²   BP: 155/57  HR: 62  RR: 15  SPO2: 97% on RA  Temp: 97.2    General Appearance:    Alert, cooperative, no distress, appears stated age   Head:    Normocephalic, without obvious abnormality, atraumatic   Lungs:     Clear to auscultation bilaterally, " respirations unlabored    Heart:   Regular rate and rhythm, S1 and S2 normal. + Murmur.    Abdomen:    Soft without tenderness   Extremities:   Extremities normal, atraumatic, no cyanosis or edema   Skin:   Skin color, texture, turgor normal, no rashes or lesions   Neurologic:   Grossly intact     Results Review:     LABS:  Lab Results   Component Value Date    WBC 8.60 10/16/2023    HGB 12.2 10/16/2023    HCT 38.0 10/16/2023    .1 (H) 10/16/2023     10/16/2023    NEUTROABS 6.87 10/16/2023    GLUCOSE 167 (H) 10/16/2023    BUN 13 10/16/2023    CREATININE 0.83 10/16/2023    EGFRIFNONA 77 08/02/2021    EGFRIFAFRI  08/29/2016      Comment:      <15 Indicative of kidney failure.     (L) 10/16/2023    K 4.7 10/16/2023     10/16/2023    CO2 21.4 (L) 10/16/2023    CALCIUM 8.6 10/16/2023    ALBUMIN 4.1 10/16/2023    AST 10 10/16/2023    ALT 9 10/16/2023    BILITOT 0.2 10/16/2023       RADIOLOGY:  Imaging Results (Last 72 Hours)       ** No results found for the last 72 hours. **            I reviewed the patient's new clinical results.      Impression: Aortoiliac stenosis      Plan: AORTAGRAM WITH INTERVENTION       BRANDON Shelton   10/18/2023   07:54 EDT

## 2023-11-29 ENCOUNTER — HOSPITAL ENCOUNTER (INPATIENT)
Facility: HOSPITAL | Age: 58
LOS: 1 days | Discharge: HOME | DRG: 282 | End: 2023-11-30
Attending: EMERGENCY MEDICINE | Admitting: INTERNAL MEDICINE
Payer: MEDICARE

## 2023-11-29 ENCOUNTER — APPOINTMENT (OUTPATIENT)
Dept: GENERAL RADIOLOGY | Facility: HOSPITAL | Age: 58
DRG: 282 | End: 2023-11-29
Payer: MEDICARE

## 2023-11-29 ENCOUNTER — APPOINTMENT (OUTPATIENT)
Dept: CT IMAGING | Facility: HOSPITAL | Age: 58
DRG: 282 | End: 2023-11-29
Payer: MEDICARE

## 2023-11-29 DIAGNOSIS — F12.929 SYNTHETIC CANNABINOID INTOXICATION: ICD-10-CM

## 2023-11-29 DIAGNOSIS — I67.4 HYPERTENSIVE ENCEPHALOPATHY: Primary | ICD-10-CM

## 2023-11-29 PROBLEM — I16.1 HYPERTENSIVE EMERGENCY: Status: ACTIVE | Noted: 2023-11-29

## 2023-11-29 LAB
A-A DO2: 17.2 MMHG (ref 0–300)
ALBUMIN SERPL-MCNC: 4.8 G/DL (ref 3.5–5.2)
ALBUMIN/GLOB SERPL: 1.9 G/DL
ALP SERPL-CCNC: 78 U/L (ref 39–117)
ALT SERPL W P-5'-P-CCNC: 14 U/L (ref 1–33)
AMMONIA BLD-SCNC: 12 UMOL/L (ref 11–51)
AMPHET+METHAMPHET UR QL: NEGATIVE
AMPHETAMINES UR QL: NEGATIVE
ANION GAP SERPL CALCULATED.3IONS-SCNC: 14.3 MMOL/L (ref 5–15)
ARTERIAL PATENCY WRIST A: NORMAL
AST SERPL-CCNC: 18 U/L (ref 1–32)
ATMOSPHERIC PRESS: 728 MMHG
BACTERIA UR QL AUTO: ABNORMAL /HPF
BARBITURATES UR QL SCN: NEGATIVE
BASE EXCESS BLDA CALC-SCNC: 0.5 MMOL/L (ref 0–2)
BASOPHILS # BLD AUTO: 0.01 10*3/MM3 (ref 0–0.2)
BASOPHILS NFR BLD AUTO: 0.1 % (ref 0–1.5)
BDY SITE: NORMAL
BENZODIAZ UR QL SCN: NEGATIVE
BILIRUB SERPL-MCNC: 0.6 MG/DL (ref 0–1.2)
BILIRUB UR QL STRIP: NEGATIVE
BUN SERPL-MCNC: 22 MG/DL (ref 6–20)
BUN/CREAT SERPL: 26.2 (ref 7–25)
BUPRENORPHINE SERPL-MCNC: NEGATIVE NG/ML
CALCIUM SPEC-SCNC: 10.1 MG/DL (ref 8.6–10.5)
CANNABINOIDS SERPL QL: POSITIVE
CHLORIDE SERPL-SCNC: 99 MMOL/L (ref 98–107)
CLARITY UR: CLEAR
CO2 BLDA-SCNC: 25.2 MMOL/L (ref 22–33)
CO2 SERPL-SCNC: 26.7 MMOL/L (ref 22–29)
COCAINE UR QL: NEGATIVE
COHGB MFR BLD: 0.7 % (ref 0–5)
COLOR UR: YELLOW
CREAT SERPL-MCNC: 0.84 MG/DL (ref 0.57–1)
D-LACTATE SERPL-SCNC: 1.7 MMOL/L (ref 0.5–2)
DEPRECATED RDW RBC AUTO: 44.7 FL (ref 37–54)
EGFRCR SERPLBLD CKD-EPI 2021: 80.2 ML/MIN/1.73
EOSINOPHIL # BLD AUTO: 0 10*3/MM3 (ref 0–0.4)
EOSINOPHIL NFR BLD AUTO: 0 % (ref 0.3–6.2)
ERYTHROCYTE [DISTWIDTH] IN BLOOD BY AUTOMATED COUNT: 12.2 % (ref 12.3–15.4)
ETHANOL BLD-MCNC: <10 MG/DL (ref 0–10)
ETHANOL UR QL: <0.01 %
FENTANYL UR-MCNC: NEGATIVE NG/ML
GEN 5 2HR TROPONIN T REFLEX: 74 NG/L
GLOBULIN UR ELPH-MCNC: 2.5 GM/DL
GLUCOSE BLDC GLUCOMTR-MCNC: 205 MG/DL (ref 70–130)
GLUCOSE SERPL-MCNC: 278 MG/DL (ref 65–99)
GLUCOSE UR STRIP-MCNC: ABNORMAL MG/DL
HCO3 BLDA-SCNC: 24.2 MMOL/L (ref 20–26)
HCT VFR BLD AUTO: 43.2 % (ref 34–46.6)
HCT VFR BLD CALC: 43.2 % (ref 38–51)
HGB BLD-MCNC: 14.2 G/DL (ref 12–15.9)
HGB BLDA-MCNC: 14.1 G/DL (ref 13.5–17.5)
HGB UR QL STRIP.AUTO: ABNORMAL
HYALINE CASTS UR QL AUTO: ABNORMAL /LPF
IMM GRANULOCYTES # BLD AUTO: 0.02 10*3/MM3 (ref 0–0.05)
IMM GRANULOCYTES NFR BLD AUTO: 0.2 % (ref 0–0.5)
INHALED O2 CONCENTRATION: 21 %
KETONES UR QL STRIP: ABNORMAL
LEUKOCYTE ESTERASE UR QL STRIP.AUTO: NEGATIVE
LYMPHOCYTES # BLD AUTO: 0.41 10*3/MM3 (ref 0.7–3.1)
LYMPHOCYTES NFR BLD AUTO: 4.2 % (ref 19.6–45.3)
Lab: NORMAL
MAGNESIUM SERPL-MCNC: 2.5 MG/DL (ref 1.6–2.6)
MCH RBC QN AUTO: 32.6 PG (ref 26.6–33)
MCHC RBC AUTO-ENTMCNC: 32.9 G/DL (ref 31.5–35.7)
MCV RBC AUTO: 99.3 FL (ref 79–97)
METHADONE UR QL SCN: NEGATIVE
METHGB BLD QL: 1 % (ref 0–3)
MODALITY: NORMAL
MONOCYTES # BLD AUTO: 0.33 10*3/MM3 (ref 0.1–0.9)
MONOCYTES NFR BLD AUTO: 3.4 % (ref 5–12)
NEUTROPHILS NFR BLD AUTO: 9.06 10*3/MM3 (ref 1.7–7)
NEUTROPHILS NFR BLD AUTO: 92.1 % (ref 42.7–76)
NITRITE UR QL STRIP: NEGATIVE
NRBC BLD AUTO-RTO: 0 /100 WBC (ref 0–0.2)
OPIATES UR QL: NEGATIVE
OXYCODONE UR QL SCN: POSITIVE
OXYHGB MFR BLDV: 94.8 % (ref 94–99)
PCO2 BLDA: 35.1 MM HG (ref 35–45)
PCO2 TEMP ADJ BLD: NORMAL MM[HG]
PCP UR QL SCN: NEGATIVE
PH BLDA: 7.45 PH UNITS (ref 7.35–7.45)
PH UR STRIP.AUTO: 6 [PH] (ref 5–8)
PH, TEMP CORRECTED: NORMAL
PLATELET # BLD AUTO: 202 10*3/MM3 (ref 140–450)
PMV BLD AUTO: 10.7 FL (ref 6–12)
PO2 BLDA: 86.3 MM HG (ref 83–108)
PO2 TEMP ADJ BLD: NORMAL MM[HG]
POTASSIUM SERPL-SCNC: 3.7 MMOL/L (ref 3.5–5.2)
PROT SERPL-MCNC: 7.3 G/DL (ref 6–8.5)
PROT UR QL STRIP: ABNORMAL
RBC # BLD AUTO: 4.35 10*6/MM3 (ref 3.77–5.28)
RBC # UR STRIP: ABNORMAL /HPF
REF LAB TEST METHOD: ABNORMAL
SAO2 % BLDCOA: 96.4 % (ref 94–99)
SODIUM SERPL-SCNC: 140 MMOL/L (ref 136–145)
SP GR UR STRIP: 1.01 (ref 1–1.03)
SQUAMOUS #/AREA URNS HPF: ABNORMAL /HPF
TRICYCLICS UR QL SCN: NEGATIVE
TROPONIN T DELTA: 6 NG/L
TROPONIN T SERPL HS-MCNC: 68 NG/L
UROBILINOGEN UR QL STRIP: ABNORMAL
VENTILATOR MODE: NORMAL
WBC # UR STRIP: ABNORMAL /HPF
WBC NRBC COR # BLD AUTO: 9.83 10*3/MM3 (ref 3.4–10.8)

## 2023-11-29 PROCEDURE — 99223 1ST HOSP IP/OBS HIGH 75: CPT | Performed by: INTERNAL MEDICINE

## 2023-11-29 PROCEDURE — 82140 ASSAY OF AMMONIA: CPT | Performed by: EMERGENCY MEDICINE

## 2023-11-29 PROCEDURE — 36415 COLL VENOUS BLD VENIPUNCTURE: CPT

## 2023-11-29 PROCEDURE — 82805 BLOOD GASES W/O2 SATURATION: CPT

## 2023-11-29 PROCEDURE — 87040 BLOOD CULTURE FOR BACTERIA: CPT | Performed by: EMERGENCY MEDICINE

## 2023-11-29 PROCEDURE — 25010000002 HEPARIN (PORCINE) PER 1000 UNITS: Performed by: INTERNAL MEDICINE

## 2023-11-29 PROCEDURE — 85025 COMPLETE CBC W/AUTO DIFF WBC: CPT | Performed by: EMERGENCY MEDICINE

## 2023-11-29 PROCEDURE — 84484 ASSAY OF TROPONIN QUANT: CPT | Performed by: EMERGENCY MEDICINE

## 2023-11-29 PROCEDURE — 99285 EMERGENCY DEPT VISIT HI MDM: CPT

## 2023-11-29 PROCEDURE — 80307 DRUG TEST PRSMV CHEM ANLYZR: CPT | Performed by: EMERGENCY MEDICINE

## 2023-11-29 PROCEDURE — 25810000003 SODIUM CHLORIDE 0.9 % SOLUTION: Performed by: EMERGENCY MEDICINE

## 2023-11-29 PROCEDURE — 70450 CT HEAD/BRAIN W/O DYE: CPT

## 2023-11-29 PROCEDURE — 82375 ASSAY CARBOXYHB QUANT: CPT

## 2023-11-29 PROCEDURE — 81001 URINALYSIS AUTO W/SCOPE: CPT | Performed by: EMERGENCY MEDICINE

## 2023-11-29 PROCEDURE — P9612 CATHETERIZE FOR URINE SPEC: HCPCS

## 2023-11-29 PROCEDURE — 71045 X-RAY EXAM CHEST 1 VIEW: CPT

## 2023-11-29 PROCEDURE — 70450 CT HEAD/BRAIN W/O DYE: CPT | Performed by: RADIOLOGY

## 2023-11-29 PROCEDURE — 25010000002 NALOXONE HCL 2 MG/2ML SOLUTION PREFILLED SYRINGE: Performed by: EMERGENCY MEDICINE

## 2023-11-29 PROCEDURE — 71045 X-RAY EXAM CHEST 1 VIEW: CPT | Performed by: RADIOLOGY

## 2023-11-29 PROCEDURE — 93005 ELECTROCARDIOGRAM TRACING: CPT | Performed by: EMERGENCY MEDICINE

## 2023-11-29 PROCEDURE — 36600 WITHDRAWAL OF ARTERIAL BLOOD: CPT

## 2023-11-29 PROCEDURE — 80053 COMPREHEN METABOLIC PANEL: CPT | Performed by: EMERGENCY MEDICINE

## 2023-11-29 PROCEDURE — 25810000003 SODIUM CHLORIDE 0.9 % SOLUTION 250 ML FLEX CONT: Performed by: EMERGENCY MEDICINE

## 2023-11-29 PROCEDURE — 82077 ASSAY SPEC XCP UR&BREATH IA: CPT | Performed by: EMERGENCY MEDICINE

## 2023-11-29 PROCEDURE — 82948 REAGENT STRIP/BLOOD GLUCOSE: CPT

## 2023-11-29 PROCEDURE — 83735 ASSAY OF MAGNESIUM: CPT | Performed by: EMERGENCY MEDICINE

## 2023-11-29 PROCEDURE — 83050 HGB METHEMOGLOBIN QUAN: CPT

## 2023-11-29 PROCEDURE — 83605 ASSAY OF LACTIC ACID: CPT | Performed by: EMERGENCY MEDICINE

## 2023-11-29 RX ORDER — AMOXICILLIN 250 MG
2 CAPSULE ORAL 2 TIMES DAILY
Status: DISCONTINUED | OUTPATIENT
Start: 2023-11-29 | End: 2023-11-30 | Stop reason: HOSPADM

## 2023-11-29 RX ORDER — SODIUM CHLORIDE 9 MG/ML
40 INJECTION, SOLUTION INTRAVENOUS AS NEEDED
Status: DISCONTINUED | OUTPATIENT
Start: 2023-11-29 | End: 2023-11-30 | Stop reason: HOSPADM

## 2023-11-29 RX ORDER — ROSUVASTATIN CALCIUM 20 MG/1
40 TABLET, COATED ORAL NIGHTLY
Status: DISCONTINUED | OUTPATIENT
Start: 2023-11-30 | End: 2023-11-30 | Stop reason: HOSPADM

## 2023-11-29 RX ORDER — GLUCAGON 1 MG/ML
1 KIT INJECTION
Status: DISCONTINUED | OUTPATIENT
Start: 2023-11-29 | End: 2023-11-30 | Stop reason: HOSPADM

## 2023-11-29 RX ORDER — SODIUM CHLORIDE 0.9 % (FLUSH) 0.9 %
10 SYRINGE (ML) INJECTION EVERY 12 HOURS SCHEDULED
Status: DISCONTINUED | OUTPATIENT
Start: 2023-11-29 | End: 2023-11-30 | Stop reason: HOSPADM

## 2023-11-29 RX ORDER — BISACODYL 5 MG/1
5 TABLET, DELAYED RELEASE ORAL DAILY PRN
Status: DISCONTINUED | OUTPATIENT
Start: 2023-11-29 | End: 2023-11-30 | Stop reason: HOSPADM

## 2023-11-29 RX ORDER — SODIUM CHLORIDE 0.9 % (FLUSH) 0.9 %
10 SYRINGE (ML) INJECTION AS NEEDED
Status: DISCONTINUED | OUTPATIENT
Start: 2023-11-29 | End: 2023-11-30 | Stop reason: HOSPADM

## 2023-11-29 RX ORDER — ASPIRIN 81 MG/1
81 TABLET ORAL DAILY
Status: DISCONTINUED | OUTPATIENT
Start: 2023-11-30 | End: 2023-11-30 | Stop reason: HOSPADM

## 2023-11-29 RX ORDER — HEPARIN SODIUM 5000 [USP'U]/ML
5000 INJECTION, SOLUTION INTRAVENOUS; SUBCUTANEOUS EVERY 12 HOURS SCHEDULED
Status: DISCONTINUED | OUTPATIENT
Start: 2023-11-29 | End: 2023-11-30 | Stop reason: HOSPADM

## 2023-11-29 RX ORDER — BISACODYL 10 MG
10 SUPPOSITORY, RECTAL RECTAL DAILY PRN
Status: DISCONTINUED | OUTPATIENT
Start: 2023-11-29 | End: 2023-11-30 | Stop reason: HOSPADM

## 2023-11-29 RX ORDER — NICOTINE POLACRILEX 4 MG
15 LOZENGE BUCCAL
Status: DISCONTINUED | OUTPATIENT
Start: 2023-11-29 | End: 2023-11-30 | Stop reason: HOSPADM

## 2023-11-29 RX ORDER — CLOPIDOGREL BISULFATE 75 MG/1
75 TABLET ORAL DAILY
Status: DISCONTINUED | OUTPATIENT
Start: 2023-11-30 | End: 2023-11-30 | Stop reason: HOSPADM

## 2023-11-29 RX ORDER — INSULIN LISPRO 100 [IU]/ML
2-7 INJECTION, SOLUTION INTRAVENOUS; SUBCUTANEOUS
Status: DISCONTINUED | OUTPATIENT
Start: 2023-11-30 | End: 2023-11-30 | Stop reason: HOSPADM

## 2023-11-29 RX ORDER — NITROGLYCERIN 0.4 MG/1
0.4 TABLET SUBLINGUAL
Status: DISCONTINUED | OUTPATIENT
Start: 2023-11-29 | End: 2023-11-30 | Stop reason: HOSPADM

## 2023-11-29 RX ORDER — NALOXONE HYDROCHLORIDE 1 MG/ML
2 INJECTION INTRAMUSCULAR; INTRAVENOUS; SUBCUTANEOUS ONCE
Status: COMPLETED | OUTPATIENT
Start: 2023-11-29 | End: 2023-11-29

## 2023-11-29 RX ORDER — ASPIRIN 81 MG/1
324 TABLET, CHEWABLE ORAL ONCE
Status: COMPLETED | OUTPATIENT
Start: 2023-11-29 | End: 2023-11-29

## 2023-11-29 RX ORDER — DEXTROSE MONOHYDRATE 25 G/50ML
25 INJECTION, SOLUTION INTRAVENOUS
Status: DISCONTINUED | OUTPATIENT
Start: 2023-11-29 | End: 2023-11-30 | Stop reason: HOSPADM

## 2023-11-29 RX ORDER — POLYETHYLENE GLYCOL 3350 17 G/17G
17 POWDER, FOR SOLUTION ORAL DAILY PRN
Status: DISCONTINUED | OUTPATIENT
Start: 2023-11-29 | End: 2023-11-30 | Stop reason: HOSPADM

## 2023-11-29 RX ADMIN — ASPIRIN 324 MG: 81 TABLET, CHEWABLE ORAL at 21:09

## 2023-11-29 RX ADMIN — SODIUM CHLORIDE 2.5 MG/HR: 9 INJECTION, SOLUTION INTRAVENOUS at 22:49

## 2023-11-29 RX ADMIN — SODIUM CHLORIDE 5 MG/HR: 9 INJECTION, SOLUTION INTRAVENOUS at 18:30

## 2023-11-29 RX ADMIN — NALOXONE HYDROCHLORIDE 2 MG: 1 INJECTION PARENTERAL at 16:13

## 2023-11-29 RX ADMIN — DOCUSATE SODIUM 50 MG AND SENNOSIDES 8.6 MG 2 TABLET: 8.6; 5 TABLET, FILM COATED ORAL at 23:53

## 2023-11-29 RX ADMIN — SODIUM CHLORIDE 1000 ML: 9 INJECTION, SOLUTION INTRAVENOUS at 16:13

## 2023-11-29 RX ADMIN — HEPARIN SODIUM 5000 UNITS: 5000 INJECTION INTRAVENOUS; SUBCUTANEOUS at 23:53

## 2023-11-29 RX ADMIN — Medication 10 ML: at 23:53

## 2023-11-29 NOTE — ED NOTES
Cleaned pt from feces and urine. Pt noted to have feces all down her legs and on the bottom of her feet. Clothes placed in belonging bag and gave to family with pt. Changed pt bed linen. Place purwick on pt. Positioned pt in a position of comfort and placed two warm blankets on pt.  Call light within reach.

## 2023-11-29 NOTE — ED PROVIDER NOTES
"Subjective   History of Present Illness  88-year-old female here today for altered mental status.  Patient unable to give any history.  Sons state that the patient was \"out of it\" over the past 2 days, sleeping more than usual.  Today she would not communicate with them, saying only 2 words the entire day.  She did not have any complaints over the past 2 days that they are aware of.  She has a recent history of abdominal aortic aneurysm stenting.  She has not had previous cardiac stents.  She does have a history of carotid artery disease, CLL diagnosed 11 years ago.  COPD, depression, diabetes, peripheral vascular disease.  They are not sure if she has any recent changes in her medicine.  She is taking Neurontin 800 mg 4 times daily, among other meds.      Review of Systems   All other systems reviewed and are negative.      Past Medical History:   Diagnosis Date    Aneurysm 8/223/17    Cavernous left ICA aneurysm    Asthma     Carotid artery occlusion     Chronic back pain     CLL (chronic lymphocytic leukemia)     OCTOBER OF 2012    COPD (chronic obstructive pulmonary disease)     Degenerative arthritis     Depression     Diabetes mellitus     type 2    H/O blood clots     ovary    Heart murmur     Hyperlipidemia     Hypertension     Lymphadenopathy     Non Hodgkin's lymphoma     PVD (peripheral vascular disease)     Urinary frequency        Allergies   Allergen Reactions    Codeine Shortness Of Breath    Rituxan [Rituximab] Shortness Of Breath and Other (See Comments)     \"THROAT RAWNESS; FELT LIKE MY THROAT WAS CLOSING UP\"    Penicillins Other (See Comments)     \"UNKNOWN CHILDHOOD ALLERGY\"       Past Surgical History:   Procedure Laterality Date    AORTAGRAM N/A 10/18/2023    Procedure: AORTAGRAM, BILATERAL COMMON ILIAC ARTERY STENTS, SUPERIOR MESENTERIC  ARTERY STENT;  Surgeon: Filiberto Alcantara MD;  Location: [facility];  Service: Vascular;  Laterality: N/A;  109 mGy  4 MIN 06 SECS  40mL CONTRAST    " APPENDECTOMY      BACK SURGERY      2002 (work accident) c-spine surgery 4/14    CARDIAC CATHETERIZATION N/A 09/20/2023    Procedure: Left Heart Cath;  Surgeon: Martín Tobin MD;  Location:  [facility];  Service: Cardiovascular;  Laterality: N/A;    CAROTID STENT Right 08/23/2017    CEREBRAL ANGIOGRAM N/A 08/23/2017    Diagnostic Carotid/Cerebral Angiogram    CHOLECYSTECTOMY      COLONOSCOPY      HYSTERECTOMY      for endometriosis/ovarian bleed    INTERVENTIONAL RADIOLOGY PROCEDURE N/A 08/02/2023    Procedure: Abdominal Aortagram with Runoff;  Surgeon: Jeffry Ackerman MD;  Location: [facility];  Service: Interventional Radiology;  Laterality: N/A;    INTERVENTIONAL RADIOLOGY PROCEDURE Bilateral 08/02/2023    Procedure: Carotid Cerebral Angiogram;  Surgeon: Jeffry Ackerman MD;  Location:  [facility];  Service: Interventional Radiology;  Laterality: Bilateral;    LUMBAR FUSION      NECK SURGERY      Neck hardware    OOPHORECTOMY Left     OTHER SURGICAL HISTORY      pac insertion and removal    TONSILLECTOMY         Family History   Problem Relation Age of Onset    Lung cancer Father 72    Hypertension Father     Heart disease Father     Cancer Father     Diabetes Father     Other Brother 46        MI    Heart disease Brother     Breast cancer Paternal Aunt     Colon cancer Maternal Grandfather     Other Cousin         CLL (dx 48 yo)    Hypertension Mother        Social History     Socioeconomic History    Marital status:    Tobacco Use    Smoking status: Every Day     Packs/day: 0.50     Years: 35.00     Additional pack years: 0.00     Total pack years: 17.50     Types: Cigarettes     Passive exposure: Current    Smokeless tobacco: Never    Tobacco comments:     0.5-1 PPD   Vaping Use    Vaping Use: Never used   Substance and Sexual Activity    Alcohol use: Yes     Comment: RARELY PER PT    Drug use: No    Sexual activity: Defer           Objective    Physical Exam  Vitals and nursing note reviewed. Exam conducted with a chaperone present.   Constitutional:       Appearance: Normal appearance. She is normal weight.   HENT:      Head: Normocephalic and atraumatic.      Mouth/Throat:      Mouth: Mucous membranes are moist.      Pharynx: Oropharynx is clear.   Eyes:      Extraocular Movements: Extraocular movements intact.      Conjunctiva/sclera: Conjunctivae normal.      Pupils: Pupils are equal, round, and reactive to light.      Comments: Pupils equal bilaterally at rest.  Pupils react unequally with the right being sluggish and less reactive.   Neck:      Vascular: Carotid bruit (Marked bilateral) present.   Cardiovascular:      Rate and Rhythm: Normal rate and regular rhythm.      Heart sounds: Normal heart sounds. No murmur heard.     No friction rub. No gallop.   Pulmonary:      Effort: Pulmonary effort is normal. No respiratory distress.      Breath sounds: Normal breath sounds. No wheezing, rhonchi or rales.   Chest:      Chest wall: No tenderness.   Abdominal:      General: Abdomen is flat. Bowel sounds are normal. There is no distension.      Palpations: Abdomen is soft.      Tenderness: There is no abdominal tenderness.   Musculoskeletal:         General: Normal range of motion.      Cervical back: Normal range of motion and neck supple.      Right lower leg: No edema.      Left lower leg: No edema.   Skin:     General: Skin is warm and dry.   Neurological:      General: No focal deficit present.      Mental Status: She is alert.      Comments: Patient is awake, alert, follows some commands.  Completely nonverbal.  Moves all 4 extremities.  No focal deficits noted though exam suboptimal as she is not able to completely cooperate and follow commands.         Procedures           ED Course  ED Course as of 11/29/23 2115   Wed Nov 29, 2023   1732 ECG 12 Lead Other; Hypertension  Sinus rhythm with frequent PVCs.  Rate 92.  Left axis deviation.  Biatrial  enlargement.  ST depression 1 mm in aVF and V3, 4.  No ST elevation.  Abnormal EKG.  Interpreted by me.  Electronically signed by Gurpreet Miller MD, 11/29/23, 5:34 PM EST.   [BC]   1848 Patient care transferred to Dr. Medina at change of shift.    Gurpreet Miller MD  6:49 PM EST   [BC]      ED Course User Index  [BC] Gurpreet Miller MD      XR Chest 1 View    Result Date: 11/29/2023    Coarsened interstitial markings noted throughout the lungs.   This report was finalized on 11/29/2023 4:30 PM by Dr. Fazal Theodore MD.      CT Head Without Contrast Stroke Protocol    Result Date: 11/29/2023    Unremarkable exam demonstrating no CT evidence of acute intracranial findings.   This report was finalized on 11/29/2023 4:04 PM by Dr. Fazal Theodore MD.         Results for orders placed or performed during the hospital encounter of 11/29/23   Comprehensive Metabolic Panel    Specimen: Arm, Left; Blood   Result Value Ref Range    Glucose 278 (H) 65 - 99 mg/dL    BUN 22 (H) 6 - 20 mg/dL    Creatinine 0.84 0.57 - 1.00 mg/dL    Sodium 140 136 - 145 mmol/L    Potassium 3.7 3.5 - 5.2 mmol/L    Chloride 99 98 - 107 mmol/L    CO2 26.7 22.0 - 29.0 mmol/L    Calcium 10.1 8.6 - 10.5 mg/dL    Total Protein 7.3 6.0 - 8.5 g/dL    Albumin 4.8 3.5 - 5.2 g/dL    ALT (SGPT) 14 1 - 33 U/L    AST (SGOT) 18 1 - 32 U/L    Alkaline Phosphatase 78 39 - 117 U/L    Total Bilirubin 0.6 0.0 - 1.2 mg/dL    Globulin 2.5 gm/dL    A/G Ratio 1.9 g/dL    BUN/Creatinine Ratio 26.2 (H) 7.0 - 25.0    Anion Gap 14.3 5.0 - 15.0 mmol/L    eGFR 80.2 >60.0 mL/min/1.73   Urinalysis With Culture If Indicated - Urine, Catheter    Specimen: Urine, Catheter   Result Value Ref Range    Color, UA Yellow Yellow, Straw    Appearance, UA Clear Clear    pH, UA 6.0 5.0 - 8.0    Specific Gravity, UA 1.013 1.005 - 1.030    Glucose,  mg/dL (1+) (A) Negative    Ketones, UA 15 mg/dL (1+) (A) Negative    Bilirubin, UA Negative Negative    Blood, UA Moderate (2+) (A) Negative     Protein,  mg/dL (2+) (A) Negative    Leuk Esterase, UA Negative Negative    Nitrite, UA Negative Negative    Urobilinogen, UA 0.2 E.U./dL 0.2 - 1.0 E.U./dL   High Sensitivity Troponin T    Specimen: Arm, Left; Blood   Result Value Ref Range    HS Troponin T 68 (C) <14 ng/L   Lactic Acid, Plasma    Specimen: Arm, Left; Blood   Result Value Ref Range    Lactate 1.7 0.5 - 2.0 mmol/L   Urine Drug Screen - Urine, Clean Catch    Specimen: Urine, Clean Catch   Result Value Ref Range    THC, Screen, Urine Positive (A) Negative    Phencyclidine (PCP), Urine Negative Negative    Cocaine Screen, Urine Negative Negative    Methamphetamine, Ur Negative Negative    Opiate Screen Negative Negative    Amphetamine Screen, Urine Negative Negative    Benzodiazepine Screen, Urine Negative Negative    Tricyclic Antidepressants Screen Negative Negative    Methadone Screen, Urine Negative Negative    Barbiturates Screen, Urine Negative Negative    Oxycodone Screen, Urine Positive (A) Negative    Buprenorphine, Screen, Urine Negative Negative   Ethanol    Specimen: Arm, Left; Blood   Result Value Ref Range    Ethanol <10 0 - 10 mg/dL    Ethanol % <0.010 %   Magnesium    Specimen: Arm, Left; Blood   Result Value Ref Range    Magnesium 2.5 1.6 - 2.6 mg/dL   Ammonia    Specimen: Arm, Left; Blood   Result Value Ref Range    Ammonia 12 11 - 51 umol/L   CBC Auto Differential    Specimen: Arm, Right; Blood   Result Value Ref Range    WBC 9.83 3.40 - 10.80 10*3/mm3    RBC 4.35 3.77 - 5.28 10*6/mm3    Hemoglobin 14.2 12.0 - 15.9 g/dL    Hematocrit 43.2 34.0 - 46.6 %    MCV 99.3 (H) 79.0 - 97.0 fL    MCH 32.6 26.6 - 33.0 pg    MCHC 32.9 31.5 - 35.7 g/dL    RDW 12.2 (L) 12.3 - 15.4 %    RDW-SD 44.7 37.0 - 54.0 fl    MPV 10.7 6.0 - 12.0 fL    Platelets 202 140 - 450 10*3/mm3    Neutrophil % 92.1 (H) 42.7 - 76.0 %    Lymphocyte % 4.2 (L) 19.6 - 45.3 %    Monocyte % 3.4 (L) 5.0 - 12.0 %    Eosinophil % 0.0 (L) 0.3 - 6.2 %    Basophil % 0.1 0.0 -  1.5 %    Immature Grans % 0.2 0.0 - 0.5 %    Neutrophils, Absolute 9.06 (H) 1.70 - 7.00 10*3/mm3    Lymphocytes, Absolute 0.41 (L) 0.70 - 3.10 10*3/mm3    Monocytes, Absolute 0.33 0.10 - 0.90 10*3/mm3    Eosinophils, Absolute 0.00 0.00 - 0.40 10*3/mm3    Basophils, Absolute 0.01 0.00 - 0.20 10*3/mm3    Immature Grans, Absolute 0.02 0.00 - 0.05 10*3/mm3    nRBC 0.0 0.0 - 0.2 /100 WBC   Blood Gas, Arterial With Co-Ox    Specimen: Arterial Blood   Result Value Ref Range    Site Right Brachial     Krishna's Test N/A     pH, Arterial 7.447 7.350 - 7.450 pH units    pCO2, Arterial 35.1 35.0 - 45.0 mm Hg    pO2, Arterial 86.3 83.0 - 108.0 mm Hg    HCO3, Arterial 24.2 20.0 - 26.0 mmol/L    Base Excess, Arterial 0.5 0.0 - 2.0 mmol/L    O2 Saturation, Arterial 96.4 94.0 - 99.0 %    Hemoglobin, Blood Gas 14.1 13.5 - 17.5 g/dL    Hematocrit, Blood Gas 43.2 38.0 - 51.0 %    Oxyhemoglobin 94.8 94 - 99 %    Methemoglobin 1.00 0.00 - 3.00 %    Carboxyhemoglobin 0.7 0 - 5 %    A-a DO2 17.2 0.0 - 300.0 mmHg    CO2 Content 25.2 22 - 33 mmol/L    Barometric Pressure for Blood Gas 728 mmHg    Modality Room Air     FIO2 21 %    Ventilator Mode NA     Collected by [id number]     pH, Temp Corrected      pCO2, Temperature Corrected      pO2, Temperature Corrected     High Sensitivity Troponin T 2Hr    Specimen: Arm, Left; Blood   Result Value Ref Range    HS Troponin T 74 (C) <14 ng/L    Troponin T Delta 6 (C) >=-4 - <+4 ng/L   Fentanyl, Urine - Urine, Clean Catch    Specimen: Urine, Clean Catch   Result Value Ref Range    Fentanyl, Urine Negative Negative   Urinalysis, Microscopic Only - Urine, Catheter    Specimen: Urine, Catheter   Result Value Ref Range    RBC, UA 11-20 (A) None Seen, 0-2 /HPF    WBC, UA 0-2 None Seen, 0-2 /HPF    Bacteria, UA 2+ (A) None Seen /HPF    Squamous Epithelial Cells, UA 0-2 None Seen, 0-2 /HPF    Hyaline Casts, UA None Seen None Seen /LPF    Methodology Automated Microscopy    ECG 12 Lead Other; Hypertension    Result Value Ref Range    QT Interval 368 ms    QTC Interval 455 ms                                            Medical Decision Making  I assumed care of this patient from Dr. Miller at shift change.  Laboratory work-up as listed.  THC and oxycodone positive urine.  Patient did require Cardene for persistent hypertension.  CT imaging of the head is unremarkable.  Cannot rule out possible hypertensive encephalopathy versus THC synthetics intoxication.  Please see original HPI for further details.  Recommend admission for further work up and treatment.  Hospitalist team consulted and made aware of the patient.  Consults and orders placed per hospitalist request.  Patient was agreeable to admission plan.  Vitals stable on admission.    Problems Addressed:  Hypertensive encephalopathy: complicated acute illness or injury  Synthetic cannabinoid intoxication: complicated acute illness or injury    Amount and/or Complexity of Data Reviewed  Labs: ordered. Decision-making details documented in ED Course.  Radiology: ordered. Decision-making details documented in ED Course.  ECG/medicine tests: ordered. Decision-making details documented in ED Course.    Risk  Prescription drug management.  Decision regarding hospitalization.        Final diagnoses:   Hypertensive encephalopathy   Synthetic cannabinoid intoxication       ED Disposition  ED Disposition       ED Disposition   Decision to Admit    Condition   --    Comment   Level of Care: Progressive Care [20]   Diagnosis: Hypertensive emergency [305675]   Certification: I Certify That Inpatient Hospital Services Are Medically Necessary For Greater Than 2 Midnights                 No follow-up provider specified.       Medication List      No changes were made to your prescriptions during this visit.            Vik Medina, DO  11/29/23 2115       Vik Medina, DO  11/29/23 2115

## 2023-11-30 ENCOUNTER — READMISSION MANAGEMENT (OUTPATIENT)
Dept: CALL CENTER | Facility: HOSPITAL | Age: 58
End: 2023-11-30
Payer: MEDICARE

## 2023-11-30 VITALS
HEIGHT: 64 IN | OXYGEN SATURATION: 97 % | BODY MASS INDEX: 23.94 KG/M2 | TEMPERATURE: 98.4 F | WEIGHT: 140.21 LBS | DIASTOLIC BLOOD PRESSURE: 56 MMHG | HEART RATE: 79 BPM | SYSTOLIC BLOOD PRESSURE: 144 MMHG | RESPIRATION RATE: 22 BRPM

## 2023-11-30 PROBLEM — I16.1 HYPERTENSIVE EMERGENCY: Status: RESOLVED | Noted: 2023-11-29 | Resolved: 2023-11-30

## 2023-11-30 LAB
GLUCOSE BLDC GLUCOMTR-MCNC: 188 MG/DL (ref 70–130)
GLUCOSE BLDC GLUCOMTR-MCNC: 223 MG/DL (ref 70–130)
QT INTERVAL: 368 MS
QTC INTERVAL: 455 MS

## 2023-11-30 PROCEDURE — 63710000001 INSULIN LISPRO (HUMAN) PER 5 UNITS

## 2023-11-30 PROCEDURE — 82948 REAGENT STRIP/BLOOD GLUCOSE: CPT

## 2023-11-30 PROCEDURE — 99239 HOSP IP/OBS DSCHRG MGMT >30: CPT | Performed by: INTERNAL MEDICINE

## 2023-11-30 PROCEDURE — 99222 1ST HOSP IP/OBS MODERATE 55: CPT | Performed by: SPECIALIST

## 2023-11-30 PROCEDURE — 25010000002 HEPARIN (PORCINE) PER 1000 UNITS: Performed by: INTERNAL MEDICINE

## 2023-11-30 RX ORDER — AMLODIPINE BESYLATE 5 MG/1
5 TABLET ORAL
Qty: 30 TABLET | Refills: 0 | Status: SHIPPED | OUTPATIENT
Start: 2023-11-30

## 2023-11-30 RX ORDER — SULFAMETHOXAZOLE AND TRIMETHOPRIM 800; 160 MG/1; MG/1
1 TABLET ORAL TAKE AS DIRECTED
COMMUNITY

## 2023-11-30 RX ORDER — LISINOPRIL 10 MG/1
40 TABLET ORAL
Status: DISCONTINUED | OUTPATIENT
Start: 2023-11-30 | End: 2023-11-30 | Stop reason: HOSPADM

## 2023-11-30 RX ORDER — ROSUVASTATIN CALCIUM 40 MG/1
40 TABLET, COATED ORAL NIGHTLY
Qty: 30 TABLET | Refills: 0 | Status: SHIPPED | OUTPATIENT
Start: 2023-11-30

## 2023-11-30 RX ORDER — AMLODIPINE BESYLATE 5 MG/1
5 TABLET ORAL
Status: DISCONTINUED | OUTPATIENT
Start: 2023-11-30 | End: 2023-11-30 | Stop reason: HOSPADM

## 2023-11-30 RX ORDER — ALPRAZOLAM 1 MG/1
0.5 TABLET ORAL 3 TIMES DAILY PRN
COMMUNITY

## 2023-11-30 RX ORDER — SODIUM CHLORIDE 9 MG/ML
40 INJECTION, SOLUTION INTRAVENOUS AS NEEDED
Status: DISCONTINUED | OUTPATIENT
Start: 2023-11-30 | End: 2023-11-30 | Stop reason: HOSPADM

## 2023-11-30 RX ORDER — SODIUM CHLORIDE 0.9 % (FLUSH) 0.9 %
10 SYRINGE (ML) INJECTION AS NEEDED
Status: DISCONTINUED | OUTPATIENT
Start: 2023-11-30 | End: 2023-11-30 | Stop reason: HOSPADM

## 2023-11-30 RX ORDER — ROSUVASTATIN CALCIUM 20 MG/1
20 TABLET, COATED ORAL NIGHTLY
COMMUNITY
End: 2023-11-30 | Stop reason: HOSPADM

## 2023-11-30 RX ORDER — ISOSORBIDE MONONITRATE 30 MG/1
60 TABLET, EXTENDED RELEASE ORAL
Status: DISCONTINUED | OUTPATIENT
Start: 2023-11-30 | End: 2023-11-30 | Stop reason: HOSPADM

## 2023-11-30 RX ORDER — SODIUM CHLORIDE 0.9 % (FLUSH) 0.9 %
10 SYRINGE (ML) INJECTION EVERY 12 HOURS SCHEDULED
Status: DISCONTINUED | OUTPATIENT
Start: 2023-11-30 | End: 2023-11-30 | Stop reason: HOSPADM

## 2023-11-30 RX ADMIN — Medication 10 ML: at 08:44

## 2023-11-30 RX ADMIN — INSULIN LISPRO 2 UNITS: 100 INJECTION, SOLUTION INTRAVENOUS; SUBCUTANEOUS at 12:49

## 2023-11-30 RX ADMIN — INSULIN LISPRO 3 UNITS: 100 INJECTION, SOLUTION INTRAVENOUS; SUBCUTANEOUS at 06:30

## 2023-11-30 RX ADMIN — HEPARIN SODIUM 5000 UNITS: 5000 INJECTION INTRAVENOUS; SUBCUTANEOUS at 08:44

## 2023-11-30 RX ADMIN — LISINOPRIL 40 MG: 10 TABLET ORAL at 06:13

## 2023-11-30 RX ADMIN — CLOPIDOGREL BISULFATE 75 MG: 75 TABLET, FILM COATED ORAL at 08:44

## 2023-11-30 RX ADMIN — DOCUSATE SODIUM 50 MG AND SENNOSIDES 8.6 MG 2 TABLET: 8.6; 5 TABLET, FILM COATED ORAL at 08:43

## 2023-11-30 RX ADMIN — ASPIRIN 81 MG: 81 TABLET, COATED ORAL at 10:10

## 2023-11-30 RX ADMIN — ROSUVASTATIN CALCIUM 40 MG: 20 TABLET, FILM COATED ORAL at 01:56

## 2023-11-30 RX ADMIN — Medication 10 ML: at 01:57

## 2023-11-30 RX ADMIN — ISOSORBIDE MONONITRATE 60 MG: 30 TABLET, EXTENDED RELEASE ORAL at 06:13

## 2023-11-30 RX ADMIN — AMLODIPINE BESYLATE 5 MG: 5 TABLET ORAL at 16:53

## 2023-11-30 NOTE — DISCHARGE SUMMARY
[facility] HOSPITALIST MEDICINE DISCHARGE SUMMARY    Patient Identification:  Name:  Melania Mae  Age:  58 y.o.  Sex:  female  :  1965  MRN:  [id number]  Visit Number:  [id number]    Date of Admission: 2023  Date of Discharge: 2023    PCP: Gillian Harrison, APRN    DISCHARGE DIAGNOSIS   Hypertensive emergency  Type II NSTEMI  COPD  Type 2 diabetes mellitus  Essential hypertension      CONSULTS  Dr. Silverio, Cardiology      PROCEDURES PERFORMED   None      HOSPITAL COURSE  Ms. Mae is a 58 y.o. female who presented to [facility] ED on 2020 with a chief complaint of confusion.  Patient has a past medical history remarkable for essential hypertension, hyperlipidemia, CLL, COPD and type 2 diabetes mellitus.  Patient was brought to the emergency department by her son secondary to altered mental status.  Patient's son states he had not heard from his mother in approximately 1 to 2 days and went to check on her at home.  When he went to visit her, he found the patient walking around inside her home and not oriented to herself or immediate surroundings.  For this reason, patient was brought to the emergency department for further treatment and evaluation.  Initial evaluation in the emergency department did consist of basic laboratory work as well as physical exam and vital signs.  Initial vital signs found patient's blood pressure with systolic blood pressure greater than 200s.  Patient was started on Cardene drip in the emergency department and then request was made to admit for altered mental status with hypertensive emergency.  Initial vital signs within the emergency department again demonstrated marked elevated blood pressure, heart rate in the 90s, respiration 19 and oxygen saturation 98% on room air.  Patient was admitted to the critical care unit as PCU overflow for further treatment and evaluation.    Patient was restarted on her home antihypertensive  medications and cardiology services were consulted.  Cardene drip was discontinued and amlodipine 5 mg p.o. daily was added to patient's home medication regimen.  Patient's blood pressure remained within normal limits throughout the rest of her stay.  Mentation also improved during patient's stay.  Patient was completely alert, oriented to person, place and time prior to discharge.  After further questioning, patient does admit to taking an unknown amount of Lortab at home which may have contributed to patient's altered mental status.  Patient's urine drug screen was also positive for THC.  When patient was questioned about this, she states that she does not smoke marijuana and does not know how this got in her urine drug screen.  She did however mention she took CBD oil under the tongue and thought this was a plausible explanation to her THC positivity.  However, I did inform patient CBD oil does not contain THC.  Nevertheless, it appears patient's altered mental status has completely resolved and was likely secondary to multiple reasons including hypertensive emergency and recreational drug use.  Patient was strongly encouraged to cease recreational drug use and to adhere to strict antihypertensive medication regimen.  Patient also reported not taking her blood pressure medicines for at least 1 day prior to evaluation in the emergency department.  With this in mind, it is felt patient has reached maximum medical benefit of current hospitalization and will be discharged home in stable condition today.  The beforementioned plan was thoroughly discussed with the patient and she expressed understanding and willingness to proceed with the beforementioned plan.    VITAL SIGNS:      11/29/23  1445 11/29/23  2252   Weight: 64 kg (141 lb) 63.6 kg (140 lb 3.4 oz)     Body mass index is 24.07 kg/m².    PHYSICAL EXAM:  General -well-nourished  female appearing stated age in no apparent distress  HEENT -head  normocephalic and atraumatic, pupils equally round and reactive to light  Lungs -clear to auscultation bilaterally with no wheezes, rales or rhonchi appreciated  Cardiovascular -regular rate and rhythm with no murmurs, rubs or clicks appreciated  GI -abdomen soft, nontender nondistended  Neurological -cranial nerves II through XII intact with no focal deficit or unintentional motor movement appreciated    DISCHARGE DISPOSITION   Stable    DISCHARGE MEDICATIONS:     Discharge Medications        New Medications        Instructions Start Date   amLODIPine 5 MG tablet  Commonly known as: NORVASC   5 mg, Oral, Every 24 Hours Scheduled             Changes to Medications        Instructions Start Date   isosorbide mononitrate 60 MG 24 hr tablet  Commonly known as: IMDUR  What changed: when to take this   60 mg, Oral, Every 24 Hours Scheduled      rosuvastatin 40 MG tablet  Commonly known as: CRESTOR  What changed:   medication strength  how much to take   40 mg, Oral, Nightly      sulfamethoxazole-trimethoprim 800-160 MG per tablet  Commonly known as: BACTRIM DS,SEPTRA DS  What changed: Another medication with the same name was removed. Continue taking this medication, and follow the directions you see here.   1 tablet, Oral, Take As Directed             Continue These Medications        Instructions Start Date   ADVIL PO   400 mg, Oral, 3 Times Daily, LIQUIGELS      albuterol sulfate  (90 Base) MCG/ACT inhaler  Commonly known as: PROVENTIL HFA;VENTOLIN HFA;PROAIR HFA   2 puffs, Inhalation, As Needed      ALPRAZolam 1 MG tablet  Commonly known as: XANAX   0.5 mg, Oral, 3 Times Daily PRN      aspirin 81 MG EC tablet   81 mg, Oral, Daily      CBD oil capsule  Commonly known as: cannabidiol   1 each, Oral, Daily      clopidogrel 75 MG tablet  Commonly known as: PLAVIX   75 mg, Oral, Daily      gabapentin 800 MG tablet  Commonly known as: NEURONTIN   800 mg, Oral, 4 Times Daily      Imbruvica 420 MG tablet  tablet  Generic drug: ibrutinib   420 mg, Oral, Daily      lisinopril 40 MG tablet  Commonly known as: PRINIVIL,ZESTRIL   40 mg, Oral, Every Morning      metoprolol tartrate 25 MG tablet  Commonly known as: LOPRESSOR   25 mg, Oral, Every 12 Hours Scheduled      nitroglycerin 0.4 MG SL tablet  Commonly known as: NITROSTAT   0.4 mg, Sublingual, Every 5 Minutes PRN, Take no more than 3 doses in 15 minutes.      oxyCODONE ER 20 MG 12 hr tablet  Commonly known as: oxyCONTIN   20 mg, Oral, Every 12 Hours      oxyCODONE-acetaminophen  MG per tablet  Commonly known as: PERCOCET   1 tablet, Oral, Every 6 Hours PRN      sertraline 100 MG tablet  Commonly known as: ZOLOFT   200 mg, Oral, Daily                 Your Scheduled Appointments      Feb 01, 2024 11:45 AM  Hospital Follow Up with Ned Flores MD  [facility] [address] [facility] [address]  [phone]   -Bring photo ID, insurance card, and list of medications to appointment  -If testing was completed outside of [facility] then patient must bring images on a disc  -Copay will be collected at time of appointment  -Established patients should arrive 15 minutes prior to appointment         Jul 31, 2024  1:00 PM  CAROTID VAS ULTRASOUND VISIT with OP CRISTY VASC CART  5  [facility] ([place]) [address] [place] [address] [address]  [facility] [id number] [phone]    Please plan to arrive 30 minutes prior to your scheduled exam time. This is necessary to ensure adequate time for registration and instructions.   If you arrive more than 15 minutes late for your scheduled appointment, you can expect delays and will possibly be required to reschedule for a different date.   There is no at-home prep for this exam, but please be sure to wear comfortable clothing.   Total exam time can be up to 1 hour.         Jul 31, 2024  3:00 PM  Follow Up with Jeffry Ackerman  MD  [facility] ([place]) [address]  [address]  [facility] [address] [phone]   Arrive 15 minutes prior to appointment.       Additional instructions:    You have an appointment with Deanne Harrison on Dec. 5th @2:30 PM           Additional Instructions for the Follow-ups that You Need to Schedule       Discharge Follow-up with PCP   As directed       Currently Documented PCP:    Gillian Harrison APRN    PCP Phone Number:    [phone]     Follow Up Details: please follow up with pcp in 1 week               Follow-up Information       Gillian Harrison APRN .    Specialty: Family Medicine  Why: please follow up with pcp in 1 week  Contact information:  [address] [facility] [address] [phone]                             TEST  RESULTS PENDING AT DISCHARGE  Pending Labs       Order Current Status    Blood Culture - Blood, Arm, Left Preliminary result    Blood Culture - Blood, Arm, Right Preliminary result             Ken Sloan, DO  11/30/23  17:23 EST    Please note that this discharge summary required more than 30 minutes to complete.    Please send a copy of this dictation to the following providers:  Gillian Harrison APRN

## 2023-11-30 NOTE — PLAN OF CARE
Problem: Adult Inpatient Plan of Care  Goal: Plan of Care Review  11/30/2023 0549 by Chong Muñoz RN  Outcome: Ongoing, Progressing  Flowsheets (Taken 11/30/2023 0549)  Plan of Care Reviewed With:   patient   family  Outcome Evaluation: Pt resting in bed with cardene infusing. Purewic placed. Blood pressures have remained stable throughout shift. Pt remains non verbal. Neuro checks continued Q4. VSS. WCTM.  11/30/2023 0546 by Chong Muñoz RN  Outcome: Ongoing, Progressing  Flowsheets (Taken 11/30/2023 0546)  Progress: improving  Plan of Care Reviewed With: patient  Outcome Evaluation: Pt is intubated and sedated with fentanyl and precedex. Yovany infusing for hypotension. A fib RVR early in shift, metoprolol given for tachycardia. OG placed. PT tested positive for RSV.  VSS. WCTM.  Goal: Patient-Specific Goal (Individualized)  11/30/2023 0549 by Chong Muñoz RN  Outcome: Ongoing, Progressing  11/30/2023 0546 by Chong Muñoz RN  Outcome: Ongoing, Progressing  Goal: Absence of Hospital-Acquired Illness or Injury  11/30/2023 0549 by Chong Muñoz RN  Outcome: Ongoing, Progressing  11/30/2023 0546 by Chong Muñoz RN  Outcome: Ongoing, Progressing  Intervention: Identify and Manage Fall Risk  Recent Flowsheet Documentation  Taken 11/30/2023 0400 by Chong Muñoz RN  Safety Promotion/Fall Prevention:   safety round/check completed   fall prevention program maintained  Taken 11/30/2023 0300 by Chong Muñoz RN  Safety Promotion/Fall Prevention:   safety round/check completed   fall prevention program maintained  Taken 11/30/2023 0200 by Chong Muñoz RN  Safety Promotion/Fall Prevention:   safety round/check completed   fall prevention program maintained  Taken 11/30/2023 0100 by Chong Muñoz RN  Safety Promotion/Fall Prevention:   safety round/check completed   fall prevention program maintained  Taken 11/30/2023 0000 by Chong Muñoz  JAMES Hudson  Safety Promotion/Fall Prevention:   safety round/check completed   fall prevention program maintained  Taken 11/29/2023 2300 by Chong Muñoz RN  Safety Promotion/Fall Prevention:   safety round/check completed   fall prevention program maintained  Intervention: Prevent Skin Injury  Recent Flowsheet Documentation  Taken 11/30/2023 0400 by Chong Muñoz RN  Body Position:   turned   left  Taken 11/30/2023 0200 by Chong Muñoz RN  Body Position:   turned   right  Taken 11/30/2023 0000 by Chong Muñoz RN  Body Position:   turned   left   side-lying  Intervention: Prevent and Manage VTE (Venous Thromboembolism) Risk  Recent Flowsheet Documentation  Taken 11/30/2023 0200 by Chong Muñoz RN  Activity Management: bedrest  VTE Prevention/Management: (see mar) other (see comments)  Range of Motion: ROM (range of motion) performed  Taken 11/29/2023 2300 by Chong Muñoz RN  Activity Management: bedrest  VTE Prevention/Management: (see MAR) other (see comments)  Range of Motion: ROM (range of motion) performed  Goal: Optimal Comfort and Wellbeing  11/30/2023 0549 by Chong Muñoz RN  Outcome: Ongoing, Progressing  11/30/2023 0546 by Chong Muñoz RN  Outcome: Ongoing, Progressing  Intervention: Provide Person-Centered Care  Recent Flowsheet Documentation  Taken 11/30/2023 0200 by Chong Muñoz RN  Trust Relationship/Rapport:   care explained   choices provided   questions encouraged   reassurance provided  Taken 11/29/2023 2300 by Chong Muñoz RN  Trust Relationship/Rapport:   care explained   choices provided   emotional support provided   questions encouraged  Goal: Readiness for Transition of Care  11/30/2023 0549 by Chong Muñoz, JAMES  Outcome: Ongoing, Progressing  11/30/2023 0546 by Chong Muñoz RN  Outcome: Ongoing, Progressing     Problem: Hypertension Comorbidity  Goal: Blood Pressure in Desired  Range  11/30/2023 0549 by Chong Muñoz RN  Outcome: Ongoing, Progressing  11/30/2023 0546 by Chong Muñoz RN  Outcome: Ongoing, Progressing  Intervention: Maintain Blood Pressure Management  Recent Flowsheet Documentation  Taken 11/30/2023 0400 by Chong Muñoz RN  Medication Review/Management: medications reviewed  Taken 11/30/2023 0300 by Chong Muñoz, RN  Medication Review/Management: medications reviewed  Taken 11/30/2023 0200 by Chong Muñoz RN  Medication Review/Management: medications reviewed  Taken 11/30/2023 0100 by Chong Muñoz RN  Medication Review/Management: medications reviewed  Taken 11/30/2023 0000 by Chong Muñoz RN  Medication Review/Management: medications reviewed  Taken 11/29/2023 2300 by Chong Muñoz RN  Medication Review/Management: medications reviewed     Problem: Fall Injury Risk  Goal: Absence of Fall and Fall-Related Injury  11/30/2023 0549 by Chong Muñoz RN  Outcome: Ongoing, Progressing  11/30/2023 0546 by Chong Muñoz RN  Outcome: Ongoing, Progressing  Intervention: Identify and Manage Contributors  Recent Flowsheet Documentation  Taken 11/30/2023 0400 by Chong Muñoz RN  Medication Review/Management: medications reviewed  Taken 11/30/2023 0300 by Chong Muñoz, RN  Medication Review/Management: medications reviewed  Taken 11/30/2023 0200 by Chong Muñoz, RN  Medication Review/Management: medications reviewed  Taken 11/30/2023 0100 by Chong Muñoz, RN  Medication Review/Management: medications reviewed  Taken 11/30/2023 0000 by Chong Muñoz, RN  Medication Review/Management: medications reviewed  Taken 11/29/2023 2300 by Chong Muñoz, RN  Medication Review/Management: medications reviewed  Intervention: Promote Injury-Free Environment  Recent Flowsheet Documentation  Taken 11/30/2023 0400 by Chong Muñoz, RN  Safety Promotion/Fall  Prevention:   safety round/check completed   fall prevention program maintained  Taken 11/30/2023 0300 by Chong Muñoz RN  Safety Promotion/Fall Prevention:   safety round/check completed   fall prevention program maintained  Taken 11/30/2023 0200 by Chong Muñoz RN  Safety Promotion/Fall Prevention:   safety round/check completed   fall prevention program maintained  Taken 11/30/2023 0100 by Chong Muñoz RN  Safety Promotion/Fall Prevention:   safety round/check completed   fall prevention program maintained  Taken 11/30/2023 0000 by Chong Muñoz RN  Safety Promotion/Fall Prevention:   safety round/check completed   fall prevention program maintained  Taken 11/29/2023 2300 by Chong Muñoz RN  Safety Promotion/Fall Prevention:   safety round/check completed   fall prevention program maintained     Problem: Skin Injury Risk Increased  Goal: Skin Health and Integrity  11/30/2023 0549 by Chong Muñoz RN  Outcome: Ongoing, Progressing  11/30/2023 0546 by Chong Muñoz RN  Outcome: Ongoing, Progressing  Intervention: Optimize Skin Protection  Recent Flowsheet Documentation  Taken 11/30/2023 0400 by Chong Muñoz RN  Head of Bed (HOB) Positioning: HOB elevated  Taken 11/30/2023 0200 by Chong Muñoz RN  Head of Bed (HOB) Positioning: HOB elevated  Taken 11/30/2023 0000 by Chong Muñoz RN  Head of Bed (HOB) Positioning: HOB elevated   Goal Outcome Evaluation:  Plan of Care Reviewed With: patient, family        Progress: improving  Outcome Evaluation: Pt resting in bed with cardene infusing. Purewic placed. Blood pressures have remained stable throughout shift. Pt remains non verbal. Neuro checks continued Q4. VSS. WCTM.

## 2023-11-30 NOTE — CONSULTS
[facility]  CONSULT  NOTE      Patient information:  Date of Admit: 11/29/2023  Date of Consult: 11/30/23  Hospitalist/Referring MD:Kelly Parker MD  PCP: Gillian Harrison APRN  MRN:  [id number]  Visit Number:  [id number]    LOS: 1  CODE STATUS:  Code Status and Medical Interventions:   Ordered at: 11/29/23 2111     Level Of Support Discussed With:    Patient     Code Status (Patient has no pulse and is not breathing):    CPR (Attempt to Resuscitate)     Medical Interventions (Patient has pulse or is breathing):    Full Support       PROBLEM LIST: Principal Problem:    Hypertensive emergency        General Cardiology Consulting Physician: Dr. Alberto Silverio MD    Assessment    1.  Severe hypertension on admission  2.  ASCVD status post cardiac catheterization on September 2023 with  of the right coronary artery with collaterals and with diffuse severe disease of the obtuse marginal disease of the left circumflex artery, patient is not candidate for revascularization  3.  Carotid artery disease status post carotid artery stenting with 50% carotid stenosis  4.  History of intestinal ischemia with celiac artery stenosis  5.  Abdominal aortic aneurysm  6.  History of CLL  7.  COPD  8.  Peripheral arterial disease  9.  Ongoing tobacco abuse  10.  Dyslipidemia  11.  Mental status changes  12.  Elevated high-sensitivity troponin consistent with NSTEMI type II secondary to known history of coronary artery disease on the setting of severe hypertension          Recommendations   1.  Blood pressure did improve we will start amlodipine 5 mg/day and can titrate up if needed and also consider adding HCTZ  2.  Elevated troponins consistent with NSTEMI type II in the setting of severe stenosis of small obtuse marginal arteries and see DO of the right coronary artery patient had recent cardiac catheterization will continue with medical management  3.  Continue with aspirin  "statin and Plavix  4.  Strongly advised to quit smoking        Reason for Cardiology consultation: Acute NSTEMI    Subjective Data   ADMISSION INFORMATION:  Chief Complaint   Patient presents with    Altered Mental Status     History of Present Illness    Melania Mae is a 58 y.o. female with a past medical history significant for history of CVA, carotid stenosis status post right stent 8/23/2017, left carotid stenosis asymptomatic, CAD, abdominal aortic aneurysm stenting left ICA August 2017, history of heart murmur, history of ovarian blood clots, CLL diagnosed October 2012, COPD, current tobacco use, depression, diabetes, PVD and chronic back pain.     Patient [facility] to [facility] emergency room (ER) on 11/29/2023 with complaints of altered mental status.  Patient's son was historian while in the ER, and he reported that she has been \"out of it\" over the past 2 days and sleeping more than usual.  He stated that she became noncommunicative saying only 2 words the entire day on 11/29/2023.  Patient's family was unsure of any medication changes in her regiment, however it was noted that she was on Neurontin 800 mg 4 times a day.  EKG revealed SR with frequent PVCs with a ventricular rate of 92 bpm, biatrial enlargement, ST depression of 1 mm in aVF and V3, 4.  No ST elevation noted.  1 view CXR revealed coarsened interstitial markings throughout the lungs.  CT of the head without contrast stroke protocol was unremarkable with no evidence of acute intracranial findings.  Initial HS troponin was 68 ->74.  UDS was positive for THC and oxycodone.  Creatinine 0.84, potassium 3.7, magnesium 2.5, hemoglobin 14.2, and WBC 9.83.  Patient did require Cardene for persistent hypertension while in the ER.    Cardiology has been consulted for further evaluation and management acute NSTEMI.     Primary Cardiologist: Possibly Dr. Shea at [facility].    Appears patient was in-patient at " [facility] ([facility]) in August 2023 for CVA.    Patient is in room CCU 2 and was examined by Dr. Silverio.  Patient is lying in bed resting quietly.  No acute distress noted at this time.  Patient is awake and verbal and answers all questions.  She is alert and oriented.  She currently denies any chest pain, shortness of breath, or palpitations.  She does admit to being a current smoker but seems hesitant to stop.  Family member x 4 are at bedside.  Patient's son at bedside states that potentially she has missed several days of medicine.     Known medications given enroute vis EMS and in the ER:         Cardiac risk factors:arteriosclerotic heart disease, cerebral vascular disease, hypercholesterolemia, hypertension, peripheral vascular disease, and smoking      Last Echo: Results for orders placed during the hospital encounter of 09/20/23    Adult Transthoracic Echo Complete W/ Cont if Necessary Per Protocol    Interpretation Summary    Left ventricular systolic function is normal. Calculated left ventricular EF = 62.4% Left ventricular ejection fraction appears to be 61 - 65%.    Left ventricular wall thickness is consistent with mild concentric hypertrophy.    Left ventricular diastolic function was normal.    There is mild calcification of the aortic valve.    Estimated right ventricular systolic pressure from tricuspid regurgitation is normal (<35 mmHg). Calculated right ventricular systolic pressure from tricuspid regurgitation is 12 mmHg.    Mild mitral valve regurgitation.       Last Stress: None found in patient's chart.      Last Cath: Results for orders placed during the hospital encounter of 09/20/23    Cardiac Catheterization/Vascular Study    Narrative    Proximal %  with mature left-to-right collaterals.    LAD minimal 10 to 20% scattered stenosis, distal left main 20%    Small nondominant diffusely diseased left circumflex, OM1 mid 80% stenosis less than 1 mm vessel.    LV pressures  (S/D/E) : 183/1/24 mmHg    The ejection fraction was greater than 55% by visual estimate.    Optimize medical therapy, continue dual antiplatelet therapy, add high-dose statin, smoking cessation, add Imdur 60 mg daily                            Past Medical History:   Diagnosis Date    Aneurysm 8/223/17    Cavernous left ICA aneurysm    Asthma     Carotid artery occlusion     Chronic back pain     CLL (chronic lymphocytic leukemia)     OCTOBER OF 2012    COPD (chronic obstructive pulmonary disease)     Degenerative arthritis     Depression     Diabetes mellitus     type 2    H/O blood clots     ovary    Heart murmur     Hyperlipidemia     Hypertension     Lymphadenopathy     Non Hodgkin's lymphoma     PVD (peripheral vascular disease)     Urinary frequency      Past Surgical History:   Procedure Laterality Date    AORTAGRAM N/A 10/18/2023    Procedure: AORTAGRAM, BILATERAL COMMON ILIAC ARTERY STENTS, SUPERIOR MESENTERIC  ARTERY STENT;  Surgeon: Filiberto Alcantara MD;  Location:  [facility];  Service: Vascular;  Laterality: N/A;  109 mGy  4 MIN 06 SECS  40mL CONTRAST    APPENDECTOMY      BACK SURGERY      2002 (work accident) c-spine surgery 4/14    CARDIAC CATHETERIZATION N/A 09/20/2023    Procedure: Left Heart Cath;  Surgeon: Martín Tobin MD;  Location:  [facility];  Service: Cardiovascular;  Laterality: N/A;    CAROTID STENT Right 08/23/2017    CEREBRAL ANGIOGRAM N/A 08/23/2017    Diagnostic Carotid/Cerebral Angiogram    CHOLECYSTECTOMY      COLONOSCOPY      HYSTERECTOMY      for endometriosis/ovarian bleed    INTERVENTIONAL RADIOLOGY PROCEDURE N/A 08/02/2023    Procedure: Abdominal Aortagram with Runoff;  Surgeon: Jeffry Ackerman MD;  Location:  [facility];  Service: Interventional Radiology;  Laterality: N/A;    INTERVENTIONAL RADIOLOGY PROCEDURE Bilateral 08/02/2023    Procedure: Carotid Cerebral Angiogram;  Surgeon: Jeffry Ackerman MD;  Location:  [facility];  Service: Interventional Radiology;  Laterality: Bilateral;    LUMBAR FUSION      NECK SURGERY      Neck hardware    OOPHORECTOMY Left     OTHER SURGICAL HISTORY      pac insertion and removal    TONSILLECTOMY       Family History   Problem Relation Age of Onset    Lung cancer Father 72    Hypertension Father     Heart disease Father     Cancer Father     Diabetes Father     Other Brother 46        MI    Heart disease Brother     Breast cancer Paternal Aunt     Colon cancer Maternal Grandfather     Other Cousin         CLL (dx 48 yo)    Hypertension Mother      Social History     Tobacco Use    Smoking status: Every Day     Packs/day: 0.50     Years: 35.00     Additional pack years: 0.00     Total pack years: 17.50     Types: Cigarettes     Passive exposure: Current    Smokeless tobacco: Never    Tobacco comments:     0.5-1 PPD   Vaping Use    Vaping Use: Never used   Substance Use Topics    Alcohol use: Yes     Comment: RARELY PER PT    Drug use: No       ALLERGIES: Codeine, Rituxan [rituximab], and Penicillins    Medications listed below are reported home medications pulling from within the system:  Medications Prior to Admission   Medication Sig Dispense Refill Last Dose    ALPRAZolam (XANAX) 1 MG tablet Take 0.5 tablets by mouth 3 (Three) Times a Day As Needed for Anxiety.       clopidogrel (PLAVIX) 75 MG tablet Take 1 tablet by mouth Daily. 30 tablet 5     gabapentin (NEURONTIN) 800 MG tablet Take 1 tablet by mouth 4 (Four) Times a Day.       ibrutinib (IMBRUVICA) 420 MG tablet tablet Take 1 tablet by mouth Daily. 28 tablet 5     isosorbide mononitrate (IMDUR) 60 MG 24 hr tablet Take 1 tablet by mouth Daily. (Patient taking differently: Take 1 tablet by mouth Daily.) 90 tablet 3     lisinopril (PRINIVIL,ZESTRIL) 40 MG tablet Take 1 tablet by mouth Every Morning. 90 tablet 3     metoprolol tartrate (LOPRESSOR) 25 MG tablet Take 1 tablet by mouth Every 12 (Twelve) Hours. 120 tablet 5     nitroglycerin  (NITROSTAT) 0.4 MG SL tablet Place 1 tablet under the tongue Every 5 (Five) Minutes As Needed for Chest Pain (Systolic BP Greater Than 100). Take no more than 3 doses in 15 minutes. 75 tablet 12     oxyCODONE ER (oxyCONTIN) 20 MG 12 hr tablet Take 1 tablet by mouth Every 12 (Twelve) Hours.       oxyCODONE-acetaminophen (PERCOCET)  MG per tablet Take 1 tablet by mouth Every 6 (Six) Hours As Needed for Moderate Pain.       rosuvastatin (CRESTOR) 10 MG tablet Take 2 tablets by mouth Every Night. 90 tablet 3     rosuvastatin (CRESTOR) 20 MG tablet Take 1 tablet by mouth Every Night.       sertraline (ZOLOFT) 100 MG tablet Take 2 tablets by mouth Daily.       sulfamethoxazole-trimethoprim (BACTRIM DS,SEPTRA DS) 800-160 MG per tablet TAKE 1 TABLET BY MOUTH EVERY DAY (Patient taking differently: Take 1 tablet by mouth Take As Directed. Takes twice a day on Saturday  and Sunday.) 30 tablet 0     valACYclovir (VALTREX) 500 MG tablet TAKE 1 TABLET BY MOUTH EVERY DAY (Patient taking differently: Take 1 tablet by mouth Daily.) 30 tablet 0     albuterol (PROVENTIL HFA;VENTOLIN HFA) 108 (90 Base) MCG/ACT inhaler Inhale 2 puffs As Needed for Wheezing.       aspirin 81 MG EC tablet Take 1 tablet by mouth Daily. 30 tablet 5     CBD oil (cannabidiol) capsule Take 1 each by mouth Daily.       Ibuprofen (ADVIL PO) Take 400 mg by mouth 3 (Three) Times a Day. LIQUIGELS          Review of Systems   Unable to perform ROS: Other   Constitutional:  Negative for activity change, appetite change and diaphoresis.   HENT:  Negative for facial swelling and trouble swallowing.    Eyes:  Negative for visual disturbance.   Respiratory:  Negative for shortness of breath.    Cardiovascular:  Negative for chest pain, palpitations and leg swelling.   Gastrointestinal:  Negative for blood in stool, nausea and vomiting.   Endocrine: Negative.    Genitourinary:  Negative for hematuria.   Musculoskeletal:  Negative for gait problem.   Skin:  Negative  for color change.   Neurological:  Negative for dizziness, syncope, speech difficulty, weakness, light-headedness and headaches.   Psychiatric/Behavioral:  Negative for agitation and behavioral problems.        Objective Data      Vital Signs  Temp:  [97.8 °F (36.6 °C)-100.1 °F (37.8 °C)] 100.1 °F (37.8 °C)  Heart Rate:  [] 78  Resp:  [17-20] 20  BP: ()/(50-86) 132/62     Vital Signs (last 72 hrs)         11/27 0700 11/28 0659 11/28 0700 11/29 0659 11/29 0700 11/30 0659 11/30 0700 11/30 0837   Most Recent      Temp (°F)     97.7 -  100.1      100.1     100.1 (37.8) 11/30 0800    Heart Rate     80 -  111       111 11/30 0613    Resp     16 -  19       18 11/30 0500    BP     115/78 -  212/81       115/78 11/30 0613    SpO2 (%)     97 -  100       98 11/30 0600          BMI:  Body mass index is 24.07 kg/m².    WEIGHT:      11/29/23  1445 11/29/23  2252   Weight: 64 kg (141 lb) 63.6 kg (140 lb 3.4 oz)       DIET:  Diet: Cardiac Diets, Diabetic Diets; Healthy Heart (2-3 Na+); Consistent Carbohydrate; Texture: Regular Texture (IDDSI 7); Fluid Consistency: Thin (IDDSI 0)    I&O:  Intake & Output (last 3 days)         11/27 0701 11/28 0700 11/28 0701 11/29 0700 11/29 0701 11/30 0700 11/30 0701 12/01 0700    I.V. (mL/kg)   395.4 (6.2)     IV Piggyback   1000     Total Intake(mL/kg)   1395.4 (21.9)     Urine (mL/kg/hr)   550     Total Output   550     Net   +845.4                     Physical Exam  Constitutional:       Appearance: Normal appearance.   HENT:      Head: Normocephalic and atraumatic.      Nose: Nose normal.      Mouth/Throat:      Mouth: Mucous membranes are moist.      Pharynx: Oropharynx is clear.   Eyes:      Conjunctiva/sclera: Conjunctivae normal.   Cardiovascular:      Rate and Rhythm: Normal rate and regular rhythm.      Pulses: Normal pulses.      Heart sounds: Normal heart sounds.   Pulmonary:      Effort: Pulmonary effort is normal.      Breath sounds: Normal breath sounds.  "  Abdominal:      General: Bowel sounds are normal.      Palpations: Abdomen is soft.   Musculoskeletal:         General: Normal range of motion.      Cervical back: Normal range of motion.   Skin:     General: Skin is warm and dry.   Neurological:      General: No focal deficit present.      Mental Status: She is alert and oriented to person, place, and time.   Psychiatric:         Mood and Affect: Mood normal.         Behavior: Behavior normal.       Results review   Results Review:    I have reviewed the patient's new clinical results.    Results from last 7 days   Lab Units 11/29/23  1802 11/29/23  1551   HSTROP T ng/L 74* 68*     No results found for: \"PROBNP\"  Results from last 7 days   Lab Units 11/29/23  1551   WBC 10*3/mm3 9.83   HEMOGLOBIN g/dL 14.2   PLATELETS 10*3/mm3 202     Results from last 7 days   Lab Units 11/29/23  1551   SODIUM mmol/L 140   POTASSIUM mmol/L 3.7   CHLORIDE mmol/L 99   CO2 mmol/L 26.7   BUN mg/dL 22*   CREATININE mg/dL 0.84   CALCIUM mg/dL 10.1   GLUCOSE mg/dL 278*   ALT (SGPT) U/L 14   AST (SGOT) U/L 18     Lab Results   Component Value Date    MG 2.5 11/29/2023     Lab Results   Component Value Date    CHOL 108 10/16/2023    TRIG 103 10/16/2023    HDL 48 10/16/2023    LDL 41 10/16/2023     Estimated Creatinine Clearance: 72.4 mL/min (by C-G formula based on SCr of 0.84 mg/dL).  Lab Results   Component Value Date    HGBA1C 6.50 (H) 10/16/2023     Lab Results   Component Value Date    INR 0.96 04/17/2014         Lab Results   Component Value Date    TSH 0.447 10/16/2023      No results found for: \"URICACID\"  Pain Management Panel          Latest Ref Rng & Units 11/29/2023   Pain Management Panel   Amphetamine, Urine Qual Negative Negative    Barbiturates Screen, Urine Negative Negative    Benzodiazepine Screen, Urine Negative Negative    Buprenorphine, Screen, Urine Negative Negative    Cocaine Screen, Urine Negative Negative    Fentanyl, Urine Negative Negative    Methadone Screen " ", Urine Negative Negative    Methamphetamine, Ur Negative Negative      Microbiology Results (last 10 days)       ** No results found for the last 240 hours. **           No results found for: \"BLOODCX\"      EC2023      ECG/EMG Results (last 24 hours)       Procedure Component Value Units Date/Time    ECG 12 Lead Other; Hypertension [[id number]] Collected: 23 1726     Updated: 23 172     QT Interval 368 ms      QTC Interval 455 ms     Narrative:      Test Reason : Other~  Blood Pressure :   */*   mmHG  Vent. Rate :  92 BPM     Atrial Rate :  92 BPM     P-R Int : 158 ms          QRS Dur : 102 ms      QT Int : 368 ms       P-R-T Axes :  63 -31  93 degrees     QTc Int : 455 ms    Sinus rhythm with fusion complexes  Biatrial enlargement  Left axis deviation  Left ventricular hypertrophy with repolarization abnormality  Marked ST abnormality, possible anterior subendocardial injury  Abnormal ECG  When compared with ECG of 20-SEP-2023 16:33,  fusion complexes are now present  Minimal criteria for Anterior infarct are no longer present  ST now depressed in Anterior leads  T wave inversion less evident in Lateral leads    Referred By: CURD           Confirmed By:             TELEMETRY:   SR 70s        RADIOLOGY STUDIES:  Imaging Results (Last 72 Hours)       Procedure Component Value Units Date/Time    XR Chest 1 View [[id number]] Collected: 23 1629     Updated: 23 1632    Narrative:      EXAM:    XR Chest, 1 View     EXAM DATE:    2023 4:27 PM     CLINICAL HISTORY:    ams     TECHNIQUE:    Frontal view of the chest.     COMPARISON:    2018     FINDINGS:    LUNGS AND PLEURAL SPACES:  Coarsened interstitial markings noted  throughout the lungs.  No pneumothorax.    HEART:  Cardiomegaly again noted.    MEDIASTINUM:  Unremarkable as visualized.    BONES/JOINTS:  Unremarkable as visualized.       Impression:        Coarsened interstitial markings noted throughout the lungs.      "   This report was finalized on 11/29/2023 4:30 PM by Dr. Fazal Theodore MD.       CT Head Without Contrast Stroke Protocol [[id number]] Collected: 11/29/23 1603     Updated: 11/29/23 1606    Narrative:      EXAM:    CT Head Without Intravenous Contrast     EXAM DATE:    11/29/2023 4:32 PM     CLINICAL HISTORY:    Neuro deficit, acute, stroke suspected     TECHNIQUE:    Axial computed tomography images of the head/brain without intravenous  contrast.  Sagittal and coronal reformatted images were created and  reviewed.  This CT exam was performed using one or more of the following  dose reduction techniques:  automated exposure control, adjustment of  the mA and/or kV according to patient size, and/or use of iterative  reconstruction technique.     COMPARISON:    No relevant prior studies available.     FINDINGS:    BRAIN AND EXTRA-AXIAL SPACES:  Unremarkable as visualized.  No  hemorrhage.  No significant white matter disease.  No edema.  No  ventriculomegaly.    BONES/JOINTS:  Unremarkable as visualized.  No acute fracture.    SOFT TISSUES:  Unremarkable as visualized.    SINUSES:  Unremarkable as visualized.  No acute sinusitis.    MASTOID AIR CELLS:  Unremarkable as visualized.  No mastoid effusion.       Impression:        Unremarkable exam demonstrating no CT evidence of acute intracranial  findings.        This report was finalized on 11/29/2023 4:04 PM by Dr. Fazal Theodore MD.               ALLERGIES: Codeine, Rituxan [rituximab], and Penicillins    CURRENT MEDICATIONS:  Current list of medications may not reflect those currently placed in orders that are not signed or are being held.     aspirin, 81 mg, Oral, Daily  clopidogrel, 75 mg, Oral, Daily  heparin (porcine), 5,000 Units, Subcutaneous, Q12H  insulin lispro, 2-7 Units, Subcutaneous, 4x Daily AC & at Bedtime  isosorbide mononitrate, 60 mg, Oral, Q24H  lisinopril, 40 mg, Oral, Q24H  rosuvastatin, 40 mg, Oral, Nightly  senna-docusate sodium, 2 tablet, Oral,  BID  sodium chloride, 10 mL, Intravenous, Q12H  sodium chloride, 10 mL, Intravenous, Q12H           senna-docusate sodium **AND** polyethylene glycol **AND** bisacodyl **AND** bisacodyl    dextrose    dextrose    glucagon (human recombinant)    nitroglycerin    [COMPLETED] Insert Peripheral IV **AND** sodium chloride    sodium chloride    sodium chloride    sodium chloride    sodium chloride    Thank you very much for asking us to be involved in this patient's care.  We will follow along with you. Please do not hesitate to call for any questions or concerns.     I have discussed the patients findings and recommendations with patient.    Electronically signed by BRANDON Hernadez, 11/30/23, 3:17 PM EST.   Electronically signed by Albreto Silverio MD, 11/30/23, 3:44 PM EST.                        Please note that portions of this note were copied and has been reviewed and is accurate as of 11/30/2023 .      Please note that portions of this note were completed with a voice recognition program.

## 2023-11-30 NOTE — PAYOR COMM NOTE
"CONTACT: DAVID TADEO RN  UTILIZATION MANAGEMENT DEPT.  [facility]  [address] [place] [address]  PHONE: [phone]  FAX: [phone]      INPATIENT AUTH REQUEST    AUTH#[id number]    Melania Mae (58 y.o. Female)       Date of Birth   1965    Social Security Number       Address   [address] [place] [address]    Home Phone   [phone]    MRN   [id number]       Mandaen   Orthodoxy    Marital Status                               Admission Date   11/29/23    Admission Type   Emergency    Admitting Provider   Kelly Parker MD    Attending Provider   Ken Sloan DO    Department, Room/Bed   [facility] CRITICAL CARE, 02/       Discharge Date       Discharge Disposition       Discharge Destination                                 Attending Provider: Ken Sloan DO    Allergies: Codeine, Rituxan [Rituximab], Penicillins    Isolation: None   Infection: None   Code Status: CPR    Ht: 162.6 cm (64\")   Wt: 63.6 kg (140 lb 3.4 oz)    Admission Cmt: None   Principal Problem: Hypertensive emergency [I16.1]                   Active Insurance as of 11/29/2023       Primary Coverage       Payor Plan Insurance Group Employer/Plan Group    HUMANA MEDICARE REPLACEMENT HUMANA MEDICARE REPLACEMENT [id number]       Payor Plan Address Payor Plan Phone Number Payor Plan Fax Number Effective Dates    [address] [address] [phone]  1/1/2023 - None Entered    [facility] [address]         Subscriber Name Subscriber Birth Date Member ID       MELANIA MAE 1965 [id number]               Secondary Coverage       Payor Plan Insurance Group Employer/Plan Group    [place]        Payor Plan Address Payor Plan Phone Number Payor Plan Fax Number Effective Dates    [address] [phone]  6/1/2021 - None Entered    [facility] [id number]         Subscriber Name Subscriber Birth Date Member ID       " KALI NG 1965 [id number]                     Emergency Contacts        (Rel.) Home Phone Work Phone Mobile Phone    Luís Ng (Son) -- -- [phone]    adama ng (Son) -- -- [phone]                 History & Physical        Laith Carpenter APRN at 11/29/23 221       Attestation signed by Kelly Parker MD at 11/30/23 0608    I have reviewed this documentation and agree.  I have discussed and formulated the assessment and plan with CARLOS Schwarz.  I have also independently seen the patient.  I did review the patient's past medical history, as well as her vital signs, labs, EKGs, imaging, and past medical history.  The patient is in the critical care unit bed 2.  When I received checkout on the patient from the emergency department, I did asked them to decrease the Cardene drip to 2.5 mg/h as her systolic blood pressures had decreased from 212 to 144 while on the Cardene drip at 5 mg/h.  I tried to asked patient questions but she would only shake her head yes and no; she would not talk to me, which is the behavior that she exhibited with both the CCU nurse and CARLOS Verma.  Patient did not answer any orientation questions.  She was able to tell me that she did not have any chest pain and she did not have a headache.  I have now discontinued the Cardene drip and I have written for her home Imdur and lisinopril.  I have held off on the home metoprolol for now until I can gauge how her blood pressures will trend after the Cardene is finally metabolized.  Patient's head CT was negative.  Patient appears to understand questions and she does not have a focal neurological examination; the only abnormal physical finding is that she is not talking.  Patient may have hypertensive encephalopathy as she was not seen by the family for 2 days.  We will monitor her neurological status closely as well as her blood pressures.                       [facility] ER Medicine  Services  HISTORY & PHYSICAL    Patient Identification:  Name:  Melania Mae  Age:  58 y.o.  Sex:  female  :  1965  MRN:  [id number]   Visit Number:  [id number]  Admit Date: 2023   Primary Care Physician:  Gillian Harrison APRN     Subjective     Chief complaint:   Chief Complaint   Patient presents with    Altered Mental Status     History of presenting illness:   Patient is a 58 y.o. female with past medical history significant for hypertension, hyperlipidemia, CLL, non-Hodgkin's lymphoma, COPD and diabetes that presented to the [facility] emergency department for evaluation of altered mental status.  Patient is currently answering basic questions nodding her head.  Patient does respond to verbal stimuli along with painful stimuli.  Patient's son is at bedside and provides historical data for physical examination.  He advises that his brother found the patient today after not having any contact with her in the past day or so.  He advises that the patient was walking around inside her house, she was not alert or oriented to her whereabouts.  Patient's son confirms past medical history, along with implanted cardiac stents.  The patient denies any chest pain or abdominal pain currently.  Upon arrival to the emergency department patient's blood pressure was in excess of 200 systolically.  Cardene drip initiated in the emergency department.  Will continue Cardene drip upon admission.  Upon my physical assessment the patient she is not alert or oriented to person, place, or time.  Patient appears to be in no acute distress, patient is hemodynamically stable.  Cardene is infusing at 2.5.  The patient was placed in the critical care unit as overflow from the progressive care unit.Upon arrival to the ED, vitals were temperature 97.7,/56, heart rate 95, respirations 19, and SpO2 98%.Labs obtained on arrival: Initial troponin 68, reflex troponin 74 with a positive delta of 6.  BUN  22, and glucose 278, all other labs are unremarkable.    [facility] and [facility].     Present during exam:Son  ---------------------------------------------------------------------------------------------------------------------   Review of Systems   Constitutional: Negative.  Negative for chills, fatigue and fever.   HENT: Negative.  Negative for congestion, sore throat and trouble swallowing.    Eyes: Negative.    Respiratory: Negative.  Negative for cough, shortness of breath and wheezing.    Cardiovascular: Negative.  Negative for chest pain, palpitations and leg swelling.   Gastrointestinal: Negative.  Negative for abdominal pain, diarrhea, nausea and vomiting.   Endocrine: Negative.    Genitourinary: Negative.  Negative for dysuria.   Musculoskeletal: Negative.  Negative for neck pain and neck stiffness.   Allergic/Immunologic: Negative.    Neurological: Negative.  Negative for dizziness, tremors, seizures, syncope, facial asymmetry, speech difficulty, weakness, light-headedness, numbness and headaches.   Hematological: Negative.    Psychiatric/Behavioral: Negative.         Otherwise 10-system ROS reviewed and is negative except as mentioned in the HPI.    ---------------------------------------------------------------------------------------------------------------------   Past Medical History:   Diagnosis Date    Aneurysm 8/223/17    Cavernous left ICA aneurysm    Asthma     Carotid artery occlusion     Chronic back pain     CLL (chronic lymphocytic leukemia)     OCTOBER OF 2012    COPD (chronic obstructive pulmonary disease)     Degenerative arthritis     Depression     Diabetes mellitus     type 2    H/O blood clots     ovary    Heart murmur     Hyperlipidemia     Hypertension     Lymphadenopathy     Non Hodgkin's lymphoma     PVD (peripheral vascular disease)     Urinary frequency      Past Surgical History:   Procedure Laterality  Date    AORTAGRAM N/A 10/18/2023    Procedure: AORTAGRAM, BILATERAL COMMON ILIAC ARTERY STENTS, SUPERIOR MESENTERIC  ARTERY STENT;  Surgeon: Filiberto Alcantara MD;  Location:  [facility];  Service: Vascular;  Laterality: N/A;  109 mGy  4 MIN 06 SECS  40mL CONTRAST    APPENDECTOMY      BACK SURGERY      2002 (work accident) c-spine surgery 4/14    CARDIAC CATHETERIZATION N/A 09/20/2023    Procedure: Left Heart Cath;  Surgeon: Martín Tobin MD;  Location:  [facility];  Service: Cardiovascular;  Laterality: N/A;    CAROTID STENT Right 08/23/2017    CEREBRAL ANGIOGRAM N/A 08/23/2017    Diagnostic Carotid/Cerebral Angiogram    CHOLECYSTECTOMY      COLONOSCOPY      HYSTERECTOMY      for endometriosis/ovarian bleed    INTERVENTIONAL RADIOLOGY PROCEDURE N/A 08/02/2023    Procedure: Abdominal Aortagram with Runoff;  Surgeon: Jeffry Ackerman MD;  Location: [facility] LOCATION;  Service: Interventional Radiology;  Laterality: N/A;    INTERVENTIONAL RADIOLOGY PROCEDURE Bilateral 08/02/2023    Procedure: Carotid Cerebral Angiogram;  Surgeon: Jeffry Ackerman MD;  Location:  [facility];  Service: Interventional Radiology;  Laterality: Bilateral;    LUMBAR FUSION      NECK SURGERY      Neck hardware    OOPHORECTOMY Left     OTHER SURGICAL HISTORY      pac insertion and removal    TONSILLECTOMY       Family History   Problem Relation Age of Onset    Lung cancer Father 72    Hypertension Father     Heart disease Father     Cancer Father     Diabetes Father     Other Brother 46        MI    Heart disease Brother     Breast cancer Paternal Aunt     Colon cancer Maternal Grandfather     Other Cousin         CLL (dx 48 yo)    Hypertension Mother      Social History     Socioeconomic History    Marital status:    Tobacco Use    Smoking status: Every Day     Packs/day: 0.50     Years: 35.00     Additional pack years: 0.00     Total pack years: 17.50     Types: Cigarettes      Passive exposure: Current    Smokeless tobacco: Never    Tobacco comments:     0.5-1 PPD   Vaping Use    Vaping Use: Never used   Substance and Sexual Activity    Alcohol use: Yes     Comment: RARELY PER PT    Drug use: No    Sexual activity: Defer     ---------------------------------------------------------------------------------------------------------------------   Allergies:  Codeine, Rituxan [rituximab], and Penicillins  ---------------------------------------------------------------------------------------------------------------------   Medications below are reported home medications pulling from within the system; at this time, these medications have not been reconciled unless otherwise specified and are in the verification process for further verifcation as current home medications.    Prior to Admission Medications       Prescriptions Last Dose Informant Patient Reported? Taking?    albuterol (PROVENTIL HFA;VENTOLIN HFA) 108 (90 Base) MCG/ACT inhaler  Self Yes No    Inhale 2 puffs As Needed for Wheezing.    ALPRAZolam (XANAX) 0.5 MG tablet  Self Yes No    Take 1 tablet by mouth 3 (Three) Times a Day As Needed for Anxiety.    aspirin 81 MG EC tablet  Self No No    Take 1 tablet by mouth Daily.    CBD oil (cannabidiol) capsule  Self Yes No    Take 1 each by mouth Daily.    clopidogrel (PLAVIX) 75 MG tablet  Self No No    Take 1 tablet by mouth Daily.    gabapentin (NEURONTIN) 800 MG tablet  Self Yes No    Take 1 tablet by mouth 4 (Four) Times a Day.    ibrutinib (IMBRUVICA) 420 MG tablet tablet  Self No No    Take 1 tablet by mouth Daily.    Ibuprofen (ADVIL PO)  Self Yes No    Take 400 mg by mouth 3 (Three) Times a Day. LIQUIGELS    isosorbide mononitrate (IMDUR) 60 MG 24 hr tablet  Self No No    Take 1 tablet by mouth Daily.    Patient taking differently:  Take 1 tablet by mouth Daily.    lisinopril (PRINIVIL,ZESTRIL) 40 MG tablet  Self No No    Take 1 tablet by mouth Every Morning.    metoprolol  tartrate (LOPRESSOR) 25 MG tablet  Self No No    Take 1 tablet by mouth Every 12 (Twelve) Hours.    nitroglycerin (NITROSTAT) 0.4 MG SL tablet  Self No No    Place 1 tablet under the tongue Every 5 (Five) Minutes As Needed for Chest Pain (Systolic BP Greater Than 100). Take no more than 3 doses in 15 minutes.    oxyCODONE ER (oxyCONTIN) 20 MG 12 hr tablet  Self Yes No    Take 1 tablet by mouth Every 12 (Twelve) Hours.    oxyCODONE-acetaminophen (PERCOCET)  MG per tablet  Self Yes No    Take 1 tablet by mouth Every 6 (Six) Hours As Needed for Moderate Pain.    prochlorperazine (COMPAZINE) 10 MG tablet  Self No No    Take 1 tablet by mouth Every 6 (Six) Hours As Needed for Nausea or Vomiting.    rosuvastatin (CRESTOR) 10 MG tablet  Self No No    Take 2 tablets by mouth Every Night.    sertraline (ZOLOFT) 100 MG tablet  Self Yes No    Take 1 tablet by mouth Every Night.    sulfamethoxazole-trimethoprim (BACTRIM DS,SEPTRA DS) 800-160 MG per tablet  Self No No    TAKE 1 TABLET BY MOUTH EVERY DAY    Patient taking differently:  Take 1 tablet by mouth Take As Directed. Takes twice a day on Saturday  and Sunday.    valACYclovir (VALTREX) 500 MG tablet  Self No No    TAKE 1 TABLET BY MOUTH EVERY DAY    Patient taking differently:  Take 1 tablet by mouth Daily.          ---------------------------------------------------------------------------------------------------------------------    Objective     Hospital Scheduled Meds:  heparin (porcine), 5,000 Units, Subcutaneous, Q12H  senna-docusate sodium, 2 tablet, Oral, BID  sodium chloride, 10 mL, Intravenous, Q12H      niCARdipine, 5-15 mg/hr, Last Rate: 2.5 mg/hr (11/29/23 7173)        Current listed hospital scheduled medications may not yet reflect those currently placed in orders that are signed and held, awaiting patient's arrival to floor/unit.    ---------------------------------------------------------------------------------------------------------------------    Vital Signs:  Temp:  [97.7 °F (36.5 °C)-97.9 °F (36.6 °C)] 97.9 °F (36.6 °C)  Heart Rate:  [] 95  Resp:  [16-19] 19  BP: (129-212)/(50-86) 129/56  Mean Arterial Pressure (Non-Invasive) for the past 24 hrs (Last 3 readings):   Noninvasive MAP (mmHg)   11/29/23 2300 82   11/29/23 1900 82   11/29/23 1855 94     SpO2 Percentage    11/29/23 1855 11/29/23 1900 11/29/23 2300   SpO2: 100% 99% 98%     SpO2:  [98 %-100 %] 98 %  on   ;        Body mass index is 24.07 kg/m².  Wt Readings from Last 3 Encounters:   11/29/23 63.6 kg (140 lb 3.4 oz)   10/18/23 64.4 kg (141 lb 15.6 oz)   09/21/23 65.3 kg (143 lb 15.4 oz)       ---------------------------------------------------------------------------------------------------------------------   Physical Exam  Vitals reviewed.   Constitutional:       General: She is awake. She is not in acute distress.     Appearance: Normal appearance. She is normal weight. She is not ill-appearing or diaphoretic.   HENT:      Head: Normocephalic and atraumatic.      Nose: Nose normal.      Mouth/Throat:      Mouth: Mucous membranes are moist.      Pharynx: Oropharynx is clear.   Eyes:      Extraocular Movements: Extraocular movements intact.      Pupils: Pupils are equal, round, and reactive to light.   Cardiovascular:      Rate and Rhythm: Normal rate and regular rhythm.      Pulses: Normal pulses.           Dorsalis pedis pulses are 2+ on the right side and 2+ on the left side.      Heart sounds: Normal heart sounds. No murmur heard.     No friction rub.   Pulmonary:      Effort: Pulmonary effort is normal. No accessory muscle usage, respiratory distress or retractions.      Breath sounds: No wheezing, rhonchi or rales.   Abdominal:      General: Bowel sounds are normal. There is no distension.      Palpations: Abdomen is soft.      Tenderness: There is no abdominal tenderness. There is no guarding.   Musculoskeletal:         General: Normal range of motion.      Cervical back: Normal  range of motion and neck supple. No rigidity.      Right lower leg: No edema.      Left lower leg: No edema.   Skin:     General: Skin is warm and dry.      Capillary Refill: Capillary refill takes 2 to 3 seconds.   Neurological:      Mental Status: She is alert. Mental status is at baseline. She is disoriented.      Cranial Nerves: No dysarthria or facial asymmetry.      Sensory: Sensation is intact.      Motor: No weakness or tremor.      Comments: Patient will acknowledge questions but unable to verbally answer.  Patient withdraws from painful stimuli   Psychiatric:         Attention and Perception: Attention normal.         Mood and Affect: Mood normal.         Speech: Speech normal.         Behavior: Behavior normal. Behavior is cooperative.         Thought Content: Thought content normal.         Cognition and Memory: Cognition normal.         Judgment: Judgment normal.          ---------------------------------------------------------------------------------------------------------------------  EKG: Normal sinus rhythm, with ST changes.  Unconfirmed cardiology            Telemetry:    Normal sinus rhythm    I have personally reviewed the EKG/Telemetry strip  ---------------------------------------------------------------------------------------------------------------------   Results from last 7 days   Lab Units 11/29/23  1802 11/29/23  1551   HSTROP T ng/L 74* 68*         Results from last 7 days   Lab Units 11/29/23  1615   PH, ARTERIAL pH units 7.447   PO2 ART mm Hg 86.3   PCO2, ARTERIAL mm Hg 35.1   HCO3 ART mmol/L 24.2     Results from last 7 days   Lab Units 11/29/23  1551   LACTATE mmol/L 1.7   WBC 10*3/mm3 9.83   HEMOGLOBIN g/dL 14.2   HEMATOCRIT % 43.2   MCV fL 99.3*   MCHC g/dL 32.9   PLATELETS 10*3/mm3 202     Results from last 7 days   Lab Units 11/29/23  1551   SODIUM mmol/L 140   POTASSIUM mmol/L 3.7   MAGNESIUM mg/dL 2.5   CHLORIDE mmol/L 99   CO2 mmol/L 26.7   BUN mg/dL 22*   CREATININE mg/dL  0.84   CALCIUM mg/dL 10.1   GLUCOSE mg/dL 278*   ALBUMIN g/dL 4.8   BILIRUBIN mg/dL 0.6   ALK PHOS U/L 78   AST (SGOT) U/L 18   ALT (SGPT) U/L 14   Estimated Creatinine Clearance: 72.4 mL/min (by C-G formula based on SCr of 0.84 mg/dL).  Ammonia   Date Value Ref Range Status   11/29/2023 12 11 - 51 umol/L Final       Glucose   Date/Time Value Ref Range Status   11/29/2023 2313 205 (H) 70 - 130 mg/dL Final     Lab Results   Component Value Date    HGBA1C 6.50 (H) 10/16/2023     Lab Results   Component Value Date    TSH 0.447 10/16/2023       Microbiology Results (last 10 days)       ** No results found for the last 240 hours. **           Pain Management Panel          Latest Ref Rng & Units 11/29/2023   Pain Management Panel   Amphetamine, Urine Qual Negative Negative    Barbiturates Screen, Urine Negative Negative    Benzodiazepine Screen, Urine Negative Negative    Buprenorphine, Screen, Urine Negative Negative    Cocaine Screen, Urine Negative Negative    Fentanyl, Urine Negative Negative    Methadone Screen , Urine Negative Negative    Methamphetamine, Ur Negative Negative      I have personally reviewed the above laboratory results.   ---------------------------------------------------------------------------------------------------------------------  Imaging Results (Last 7 Days)       Procedure Component Value Units Date/Time    XR Chest 1 View [[id number]] Collected: 11/29/23 1629     Updated: 11/29/23 1632    Narrative:      EXAM:    XR Chest, 1 View     EXAM DATE:    11/29/2023 4:27 PM     CLINICAL HISTORY:    ams     TECHNIQUE:    Frontal view of the chest.     COMPARISON:    9/24/2018     FINDINGS:    LUNGS AND PLEURAL SPACES:  Coarsened interstitial markings noted  throughout the lungs.  No pneumothorax.    HEART:  Cardiomegaly again noted.    MEDIASTINUM:  Unremarkable as visualized.    BONES/JOINTS:  Unremarkable as visualized.       Impression:        Coarsened interstitial markings noted throughout  the lungs.        This report was finalized on 11/29/2023 4:30 PM by Dr. Fazal Theodore MD.       CT Head Without Contrast Stroke Protocol [[id number]] Collected: 11/29/23 1603     Updated: 11/29/23 1606    Narrative:      EXAM:    CT Head Without Intravenous Contrast     EXAM DATE:    11/29/2023 4:32 PM     CLINICAL HISTORY:    Neuro deficit, acute, stroke suspected     TECHNIQUE:    Axial computed tomography images of the head/brain without intravenous  contrast.  Sagittal and coronal reformatted images were created and  reviewed.  This CT exam was performed using one or more of the following  dose reduction techniques:  automated exposure control, adjustment of  the mA and/or kV according to patient size, and/or use of iterative  reconstruction technique.     COMPARISON:    No relevant prior studies available.     FINDINGS:    BRAIN AND EXTRA-AXIAL SPACES:  Unremarkable as visualized.  No  hemorrhage.  No significant white matter disease.  No edema.  No  ventriculomegaly.    BONES/JOINTS:  Unremarkable as visualized.  No acute fracture.    SOFT TISSUES:  Unremarkable as visualized.    SINUSES:  Unremarkable as visualized.  No acute sinusitis.    MASTOID AIR CELLS:  Unremarkable as visualized.  No mastoid effusion.       Impression:        Unremarkable exam demonstrating no CT evidence of acute intracranial  findings.        This report was finalized on 11/29/2023 4:04 PM by Dr. Fazal Theodore MD.             I have personally reviewed the above radiology results.     Last Echocardiogram:  Results for orders placed during the hospital encounter of 09/20/23    Adult Transthoracic Echo Complete W/ Cont if Necessary Per Protocol    Interpretation Summary    Left ventricular systolic function is normal. Calculated left ventricular EF = 62.4% Left ventricular ejection fraction appears to be 61 - 65%.    Left ventricular wall thickness is consistent with mild concentric hypertrophy.    Left ventricular diastolic function  was normal.    There is mild calcification of the aortic valve.    Estimated right ventricular systolic pressure from tricuspid regurgitation is normal (<35 mmHg). Calculated right ventricular systolic pressure from tricuspid regurgitation is 12 mmHg.    Mild mitral valve regurgitation.    ---------------------------------------------------------------------------------------------------------------------    Assessment & Plan      ACUTE HOSPITAL PROBLEMS    -Acute hypertensive emergency, on admission  -/81 upon presentation to the emergency department  -IV Cardene initiated in the emergency department  -Will continue Cardene upon admission, will titrate when possible  -CMP and CBC in the a.m.  -Vital signs per CCU protocol  -Will resume oral hypertensive medications-reconciliation    -Acute NSTEMI (type I versus type II), on admission  -Continue ASA therapy upon pharmacy reconciliation  -Continue statin therapy  -Telemetry monitoring  -Cardiology consult placed for the a.m.  -CMP and CBC in the a.m.  -Continue Plavix    -F/E/N  Replace electrolytes per protocol as necessary.  Cardiac/diabetic diet.     CHRONIC MEDICAL PROBLEMS    -COPD  -Not in acute exacerbation  -DuoNebs as needed  -Supplemental oxygen to maintain saturation greater than 90%    -Diabetes  -Blood glucose checks before meals and at bedtime  -Insulin sliding scale/low-dose regimen  -Hold home oral hypoglycemic medications    -Hypertension  -Patient presented to the emergency department in hypertensive emergency, Cardene drip initiated in the emergency department.  -Will continue Cardene drip  -Will monitor blood pressure closely  -Resume oral hypertensive medications upon pharmacy reconciliation    -Hyperlipidemia  -Continue Crestor on pharmacy reconciliation    -History of chronic lymphocytic leukemia  -Aware of history  -Patient sent advises patient not currently being treated    -History of non-Hodgkin's lymphoma  -Aware of  "history  ---------------------------------------------------  DVT Prophylaxis: Heparin  Activity: Bedrest  ---------------------------------------------------  The patient is considered to be a high risk patient due to: Extensive past medical history    INPATIENT status due to the need for care which can only be reasonably provided in an hospital setting such as aggressive/expedited ancillary services and/or consultation services, the necessity for IV medications, close physician monitoring and/or the possible need for procedures.  In such, I feel patient's risk for adverse outcomes and need for care warrant INPATIENT evaluation and predict the patient's care encounter to likely last beyond 2 midnights.      Code Status: Full code   ---------------------------------------------------  Disposition/Discharge planning: Consult  for discharge planning  ---------------------------------------------------  I have discussed the patient's assessment and plan with attending physician Kelly Robertson MD Carl B Gray, APRN     11/29/23  23:32 EST    Attending Physician: Kelly Parker MD       Electronically signed by Kelly Parker MD at 11/30/23 0608          Emergency Department Notes        Rica Hernandez at 11/29/23 1605          Cleaned pt from feces and urine. Pt noted to have feces all down her legs and on the bottom of her feet. Clothes placed in belonging bag and gave to family with pt. Changed pt bed linen. Place purwick on pt. Positioned pt in a position of comfort and placed two warm blankets on pt.  Call light within reach.     Electronically signed by Rica Hernandez at 11/29/23 1608       Vik Medina DO at 11/29/23 1525          Subjective   History of Present Illness  88-year-old female here today for altered mental status.  Patient unable to give any history.  Sons state that the patient was \"out of it\" over the past 2 days, sleeping more than usual.  Today she would not " "communicate with them, saying only 2 words the entire day.  She did not have any complaints over the past 2 days that they are aware of.  She has a recent history of abdominal aortic aneurysm stenting.  She has not had previous cardiac stents.  She does have a history of carotid artery disease, CLL diagnosed 11 years ago.  COPD, depression, diabetes, peripheral vascular disease.  They are not sure if she has any recent changes in her medicine.  She is taking Neurontin 800 mg 4 times daily, among other meds.      Review of Systems   All other systems reviewed and are negative.      Past Medical History:   Diagnosis Date    Aneurysm 8/223/17    Cavernous left ICA aneurysm    Asthma     Carotid artery occlusion     Chronic back pain     CLL (chronic lymphocytic leukemia)     OCTOBER OF 2012    COPD (chronic obstructive pulmonary disease)     Degenerative arthritis     Depression     Diabetes mellitus     type 2    H/O blood clots     ovary    Heart murmur     Hyperlipidemia     Hypertension     Lymphadenopathy     Non Hodgkin's lymphoma     PVD (peripheral vascular disease)     Urinary frequency        Allergies   Allergen Reactions    Codeine Shortness Of Breath    Rituxan [Rituximab] Shortness Of Breath and Other (See Comments)     \"THROAT RAWNESS; FELT LIKE MY THROAT WAS CLOSING UP\"    Penicillins Other (See Comments)     \"UNKNOWN CHILDHOOD ALLERGY\"       Past Surgical History:   Procedure Laterality Date    AORTAGRAM N/A 10/18/2023    Procedure: AORTAGRAM, BILATERAL COMMON ILIAC ARTERY STENTS, SUPERIOR MESENTERIC  ARTERY STENT;  Surgeon: Filiberto Alcantara MD;  Location: [facility];  Service: Vascular;  Laterality: N/A;  109 mGy  4 MIN 06 SECS  40mL CONTRAST    APPENDECTOMY      BACK SURGERY      2002 (work accident) c-spine surgery 4/14    CARDIAC CATHETERIZATION N/A 09/20/2023    Procedure: Left Heart Cath;  Surgeon: Martín Tobin MD;  Location: [facility] CATH INVASIVE LOCATION;  Service: Cardiovascular;  " Laterality: N/A;    CAROTID STENT Right 08/23/2017    CEREBRAL ANGIOGRAM N/A 08/23/2017    Diagnostic Carotid/Cerebral Angiogram    CHOLECYSTECTOMY      COLONOSCOPY      HYSTERECTOMY      for endometriosis/ovarian bleed    INTERVENTIONAL RADIOLOGY PROCEDURE N/A 08/02/2023    Procedure: Abdominal Aortagram with Runoff;  Surgeon: Jeffry Ackerman MD;  Location:  [facility] INVASIVE LOC[facility];  Service: Interventional Radiology;  Laterality: N/A;    INTERVENTIONAL RADIOLOGY PROCEDURE Bilateral 08/02/2023    Procedure: Carotid Cerebral Angiogram;  Surgeon: Jeffry Ackerman MD;  Location:  [facility] CATH INVASIVE LOCATION;  Service: Interventional Radiology;  Laterality: Bilateral;    LUMBAR FUSION      NECK SURGERY      Neck hardware    OOPHORECTOMY Left     OTHER SURGICAL HISTORY      pac insertion and removal    TONSILLECTOMY         Family History   Problem Relation Age of Onset    Lung cancer Father 72    Hypertension Father     Heart disease Father     Cancer Father     Diabetes Father     Other Brother 46        MI    Heart disease Brother     Breast cancer Paternal Aunt     Colon cancer Maternal Grandfather     Other Cousin         CLL (dx 48 yo)    Hypertension Mother        Social History     Socioeconomic History    Marital status:    Tobacco Use    Smoking status: Every Day     Packs/day: 0.50     Years: 35.00     Additional pack years: 0.00     Total pack years: 17.50     Types: Cigarettes     Passive exposure: Current    Smokeless tobacco: Never    Tobacco comments:     0.5-1 PPD   Vaping Use    Vaping Use: Never used   Substance and Sexual Activity    Alcohol use: Yes     Comment: RARELY PER PT    Drug use: No    Sexual activity: Defer           Objective   Physical Exam  Vitals and nursing note reviewed. Exam conducted with a chaperone present.   Constitutional:       Appearance: Normal appearance. She is normal weight.   HENT:      Head: Normocephalic and atraumatic.      Mouth/Throat:      Mouth:  Mucous membranes are moist.      Pharynx: Oropharynx is clear.   Eyes:      Extraocular Movements: Extraocular movements intact.      Conjunctiva/sclera: Conjunctivae normal.      Pupils: Pupils are equal, round, and reactive to light.      Comments: Pupils equal bilaterally at rest.  Pupils react unequally with the right being sluggish and less reactive.   Neck:      Vascular: Carotid bruit (Marked bilateral) present.   Cardiovascular:      Rate and Rhythm: Normal rate and regular rhythm.      Heart sounds: Normal heart sounds. No murmur heard.     No friction rub. No gallop.   Pulmonary:      Effort: Pulmonary effort is normal. No respiratory distress.      Breath sounds: Normal breath sounds. No wheezing, rhonchi or rales.   Chest:      Chest wall: No tenderness.   Abdominal:      General: Abdomen is flat. Bowel sounds are normal. There is no distension.      Palpations: Abdomen is soft.      Tenderness: There is no abdominal tenderness.   Musculoskeletal:         General: Normal range of motion.      Cervical back: Normal range of motion and neck supple.      Right lower leg: No edema.      Left lower leg: No edema.   Skin:     General: Skin is warm and dry.   Neurological:      General: No focal deficit present.      Mental Status: She is alert.      Comments: Patient is awake, alert, follows some commands.  Completely nonverbal.  Moves all 4 extremities.  No focal deficits noted though exam suboptimal as she is not able to completely cooperate and follow commands.         Procedures          ED Course  ED Course as of 11/29/23 2115 Wed Nov 29, 2023   1732 ECG 12 Lead Other; Hypertension  Sinus rhythm with frequent PVCs.  Rate 92.  Left axis deviation.  Biatrial enlargement.  ST depression 1 mm in aVF and V3, 4.  No ST elevation.  Abnormal EKG.  Interpreted by me.  Electronically signed by Gurpreet Miller MD, 11/29/23, 5:34 PM EST.   [BC]   4560 Patient care transferred to Dr. Medina at change of shift.    Gurpreet  JOSE Miller MD  6:49 PM EST   [BC]      ED Course User Index  [BC] Gurpreet Miller MD      XR Chest 1 View    Result Date: 11/29/2023    Coarsened interstitial markings noted throughout the lungs.   This report was finalized on 11/29/2023 4:30 PM by Dr. Fazal Theodore MD.      CT Head Without Contrast Stroke Protocol    Result Date: 11/29/2023    Unremarkable exam demonstrating no CT evidence of acute intracranial findings.   This report was finalized on 11/29/2023 4:04 PM by Dr. Fazal Theodore MD.         Results for orders placed or performed during the hospital encounter of 11/29/23   Comprehensive Metabolic Panel    Specimen: Arm, Left; Blood   Result Value Ref Range    Glucose 278 (H) 65 - 99 mg/dL    BUN 22 (H) 6 - 20 mg/dL    Creatinine 0.84 0.57 - 1.00 mg/dL    Sodium 140 136 - 145 mmol/L    Potassium 3.7 3.5 - 5.2 mmol/L    Chloride 99 98 - 107 mmol/L    CO2 26.7 22.0 - 29.0 mmol/L    Calcium 10.1 8.6 - 10.5 mg/dL    Total Protein 7.3 6.0 - 8.5 g/dL    Albumin 4.8 3.5 - 5.2 g/dL    ALT (SGPT) 14 1 - 33 U/L    AST (SGOT) 18 1 - 32 U/L    Alkaline Phosphatase 78 39 - 117 U/L    Total Bilirubin 0.6 0.0 - 1.2 mg/dL    Globulin 2.5 gm/dL    A/G Ratio 1.9 g/dL    BUN/Creatinine Ratio 26.2 (H) 7.0 - 25.0    Anion Gap 14.3 5.0 - 15.0 mmol/L    eGFR 80.2 >60.0 mL/min/1.73   Urinalysis With Culture If Indicated - Urine, Catheter    Specimen: Urine, Catheter   Result Value Ref Range    Color, UA Yellow Yellow, Straw    Appearance, UA Clear Clear    pH, UA 6.0 5.0 - 8.0    Specific Gravity, UA 1.013 1.005 - 1.030    Glucose,  mg/dL (1+) (A) Negative    Ketones, UA 15 mg/dL (1+) (A) Negative    Bilirubin, UA Negative Negative    Blood, UA Moderate (2+) (A) Negative    Protein,  mg/dL (2+) (A) Negative    Leuk Esterase, UA Negative Negative    Nitrite, UA Negative Negative    Urobilinogen, UA 0.2 E.U./dL 0.2 - 1.0 E.U./dL   High Sensitivity Troponin T    Specimen: Arm, Left; Blood   Result Value Ref Range    HS  Troponin T 68 (C) <14 ng/L   Lactic Acid, Plasma    Specimen: Arm, Left; Blood   Result Value Ref Range    Lactate 1.7 0.5 - 2.0 mmol/L   Urine Drug Screen - Urine, Clean Catch    Specimen: Urine, Clean Catch   Result Value Ref Range    THC, Screen, Urine Positive (A) Negative    Phencyclidine (PCP), Urine Negative Negative    Cocaine Screen, Urine Negative Negative    Methamphetamine, Ur Negative Negative    Opiate Screen Negative Negative    Amphetamine Screen, Urine Negative Negative    Benzodiazepine Screen, Urine Negative Negative    Tricyclic Antidepressants Screen Negative Negative    Methadone Screen, Urine Negative Negative    Barbiturates Screen, Urine Negative Negative    Oxycodone Screen, Urine Positive (A) Negative    Buprenorphine, Screen, Urine Negative Negative   Ethanol    Specimen: Arm, Left; Blood   Result Value Ref Range    Ethanol <10 0 - 10 mg/dL    Ethanol % <0.010 %   Magnesium    Specimen: Arm, Left; Blood   Result Value Ref Range    Magnesium 2.5 1.6 - 2.6 mg/dL   Ammonia    Specimen: Arm, Left; Blood   Result Value Ref Range    Ammonia 12 11 - 51 umol/L   CBC Auto Differential    Specimen: Arm, Right; Blood   Result Value Ref Range    WBC 9.83 3.40 - 10.80 10*3/mm3    RBC 4.35 3.77 - 5.28 10*6/mm3    Hemoglobin 14.2 12.0 - 15.9 g/dL    Hematocrit 43.2 34.0 - 46.6 %    MCV 99.3 (H) 79.0 - 97.0 fL    MCH 32.6 26.6 - 33.0 pg    MCHC 32.9 31.5 - 35.7 g/dL    RDW 12.2 (L) 12.3 - 15.4 %    RDW-SD 44.7 37.0 - 54.0 fl    MPV 10.7 6.0 - 12.0 fL    Platelets 202 140 - 450 10*3/mm3    Neutrophil % 92.1 (H) 42.7 - 76.0 %    Lymphocyte % 4.2 (L) 19.6 - 45.3 %    Monocyte % 3.4 (L) 5.0 - 12.0 %    Eosinophil % 0.0 (L) 0.3 - 6.2 %    Basophil % 0.1 0.0 - 1.5 %    Immature Grans % 0.2 0.0 - 0.5 %    Neutrophils, Absolute 9.06 (H) 1.70 - 7.00 10*3/mm3    Lymphocytes, Absolute 0.41 (L) 0.70 - 3.10 10*3/mm3    Monocytes, Absolute 0.33 0.10 - 0.90 10*3/mm3    Eosinophils, Absolute 0.00 0.00 - 0.40 10*3/mm3     Basophils, Absolute 0.01 0.00 - 0.20 10*3/mm3    Immature Grans, Absolute 0.02 0.00 - 0.05 10*3/mm3    nRBC 0.0 0.0 - 0.2 /100 WBC   Blood Gas, Arterial With Co-Ox    Specimen: Arterial Blood   Result Value Ref Range    Site Right Brachial     Krishna's Test N/A     pH, Arterial 7.447 7.350 - 7.450 pH units    pCO2, Arterial 35.1 35.0 - 45.0 mm Hg    pO2, Arterial 86.3 83.0 - 108.0 mm Hg    HCO3, Arterial 24.2 20.0 - 26.0 mmol/L    Base Excess, Arterial 0.5 0.0 - 2.0 mmol/L    O2 Saturation, Arterial 96.4 94.0 - 99.0 %    Hemoglobin, Blood Gas 14.1 13.5 - 17.5 g/dL    Hematocrit, Blood Gas 43.2 38.0 - 51.0 %    Oxyhemoglobin 94.8 94 - 99 %    Methemoglobin 1.00 0.00 - 3.00 %    Carboxyhemoglobin 0.7 0 - 5 %    A-a DO2 17.2 0.0 - 300.0 mmHg    CO2 Content 25.2 22 - 33 mmol/L    Barometric Pressure for Blood Gas 728 mmHg    Modality Room Air     FIO2 21 %    Ventilator Mode NA     Collected by [id number]     pH, Temp Corrected      pCO2, Temperature Corrected      pO2, Temperature Corrected     High Sensitivity Troponin T 2Hr    Specimen: Arm, Left; Blood   Result Value Ref Range    HS Troponin T 74 (C) <14 ng/L    Troponin T Delta 6 (C) >=-4 - <+4 ng/L   Fentanyl, Urine - Urine, Clean Catch    Specimen: Urine, Clean Catch   Result Value Ref Range    Fentanyl, Urine Negative Negative   Urinalysis, Microscopic Only - Urine, Catheter    Specimen: Urine, Catheter   Result Value Ref Range    RBC, UA 11-20 (A) None Seen, 0-2 /HPF    WBC, UA 0-2 None Seen, 0-2 /HPF    Bacteria, UA 2+ (A) None Seen /HPF    Squamous Epithelial Cells, UA 0-2 None Seen, 0-2 /HPF    Hyaline Casts, UA None Seen None Seen /LPF    Methodology Automated Microscopy    ECG 12 Lead Other; Hypertension   Result Value Ref Range    QT Interval 368 ms    QTC Interval 455 ms                                            Medical Decision Making  I assumed care of this patient from Dr. Millre at shift change.  Laboratory work-up as listed.  THC and oxycodone positive  urine.  Patient did require Cardene for persistent hypertension.  CT imaging of the head is unremarkable.  Cannot rule out possible hypertensive encephalopathy versus THC synthetics intoxication.  Please see original HPI for further details.  Recommend admission for further work up and treatment.  Hospitalist team consulted and made aware of the patient.  Consults and orders placed per hospitalist request.  Patient was agreeable to admission plan.  Vitals stable on admission.    Problems Addressed:  Hypertensive encephalopathy: complicated acute illness or injury  Synthetic cannabinoid intoxication: complicated acute illness or injury    Amount and/or Complexity of Data Reviewed  Labs: ordered. Decision-making details documented in ED Course.  Radiology: ordered. Decision-making details documented in ED Course.  ECG/medicine tests: ordered. Decision-making details documented in ED Course.    Risk  Prescription drug management.  Decision regarding hospitalization.        Final diagnoses:   Hypertensive encephalopathy   Synthetic cannabinoid intoxication       ED Disposition  ED Disposition       ED Disposition   Decision to Admit    Condition   --    Comment   Level of Care: Progressive Care [20]   Diagnosis: Hypertensive emergency [090741]   Certification: I Certify That Inpatient Hospital Services Are Medically Necessary For Greater Than 2 Midnights                 No follow-up provider specified.       Medication List      No changes were made to your prescriptions during this visit.            Vik Medina DO  11/29/23 2115       Vik Medina DO  11/29/23 2115      Electronically signed by Vik Medina DO at 11/29/23 2115       Facility-Administered Medications as of 11/30/2023   Medication Dose Route Frequency Provider Last Rate Last Admin    [COMPLETED] aspirin chewable tablet 324 mg  324 mg Oral Once Kelly Parker MD   324 mg at 11/29/23 2109    aspirin EC tablet 81 mg  81 mg Oral Daily Jayden  BRANDON Ozuna   81 mg at 11/30/23 1010    sennosides-docusate (PERICOLACE) 8.6-50 MG per tablet 2 tablet  2 tablet Oral BID Kelly Parker MD   2 tablet at 11/30/23 0843    And    polyethylene glycol (MIRALAX) packet 17 g  17 g Oral Daily PRN Kelly Parker MD        And    bisacodyl (DULCOLAX) EC tablet 5 mg  5 mg Oral Daily PRN Kelly Parker MD        And    bisacodyl (DULCOLAX) suppository 10 mg  10 mg Rectal Daily PRN Kelly Parker MD        clopidogrel (PLAVIX) tablet 75 mg  75 mg Oral Daily Laith Carpenter APRN   75 mg at 11/30/23 0844    dextrose (D50W) (25 g/50 mL) IV injection 25 g  25 g Intravenous Q15 Min PRN Laith Carpenter APRN        dextrose (GLUTOSE) oral gel 15 g  15 g Oral Q15 Min PRN Laith Carpenter APRN        glucagon HCl (Diagnostic) injection 1 mg  1 mg Intramuscular Q15 Min PRN Laith Carpenter APRN        heparin (porcine) 5000 UNIT/ML injection 5,000 Units  5,000 Units Subcutaneous Q12H Kelly Parker MD   5,000 Units at 11/30/23 0844    Insulin Lispro (humaLOG) injection 2-7 Units  2-7 Units Subcutaneous 4x Daily AC & at Bedtime Laith Carpenter APRN   3 Units at 11/30/23 0630    isosorbide mononitrate (IMDUR) 24 hr tablet 60 mg  60 mg Oral Q24H Kelly Parker MD   60 mg at 11/30/23 0613    lisinopril (PRINIVIL,ZESTRIL) tablet 40 mg  40 mg Oral Q24H Kelly Parker MD   40 mg at 11/30/23 0613    [COMPLETED] Naloxone HCl (NARCAN) injection 2 mg  2 mg Intravenous Once Gurpreet Miller MD   2 mg at 11/29/23 1613    nitroglycerin (NITROSTAT) SL tablet 0.4 mg  0.4 mg Sublingual Q5 Min PRN Kelly Parker MD        rosuvastatin (CRESTOR) tablet 40 mg  40 mg Oral Nightly Laith Carpenter APRN   40 mg at 11/30/23 0156    [COMPLETED] sodium chloride 0.9 % bolus 1,000 mL  1,000 mL Intravenous Once Gurpreet Miller MD   Stopped at 11/29/23 1643    sodium chloride 0.9 % flush 10 mL  10 mL Intravenous PRN Kelly Parker MD        sodium chloride 0.9 % flush 10 mL  10 mL Intravenous Q12H Keith,  Kelly FREITAS MD   10 mL at 11/30/23 0844    sodium chloride 0.9 % flush 10 mL  10 mL Intravenous PRN Kelly Parker MD        sodium chloride 0.9 % flush 10 mL  10 mL Intravenous Q12H Kelly Parker MD   10 mL at 11/30/23 0844    sodium chloride 0.9 % flush 10 mL  10 mL Intravenous PRN Kelly Parker MD        sodium chloride 0.9 % infusion 40 mL  40 mL Intravenous PRN Kelly Parker MD        sodium chloride 0.9 % infusion 40 mL  40 mL Intravenous PRN Kelly Parker MD         Orders (all)        Start     Ordered    11/30/23 0900  aspirin EC tablet 81 mg  Daily         11/29/23 2335 11/30/23 0900  clopidogrel (PLAVIX) tablet 75 mg  Daily         11/29/23 2335 11/30/23 0800  Oral Care  2 Times Daily       11/29/23 2251    11/30/23 0730  Insulin Lispro (humaLOG) injection 2-7 Units  4 Times Daily Before Meals & Nightly         11/29/23 2335 11/30/23 0700  POC Glucose 4x Daily Before Meals & at Bedtime  4 Times Daily Before Meals & at Bedtime      Comments: Complete no more than 45 minutes prior to patient eating      11/29/23 2335 11/30/23 0627  POC Glucose Once  PROCEDURE ONCE        Comments: Complete no more than 45 minutes prior to patient eating      11/30/23 0619    11/30/23 0600  isosorbide mononitrate (IMDUR) 24 hr tablet 60 mg  Every 24 Hours Scheduled         11/30/23 0507    11/30/23 0600  lisinopril (PRINIVIL,ZESTRIL) tablet 40 mg  Every 24 Hours Scheduled         11/30/23 0508    11/30/23 0145  sodium chloride 0.9 % flush 10 mL  Every 12 Hours Scheduled         11/30/23 0053    11/30/23 0054  Connectors / Hubs Must Be Scrubbed 15 Seconds Using 70% Alcohol Before Access - Allow to Dry Before Accessing Line  Continuous         11/30/23 0053    11/30/23 0053  sodium chloride 0.9 % flush 10 mL  As Needed         11/30/23 0053    11/30/23 0053  sodium chloride 0.9 % infusion 40 mL  As Needed         11/30/23 0053    11/30/23 0030  rosuvastatin (CRESTOR) tablet 40 mg  Nightly          11/29/23 2335    11/30/23 0000  Vital Signs  Every 4 Hours       11/29/23 2251    11/30/23 0000  Intake & Output  Every 4 Hours       11/29/23 2251 11/30/23 0000  Neuro Checks  Every 4 Hours       11/29/23 2251 11/29/23 2345  sodium chloride 0.9 % flush 10 mL  Every 12 Hours Scheduled         11/29/23 2251 11/29/23 2345  sennosides-docusate (PERICOLACE) 8.6-50 MG per tablet 2 tablet  2 Times Daily        See Hyperspace for full Linked Orders Report.    11/29/23 2251 11/29/23 2345  heparin (porcine) 5000 UNIT/ML injection 5,000 Units  Every 12 Hours Scheduled         11/29/23 2251 11/29/23 2335  dextrose (GLUTOSE) oral gel 15 g  Every 15 Minutes PRN         11/29/23 2335    11/29/23 2335  dextrose (D50W) (25 g/50 mL) IV injection 25 g  Every 15 Minutes PRN         11/29/23 2335    11/29/23 2335  glucagon HCl (Diagnostic) injection 1 mg  Every 15 Minutes PRN         11/29/23 2335    11/29/23 2335  Inpatient Cardiology Consult  Once        Specialty:  Cardiology  Provider:  Alberto Silverio MD    11/29/23 2335 11/29/23 2320  POC Glucose Once  PROCEDURE ONCE        Comments: Complete no more than 45 minutes prior to patient eating      11/29/23 2313    11/29/23 2252  Weigh Patient  Once         11/29/23 2251 11/29/23 2252  Insert Peripheral IV  Once         11/29/23 2251 11/29/23 2252  Saline Lock & Maintain IV Access  Continuous,   Status:  Canceled         11/29/23 2251 11/29/23 2252  Continuous Cardiac Monitoring  Continuous        Comments: Follow Standing Orders As Outlined in Process Instructions (Open Order Report to View Full Instructions)    11/29/23 2251 11/29/23 2252  Telemetry - Maintain IV Access  Continuous         11/29/23 2251 11/29/23 2252  Telemetry - Place Orders & Notify Provider of Results When Patient Experiences Acute Chest Pain, Dysrhythmia or Respiratory Distress  Until Discontinued         11/29/23 2251 11/29/23 2252  May Be Off Telemetry for Tests   Continuous         11/29/23 2251 11/29/23 2252  Pulse Oximetry, Continuous  Continuous         11/29/23 2251 11/29/23 2252  Activity - Strict Bed Rest  Until Discontinued         11/29/23 2251 11/29/23 2252  POC Glucose Once  Once        Comments: Complete no more than 45 minutes prior to patient eating      11/29/23 2251 11/29/23 2252  Daily Weights  Daily       11/29/23 2251 11/29/23 2252  Diet: Cardiac Diets, Diabetic Diets; Healthy Heart (2-3 Na+); Consistent Carbohydrate; Texture: Regular Texture (IDDSI 7); Fluid Consistency: Thin (IDDSI 0)  Diet Effective Now         11/29/23 2251 11/29/23 2251  sodium chloride 0.9 % flush 10 mL  As Needed         11/29/23 2251 11/29/23 2251  sodium chloride 0.9 % infusion 40 mL  As Needed         11/29/23 2251 11/29/23 2251  polyethylene glycol (MIRALAX) packet 17 g  Daily PRN        See Hyperspace for full Linked Orders Report.    11/29/23 2251 11/29/23 2251  bisacodyl (DULCOLAX) EC tablet 5 mg  Daily PRN        See Hyperspace for full Linked Orders Report.    11/29/23 2251 11/29/23 2251  bisacodyl (DULCOLAX) suppository 10 mg  Daily PRN        See Hyperspace for full Linked Orders Report.    11/29/23 2251 11/29/23 2251  nitroglycerin (NITROSTAT) SL tablet 0.4 mg  Every 5 Minutes PRN         11/29/23 2251 11/29/23 2111  Inpatient Admission  Once         11/29/23 2111 11/29/23 2111  Code Status and Medical Interventions:  Continuous         11/29/23 2111 11/29/23 2109  aspirin chewable tablet 324 mg  Once         11/29/23 2053 11/29/23 1917  Urinalysis, Microscopic Only - Urine, Catheter  Once         11/29/23 1916 11/29/23 1838  Fentanyl, Urine - Urine, Clean Catch  Once         11/29/23 1837 11/29/23 1816  niCARdipine (CARDENE) 25mg in 250mL NS infusion  Titrated,   Status:  Discontinued         11/29/23 1800    11/29/23 1751  High Sensitivity Troponin T 2Hr  PROCEDURE ONCE         11/29/23 1642    11/29/23 1619  Blood Gas,  Arterial With Co-Ox  PROCEDURE ONCE         11/29/23 1615    11/29/23 1541  sodium chloride 0.9 % bolus 1,000 mL  Once         11/29/23 1525    11/29/23 1541  Naloxone HCl (NARCAN) injection 2 mg  Once         11/29/23 1525    11/29/23 1515  Monitor Blood Pressure  Per Hospital Policy         11/29/23 1525    11/29/23 1515  Cardiac Monitoring  Continuous,   Status:  Canceled        Comments: Follow Standing Orders As Outlined in Process Instructions (Open Order Report to View Full Instructions)    11/29/23 1525    11/29/23 1515  Pulse Oximetry, Continuous  Continuous,   Status:  Canceled         11/29/23 1525    11/29/23 1515  Insert Peripheral IV  Once        See Hyperspace for full Linked Orders Report.    11/29/23 1525    11/29/23 1514  sodium chloride 0.9 % flush 10 mL  As Needed        See Hyperspace for full Linked Orders Report.    11/29/23 1525    11/29/23 1512  CT Head Without Contrast Stroke Protocol  1 Time Imaging         11/29/23 1525    11/29/23 1512  CBC & Differential  Once         11/29/23 1525    11/29/23 1512  Comprehensive Metabolic Panel  Once         11/29/23 1525    11/29/23 1512  Urinalysis With Culture If Indicated - Urine, Catheter  Once         11/29/23 1525    11/29/23 1512  Blood Gas, Arterial -With Co-Ox Panel: Yes  Once         11/29/23 1525    11/29/23 1512  High Sensitivity Troponin T  Once         11/29/23 1525    11/29/23 1512  Blood Culture - Blood, Arm, Left  Once         11/29/23 1525    11/29/23 1512  Blood Culture - Blood, Arm, Right  Once         11/29/23 1525    11/29/23 1512  Lactic Acid, Plasma  Once         11/29/23 1525    11/29/23 1512  Urine Drug Screen - Urine, Clean Catch  Once         11/29/23 1525    11/29/23 1512  Ethanol  Once         11/29/23 1525    11/29/23 1512  Magnesium  Once         11/29/23 1525    11/29/23 1512  Ammonia  Once         11/29/23 1525    11/29/23 1512  ECG 12 Lead Other; Hypertension  Once         11/29/23 1525    11/29/23 1512  XR Chest 1  View  1 Time Imaging         11/29/23 1525    11/29/23 1512  CBC Auto Differential  PROCEDURE ONCE         11/29/23 1525    Unscheduled  Follow Hypoglycemia Standing Orders For Blood Glucose <70 & Notify Provider of Treatment  As Needed      Comments: Follow Hypoglycemia Orders As Outlined in Process Instructions (Open Order Report to View Full Instructions)  Notify Provider Any Time Hypoglycemia Treatment is Administered    11/29/23 2335    Unscheduled  Change Dressing to IV Site As Needed When Damp, Loose or Soiled  As Needed       11/30/23 0053    Unscheduled  Change Needleless Connectors  As Needed      Comments: Change Needleless Connectors When:  - Administration Set Changed  - Dressing Changed  - Removed For Any Reason  - Residual Blood or Debris Within Connector  - Prior to Drawing Blood Cultures  - Contamination of Connector  - After Administration of Blood or Blood Components    11/30/23 0053    Unscheduled  Insert New Peripheral IV  As Needed      Comments: Frequency Per Facility Policy    11/30/23 0053    --  ALPRAZolam (XANAX) 1 MG tablet  3 Times Daily PRN         11/30/23 0410    --  rosuvastatin (CRESTOR) 20 MG tablet  Nightly         11/30/23 0417    --  SCANNED - TELEMETRY           11/29/23 0000    --  SCANNED - TELEMETRY           11/29/23 0000                  Physician Progress Notes (all)    No notes of this type exist for this encounter.       Consult Notes (all)    No notes of this type exist for this encounter.

## 2023-11-30 NOTE — CASE MANAGEMENT/SOCIAL WORK
Discharge Planning Assessment   [place]     Patient Name: Melania Mae  MRN: [id number]  Today's Date: 11/30/2023    Admit Date: 11/29/2023     Discharge Plan       Row Name 11/30/23 1559       Plan    Final Discharge Disposition Code 01 - home or self-care    Final Note Pt is being discharged home on this date. No other needs identified.               RACHEL Barbosa

## 2023-11-30 NOTE — H&P
[facility] ER Medicine Services  HISTORY & PHYSICAL    Patient Identification:  Name:  Melania Mae  Age:  58 y.o.  Sex:  female  :  1965  MRN:  [id number]   Visit Number:  [id number]  Admit Date: 2023   Primary Care Physician:  Gillian Harrison APRN     Subjective     Chief complaint:   Chief Complaint   Patient presents with    Altered Mental Status     History of presenting illness:   Patient is a 58 y.o. female with past medical history significant for hypertension, hyperlipidemia, CLL, non-Hodgkin's lymphoma, COPD and diabetes that presented to the [facility] emergency department for evaluation of altered mental status.  Patient is currently answering basic questions nodding her head.  Patient does respond to verbal stimuli along with painful stimuli.  Patient's son is at bedside and provides historical data for physical examination.  He advises that his brother found the patient today after not having any contact with her in the past day or so.  He advises that the patient was walking around inside her house, she was not alert or oriented to her whereabouts.  Patient's son confirms past medical history, along with implanted cardiac stents.  The patient denies any chest pain or abdominal pain currently.  Upon arrival to the emergency department patient's blood pressure was in excess of 200 systolically.  Cardene drip initiated in the emergency department.  Will continue Cardene drip upon admission.  Upon my physical assessment the patient she is not alert or oriented to person, place, or time.  Patient appears to be in no acute distress, patient is hemodynamically stable.  Cardene is infusing at 2.5.  The patient was placed in the critical care unit as overflow from the progressive care unit.Upon arrival to the ED, vitals were temperature 97.7,/56, heart rate 95, respirations 19, and SpO2 98%.Labs obtained on arrival: Initial troponin 68, reflex  troponin 74 with a positive delta of 6.  BUN 22, and glucose 278, all other labs are unremarkable.    Patient has been admitted to the critical care unit (overflow) for further evaluation and treatment.     Present during exam:Son  ---------------------------------------------------------------------------------------------------------------------   Review of Systems   Constitutional: Negative.  Negative for chills, fatigue and fever.   HENT: Negative.  Negative for congestion, sore throat and trouble swallowing.    Eyes: Negative.    Respiratory: Negative.  Negative for cough, shortness of breath and wheezing.    Cardiovascular: Negative.  Negative for chest pain, palpitations and leg swelling.   Gastrointestinal: Negative.  Negative for abdominal pain, diarrhea, nausea and vomiting.   Endocrine: Negative.    Genitourinary: Negative.  Negative for dysuria.   Musculoskeletal: Negative.  Negative for neck pain and neck stiffness.   Allergic/Immunologic: Negative.    Neurological: Negative.  Negative for dizziness, tremors, seizures, syncope, facial asymmetry, speech difficulty, weakness, light-headedness, numbness and headaches.   Hematological: Negative.    Psychiatric/Behavioral: Negative.         Otherwise 10-system ROS reviewed and is negative except as mentioned in the HPI.    ---------------------------------------------------------------------------------------------------------------------   Past Medical History:   Diagnosis Date    Aneurysm 8/223/17    Cavernous left ICA aneurysm    Asthma     Carotid artery occlusion     Chronic back pain     CLL (chronic lymphocytic leukemia)     OCTOBER OF 2012    COPD (chronic obstructive pulmonary disease)     Degenerative arthritis     Depression     Diabetes mellitus     type 2    H/O blood clots     ovary    Heart murmur     Hyperlipidemia     Hypertension     Lymphadenopathy     Non Hodgkin's lymphoma     PVD (peripheral vascular disease)     Urinary frequency       Past Surgical History:   Procedure Laterality Date    AORTAGRAM N/A 10/18/2023    Procedure: AORTAGRAM, BILATERAL COMMON ILIAC ARTERY STENTS, SUPERIOR MESENTERIC  ARTERY STENT;  Surgeon: Filiberto Alcantara MD;  Location:  [facility];  Service: Vascular;  Laterality: N/A;  109 mGy  4 MIN 06 SECS  40mL CONTRAST    APPENDECTOMY      BACK SURGERY      2002 (work accident) c-spine surgery 4/14    CARDIAC CATHETERIZATION N/A 09/20/2023    Procedure: Left Heart Cath;  Surgeon: Martín Tobin MD;  Location: [facility];  Service: Cardiovascular;  Laterality: N/A;    CAROTID STENT Right 08/23/2017    CEREBRAL ANGIOGRAM N/A 08/23/2017    Diagnostic Carotid/Cerebral Angiogram    CHOLECYSTECTOMY      COLONOSCOPY      HYSTERECTOMY      for endometriosis/ovarian bleed    INTERVENTIONAL RADIOLOGY PROCEDURE N/A 08/02/2023    Procedure: Abdominal Aortagram with Runoff;  Surgeon: Jeffry Ackerman MD;  Location: [facility];  Service: Interventional Radiology;  Laterality: N/A;    INTERVENTIONAL RADIOLOGY PROCEDURE Bilateral 08/02/2023    Procedure: Carotid Cerebral Angiogram;  Surgeon: Jeffry Ackerman MD;  Location: [facility];  Service: Interventional Radiology;  Laterality: Bilateral;    LUMBAR FUSION      NECK SURGERY      Neck hardware    OOPHORECTOMY Left     OTHER SURGICAL HISTORY      pac insertion and removal    TONSILLECTOMY       Family History   Problem Relation Age of Onset    Lung cancer Father 72    Hypertension Father     Heart disease Father     Cancer Father     Diabetes Father     Other Brother 46        MI    Heart disease Brother     Breast cancer Paternal Aunt     Colon cancer Maternal Grandfather     Other Cousin         CLL (dx 46 yo)    Hypertension Mother      Social History     Socioeconomic History    Marital status:    Tobacco Use    Smoking status: Every Day     Packs/day: 0.50     Years: 35.00     Additional pack years: 0.00     Total  pack years: 17.50     Types: Cigarettes     Passive exposure: Current    Smokeless tobacco: Never    Tobacco comments:     0.5-1 PPD   Vaping Use    Vaping Use: Never used   Substance and Sexual Activity    Alcohol use: Yes     Comment: RARELY PER PT    Drug use: No    Sexual activity: Defer     ---------------------------------------------------------------------------------------------------------------------   Allergies:  Codeine, Rituxan [rituximab], and Penicillins  ---------------------------------------------------------------------------------------------------------------------   Medications below are reported home medications pulling from within the system; at this time, these medications have not been reconciled unless otherwise specified and are in the verification process for further verifcation as current home medications.    Prior to Admission Medications       Prescriptions Last Dose Informant Patient Reported? Taking?    albuterol (PROVENTIL HFA;VENTOLIN HFA) 108 (90 Base) MCG/ACT inhaler  Self Yes No    Inhale 2 puffs As Needed for Wheezing.    ALPRAZolam (XANAX) 0.5 MG tablet  Self Yes No    Take 1 tablet by mouth 3 (Three) Times a Day As Needed for Anxiety.    aspirin 81 MG EC tablet  Self No No    Take 1 tablet by mouth Daily.    CBD oil (cannabidiol) capsule  Self Yes No    Take 1 each by mouth Daily.    clopidogrel (PLAVIX) 75 MG tablet  Self No No    Take 1 tablet by mouth Daily.    gabapentin (NEURONTIN) 800 MG tablet  Self Yes No    Take 1 tablet by mouth 4 (Four) Times a Day.    ibrutinib (IMBRUVICA) 420 MG tablet tablet  Self No No    Take 1 tablet by mouth Daily.    Ibuprofen (ADVIL PO)  Self Yes No    Take 400 mg by mouth 3 (Three) Times a Day. LIQUIGELS    isosorbide mononitrate (IMDUR) 60 MG 24 hr tablet  Self No No    Take 1 tablet by mouth Daily.    Patient taking differently:  Take 1 tablet by mouth Daily.    lisinopril (PRINIVIL,ZESTRIL) 40 MG tablet  Self No No    Take 1 tablet  by mouth Every Morning.    metoprolol tartrate (LOPRESSOR) 25 MG tablet  Self No No    Take 1 tablet by mouth Every 12 (Twelve) Hours.    nitroglycerin (NITROSTAT) 0.4 MG SL tablet  Self No No    Place 1 tablet under the tongue Every 5 (Five) Minutes As Needed for Chest Pain (Systolic BP Greater Than 100). Take no more than 3 doses in 15 minutes.    oxyCODONE ER (oxyCONTIN) 20 MG 12 hr tablet  Self Yes No    Take 1 tablet by mouth Every 12 (Twelve) Hours.    oxyCODONE-acetaminophen (PERCOCET)  MG per tablet  Self Yes No    Take 1 tablet by mouth Every 6 (Six) Hours As Needed for Moderate Pain.    prochlorperazine (COMPAZINE) 10 MG tablet  Self No No    Take 1 tablet by mouth Every 6 (Six) Hours As Needed for Nausea or Vomiting.    rosuvastatin (CRESTOR) 10 MG tablet  Self No No    Take 2 tablets by mouth Every Night.    sertraline (ZOLOFT) 100 MG tablet  Self Yes No    Take 1 tablet by mouth Every Night.    sulfamethoxazole-trimethoprim (BACTRIM DS,SEPTRA DS) 800-160 MG per tablet  Self No No    TAKE 1 TABLET BY MOUTH EVERY DAY    Patient taking differently:  Take 1 tablet by mouth Take As Directed. Takes twice a day on Saturday  and Sunday.    valACYclovir (VALTREX) 500 MG tablet  Self No No    TAKE 1 TABLET BY MOUTH EVERY DAY    Patient taking differently:  Take 1 tablet by mouth Daily.          ---------------------------------------------------------------------------------------------------------------------    Objective     Hospital Scheduled Meds:  heparin (porcine), 5,000 Units, Subcutaneous, Q12H  senna-docusate sodium, 2 tablet, Oral, BID  sodium chloride, 10 mL, Intravenous, Q12H      niCARdipine, 5-15 mg/hr, Last Rate: 2.5 mg/hr (11/29/23 0529)        Current listed hospital scheduled medications may not yet reflect those currently placed in orders that are signed and held, awaiting patient's arrival to floor/unit.     ---------------------------------------------------------------------------------------------------------------------   Vital Signs:  Temp:  [97.7 °F (36.5 °C)-97.9 °F (36.6 °C)] 97.9 °F (36.6 °C)  Heart Rate:  [] 95  Resp:  [16-19] 19  BP: (129-212)/(50-86) 129/56  Mean Arterial Pressure (Non-Invasive) for the past 24 hrs (Last 3 readings):   Noninvasive MAP (mmHg)   11/29/23 2300 82   11/29/23 1900 82   11/29/23 1855 94     SpO2 Percentage    11/29/23 1855 11/29/23 1900 11/29/23 2300   SpO2: 100% 99% 98%     SpO2:  [98 %-100 %] 98 %  on   ;        Body mass index is 24.07 kg/m².  Wt Readings from Last 3 Encounters:   11/29/23 63.6 kg (140 lb 3.4 oz)   10/18/23 64.4 kg (141 lb 15.6 oz)   09/21/23 65.3 kg (143 lb 15.4 oz)       ---------------------------------------------------------------------------------------------------------------------   Physical Exam  Vitals reviewed.   Constitutional:       General: She is awake. She is not in acute distress.     Appearance: Normal appearance. She is normal weight. She is not ill-appearing or diaphoretic.   HENT:      Head: Normocephalic and atraumatic.      Nose: Nose normal.      Mouth/Throat:      Mouth: Mucous membranes are moist.      Pharynx: Oropharynx is clear.   Eyes:      Extraocular Movements: Extraocular movements intact.      Pupils: Pupils are equal, round, and reactive to light.   Cardiovascular:      Rate and Rhythm: Normal rate and regular rhythm.      Pulses: Normal pulses.           Dorsalis pedis pulses are 2+ on the right side and 2+ on the left side.      Heart sounds: Normal heart sounds. No murmur heard.     No friction rub.   Pulmonary:      Effort: Pulmonary effort is normal. No accessory muscle usage, respiratory distress or retractions.      Breath sounds: No wheezing, rhonchi or rales.   Abdominal:      General: Bowel sounds are normal. There is no distension.      Palpations: Abdomen is soft.      Tenderness: There is no abdominal  tenderness. There is no guarding.   Musculoskeletal:         General: Normal range of motion.      Cervical back: Normal range of motion and neck supple. No rigidity.      Right lower leg: No edema.      Left lower leg: No edema.   Skin:     General: Skin is warm and dry.      Capillary Refill: Capillary refill takes 2 to 3 seconds.   Neurological:      Mental Status: She is alert. Mental status is at baseline. She is disoriented.      Cranial Nerves: No dysarthria or facial asymmetry.      Sensory: Sensation is intact.      Motor: No weakness or tremor.      Comments: Patient will acknowledge questions but unable to verbally answer.  Patient withdraws from painful stimuli   Psychiatric:         Attention and Perception: Attention normal.         Mood and Affect: Mood normal.         Speech: Speech normal.         Behavior: Behavior normal. Behavior is cooperative.         Thought Content: Thought content normal.         Cognition and Memory: Cognition normal.         Judgment: Judgment normal.          ---------------------------------------------------------------------------------------------------------------------  EKG: Normal sinus rhythm, with ST changes.  Unconfirmed cardiology            Telemetry:    Normal sinus rhythm    I have personally reviewed the EKG/Telemetry strip  ---------------------------------------------------------------------------------------------------------------------   Results from last 7 days   Lab Units 11/29/23  1802 11/29/23  1551   HSTROP T ng/L 74* 68*         Results from last 7 days   Lab Units 11/29/23  1615   PH, ARTERIAL pH units 7.447   PO2 ART mm Hg 86.3   PCO2, ARTERIAL mm Hg 35.1   HCO3 ART mmol/L 24.2     Results from last 7 days   Lab Units 11/29/23  1551   LACTATE mmol/L 1.7   WBC 10*3/mm3 9.83   HEMOGLOBIN g/dL 14.2   HEMATOCRIT % 43.2   MCV fL 99.3*   MCHC g/dL 32.9   PLATELETS 10*3/mm3 202     Results from last 7 days   Lab Units 11/29/23  1551   SODIUM mmol/L  140   POTASSIUM mmol/L 3.7   MAGNESIUM mg/dL 2.5   CHLORIDE mmol/L 99   CO2 mmol/L 26.7   BUN mg/dL 22*   CREATININE mg/dL 0.84   CALCIUM mg/dL 10.1   GLUCOSE mg/dL 278*   ALBUMIN g/dL 4.8   BILIRUBIN mg/dL 0.6   ALK PHOS U/L 78   AST (SGOT) U/L 18   ALT (SGPT) U/L 14   Estimated Creatinine Clearance: 72.4 mL/min (by C-G formula based on SCr of 0.84 mg/dL).  Ammonia   Date Value Ref Range Status   11/29/2023 12 11 - 51 umol/L Final       Glucose   Date/Time Value Ref Range Status   11/29/2023 2313 205 (H) 70 - 130 mg/dL Final     Lab Results   Component Value Date    HGBA1C 6.50 (H) 10/16/2023     Lab Results   Component Value Date    TSH 0.447 10/16/2023       Microbiology Results (last 10 days)       ** No results found for the last 240 hours. **           Pain Management Panel          Latest Ref Rng & Units 11/29/2023   Pain Management Panel   Amphetamine, Urine Qual Negative Negative    Barbiturates Screen, Urine Negative Negative    Benzodiazepine Screen, Urine Negative Negative    Buprenorphine, Screen, Urine Negative Negative    Cocaine Screen, Urine Negative Negative    Fentanyl, Urine Negative Negative    Methadone Screen , Urine Negative Negative    Methamphetamine, Ur Negative Negative      I have personally reviewed the above laboratory results.   ---------------------------------------------------------------------------------------------------------------------  Imaging Results (Last 7 Days)       Procedure Component Value Units Date/Time    XR Chest 1 View [[id number]] Collected: 11/29/23 1629     Updated: 11/29/23 1632    Narrative:      EXAM:    XR Chest, 1 View     EXAM DATE:    11/29/2023 4:27 PM     CLINICAL HISTORY:    ams     TECHNIQUE:    Frontal view of the chest.     COMPARISON:    9/24/2018     FINDINGS:    LUNGS AND PLEURAL SPACES:  Coarsened interstitial markings noted  throughout the lungs.  No pneumothorax.    HEART:  Cardiomegaly again noted.    MEDIASTINUM:  Unremarkable as  visualized.    BONES/JOINTS:  Unremarkable as visualized.       Impression:        Coarsened interstitial markings noted throughout the lungs.        This report was finalized on 11/29/2023 4:30 PM by Dr. Fazal Theodore MD.       CT Head Without Contrast Stroke Protocol [[id number]] Collected: 11/29/23 1603     Updated: 11/29/23 1606    Narrative:      EXAM:    CT Head Without Intravenous Contrast     EXAM DATE:    11/29/2023 4:32 PM     CLINICAL HISTORY:    Neuro deficit, acute, stroke suspected     TECHNIQUE:    Axial computed tomography images of the head/brain without intravenous  contrast.  Sagittal and coronal reformatted images were created and  reviewed.  This CT exam was performed using one or more of the following  dose reduction techniques:  automated exposure control, adjustment of  the mA and/or kV according to patient size, and/or use of iterative  reconstruction technique.     COMPARISON:    No relevant prior studies available.     FINDINGS:    BRAIN AND EXTRA-AXIAL SPACES:  Unremarkable as visualized.  No  hemorrhage.  No significant white matter disease.  No edema.  No  ventriculomegaly.    BONES/JOINTS:  Unremarkable as visualized.  No acute fracture.    SOFT TISSUES:  Unremarkable as visualized.    SINUSES:  Unremarkable as visualized.  No acute sinusitis.    MASTOID AIR CELLS:  Unremarkable as visualized.  No mastoid effusion.       Impression:        Unremarkable exam demonstrating no CT evidence of acute intracranial  findings.        This report was finalized on 11/29/2023 4:04 PM by Dr. Fazal Theodore MD.             I have personally reviewed the above radiology results.     Last Echocardiogram:  Results for orders placed during the hospital encounter of 09/20/23    Adult Transthoracic Echo Complete W/ Cont if Necessary Per Protocol    Interpretation Summary    Left ventricular systolic function is normal. Calculated left ventricular EF = 62.4% Left ventricular ejection fraction appears to be  61 - 65%.    Left ventricular wall thickness is consistent with mild concentric hypertrophy.    Left ventricular diastolic function was normal.    There is mild calcification of the aortic valve.    Estimated right ventricular systolic pressure from tricuspid regurgitation is normal (<35 mmHg). Calculated right ventricular systolic pressure from tricuspid regurgitation is 12 mmHg.    Mild mitral valve regurgitation.    ---------------------------------------------------------------------------------------------------------------------    Assessment & Plan      ACUTE HOSPITAL PROBLEMS    -Acute hypertensive emergency, on admission  -/81 upon presentation to the emergency department  -IV Cardene initiated in the emergency department  -Will continue Cardene upon admission, will titrate when possible  -CMP and CBC in the a.m.  -Vital signs per CCU protocol  -Will resume oral hypertensive medications-reconciliation    -Acute NSTEMI (type I versus type II), on admission  -Continue ASA therapy upon pharmacy reconciliation  -Continue statin therapy  -Telemetry monitoring  -Cardiology consult placed for the a.m.  -CMP and CBC in the a.m.  -Continue Plavix    -F/E/N  Replace electrolytes per protocol as necessary.  Cardiac/diabetic diet.     CHRONIC MEDICAL PROBLEMS    -COPD  -Not in acute exacerbation  -DuoNebs as needed  -Supplemental oxygen to maintain saturation greater than 90%    -Diabetes  -Blood glucose checks before meals and at bedtime  -Insulin sliding scale/low-dose regimen  -Hold home oral hypoglycemic medications    -Hypertension  -Patient presented to the emergency department in hypertensive emergency, Cardene drip initiated in the emergency department.  -Will continue Cardene drip  -Will monitor blood pressure closely  -Resume oral hypertensive medications upon pharmacy reconciliation    -Hyperlipidemia  -Continue Crestor on pharmacy reconciliation    -History of chronic lymphocytic leukemia  -Aware of  history  -Patient sent advises patient not currently being treated    -History of non-Hodgkin's lymphoma  -Aware of history  ---------------------------------------------------  DVT Prophylaxis: Heparin  Activity: Bedrest  ---------------------------------------------------  The patient is considered to be a high risk patient due to: Extensive past medical history    INPATIENT status due to the need for care which can only be reasonably provided in an hospital setting such as aggressive/expedited ancillary services and/or consultation services, the necessity for IV medications, close physician monitoring and/or the possible need for procedures.  In such, I feel patient's risk for adverse outcomes and need for care warrant INPATIENT evaluation and predict the patient's care encounter to likely last beyond 2 midnights.      Code Status: Full code   ---------------------------------------------------  Disposition/Discharge planning: Consult  for discharge planning  ---------------------------------------------------  I have discussed the patient's assessment and plan with attending physician Kelly Robertson MD Carl B Gray, APRN     11/29/23  23:32 EST    Attending Physician: Kelly Parker MD

## 2023-11-30 NOTE — PLAN OF CARE
Problem: Adult Inpatient Plan of Care  Goal: Plan of Care Review  Outcome: Ongoing, Progressing  Flowsheets (Taken 11/30/2023 0546)  Progress: improving  Plan of Care Reviewed With: patient  Outcome Evaluation: Pt is intubated and sedated with fentanyl and precedex. Yovany infusing for hypotension. A fib RVR early in shift, metoprolol given for tachycardia. OG placed. PT tested positive for RSV.  VSS. WCTM.  Goal: Patient-Specific Goal (Individualized)  Outcome: Ongoing, Progressing  Goal: Absence of Hospital-Acquired Illness or Injury  Outcome: Ongoing, Progressing  Intervention: Identify and Manage Fall Risk  Recent Flowsheet Documentation  Taken 11/30/2023 0400 by Chong Muñoz RN  Safety Promotion/Fall Prevention:   safety round/check completed   fall prevention program maintained  Taken 11/30/2023 0300 by Chong Muñoz RN  Safety Promotion/Fall Prevention:   safety round/check completed   fall prevention program maintained  Taken 11/30/2023 0200 by Chong Muñoz RN  Safety Promotion/Fall Prevention:   safety round/check completed   fall prevention program maintained  Taken 11/30/2023 0100 by Chong Muñoz RN  Safety Promotion/Fall Prevention:   safety round/check completed   fall prevention program maintained  Taken 11/30/2023 0000 by Chong Muñoz RN  Safety Promotion/Fall Prevention:   safety round/check completed   fall prevention program maintained  Taken 11/29/2023 2300 by Chong Muñoz RN  Safety Promotion/Fall Prevention:   safety round/check completed   fall prevention program maintained  Intervention: Prevent Skin Injury  Recent Flowsheet Documentation  Taken 11/30/2023 0400 by Chong Muñoz RN  Body Position:   turned   left  Taken 11/30/2023 0200 by Chong Muñoz RN  Body Position:   turned   right  Taken 11/30/2023 0000 by Chong Muñoz RN  Body Position:   turned   left   side-lying  Intervention: Prevent and Manage VTE (Venous  Thromboembolism) Risk  Recent Flowsheet Documentation  Taken 11/30/2023 0200 by Chong Muñoz, RN  Activity Management: bedrest  VTE Prevention/Management: (see mar) other (see comments)  Range of Motion: ROM (range of motion) performed  Taken 11/29/2023 2300 by Chong Muñoz RN  Activity Management: bedrest  VTE Prevention/Management: (see MAR) other (see comments)  Range of Motion: ROM (range of motion) performed  Goal: Optimal Comfort and Wellbeing  Outcome: Ongoing, Progressing  Intervention: Provide Person-Centered Care  Recent Flowsheet Documentation  Taken 11/30/2023 0200 by Chong Muñoz RN  Trust Relationship/Rapport:   care explained   choices provided   questions encouraged   reassurance provided  Taken 11/29/2023 2300 by Chong Muñoz RN  Trust Relationship/Rapport:   care explained   choices provided   emotional support provided   questions encouraged  Goal: Readiness for Transition of Care  Outcome: Ongoing, Progressing     Problem: Hypertension Comorbidity  Goal: Blood Pressure in Desired Range  Outcome: Ongoing, Progressing  Intervention: Maintain Blood Pressure Management  Recent Flowsheet Documentation  Taken 11/30/2023 0400 by Chong Muñoz RN  Medication Review/Management: medications reviewed  Taken 11/30/2023 0300 by Chong Muñoz, RN  Medication Review/Management: medications reviewed  Taken 11/30/2023 0200 by Chong Muñoz RN  Medication Review/Management: medications reviewed  Taken 11/30/2023 0100 by Chong Muñoz, RN  Medication Review/Management: medications reviewed  Taken 11/30/2023 0000 by Chong Muñoz, RN  Medication Review/Management: medications reviewed  Taken 11/29/2023 2300 by Chong Muñoz RN  Medication Review/Management: medications reviewed     Problem: Fall Injury Risk  Goal: Absence of Fall and Fall-Related Injury  Outcome: Ongoing, Progressing  Intervention: Identify and Manage  Contributors  Recent Flowsheet Documentation  Taken 11/30/2023 0400 by Chong Muñoz, RN  Medication Review/Management: medications reviewed  Taken 11/30/2023 0300 by Chong Muñoz, RN  Medication Review/Management: medications reviewed  Taken 11/30/2023 0200 by Chong Muñoz, RN  Medication Review/Management: medications reviewed  Taken 11/30/2023 0100 by Chong Muñoz, RN  Medication Review/Management: medications reviewed  Taken 11/30/2023 0000 by Chong Muñoz, RN  Medication Review/Management: medications reviewed  Taken 11/29/2023 2300 by Chong Muñoz, RN  Medication Review/Management: medications reviewed  Intervention: Promote Injury-Free Environment  Recent Flowsheet Documentation  Taken 11/30/2023 0400 by Chong Muñoz RN  Safety Promotion/Fall Prevention:   safety round/check completed   fall prevention program maintained  Taken 11/30/2023 0300 by Chong Muñoz, RN  Safety Promotion/Fall Prevention:   safety round/check completed   fall prevention program maintained  Taken 11/30/2023 0200 by Chong Muñoz RN  Safety Promotion/Fall Prevention:   safety round/check completed   fall prevention program maintained  Taken 11/30/2023 0100 by Chong Muñoz, RN  Safety Promotion/Fall Prevention:   safety round/check completed   fall prevention program maintained  Taken 11/30/2023 0000 by Chong Muñoz, RN  Safety Promotion/Fall Prevention:   safety round/check completed   fall prevention program maintained  Taken 11/29/2023 2300 by Chong Muñoz, RN  Safety Promotion/Fall Prevention:   safety round/check completed   fall prevention program maintained     Problem: Skin Injury Risk Increased  Goal: Skin Health and Integrity  Outcome: Ongoing, Progressing  Intervention: Optimize Skin Protection  Recent Flowsheet Documentation  Taken 11/30/2023 0400 by Chong Muñoz, RN  Head of Bed (HOB) Positioning: HOB elevated  Taken  11/30/2023 0200 by Chong Muñoz, RN  Head of Bed ([facility]) Positioning: HOB elevated  Taken 11/30/2023 0000 by Chong Muñoz, RN  Head of Bed ([facility]) Positioning: HOB elevated   Goal Outcome Evaluation:  Plan of Care Reviewed With: patient        Progress: improving  Outcome Evaluation: Pt is intubated and sedated with fentanyl and precedex. Yovany infusing for hypotension. A fib RVR early in shift, metoprolol given for tachycardia. OG placed. PT tested positive for RSV.  VSS. WCTM.

## 2023-12-01 NOTE — OUTREACH NOTE
Prep Survey      Flowsheet Row Responses   [facility] facility patient discharged from? [facility]   Is LACE score < 7 ? No   Eligibility Readm Mgmt   Discharge diagnosis hypertensive emergency, Type II NSTEMI   Does the patient have one of the following disease processes/diagnoses(primary or secondary)? Other   Does the patient have Home health ordered? No   Is there a DME ordered? No   Prep survey completed? Yes            Gunjan FREITAS - Registered Nurse

## 2023-12-01 NOTE — PAYOR COMM NOTE
"CONTACT: DAVID TADEO RN  UTILIZATION MANAGEMENT DEPT.  [facility]  [address] [place] [address]  PHONE: [phone]  FAX: [phone]        DISCHARGE NOTIFICATION  DC DATE: 11/30/23 to home    AWAITING INPT AUTHORIZATION PLEASE    REF#[id number]     Melania Still (58 y.o. Female)       Date of Birth   1965    Social Security Number       Address   [address] [address]    Home Phone   [phone]    MRN   [id number]       Christian   Yarsani    Marital Status                               Admission Date   11/29/23    Admission Type   Emergency    Admitting Provider   Kelly Parker MD    Attending Provider       Department, Room/Bed   [facility] CRITICAL CARE, CC02/1C       Discharge Date   11/30/2023    Discharge Disposition   Home or Self Care    Discharge Destination                                 Attending Provider: (none)   Allergies: Codeine, Rituxan [Rituximab], Penicillins    Isolation: None   Infection: None   Code Status: Prior    Ht: 162.6 cm (64\")   Wt: 63.6 kg (140 lb 3.4 oz)    Admission Cmt: None   Principal Problem: Hypertensive emergency [I16.1]                   Active Insurance as of 11/29/2023       Primary Coverage       Payor Plan Insurance Group Employer/Plan Group    HUMANA MEDICARE REPLACEMENT HUMANA MEDICARE REPLACEMENT [id number]       Payor Plan Address Payor Plan Phone Number Payor Plan Fax Number Effective Dates    [address] [address] [phone]  1/1/2023 - None Entered    [facility] [id number]         Subscriber Name Subscriber Birth Date Member ID       MELANIA STILL 1965 [id number]               Secondary Coverage       Payor Plan Insurance Group Employer/Plan Group    [place]        Payor Plan Address Payor Plan Phone Number Payor Plan Fax Number Effective Dates    [address] [phone]  6/1/2021 - None Entered    [facility] [id number]         " Subscriber Name Subscriber Birth Date Member ID       MELANIA NG 1965 [id number]                     Emergency Contacts        (Rel.) Home Phone Work Phone Mobile Phone    Luís Ng (Son) -- -- [phone]    adama ng (Son) -- -- [phone]                 Discharge Summary        Ken Sloan  at 23 1705              [facility] HOSPITALIST MEDICINE DISCHARGE SUMMARY    Patient Identification:  Name:  Melania Ng  Age:  58 y.o.  Sex:  female  :  1965  MRN:  [id number]  Visit Number:  [id number]    Date of Admission: 2023  Date of Discharge: 2023    PCP: Gillian Harrison, [place]    DISCHARGE DIAGNOSIS   Hypertensive emergency  Type II NSTEMI  COPD  Type 2 diabetes mellitus  Essential hypertension      CONSULTS  Dr. Silverio, Cardiology      PROCEDURES PERFORMED   None      HOSPITAL COURSE  Ms. Ng is a 58 y.o. female who presented to [facility] ED on 2020 with a chief complaint of confusion.  Patient has a past medical history remarkable for essential hypertension, hyperlipidemia, CLL, COPD and type 2 diabetes mellitus.  Patient was brought to the emergency department by her son secondary to altered mental status.  Patient's son states he had not heard from his mother in approximately 1 to 2 days and went to check on her at home.  When he went to visit her, he found the patient walking around inside her home and not oriented to herself or immediate surroundings.  For this reason, patient was brought to the emergency department for further treatment and evaluation.  Initial evaluation in the emergency department did consist of basic laboratory work as well as physical exam and vital signs.  Initial vital signs found patient's blood pressure with systolic blood pressure greater than 200s.  Patient was started on Cardene drip in the emergency department and then request was made to admit for  altered mental status with hypertensive emergency.  Initial vital signs within the emergency department again demonstrated marked elevated blood pressure, heart rate in the 90s, respiration 19 and oxygen saturation 98% on room air.  Patient was admitted to the critical care unit as PCU overflow for further treatment and evaluation.    Patient was restarted on her home antihypertensive medications and cardiology services were consulted.  Cardene drip was discontinued and amlodipine 5 mg p.o. daily was added to patient's home medication regimen.  Patient's blood pressure remained within normal limits throughout the rest of her stay.  Mentation also improved during patient's stay.  Patient was completely alert, oriented to person, place and time prior to discharge.  After further questioning, patient does admit to taking an unknown amount of Lortab at home which may have contributed to patient's altered mental status.  Patient's urine drug screen was also positive for THC.  When patient was questioned about this, she states that she does not smoke marijuana and does not know how this got in her urine drug screen.  She did however mention she took CBD oil under the tongue and thought this was a plausible explanation to her THC positivity.  However, I did inform patient CBD oil does not contain THC.  Nevertheless, it appears patient's altered mental status has completely resolved and was likely secondary to multiple reasons including hypertensive emergency and recreational drug use.  Patient was strongly encouraged to cease recreational drug use and to adhere to strict antihypertensive medication regimen.  Patient also reported not taking her blood pressure medicines for at least 1 day prior to evaluation in the emergency department.  With this in mind, it is felt patient has reached maximum medical benefit of current hospitalization and will be discharged home in stable condition today.  The beforementioned plan was  thoroughly discussed with the patient and she expressed understanding and willingness to proceed with the beforementioned plan.    VITAL SIGNS:      11/29/23  1445 11/29/23  2252   Weight: 64 kg (141 lb) 63.6 kg (140 lb 3.4 oz)     Body mass index is 24.07 kg/m².    PHYSICAL EXAM:  General -well-nourished  female appearing stated age in no apparent distress  HEENT -head normocephalic and atraumatic, pupils equally round and reactive to light  Lungs -clear to auscultation bilaterally with no wheezes, rales or rhonchi appreciated  Cardiovascular -regular rate and rhythm with no murmurs, rubs or clicks appreciated  GI -abdomen soft, nontender nondistended  Neurological -cranial nerves II through XII intact with no focal deficit or unintentional motor movement appreciated    DISCHARGE DISPOSITION   Stable    DISCHARGE MEDICATIONS:     Discharge Medications        New Medications        Instructions Start Date   amLODIPine 5 MG tablet  Commonly known as: NORVASC   5 mg, Oral, Every 24 Hours Scheduled             Changes to Medications        Instructions Start Date   isosorbide mononitrate 60 MG 24 hr tablet  Commonly known as: IMDUR  What changed: when to take this   60 mg, Oral, Every 24 Hours Scheduled      rosuvastatin 40 MG tablet  Commonly known as: CRESTOR  What changed:   medication strength  how much to take   40 mg, Oral, Nightly      sulfamethoxazole-trimethoprim 800-160 MG per tablet  Commonly known as: BACTRIM DS,SEPTRA DS  What changed: Another medication with the same name was removed. Continue taking this medication, and follow the directions you see here.   1 tablet, Oral, Take As Directed             Continue These Medications        Instructions Start Date   ADVIL PO   400 mg, Oral, 3 Times Daily, LIQUIGELS      albuterol sulfate  (90 Base) MCG/ACT inhaler  Commonly known as: PROVENTIL HFA;VENTOLIN HFA;PROAIR HFA   2 puffs, Inhalation, As Needed      ALPRAZolam 1 MG tablet  Commonly  known as: XANAX   0.5 mg, Oral, 3 Times Daily PRN      aspirin 81 MG EC tablet   81 mg, Oral, Daily      CBD oil capsule  Commonly known as: cannabidiol   1 each, Oral, Daily      clopidogrel 75 MG tablet  Commonly known as: PLAVIX   75 mg, Oral, Daily      gabapentin 800 MG tablet  Commonly known as: NEURONTIN   800 mg, Oral, 4 Times Daily      Imbruvica 420 MG tablet tablet  Generic drug: ibrutinib   420 mg, Oral, Daily      lisinopril 40 MG tablet  Commonly known as: PRINIVIL,ZESTRIL   40 mg, Oral, Every Morning      metoprolol tartrate 25 MG tablet  Commonly known as: LOPRESSOR   25 mg, Oral, Every 12 Hours Scheduled      nitroglycerin 0.4 MG SL tablet  Commonly known as: NITROSTAT   0.4 mg, Sublingual, Every 5 Minutes PRN, Take no more than 3 doses in 15 minutes.      oxyCODONE ER 20 MG 12 hr tablet  Commonly known as: oxyCONTIN   20 mg, Oral, Every 12 Hours      oxyCODONE-acetaminophen  MG per tablet  Commonly known as: PERCOCET   1 tablet, Oral, Every 6 Hours PRN      sertraline 100 MG tablet  Commonly known as: ZOLOFT   200 mg, Oral, Daily                 Your Scheduled Appointments      Feb 01, 2024 11:45 AM  Hospital Follow Up with Ned Flores MD  [facility] [address] [facility] [address] [phone]   -Bring photo ID, insurance card, and list of medications to appointment  -If testing was completed outside of [facility] then patient must bring images on a disc  -Copay will be collected at time of appointment  -Established patients should arrive 15 minutes prior to appointment         Jul 31, 2024  1:00 PM  CAROTID VAS ULTRASOUND VISIT with OP CRISTY VASC CART  5  [facility] ([place]) [address]  [facility] [address] [phone]    Please plan to arrive 30 minutes prior to your scheduled exam time. This is necessary to ensure adequate time for registration and  instructions.   If you arrive more than 15 minutes late for your scheduled appointment, you can expect delays and will possibly be required to reschedule for a different date.   There is no at-home prep for this exam, but please be sure to wear comfortable clothing.   Total exam time can be up to 1 hour.         Jul 31, 2024  3:00 PM  Follow Up with Jeffry Ackerman MD  [facility] NEUROS[facility] ([place]) [address]  [facility] [address]  [facility] [id number] [phone]   Arrive 15 minutes prior to appointment.       Additional instructions:    You have an appointment with Deanne Harrison on Dec. 5th @2:30 PM           Additional Instructions for the Follow-ups that You Need to Schedule       Discharge Follow-up with PCP   As directed       Currently Documented PCP:    Gillian Harrison APRN    PCP Phone Number:    [phone]     Follow Up Details: please follow up with pcp in 1 week               Follow-up Information       Gillian Harrison APRN .    Specialty: Family Medicine  Why: please follow up with pcp in 1 week  Contact information:  [address] [facility] [address] [phone]                             TEST  RESULTS PENDING AT DISCHARGE  Pending Labs       Order Current Status    Blood Culture - Blood, Arm, Left Preliminary result    Blood Culture - Blood, Arm, Right Preliminary result             Ken Sloan DO  11/30/23  17:23 EST    Please note that this discharge summary required more than 30 minutes to complete.    Please send a copy of this dictation to the following providers:  Gillian Harrison APRN    Electronically signed by Ken Sloan DO at 11/30/23 1732       Discharge Order (From admission, onward)       Start     Ordered    11/30/23 1533  Discharge patient  Once        Expected Discharge Date: 11/30/23   Expected Discharge Time: Afternoon   Discharge Disposition: Home or Self Care   Physician of Record for Attribution - Please select from  Treatment Team: NEIL MATTA [[id number]]   Review needed by CMO to determine Physician of Record: No      Question Answer Comment   Physician of Record for Attribution - Please select from Treatment Team NEIL AMTTA    Review needed by CMO to determine Physician of Record No        11/30/23 1551

## 2023-12-04 LAB
BACTERIA SPEC AEROBE CULT: NORMAL
BACTERIA SPEC AEROBE CULT: NORMAL

## 2023-12-05 ENCOUNTER — READMISSION MANAGEMENT (OUTPATIENT)
Dept: CALL CENTER | Facility: HOSPITAL | Age: 58
End: 2023-12-05
Payer: MEDICARE

## 2023-12-05 ENCOUNTER — HOSPITAL ENCOUNTER (EMERGENCY)
Facility: HOSPITAL | Age: 58
Discharge: HOME | End: 2023-12-05
Attending: STUDENT IN AN ORGANIZED HEALTH CARE EDUCATION/TRAINING PROGRAM
Payer: MEDICARE

## 2023-12-05 VITALS
HEART RATE: 77 BPM | SYSTOLIC BLOOD PRESSURE: 104 MMHG | TEMPERATURE: 98.6 F | BODY MASS INDEX: 23.39 KG/M2 | WEIGHT: 137 LBS | HEIGHT: 64 IN | DIASTOLIC BLOOD PRESSURE: 54 MMHG | RESPIRATION RATE: 16 BRPM | OXYGEN SATURATION: 98 %

## 2023-12-05 DIAGNOSIS — N39.0 ACUTE UTI: Primary | ICD-10-CM

## 2023-12-05 DIAGNOSIS — F33.9 EPISODE OF RECURRENT MAJOR DEPRESSIVE DISORDER, UNSPECIFIED DEPRESSION EPISODE SEVERITY: ICD-10-CM

## 2023-12-05 LAB
ALBUMIN SERPL-MCNC: 3.7 G/DL (ref 3.5–5.2)
ALBUMIN/GLOB SERPL: 1.6 G/DL
ALP SERPL-CCNC: 60 U/L (ref 39–117)
ALT SERPL W P-5'-P-CCNC: 16 U/L (ref 1–33)
AMPHET+METHAMPHET UR QL: NEGATIVE
AMPHETAMINES UR QL: NEGATIVE
ANION GAP SERPL CALCULATED.3IONS-SCNC: 12 MMOL/L (ref 5–15)
AST SERPL-CCNC: 18 U/L (ref 1–32)
BACTERIA UR QL AUTO: ABNORMAL /HPF
BARBITURATES UR QL SCN: NEGATIVE
BASOPHILS # BLD AUTO: 0.02 10*3/MM3 (ref 0–0.2)
BASOPHILS NFR BLD AUTO: 0.2 % (ref 0–1.5)
BENZODIAZ UR QL SCN: NEGATIVE
BILIRUB SERPL-MCNC: 0.4 MG/DL (ref 0–1.2)
BILIRUB UR QL STRIP: NEGATIVE
BUN SERPL-MCNC: 30 MG/DL (ref 6–20)
BUN/CREAT SERPL: 23.8 (ref 7–25)
BUPRENORPHINE SERPL-MCNC: NEGATIVE NG/ML
CALCIUM SPEC-SCNC: 9 MG/DL (ref 8.6–10.5)
CANNABINOIDS SERPL QL: POSITIVE
CHLORIDE SERPL-SCNC: 101 MMOL/L (ref 98–107)
CLARITY UR: ABNORMAL
CO2 SERPL-SCNC: 24 MMOL/L (ref 22–29)
COCAINE UR QL: NEGATIVE
COLOR UR: YELLOW
CREAT SERPL-MCNC: 1.26 MG/DL (ref 0.57–1)
DEPRECATED RDW RBC AUTO: 47.8 FL (ref 37–54)
EGFRCR SERPLBLD CKD-EPI 2021: 49.3 ML/MIN/1.73
EOSINOPHIL # BLD AUTO: 0.06 10*3/MM3 (ref 0–0.4)
EOSINOPHIL NFR BLD AUTO: 0.5 % (ref 0.3–6.2)
ERYTHROCYTE [DISTWIDTH] IN BLOOD BY AUTOMATED COUNT: 12.3 % (ref 12.3–15.4)
ETHANOL BLD-MCNC: <10 MG/DL (ref 0–10)
ETHANOL UR QL: <0.01 %
FENTANYL UR-MCNC: NEGATIVE NG/ML
GLOBULIN UR ELPH-MCNC: 2.3 GM/DL
GLUCOSE SERPL-MCNC: 201 MG/DL (ref 65–99)
GLUCOSE UR STRIP-MCNC: NEGATIVE MG/DL
HCT VFR BLD AUTO: 40.2 % (ref 34–46.6)
HGB BLD-MCNC: 12.6 G/DL (ref 12–15.9)
HGB UR QL STRIP.AUTO: ABNORMAL
HOLD SPECIMEN: NORMAL
HYALINE CASTS UR QL AUTO: ABNORMAL /LPF
IMM GRANULOCYTES # BLD AUTO: 0.05 10*3/MM3 (ref 0–0.05)
IMM GRANULOCYTES NFR BLD AUTO: 0.4 % (ref 0–0.5)
KETONES UR QL STRIP: ABNORMAL
LEUKOCYTE ESTERASE UR QL STRIP.AUTO: ABNORMAL
LYMPHOCYTES # BLD AUTO: 0.88 10*3/MM3 (ref 0.7–3.1)
LYMPHOCYTES NFR BLD AUTO: 7.5 % (ref 19.6–45.3)
MAGNESIUM SERPL-MCNC: 2.4 MG/DL (ref 1.6–2.6)
MCH RBC QN AUTO: 33.2 PG (ref 26.6–33)
MCHC RBC AUTO-ENTMCNC: 31.3 G/DL (ref 31.5–35.7)
MCV RBC AUTO: 105.8 FL (ref 79–97)
METHADONE UR QL SCN: NEGATIVE
MONOCYTES # BLD AUTO: 0.36 10*3/MM3 (ref 0.1–0.9)
MONOCYTES NFR BLD AUTO: 3.1 % (ref 5–12)
NEUTROPHILS NFR BLD AUTO: 10.32 10*3/MM3 (ref 1.7–7)
NEUTROPHILS NFR BLD AUTO: 88.3 % (ref 42.7–76)
NITRITE UR QL STRIP: NEGATIVE
NRBC BLD AUTO-RTO: 0 /100 WBC (ref 0–0.2)
OPIATES UR QL: NEGATIVE
OXYCODONE UR QL SCN: POSITIVE
PCP UR QL SCN: NEGATIVE
PH UR STRIP.AUTO: 6 [PH] (ref 5–8)
PLATELET # BLD AUTO: 172 10*3/MM3 (ref 140–450)
PMV BLD AUTO: 11.2 FL (ref 6–12)
POTASSIUM SERPL-SCNC: 3.4 MMOL/L (ref 3.5–5.2)
PROT SERPL-MCNC: 6 G/DL (ref 6–8.5)
PROT UR QL STRIP: ABNORMAL
RBC # BLD AUTO: 3.8 10*6/MM3 (ref 3.77–5.28)
RBC # UR STRIP: ABNORMAL /HPF
REF LAB TEST METHOD: ABNORMAL
SODIUM SERPL-SCNC: 137 MMOL/L (ref 136–145)
SP GR UR STRIP: 1.02 (ref 1–1.03)
SQUAMOUS #/AREA URNS HPF: ABNORMAL /HPF
TRICYCLICS UR QL SCN: NEGATIVE
UROBILINOGEN UR QL STRIP: ABNORMAL
WBC # UR STRIP: ABNORMAL /HPF
WBC NRBC COR # BLD AUTO: 11.69 10*3/MM3 (ref 3.4–10.8)

## 2023-12-05 PROCEDURE — 83735 ASSAY OF MAGNESIUM: CPT | Performed by: STUDENT IN AN ORGANIZED HEALTH CARE EDUCATION/TRAINING PROGRAM

## 2023-12-05 PROCEDURE — 80307 DRUG TEST PRSMV CHEM ANLYZR: CPT | Performed by: STUDENT IN AN ORGANIZED HEALTH CARE EDUCATION/TRAINING PROGRAM

## 2023-12-05 PROCEDURE — 85025 COMPLETE CBC W/AUTO DIFF WBC: CPT | Performed by: STUDENT IN AN ORGANIZED HEALTH CARE EDUCATION/TRAINING PROGRAM

## 2023-12-05 PROCEDURE — 99285 EMERGENCY DEPT VISIT HI MDM: CPT

## 2023-12-05 PROCEDURE — 80053 COMPREHEN METABOLIC PANEL: CPT | Performed by: STUDENT IN AN ORGANIZED HEALTH CARE EDUCATION/TRAINING PROGRAM

## 2023-12-05 PROCEDURE — 81001 URINALYSIS AUTO W/SCOPE: CPT | Performed by: STUDENT IN AN ORGANIZED HEALTH CARE EDUCATION/TRAINING PROGRAM

## 2023-12-05 PROCEDURE — 82077 ASSAY SPEC XCP UR&BREATH IA: CPT | Performed by: STUDENT IN AN ORGANIZED HEALTH CARE EDUCATION/TRAINING PROGRAM

## 2023-12-05 PROCEDURE — 36415 COLL VENOUS BLD VENIPUNCTURE: CPT

## 2023-12-05 RX ORDER — NITROFURANTOIN 25; 75 MG/1; MG/1
100 CAPSULE ORAL EVERY 12 HOURS SCHEDULED
Status: DISCONTINUED | OUTPATIENT
Start: 2023-12-05 | End: 2023-12-05 | Stop reason: HOSPADM

## 2023-12-05 RX ORDER — NITROFURANTOIN 25; 75 MG/1; MG/1
100 CAPSULE ORAL 2 TIMES DAILY
Qty: 14 CAPSULE | Refills: 0 | Status: SHIPPED | OUTPATIENT
Start: 2023-12-05 | End: 2023-12-12

## 2023-12-05 NOTE — NURSING NOTE
"Pt assessment complete     Pt comes to intake alongside son.     Pt reports coming in because her PCP will no longer prescribe her Xanax because she will not take her zoloft because she does not like the way it makes her feel.     Pt states that her stressors are that 11/29/23 her son brought her to the hospital after she lost days and that she has never done that before. She states \"I don't know what happened.\" After reviewing the pts chart     Per DR. Sloan note from 11/30/23 \"Patient was brought to the emergency department by her son secondary to altered mental status.  Patient's son states he had not heard from his mother in approximately 1 to 2 days and went to check on her at home.  When he went to visit her, he found the patient walking around inside her home and not oriented to herself or immediate surroundings.   After further questioning, patient does admit to taking an unknown amount of Lortab at home which may have contributed to patient's altered mental status.  Patient's urine drug screen was also positive for THC.  When patient was questioned about this, she states that she does not smoke marijuana and does not know how this got in her urine drug screen.  She did however mention she took CBD oil under the tongue and thought this was a plausible explanation to her THC positivity.  However, I did inform patient CBD oil does not contain THC.  Nevertheless, it appears patient's altered mental status has completely resolved and was likely secondary to multiple reasons including hypertensive emergency and recreational drug use.\"     Pt denies current si/hi/avh and is fully alert and oriented.   Anxiety 8  Depression 8  Good sleep and poor appetite     "

## 2023-12-05 NOTE — ED PROVIDER NOTES
Subjective   History of Present Illness  58-year-old female with history of diabetes, asthma presents to the emergency department with agitation.  Patient came in for mental health evaluation and medication review.    History provided by:  Patient   used: No    Mental Health Problem  Presenting symptoms: agitation    Presenting symptoms: no bizarre behavior, no depression, no disorganized speech, no paranoid behavior, no self-mutilation and no suicidal thoughts    Degree of incapacity (severity):  Moderate  Onset quality:  Gradual  Duration:  2 days  Timing:  Intermittent  Progression:  Worsening  Chronicity:  New  Context: not alcohol use, not drug abuse, not noncompliant and not recent medication change    Treatment compliance:  Untreated  Time since last psychoactive medication taken:  2 days  Relieved by:  Nothing  Worsened by:  Nothing  Ineffective treatments:  None tried  Associated symptoms: no anhedonia, no anxiety, no appetite change, no chest pain, no decreased need for sleep, not distractible, no hypersomnia, no hyperventilation, no irritability and no poor judgment    Risk factors: no family hx of mental illness, no family violence, no hx of suicide attempts and no neurological disease        Review of Systems   Constitutional: Negative.  Negative for appetite change, chills, diaphoresis and irritability.   HENT: Negative.  Negative for dental problem, drooling, ear discharge, ear pain, hearing loss, mouth sores, nosebleeds, postnasal drip and sinus pain.    Eyes: Negative.  Negative for photophobia, redness and itching.   Respiratory: Negative.  Negative for choking, chest tightness and stridor.    Cardiovascular: Negative.  Negative for chest pain and leg swelling.   Gastrointestinal: Negative.  Negative for anal bleeding, blood in stool and constipation.   Endocrine: Negative.  Negative for heat intolerance and polydipsia.   Genitourinary: Negative.  Negative for dysuria, flank  "pain, genital sores, hematuria and menstrual problem.   Musculoskeletal: Negative.  Negative for back pain, gait problem, joint swelling and myalgias.   Skin: Negative.  Negative for color change, pallor and rash.   Psychiatric/Behavioral:  Positive for agitation and behavioral problems. Negative for confusion, decreased concentration, paranoia, self-injury and suicidal ideas. The patient is not nervous/anxious.    All other systems reviewed and are negative.      Past Medical History:   Diagnosis Date    Aneurysm 8/223/17    Cavernous left ICA aneurysm    Asthma     Carotid artery occlusion     Chronic back pain     CLL (chronic lymphocytic leukemia)     OCTOBER OF 2012    COPD (chronic obstructive pulmonary disease)     Degenerative arthritis     Depression     Diabetes mellitus     type 2    H/O blood clots     ovary    Heart murmur     Hyperlipidemia     Hypertension     Lymphadenopathy     Non Hodgkin's lymphoma     PVD (peripheral vascular disease)     Urinary frequency        Allergies   Allergen Reactions    Codeine Shortness Of Breath    Rituxan [Rituximab] Shortness Of Breath and Other (See Comments)     \"THROAT RAWNESS; FELT LIKE MY THROAT WAS CLOSING UP\"    Penicillins Other (See Comments)     \"UNKNOWN CHILDHOOD ALLERGY\"       Past Surgical History:   Procedure Laterality Date    AORTAGRAM N/A 10/18/2023    Procedure: AORTAGRAM, BILATERAL COMMON ILIAC ARTERY STENTS, SUPERIOR MESENTERIC  ARTERY STENT;  Surgeon: Filiberto Alcantara MD;  Location: [facility];  Service: Vascular;  Laterality: N/A;  109 mGy  4 MIN 06 SECS  40mL CONTRAST    APPENDECTOMY      BACK SURGERY      2002 (work accident) c-spine surgery 4/14    CARDIAC CATHETERIZATION N/A 09/20/2023    Procedure: Left Heart Cath;  Surgeon: Martín Tobin MD;  Location:  [facility];  Service: Cardiovascular;  Laterality: N/A;    CAROTID STENT Right 08/23/2017    CEREBRAL ANGIOGRAM N/A 08/23/2017    Diagnostic Carotid/Cerebral " Angiogram    CHOLECYSTECTOMY      COLONOSCOPY      HYSTERECTOMY      for endometriosis/ovarian bleed    INTERVENTIONAL RADIOLOGY PROCEDURE N/A 08/02/2023    Procedure: Abdominal Aortagram with Runoff;  Surgeon: Jeffry Ackerman MD;  Location:  [facility];  Service: Interventional Radiology;  Laterality: N/A;    INTERVENTIONAL RADIOLOGY PROCEDURE Bilateral 08/02/2023    Procedure: Carotid Cerebral Angiogram;  Surgeon: Jeffry Ackerman MD;  Location:  [facility];  Service: Interventional Radiology;  Laterality: Bilateral;    LUMBAR FUSION      NECK SURGERY      Neck hardware    OOPHORECTOMY Left     OTHER SURGICAL HISTORY      pac insertion and removal    TONSILLECTOMY         Family History   Problem Relation Age of Onset    Lung cancer Father 72    Hypertension Father     Heart disease Father     Cancer Father     Diabetes Father     Other Brother 46        MI    Heart disease Brother     Breast cancer Paternal Aunt     Colon cancer Maternal Grandfather     Other Cousin         CLL (dx 48 yo)    Hypertension Mother        Social History     Socioeconomic History    Marital status:      Spouse name: denies    Number of children: 3    Years of education: some college    Highest education level: Some college, no degree   Tobacco Use    Smoking status: Every Day     Packs/day: 0.50     Years: 35.00     Additional pack years: 0.00     Total pack years: 17.50     Types: Cigarettes     Passive exposure: Current    Smokeless tobacco: Never   Vaping Use    Vaping Use: Never used   Substance and Sexual Activity    Alcohol use: Yes     Comment: occasional use    Drug use: Yes     Types: Marijuana     Comment: loratabs    Sexual activity: Not Currently           Objective   Physical Exam  Vitals and nursing note reviewed.   Constitutional:       General: She is not in acute distress.     Appearance: Normal appearance. She is normal weight. She is not ill-appearing, toxic-appearing or  diaphoretic.   HENT:      Head: Normocephalic and atraumatic.      Right Ear: Tympanic membrane, ear canal and external ear normal. There is no impacted cerumen.      Left Ear: Tympanic membrane, ear canal and external ear normal. There is no impacted cerumen.      Nose: Nose normal. No congestion or rhinorrhea.      Mouth/Throat:      Mouth: Mucous membranes are moist. Mucous membranes are dry.      Pharynx: Oropharynx is clear. No oropharyngeal exudate or posterior oropharyngeal erythema.   Eyes:      General: No scleral icterus.        Right eye: No discharge.         Left eye: No discharge.      Extraocular Movements: Extraocular movements intact.      Conjunctiva/sclera: Conjunctivae normal.      Pupils: Pupils are equal, round, and reactive to light.   Neck:      Vascular: No carotid bruit.   Cardiovascular:      Rate and Rhythm: Normal rate and regular rhythm.      Pulses: Normal pulses.      Heart sounds: Normal heart sounds. No murmur heard.     No friction rub. No gallop.   Pulmonary:      Effort: Pulmonary effort is normal. No respiratory distress.      Breath sounds: Normal breath sounds. No stridor. No wheezing, rhonchi or rales.   Chest:      Chest wall: No tenderness.   Abdominal:      General: Abdomen is flat. Bowel sounds are normal. There is no distension.      Palpations: Abdomen is soft. There is no mass.      Tenderness: There is no abdominal tenderness. There is no right CVA tenderness, guarding or rebound.      Hernia: No hernia is present.   Musculoskeletal:         General: No swelling, tenderness, deformity or signs of injury. Normal range of motion.      Cervical back: Normal range of motion and neck supple. No rigidity or tenderness.      Right lower leg: No edema.      Left lower leg: No edema.   Lymphadenopathy:      Cervical: No cervical adenopathy.   Skin:     General: Skin is warm and dry.      Capillary Refill: Capillary refill takes less than 2 seconds.      Coloration: Skin is not  jaundiced or pale.      Findings: No bruising, erythema, lesion or rash.   Neurological:      General: No focal deficit present.      Mental Status: She is alert and oriented to person, place, and time. Mental status is at baseline.      Cranial Nerves: No cranial nerve deficit.      Sensory: No sensory deficit.      Motor: No weakness.      Coordination: Coordination normal.      Gait: Gait normal.   Psychiatric:         Mood and Affect: Mood normal.         Behavior: Behavior normal.         Thought Content: Thought content normal.         Judgment: Judgment normal.         Procedures           ED Course                                             Medical Decision Making  Problems Addressed:  Acute UTI: complicated acute illness or injury  Episode of recurrent major depressive disorder, unspecified depression episode severity: complicated acute illness or injury    Amount and/or Complexity of Data Reviewed  Labs: ordered.    Risk  Prescription drug management.        Final diagnoses:   Acute UTI   Episode of recurrent major depressive disorder, unspecified depression episode severity       ED Disposition  ED Disposition       ED Disposition   Discharge    Condition   Stable    Comment   --               Gillian Harrison, BRANDON  [address]  [place] [id number] [phone]    Call in 1 day           Medication List        New Prescriptions      nitrofurantoin (macrocrystal-monohydrate) 100 MG capsule  Commonly known as: MACROBID  Take 1 capsule by mouth 2 (Two) Times a Day for 7 days.               Where to Get Your Medications        These medications were sent to [place] [address] - [phone] [facility] [phone] [facility] [address], [facility] [address]      Phone: [phone]   nitrofurantoin (macrocrystal-monohydrate) 100 MG capsule            Lawrence Richards PA-C  12/05/23 [id number]

## 2023-12-05 NOTE — NURSING NOTE
Presented pt to DR. Saleh new order to discharge, provide outpatient resources, and develop a safety plan. [id number]

## 2023-12-05 NOTE — OUTREACH NOTE
Medical Week 1 Survey      Flowsheet Row Responses   [facility] patient discharged from? [facility]   Does the patient have one of the following disease processes/diagnoses(primary or secondary)? Other   Week 1 attempt successful? Yes   Call start time 0954   Call end time 0955   Discharge diagnosis hypertensive emergency, Type II NSTEMI   Person spoke with today (if not patient) and relationship patient   Does the patient have a primary care provider?  Yes   Comments regarding PCP Has seen pcp   Did the patient receive a copy of their discharge instructions? Yes   Nursing interventions Reviewed instructions with patient   What is the patient's perception of their health status since discharge? New symptoms unrelated to diagnosis   Is the patient/caregiver able to teach back signs and symptoms related to disease process for when to call PCP? Yes   Is the patient/caregiver able to teach back signs and symptoms related to disease process for when to call 911? Yes   Is the patient/caregiver able to teach back the hierarchy of who to call/visit for symptoms/problems? PCP, Specialist, Home health nurse, Urgent Care, ED, 911 Yes   Week 1 call completed? Yes   Would this patient benefit from a Referral to Amb Social Work? No   Is the patient interested in additional calls from an ambulatory ? No   Wrap up additional comments PCP Gillian Harrison advised patient to go to ER for Pscyh Evaluation. Patient reports she is getting herself ready and will report to the ER for this.   Call end time 0955            Cinthia CHAUDHARI - Registered Nurse

## 2023-12-13 ENCOUNTER — READMISSION MANAGEMENT (OUTPATIENT)
Dept: CALL CENTER | Facility: HOSPITAL | Age: 58
End: 2023-12-13
Payer: MEDICARE

## 2023-12-13 NOTE — OUTREACH NOTE
Medical Week 2 Survey      Flowsheet Row Responses   [facility]? [facility]   Does the patient have one of the following disease processes/diagnoses(primary or secondary)? Other   Week 2 attempt successful? No   Unsuccessful attempts Attempt 1            Constance FREITAS - Registered Nurse

## 2023-12-19 ENCOUNTER — READMISSION MANAGEMENT (OUTPATIENT)
Dept: CALL CENTER | Facility: HOSPITAL | Age: 58
End: 2023-12-19
Payer: MEDICARE

## 2023-12-19 NOTE — OUTREACH NOTE
Medical Week 3 Survey      Flowsheet Row Responses   [facility] patient discharged from? [facility]   Does the patient have one of the following disease processes/diagnoses(primary or secondary)? Other   Week 3 attempt successful? Yes   Call start time 1428   Call end time 1435   Discharge diagnosis hypertensive emergency, Type II NSTEMI   Person spoke with today (if not patient) and relationship Patient   Meds reviewed with patient/caregiver? Yes   Is the patient having any side effects they believe may be caused by any medication additions or changes? Yes   Side effects comments  b/p has been back and forth, sometimes running low since b/p med was started.   Does the patient have all medications ordered at discharge? Yes   Is the patient taking all medications as directed (includes completed medication regime)? Yes   Comments regarding appointments Pt planning to call and make appt with [facility] for f/u.   Does the patient have a primary care provider?  Yes   Comments regarding PCP saw PCP for f/u already.   Does the patient have an appointment with their PCP within 7 days of discharge? Yes   Has the patient kept scheduled appointments due by today? Yes   Has home health visited the patient within 72 hours of discharge? N/A   Psychosocial issues? Yes   Psychosocial comments pt states she had a bad week last week, multiple panic attacks. this week has been better. she plans on seeing a psychiatrist at [facility] in [place], [place]. Encouraged pt to call and make appt.   Comments Pt states her UTI has improved, she was placed on additional abx from the PCP. Pt is checking her b/p daily. advised to continue and log b/ps.   Did the patient receive a copy of their discharge instructions? Yes   Nursing interventions Reviewed instructions with patient   What is the patient's perception of their health status since discharge? Improving   Is the patient/caregiver able to teach back signs and symptoms  related to disease process for when to call PCP? Yes   Is the patient/caregiver able to teach back signs and symptoms related to disease process for when to call 911? Yes   Is the patient/caregiver able to teach back the hierarchy of who to call/visit for symptoms/problems? PCP, Specialist, Home health nurse, Urgent Care, ED, 911 Yes   Week 3 Call Completed? Yes   Graduated Yes   Is the patient interested in additional calls from an ambulatory ? No   Would this patient benefit from a Referral to [facility] Social Work? No   Graduated/Revoked comments Pt denies any needs or concerns at this time. No questions.   Call end time 1435            Nicci STEVENS - Registered Nurse

## 2024-01-12 RX ORDER — ROSUVASTATIN CALCIUM 40 MG/1
40 TABLET, COATED ORAL
Qty: 30 TABLET | Refills: 0 | OUTPATIENT
Start: 2024-01-12

## 2024-01-12 RX ORDER — AMLODIPINE BESYLATE 5 MG/1
5 TABLET ORAL DAILY
Qty: 30 TABLET | Refills: 0 | OUTPATIENT
Start: 2024-01-12

## 2024-02-08 ENCOUNTER — OFFICE VISIT (OUTPATIENT)
Dept: CARDIOLOGY | Facility: CLINIC | Age: 59
End: 2024-02-08
Payer: MEDICARE

## 2024-02-08 VITALS
BODY MASS INDEX: 23.22 KG/M2 | SYSTOLIC BLOOD PRESSURE: 149 MMHG | WEIGHT: 136 LBS | HEART RATE: 69 BPM | HEIGHT: 64 IN | OXYGEN SATURATION: 98 % | DIASTOLIC BLOOD PRESSURE: 68 MMHG

## 2024-02-08 DIAGNOSIS — I15.0 RENOVASCULAR HYPERTENSION: ICD-10-CM

## 2024-02-08 DIAGNOSIS — R06.02 SHORTNESS OF BREATH: ICD-10-CM

## 2024-02-08 DIAGNOSIS — I65.21 CAROTID STENOSIS, RIGHT: ICD-10-CM

## 2024-02-08 DIAGNOSIS — I1A.0 RESISTANT HYPERTENSION: Primary | ICD-10-CM

## 2024-02-08 RX ORDER — FUROSEMIDE 40 MG/1
40 TABLET ORAL DAILY
Qty: 30 TABLET | Refills: 11 | Status: SHIPPED | OUTPATIENT
Start: 2024-02-08

## 2024-02-08 RX ORDER — POTASSIUM CHLORIDE 1500 MG/1
20 TABLET, EXTENDED RELEASE ORAL DAILY
Qty: 30 TABLET | Refills: 11 | Status: SHIPPED | OUTPATIENT
Start: 2024-02-08

## 2024-02-08 RX ORDER — NALOXONE HYDROCHLORIDE 4 MG/.1ML
SPRAY NASAL
COMMUNITY
Start: 2023-10-14

## 2024-02-08 RX ORDER — NITROFURANTOIN 25; 75 MG/1; MG/1
100 CAPSULE ORAL
COMMUNITY

## 2024-02-08 RX ORDER — CLOPIDOGREL BISULFATE 75 MG/1
75 TABLET ORAL DAILY
Qty: 30 TABLET | Refills: 11 | Status: SHIPPED | OUTPATIENT
Start: 2024-02-08

## 2024-02-08 RX ORDER — LISINOPRIL 20 MG/1
20 TABLET ORAL EVERY MORNING
Qty: 30 TABLET | Refills: 11 | Status: SHIPPED | OUTPATIENT
Start: 2024-02-08

## 2024-02-08 RX ORDER — VALACYCLOVIR HYDROCHLORIDE 500 MG/1
500 TABLET, FILM COATED ORAL DAILY
COMMUNITY

## 2024-02-08 NOTE — PROGRESS NOTES
[facility] Cardiology  Follow Up Visit  Melania Mae  1965    VISIT DATE:  02/08/24    PCP:   Gillian Harrison, APRN  [address] [facility] [address]          CC:  No chief complaint on file.      Problem List:  Diabetes  Tobacco use  Hypertension  Hyperlipidemia  Non-Hodgkin's lymphoma/CLL  Initial treatment 2011 for 6 months possible anthracycline   On Ibrutinib  Previous carotid stent  PAD  Bilateral iliac stents October 2023  Superior mesenteric artery stent October 2023  CAD  Heart catheterization September 2023: RCA , LAD 20% stenosis, nondominant diffusely diseased circumflex, OM1 80% stenosis, small vessel  Medical management recommended  Echo September 2023: EF 61 to 65% mild MR, normal estimated PA pressure       History of Present Illness:  Melania Mae  Is a 58 y.o. female with pertinent cardiac history detailed above.  She  had previously followed with Dr. Shea.  She states the CP has been fairly well controlled.  She has had some in the last three weeks,.  She has not been taking her blood pressure medications on a regular basis because she has had some intermittent hypotension.  However she is also had days where the systolics have been greater than 160 on these days she will take her lisinopril 40 mg.  She has been having persistent lower extremity swelling.  Also has some low back pain affecting the legs.  Claudication symptoms are better since iliac stenting.  She follows with Dr. Alcantara.  She also reports she has been out of Plavix for about 1 week.  Blood pressure today mildly elevated      Patient Active Problem List    Diagnosis Date Noted    Coronary artery disease involving native coronary artery of native heart with unstable angina pectoris 09/20/2023     Note Last Updated: 9/20/2023 9/20/2023: LHC at [facility], proximal %  with mature left-to-right collaterals from the LAD, small diffusely diseased left circumflex, LAD mild-20%  "luminal irregularities.  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 12/09/2019    Cutaneous abscess of abdominal wall 05/21/2018    CLL (chronic lymphocytic leukemia) 02/20/2018    Carotid stenosis, right, s/p stent 8/23/17 08/23/2017    Type 2 diabetes mellitus with complication, with long-term current use of insulin 08/23/2017    HTN (hypertension) 08/23/2017    HLD (hyperlipidemia) 08/23/2017    Cerebrovascular accident (CVA) 08/14/2017    Aneurysm 08/14/2017    CLL (chronic lymphocytic leukemia) 08/04/2017    Encounter for consultation 07/06/2017     Note Last Updated: 7/6/2017     Referred for Vascular Abnormality         Allergies   Allergen Reactions    Codeine Shortness Of Breath    Rituxan [Rituximab] Shortness Of Breath and Other (See Comments)     \"THROAT RAWNESS; FELT LIKE MY THROAT WAS CLOSING UP\"    Penicillins Other (See Comments)     \"UNKNOWN CHILDHOOD ALLERGY\"       Social History     Socioeconomic History    Marital status:      Spouse name: denies    Number of children: 3    Years of education: some college    Highest education level: Some college, no degree   Tobacco Use    Smoking status: Every Day     Packs/day: 0.50     Years: 35.00     Additional pack years: 0.00     Total pack years: 17.50     Types: Cigarettes     Passive exposure: Current    Smokeless tobacco: Never   Vaping Use    Vaping Use: Never used   Substance and Sexual Activity    Alcohol use: Yes     Comment: occasional use    Drug use: Yes     Types: Marijuana     Comment: loratabs    Sexual activity: Not Currently       Family History   Problem Relation Age of Onset    " Lung cancer Father 72    Hypertension Father     Heart disease Father     Cancer Father     Diabetes Father     Other Brother 46        MI    Heart disease Brother     Breast cancer Paternal Aunt     Colon cancer Maternal Grandfather     Other Cousin         CLL (dx 48 yo)    Hypertension Mother        Current Medications:    Current Outpatient Medications:     albuterol (PROVENTIL HFA;VENTOLIN HFA) 108 (90 Base) MCG/ACT inhaler, Inhale 2 puffs As Needed for Wheezing., Disp: , Rfl:     ALPRAZolam (XANAX) 1 MG tablet, Take 0.5 tablets by mouth 3 (Three) Times a Day As Needed for Anxiety., Disp: , Rfl:     amLODIPine (NORVASC) 5 MG tablet, Take 1 tablet by mouth Daily., Disp: 30 tablet, Rfl: 0    aspirin 81 MG EC tablet, Take 1 tablet by mouth Daily., Disp: 30 tablet, Rfl: 5    CBD oil (cannabidiol) capsule, Take 1 each by mouth Daily., Disp: , Rfl:     clopidogrel (PLAVIX) 75 MG tablet, Take 1 tablet by mouth Daily., Disp: 30 tablet, Rfl: 5    gabapentin (NEURONTIN) 800 MG tablet, Take 1 tablet by mouth 4 (Four) Times a Day., Disp: , Rfl:     ibrutinib (IMBRUVICA) 420 MG tablet tablet, Take 1 tablet by mouth Daily., Disp: 28 tablet, Rfl: 5    Ibuprofen (ADVIL PO), Take 400 mg by mouth 3 (Three) Times a Day. LIQUIGELS, Disp: , Rfl:     isosorbide mononitrate (IMDUR) 60 MG 24 hr tablet, Take 1 tablet by mouth Daily., Disp: 90 tablet, Rfl: 3    lisinopril (PRINIVIL,ZESTRIL) 40 MG tablet, Take 1 tablet by mouth Every Morning., Disp: 90 tablet, Rfl: 3    metoprolol tartrate (LOPRESSOR) 25 MG tablet, Take 1 tablet by mouth Every 12 (Twelve) Hours., Disp: 120 tablet, Rfl: 5    nitroglycerin (NITROSTAT) 0.4 MG SL tablet, Place 1 tablet under the tongue Every 5 (Five) Minutes As Needed for Chest Pain (Systolic BP Greater Than 100). Take no more than 3 doses in 15 minutes., Disp: 75 tablet, Rfl: 12    oxyCODONE ER (oxyCONTIN) 20 MG 12 hr tablet, Take 1 tablet by mouth Every 12 (Twelve) Hours., Disp: , Rfl:      oxyCODONE-acetaminophen (PERCOCET)  MG per tablet, Take 1 tablet by mouth Every 6 (Six) Hours As Needed for Moderate Pain., Disp: , Rfl:     rosuvastatin (CRESTOR) 40 MG tablet, Take 1 tablet by mouth Every Night., Disp: 30 tablet, Rfl: 0    sertraline (ZOLOFT) 100 MG tablet, Take 2 tablets by mouth Daily., Disp: , Rfl:     sulfamethoxazole-trimethoprim (BACTRIM DS,SEPTRA DS) 800-160 MG per tablet, Take 1 tablet by mouth Take As Directed., Disp: , Rfl:      Review of Systems   Cardiovascular:  Positive for chest pain, claudication and leg swelling.   Musculoskeletal:  Positive for back pain.       There were no vitals filed for this visit.    Physical Exam  Constitutional:       Appearance: Normal appearance.   Neck:      Vascular: Carotid bruit (Bilateral) present.   Cardiovascular:      Rate and Rhythm: Normal rate and regular rhythm.      Heart sounds: Murmur (3/6 systolic murmur) heard.   Pulmonary:      Effort: Pulmonary effort is normal.   Musculoskeletal:      Right lower leg: Edema present.      Left lower leg: Edema present.      Comments: 2+ bilateral   Neurological:      Mental Status: She is alert.         Diagnostic Data:  Procedures  Lab Results   Component Value Date    TRIG 103 10/16/2023    HDL 48 10/16/2023     Lab Results   Component Value Date    GLUCOSE 201 (H) 12/05/2023    BUN 30 (H) 12/05/2023    CREATININE 1.26 (H) 12/05/2023     12/05/2023    K 3.4 (L) 12/05/2023     12/05/2023    CO2 24.0 12/05/2023     Lab Results   Component Value Date    HGBA1C 6.50 (H) 10/16/2023     Lab Results   Component Value Date    WBC 11.69 (H) 12/05/2023    HGB 12.6 12/05/2023    HCT 40.2 12/05/2023     12/05/2023       Assessment:  No diagnosis found.    Plan:    CAD  Heart catheterization September 2023.  RCA , diffusely diseased circumflex, no obstructive LAD disease  Chest pain has been fairly well-controlled.  Recommend she resume Plavix which I refilled today and continue  metoprolol and Imdur  Previous carotid artery stenting  Angiogram August 2023: Patent right carotid stent, less than 50% left stenosis, no intracerebral ICA aneurysm  Follows with Dr. Ackerman  PAD/mesenteric ischemia  Kissing iliac stents October 2023 Dr. Alcantara  Superior mesenteric artery stent October 2023  HTN  Schedule renal artery duplex  HLD  Lipids from October 2023 total cholesterol 108, HDL 48, LDL 41, triglycerides 103  She is on appropriate statin therapy  CLL  Ibrutinib  Lower extremity edema   Third EF on last echo.  Add Lasix 40 mg daily with potassium 10 mEq  Check labs in 1 week at [facility]      ACP discussion was held with the patient during this visit. Patient does not have an advance directive, declines further assistance.      Ned Flores MD [facility]

## 2024-02-08 NOTE — H&P (VIEW-ONLY)
[facility] Cardiology  Follow Up Visit  Melania Mae  1965    VISIT DATE:  02/08/24    PCP:   Gillian Harrison, APRN  [address] [facility] [address]          CC:  No chief complaint on file.      Problem List:  Diabetes  Tobacco use  Hypertension  Hyperlipidemia  Non-Hodgkin's lymphoma/CLL  Initial treatment 2011 for 6 months possible anthracycline   On Ibrutinib  Previous carotid stent  PAD  Bilateral iliac stents October 2023  Superior mesenteric artery stent October 2023  CAD  Heart catheterization September 2023: RCA , LAD 20% stenosis, nondominant diffusely diseased circumflex, OM1 80% stenosis, small vessel  Medical management recommended  Echo September 2023: EF 61 to 65% mild MR, normal estimated PA pressure       History of Present Illness:  Melania Mae  Is a 58 y.o. female with pertinent cardiac history detailed above.  She  had previously followed with Dr. Shea.  She states the CP has been fairly well controlled.  She has had some in the last three weeks,.  She has not been taking her blood pressure medications on a regular basis because she has had some intermittent hypotension.  However she is also had days where the systolics have been greater than 160 on these days she will take her lisinopril 40 mg.  She has been having persistent lower extremity swelling.  Also has some low back pain affecting the legs.  Claudication symptoms are better since iliac stenting.  She follows with Dr. Alcantara.  She also reports she has been out of Plavix for about 1 week.  Blood pressure today mildly elevated      Patient Active Problem List    Diagnosis Date Noted    Coronary artery disease involving native coronary artery of native heart with unstable angina pectoris 09/20/2023     Note Last Updated: 9/20/2023 9/20/2023: LHC at [facility], proximal %  with mature left-to-right collaterals from the LAD, small diffusely diseased left circumflex, LAD mild-20%  "luminal irregularities.  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 12/09/2019    Cutaneous abscess of abdominal wall 05/21/2018    CLL (chronic lymphocytic leukemia) 02/20/2018    Carotid stenosis, right, s/p stent 8/23/17 08/23/2017    Type 2 diabetes mellitus with complication, with long-term current use of insulin 08/23/2017    HTN (hypertension) 08/23/2017    HLD (hyperlipidemia) 08/23/2017    Cerebrovascular accident (CVA) 08/14/2017    Aneurysm 08/14/2017    CLL (chronic lymphocytic leukemia) 08/04/2017    Encounter for consultation 07/06/2017     Note Last Updated: 7/6/2017     Referred for Vascular Abnormality         Allergies   Allergen Reactions    Codeine Shortness Of Breath    Rituxan [Rituximab] Shortness Of Breath and Other (See Comments)     \"THROAT RAWNESS; FELT LIKE MY THROAT WAS CLOSING UP\"    Penicillins Other (See Comments)     \"UNKNOWN CHILDHOOD ALLERGY\"       Social History     Socioeconomic History    Marital status:      Spouse name: denies    Number of children: 3    Years of education: some college    Highest education level: Some college, no degree   Tobacco Use    Smoking status: Every Day     Packs/day: 0.50     Years: 35.00     Additional pack years: 0.00     Total pack years: 17.50     Types: Cigarettes     Passive exposure: Current    Smokeless tobacco: Never   Vaping Use    Vaping Use: Never used   Substance and Sexual Activity    Alcohol use: Yes     Comment: occasional use    Drug use: Yes     Types: Marijuana     Comment: loratabs    Sexual activity: Not Currently       Family History   Problem Relation Age of Onset    " Lung cancer Father 72    Hypertension Father     Heart disease Father     Cancer Father     Diabetes Father     Other Brother 46        MI    Heart disease Brother     Breast cancer Paternal Aunt     Colon cancer Maternal Grandfather     Other Cousin         CLL (dx 46 yo)    Hypertension Mother        Current Medications:    Current Outpatient Medications:     albuterol (PROVENTIL HFA;VENTOLIN HFA) 108 (90 Base) MCG/ACT inhaler, Inhale 2 puffs As Needed for Wheezing., Disp: , Rfl:     ALPRAZolam (XANAX) 1 MG tablet, Take 0.5 tablets by mouth 3 (Three) Times a Day As Needed for Anxiety., Disp: , Rfl:     amLODIPine (NORVASC) 5 MG tablet, Take 1 tablet by mouth Daily., Disp: 30 tablet, Rfl: 0    aspirin 81 MG EC tablet, Take 1 tablet by mouth Daily., Disp: 30 tablet, Rfl: 5    CBD oil (cannabidiol) capsule, Take 1 each by mouth Daily., Disp: , Rfl:     clopidogrel (PLAVIX) 75 MG tablet, Take 1 tablet by mouth Daily., Disp: 30 tablet, Rfl: 5    gabapentin (NEURONTIN) 800 MG tablet, Take 1 tablet by mouth 4 (Four) Times a Day., Disp: , Rfl:     ibrutinib (IMBRUVICA) 420 MG tablet tablet, Take 1 tablet by mouth Daily., Disp: 28 tablet, Rfl: 5    Ibuprofen (ADVIL PO), Take 400 mg by mouth 3 (Three) Times a Day. LIQUIGELS, Disp: , Rfl:     isosorbide mononitrate (IMDUR) 60 MG 24 hr tablet, Take 1 tablet by mouth Daily., Disp: 90 tablet, Rfl: 3    lisinopril (PRINIVIL,ZESTRIL) 40 MG tablet, Take 1 tablet by mouth Every Morning., Disp: 90 tablet, Rfl: 3    metoprolol tartrate (LOPRESSOR) 25 MG tablet, Take 1 tablet by mouth Every 12 (Twelve) Hours., Disp: 120 tablet, Rfl: 5    nitroglycerin (NITROSTAT) 0.4 MG SL tablet, Place 1 tablet under the tongue Every 5 (Five) Minutes As Needed for Chest Pain (Systolic BP Greater Than 100). Take no more than 3 doses in 15 minutes., Disp: 75 tablet, Rfl: 12    oxyCODONE ER (oxyCONTIN) 20 MG 12 hr tablet, Take 1 tablet by mouth Every 12 (Twelve) Hours., Disp: , Rfl:      oxyCODONE-acetaminophen (PERCOCET)  MG per tablet, Take 1 tablet by mouth Every 6 (Six) Hours As Needed for Moderate Pain., Disp: , Rfl:     rosuvastatin (CRESTOR) 40 MG tablet, Take 1 tablet by mouth Every Night., Disp: 30 tablet, Rfl: 0    sertraline (ZOLOFT) 100 MG tablet, Take 2 tablets by mouth Daily., Disp: , Rfl:     sulfamethoxazole-trimethoprim (BACTRIM DS,SEPTRA DS) 800-160 MG per tablet, Take 1 tablet by mouth Take As Directed., Disp: , Rfl:      Review of Systems   Cardiovascular:  Positive for chest pain, claudication and leg swelling.   Musculoskeletal:  Positive for back pain.       There were no vitals filed for this visit.    Physical Exam  Constitutional:       Appearance: Normal appearance.   Neck:      Vascular: Carotid bruit (Bilateral) present.   Cardiovascular:      Rate and Rhythm: Normal rate and regular rhythm.      Heart sounds: Murmur (3/6 systolic murmur) heard.   Pulmonary:      Effort: Pulmonary effort is normal.   Musculoskeletal:      Right lower leg: Edema present.      Left lower leg: Edema present.      Comments: 2+ bilateral   Neurological:      Mental Status: She is alert.         Diagnostic Data:  Procedures  Lab Results   Component Value Date    TRIG 103 10/16/2023    HDL 48 10/16/2023     Lab Results   Component Value Date    GLUCOSE 201 (H) 12/05/2023    BUN 30 (H) 12/05/2023    CREATININE 1.26 (H) 12/05/2023     12/05/2023    K 3.4 (L) 12/05/2023     12/05/2023    CO2 24.0 12/05/2023     Lab Results   Component Value Date    HGBA1C 6.50 (H) 10/16/2023     Lab Results   Component Value Date    WBC 11.69 (H) 12/05/2023    HGB 12.6 12/05/2023    HCT 40.2 12/05/2023     12/05/2023       Assessment:  No diagnosis found.    Plan:    CAD  Heart catheterization September 2023.  RCA , diffusely diseased circumflex, no obstructive LAD disease  Chest pain has been fairly well-controlled.  Recommend she resume Plavix which I refilled today and continue  metoprolol and Imdur  Previous carotid artery stenting  Angiogram August 2023: Patent right carotid stent, less than 50% left stenosis, no intracerebral ICA aneurysm  Follows with Dr. Ackerman  PAD/mesenteric ischemia  Kissing iliac stents October 2023 Dr. Alcantara  Superior mesenteric artery stent October 2023  HTN  Schedule renal artery duplex  HLD  Lipids from October 2023 total cholesterol 108, HDL 48, LDL 41, triglycerides 103  She is on appropriate statin therapy  CLL  Ibrutinib  Lower extremity edema   Third EF on last echo.  Add Lasix 40 mg daily with potassium 10 mEq  Check labs in 1 week at [place]      ACP discussion was held with the patient during this visit. Patient does not have an advance directive, declines further assistance.      Ned Flores MD [facility]

## 2024-02-22 ENCOUNTER — HOSPITAL ENCOUNTER (OUTPATIENT)
Dept: CARDIOLOGY | Facility: HOSPITAL | Age: 59
Discharge: HOME | End: 2024-02-22
Admitting: INTERNAL MEDICINE
Payer: MEDICARE

## 2024-02-22 DIAGNOSIS — I1A.0 RESISTANT HYPERTENSION: ICD-10-CM

## 2024-02-22 DIAGNOSIS — I15.0 RENOVASCULAR HYPERTENSION: ICD-10-CM

## 2024-02-22 DIAGNOSIS — I70.1 RENAL ARTERY STENOSIS: Primary | ICD-10-CM

## 2024-02-22 LAB
BH CV ECHO MEAS - DIST REN A EDV LEFT: 37.4 CM/S
BH CV ECHO MEAS - DIST REN A PSV LEFT: 113 CM/S
BH CV ECHO MEAS - MID REN A EDV LEFT: 37.4 CM/S
BH CV ECHO MEAS - MID REN A PSV LEFT: 165 CM/S
BH CV ECHO MEAS - PROX REN A EDV LEFT: 51.9 CM/S
BH CV ECHO MEAS - PROX REN A PSV LEFT: 304 CM/S
BH CV VAS BP LEFT ARM: ABNORMAL MMHG
BH CV VAS KIDNEY HEIGHT LEFT: 4.8 CM
BH CV VAS RENAL AORTIC MID EDV: 21.9 CM/S
BH CV VAS RENAL AORTIC MID PSV: 108 CM/S
BH CV VAS RENAL HILUM LEFT EDV: 18.1 CM/S
BH CV VAS RENAL HILUM LEFT PSV: 86.3 CM/S
BH CV VAS RENAL HILUM RIGHT EDV: 27.7 CM/S
BH CV VAS RENAL HILUM RIGHT PSV: 162 CM/S
BH CV XLRA MEAS - KID L LEFT: 9.4 CM
BH CV XLRA MEAS DIST REN A EDV RIGHT: 30.8 CM/S
BH CV XLRA MEAS DIST REN A PSV RIGHT: 174 CM/S
BH CV XLRA MEAS KID H RIGHT: 4.5 CM
BH CV XLRA MEAS KID L RIGHT: 9.8 CM
BH CV XLRA MEAS KID W RIGHT: 4.5 CM
BH CV XLRA MEAS MID REN A EDV RIGHT: 36.7 CM/S
BH CV XLRA MEAS MID REN A PSV RIGHT: 272 CM/S
BH CV XLRA MEAS PROX REN A EDV RIGHT: 56.5 CM/S
BH CV XLRA MEAS PROX REN A PSV RIGHT: 348 CM/S
BH CV XLRA MEAS RAR LEFT: 2.8
BH CV XLRA MEAS RAR RIGHT: 3.2
BH CV XLRA MEAS RENAL A ORG EDV LEFT: 81 CM/S
BH CV XLRA MEAS RENAL A ORG EDV RIGHT: 47.4 CM/S
BH CV XLRA MEAS RENAL A ORG PSV LEFT: 307 CM/S
BH CV XLRA MEAS RENAL A ORG PSV RIGHT: 257 CM/S
LEFT KIDNEY WIDTH: 4.8 CM
LEFT RENAL UPPER PARENCHYMA MAX: 28.3 CM/S
LEFT RENAL UPPER PARENCHYMA MIN: 9.12 CM/S
LEFT RENAL UPPER PARENCHYMA RI: 0.68
RIGHT RENAL UPPER PARENCHYMA MAX: 19.9 CM/S
RIGHT RENAL UPPER PARENCHYMA MIN: 7.64 CM/S
RIGHT RENAL UPPER PARENCHYMA RI: 0.62

## 2024-02-22 PROCEDURE — 93975 VASCULAR STUDY: CPT

## 2024-02-22 NOTE — PROGRESS NOTES
Discussed patient's renal artery duplex findings with her.  Velocities suggest bilateral renal artery stenosis greater than 60%.  She does continue to have spikes in her blood pressure.  We will plan for renal angiogram.  Will schedule with Dr. Tobin who has performed her previous heart catheterization.  Patient agreeable to proceed

## 2024-02-28 PROBLEM — I70.1 RENAL ARTERY STENOSIS: Status: ACTIVE | Noted: 2024-02-22

## 2024-03-05 ENCOUNTER — PREP FOR SURGERY (OUTPATIENT)
Dept: OTHER | Facility: HOSPITAL | Age: 59
End: 2024-03-05
Payer: MEDICARE

## 2024-03-05 RX ORDER — SODIUM CHLORIDE 9 MG/ML
40 INJECTION, SOLUTION INTRAVENOUS AS NEEDED
Status: CANCELLED | OUTPATIENT
Start: 2024-03-05

## 2024-03-05 RX ORDER — ACETAMINOPHEN 325 MG/1
650 TABLET ORAL EVERY 4 HOURS PRN
Status: CANCELLED | OUTPATIENT
Start: 2024-03-05

## 2024-03-05 RX ORDER — ASPIRIN 81 MG/1
324 TABLET, CHEWABLE ORAL ONCE
Status: CANCELLED | OUTPATIENT
Start: 2024-03-05 | End: 2024-03-05

## 2024-03-05 RX ORDER — NITROGLYCERIN 0.4 MG/1
0.4 TABLET SUBLINGUAL
Status: CANCELLED | OUTPATIENT
Start: 2024-03-05

## 2024-03-05 RX ORDER — SODIUM CHLORIDE 0.9 % (FLUSH) 0.9 %
10 SYRINGE (ML) INJECTION AS NEEDED
Status: CANCELLED | OUTPATIENT
Start: 2024-03-05

## 2024-03-05 RX ORDER — ASPIRIN 81 MG/1
81 TABLET ORAL DAILY
Status: CANCELLED | OUTPATIENT
Start: 2024-03-06

## 2024-03-05 RX ORDER — SODIUM CHLORIDE 0.9 % (FLUSH) 0.9 %
10 SYRINGE (ML) INJECTION EVERY 12 HOURS SCHEDULED
Status: CANCELLED | OUTPATIENT
Start: 2024-03-05

## 2024-03-06 ENCOUNTER — HOSPITAL ENCOUNTER (OUTPATIENT)
Facility: HOSPITAL | Age: 59
Setting detail: HOSPITAL OUTPATIENT SURGERY
Discharge: HOME | End: 2024-03-06
Attending: INTERNAL MEDICINE | Admitting: INTERNAL MEDICINE
Payer: MEDICARE

## 2024-03-06 VITALS
HEIGHT: 65 IN | BODY MASS INDEX: 22.14 KG/M2 | HEART RATE: 61 BPM | DIASTOLIC BLOOD PRESSURE: 51 MMHG | WEIGHT: 132.9 LBS | TEMPERATURE: 97.5 F | SYSTOLIC BLOOD PRESSURE: 122 MMHG | RESPIRATION RATE: 15 BRPM | OXYGEN SATURATION: 98 %

## 2024-03-06 DIAGNOSIS — I65.21 CAROTID STENOSIS, RIGHT: ICD-10-CM

## 2024-03-06 DIAGNOSIS — I70.1 RENAL ARTERY STENOSIS: ICD-10-CM

## 2024-03-06 LAB
ACT BLD: 309 SECONDS (ref 82–152)
ANION GAP SERPL CALCULATED.3IONS-SCNC: 15 MMOL/L (ref 5–15)
BUN SERPL-MCNC: 12 MG/DL (ref 8–23)
BUN/CREAT SERPL: 13.2 (ref 7–25)
CALCIUM SPEC-SCNC: 9.1 MG/DL (ref 8.6–10.5)
CHLORIDE SERPL-SCNC: 95 MMOL/L (ref 98–107)
CHOLEST SERPL-MCNC: 122 MG/DL (ref 0–200)
CO2 SERPL-SCNC: 22 MMOL/L (ref 22–29)
CREAT SERPL-MCNC: 0.91 MG/DL (ref 0.57–1)
DEPRECATED RDW RBC AUTO: 49.1 FL (ref 37–54)
EGFRCR SERPLBLD CKD-EPI 2021: 72.4 ML/MIN/1.73
ERYTHROCYTE [DISTWIDTH] IN BLOOD BY AUTOMATED COUNT: 13.8 % (ref 12.3–15.4)
GLUCOSE SERPL-MCNC: 129 MG/DL (ref 65–99)
HCT VFR BLD AUTO: 37.6 % (ref 34–46.6)
HDLC SERPL-MCNC: 39 MG/DL (ref 40–60)
HGB BLD-MCNC: 12.4 G/DL (ref 12–15.9)
LDLC SERPL CALC-MCNC: 60 MG/DL (ref 0–100)
LDLC/HDLC SERPL: 1.47 {RATIO}
MCH RBC QN AUTO: 32 PG (ref 26.6–33)
MCHC RBC AUTO-ENTMCNC: 33 G/DL (ref 31.5–35.7)
MCV RBC AUTO: 96.9 FL (ref 79–97)
PLATELET # BLD AUTO: 132 10*3/MM3 (ref 140–450)
PMV BLD AUTO: 11.2 FL (ref 6–12)
POTASSIUM SERPL-SCNC: 3.7 MMOL/L (ref 3.5–5.2)
RBC # BLD AUTO: 3.88 10*6/MM3 (ref 3.77–5.28)
SODIUM SERPL-SCNC: 132 MMOL/L (ref 136–145)
TRIGL SERPL-MCNC: 128 MG/DL (ref 0–150)
VLDLC SERPL-MCNC: 23 MG/DL (ref 5–40)
WBC NRBC COR # BLD AUTO: 7.76 10*3/MM3 (ref 3.4–10.8)

## 2024-03-06 PROCEDURE — 25810000003 SODIUM CHLORIDE 0.9 % SOLUTION: Performed by: INTERNAL MEDICINE

## 2024-03-06 PROCEDURE — 85027 COMPLETE CBC AUTOMATED: CPT | Performed by: PHYSICIAN ASSISTANT

## 2024-03-06 PROCEDURE — 25510000001 IOPAMIDOL PER 1 ML: Performed by: INTERNAL MEDICINE

## 2024-03-06 PROCEDURE — C1769 GUIDE WIRE: HCPCS | Performed by: INTERNAL MEDICINE

## 2024-03-06 PROCEDURE — 25010000002 HEPARIN (PORCINE) PER 1000 UNITS: Performed by: INTERNAL MEDICINE

## 2024-03-06 PROCEDURE — 25010000002 LIDOCAINE 1 % SOLUTION: Performed by: INTERNAL MEDICINE

## 2024-03-06 PROCEDURE — 80048 BASIC METABOLIC PNL TOTAL CA: CPT | Performed by: PHYSICIAN ASSISTANT

## 2024-03-06 PROCEDURE — 25010000002 MIDAZOLAM PER 1 MG: Performed by: INTERNAL MEDICINE

## 2024-03-06 PROCEDURE — 75726 ARTERY X-RAYS ABDOMEN: CPT

## 2024-03-06 PROCEDURE — 82565 ASSAY OF CREATININE: CPT

## 2024-03-06 PROCEDURE — 25010000002 FENTANYL CITRATE (PF) 50 MCG/ML SOLUTION: Performed by: INTERNAL MEDICINE

## 2024-03-06 PROCEDURE — C1894 INTRO/SHEATH, NON-LASER: HCPCS | Performed by: INTERNAL MEDICINE

## 2024-03-06 PROCEDURE — C1876 STENT, NON-COA/NON-COV W/DEL: HCPCS | Performed by: INTERNAL MEDICINE

## 2024-03-06 PROCEDURE — 37236 OPEN/PERQ PLACE STENT 1ST: CPT | Performed by: INTERNAL MEDICINE

## 2024-03-06 PROCEDURE — C1725 CATH, TRANSLUMIN NON-LASER: HCPCS | Performed by: INTERNAL MEDICINE

## 2024-03-06 PROCEDURE — 80061 LIPID PANEL: CPT | Performed by: PHYSICIAN ASSISTANT

## 2024-03-06 PROCEDURE — 25010000002 NITROGLYCERIN 5 MG/ML SOLUTION: Performed by: INTERNAL MEDICINE

## 2024-03-06 PROCEDURE — 36252 INS CATH REN ART 1ST BILAT: CPT | Performed by: INTERNAL MEDICINE

## 2024-03-06 PROCEDURE — 85347 COAGULATION TIME ACTIVATED: CPT

## 2024-03-06 PROCEDURE — 25010000002 NICARDIPINE 2.5 MG/ML SOLUTION: Performed by: INTERNAL MEDICINE

## 2024-03-06 PROCEDURE — 25810000003 SODIUM CHLORIDE 0.9 % SOLUTION: Performed by: PHYSICIAN ASSISTANT

## 2024-03-06 DEVICE — RX HERCULINK ELITE RENAL AND BILIARY STENT SYSTEM 5.0 MM X 18 MM X 135 CM
Type: IMPLANTABLE DEVICE | Status: FUNCTIONAL
Brand: HERCULINK ELITE

## 2024-03-06 RX ORDER — CLOPIDOGREL BISULFATE 75 MG/1
TABLET ORAL
Status: DISCONTINUED | OUTPATIENT
Start: 2024-03-06 | End: 2024-03-06 | Stop reason: HOSPADM

## 2024-03-06 RX ORDER — NITROGLYCERIN 0.4 MG/1
0.4 TABLET SUBLINGUAL
Status: DISCONTINUED | OUTPATIENT
Start: 2024-03-06 | End: 2024-03-06 | Stop reason: HOSPADM

## 2024-03-06 RX ORDER — NITROGLYCERIN 5 MG/ML
INJECTION, SOLUTION INTRAVENOUS
Status: DISCONTINUED | OUTPATIENT
Start: 2024-03-06 | End: 2024-03-06 | Stop reason: HOSPADM

## 2024-03-06 RX ORDER — ACETAMINOPHEN 325 MG/1
650 TABLET ORAL EVERY 4 HOURS PRN
Status: DISCONTINUED | OUTPATIENT
Start: 2024-03-06 | End: 2024-03-06 | Stop reason: HOSPADM

## 2024-03-06 RX ORDER — SODIUM CHLORIDE 0.9 % (FLUSH) 0.9 %
10 SYRINGE (ML) INJECTION AS NEEDED
Status: DISCONTINUED | OUTPATIENT
Start: 2024-03-06 | End: 2024-03-06 | Stop reason: HOSPADM

## 2024-03-06 RX ORDER — FENTANYL CITRATE 50 UG/ML
INJECTION, SOLUTION INTRAMUSCULAR; INTRAVENOUS
Status: DISCONTINUED | OUTPATIENT
Start: 2024-03-06 | End: 2024-03-06 | Stop reason: HOSPADM

## 2024-03-06 RX ORDER — NICARDIPINE HCL-0.9% SOD CHLOR 1 MG/10 ML
SYRINGE (ML) INTRAVENOUS
Status: DISCONTINUED | OUTPATIENT
Start: 2024-03-06 | End: 2024-03-06 | Stop reason: HOSPADM

## 2024-03-06 RX ORDER — CLOPIDOGREL BISULFATE 75 MG/1
75 TABLET ORAL DAILY
Qty: 90 TABLET | Refills: 3 | Status: SHIPPED | OUTPATIENT
Start: 2024-03-06

## 2024-03-06 RX ORDER — ASPIRIN 81 MG/1
324 TABLET, CHEWABLE ORAL ONCE
Status: COMPLETED | OUTPATIENT
Start: 2024-03-06 | End: 2024-03-06

## 2024-03-06 RX ORDER — HEPARIN SODIUM 1000 [USP'U]/ML
INJECTION, SOLUTION INTRAVENOUS; SUBCUTANEOUS
Status: DISCONTINUED | OUTPATIENT
Start: 2024-03-06 | End: 2024-03-06 | Stop reason: HOSPADM

## 2024-03-06 RX ORDER — SODIUM CHLORIDE 9 MG/ML
1 INJECTION, SOLUTION INTRAVENOUS CONTINUOUS
Status: ACTIVE | OUTPATIENT
Start: 2024-03-06 | End: 2024-03-06

## 2024-03-06 RX ORDER — ASPIRIN 81 MG/1
81 TABLET ORAL DAILY
Status: DISCONTINUED | OUTPATIENT
Start: 2024-03-07 | End: 2024-03-06 | Stop reason: HOSPADM

## 2024-03-06 RX ORDER — LIDOCAINE HYDROCHLORIDE 10 MG/ML
INJECTION, SOLUTION INFILTRATION; PERINEURAL
Status: DISCONTINUED | OUTPATIENT
Start: 2024-03-06 | End: 2024-03-06 | Stop reason: HOSPADM

## 2024-03-06 RX ORDER — MIDAZOLAM HYDROCHLORIDE 1 MG/ML
INJECTION INTRAMUSCULAR; INTRAVENOUS
Status: DISCONTINUED | OUTPATIENT
Start: 2024-03-06 | End: 2024-03-06 | Stop reason: HOSPADM

## 2024-03-06 RX ORDER — SODIUM CHLORIDE 9 MG/ML
40 INJECTION, SOLUTION INTRAVENOUS AS NEEDED
Status: DISCONTINUED | OUTPATIENT
Start: 2024-03-06 | End: 2024-03-06 | Stop reason: HOSPADM

## 2024-03-06 RX ORDER — SODIUM CHLORIDE 0.9 % (FLUSH) 0.9 %
10 SYRINGE (ML) INJECTION EVERY 12 HOURS SCHEDULED
Status: DISCONTINUED | OUTPATIENT
Start: 2024-03-06 | End: 2024-03-06 | Stop reason: HOSPADM

## 2024-03-06 RX ADMIN — ASPIRIN 324 MG: 81 TABLET, CHEWABLE ORAL at 09:31

## 2024-03-06 RX ADMIN — SODIUM CHLORIDE 185.1 ML: 9 INJECTION, SOLUTION INTRAVENOUS at 09:31

## 2024-03-06 NOTE — Clinical Note
Patient was given Post Procedure instruction by the staff. Stiches were previously removed. Healing well

## 2024-03-06 NOTE — Clinical Note
Hemostasis started on the right radial artery. R-Band was used in achieving hemostasis. Radial compression device applied to vessel. Hemostasis achieved successfully. Closure device additional comment: 12 mls

## 2024-03-06 NOTE — INTERVAL H&P NOTE
"  H&P updated. The patient was examined and the following changes are noted:  Renal artery duplex suggestive of high grade bilateral KYMBERLY. Renal angio recommended      Renal artery duplex 2/22/24:  Interpretation Summary         Elevated velocities in the proximal renal arteries bilaterally.    Right side peak velocity 348 cm/s, Renal:Aortic ratio 3.2    Left side peak velocity 307 cm/s, Renal: Aortic 2.8    Velocities suggest greater than 60% stenosis bilaterally        Vitals and Labs:  Vitals:    03/06/24 0903   Weight: 60.3 kg (132 lb 14.4 oz)   Height: 165.1 cm (65\")       Lab Results   Component Value Date    WBC 11.69 (H) 12/05/2023    HGB 12.6 12/05/2023    HCT 40.2 12/05/2023    .8 (H) 12/05/2023     12/05/2023     Lab Results   Component Value Date    GLUCOSE 201 (H) 12/05/2023    BUN 30 (H) 12/05/2023    CREATININE 1.26 (H) 12/05/2023    EGFR 49.3 (L) 12/05/2023    BCR 23.8 12/05/2023    K 3.4 (L) 12/05/2023    CO2 24.0 12/05/2023    CALCIUM 9.0 12/05/2023    PROTENTOTREF 6.6 11/17/2015    ALBUMIN 3.7 12/05/2023    BILITOT 0.4 12/05/2023    AST 18 12/05/2023    ALT 16 12/05/2023     Lab Results   Component Value Date    HGBA1C 6.50 (H) 10/16/2023     Lab Results   Component Value Date    CHOL 108 10/16/2023    TRIG 103 10/16/2023    HDL 48 10/16/2023    LDL 41 10/16/2023     All risk benefits alternatives of the planned renal arteriogram with possible intervention were explained in detail the patient and her son at the bedside.  Both are understanding of all risk, and agreeable to proceed.    Martín Tobin MD, [facility], [facility]  Interventional Cardiology  03/06/24  10:19 EST        "

## 2024-03-12 LAB — CREAT BLDA-MCNC: 0.9 MG/DL (ref 0.6–1.3)

## 2024-04-12 ENCOUNTER — HOSPITAL ENCOUNTER (OUTPATIENT)
Dept: GENERAL RADIOLOGY | Facility: HOSPITAL | Age: 59
Discharge: HOME | End: 2024-04-12
Payer: MEDICARE

## 2024-04-12 ENCOUNTER — TRANSCRIBE ORDERS (OUTPATIENT)
Dept: ADMINISTRATIVE | Facility: HOSPITAL | Age: 59
End: 2024-04-12
Payer: MEDICARE

## 2024-04-12 DIAGNOSIS — M79.671 PAIN IN RIGHT FOOT: ICD-10-CM

## 2024-04-12 DIAGNOSIS — M79.672 PAIN IN LEFT FOOT: ICD-10-CM

## 2024-04-12 DIAGNOSIS — M79.672 PAIN IN LEFT FOOT: Primary | ICD-10-CM

## 2024-04-12 PROCEDURE — 73630 X-RAY EXAM OF FOOT: CPT

## 2024-06-20 ENCOUNTER — OFFICE VISIT (OUTPATIENT)
Dept: CARDIOLOGY | Facility: CLINIC | Age: 59
End: 2024-06-20
Payer: MEDICARE

## 2024-06-20 VITALS
DIASTOLIC BLOOD PRESSURE: 65 MMHG | OXYGEN SATURATION: 97 % | SYSTOLIC BLOOD PRESSURE: 99 MMHG | HEIGHT: 64 IN | WEIGHT: 122 LBS | HEART RATE: 89 BPM | BODY MASS INDEX: 20.83 KG/M2

## 2024-06-20 DIAGNOSIS — I65.21 CAROTID STENOSIS, RIGHT: ICD-10-CM

## 2024-06-20 DIAGNOSIS — I73.9 PERIPHERAL VASCULAR DISEASE OF EXTREMITY WITH CLAUDICATION: ICD-10-CM

## 2024-06-20 DIAGNOSIS — I20.0 UNSTABLE ANGINA: Primary | ICD-10-CM

## 2024-06-20 DIAGNOSIS — I10 PRIMARY HYPERTENSION: ICD-10-CM

## 2024-06-20 RX ORDER — POTASSIUM CHLORIDE 20 MEQ/1
20 TABLET, EXTENDED RELEASE ORAL DAILY
COMMUNITY

## 2024-06-20 RX ORDER — SEMAGLUTIDE 0.68 MG/ML
INJECTION, SOLUTION SUBCUTANEOUS
COMMUNITY
Start: 2024-06-12

## 2024-06-20 RX ORDER — RANOLAZINE 500 MG/1
500 TABLET, EXTENDED RELEASE ORAL 2 TIMES DAILY
Qty: 60 TABLET | Refills: 11 | Status: SHIPPED | OUTPATIENT
Start: 2024-06-20

## 2024-06-20 NOTE — H&P (VIEW-ONLY)
[facility] Cardiology  Follow Up Visit  Melania Mae  1965    VISIT DATE:  06/20/24    PCP:   Gillian Harrison, APRN  [address] [facility] [address]          CC:  Resistant hypertension and Chest Pain      Problem List:  Diabetes  Tobacco use  Hypertension  Hyperlipidemia  Non-Hodgkin's lymphoma/CLL  Initial treatment 2011 for 6 months possible anthracycline   On Ibrutinib  Previous carotid stent (right)  PAD  Bilateral iliac stents October 2023  Superior mesenteric artery stent October 2023  CAD  Heart catheterization September 2023: RCA , LAD 20% stenosis, nondominant diffusely diseased circumflex, OM1 80% stenosis, small vessel  Medical management recommended  Echo September 2023: EF 61 to 65% mild MR, normal estimated PA pressure  9.  KYMBERLY     80% ostial/proximal left renal artery stenosis status post angioplasty and stent placement with a 5.0 x 18 mm Herculink bare-metal stent.    Right renal artery ostial 40-50% stenosis with no significant gradient.    Widely patent superior mesenteric artery stent with 20% ISR.         History of Present Illness:  Melania Mae  Is a 59 y.o. female with pertinent cardiac history detailed above.  Patient underwent recent renal artery stenting.  She still does note some intermittent labile blood pressures but overall I think the trend looks improved.  Unfortunately she has been having issues with worsening chest pain.  She had an episode that woke her from sleep that was fairly severe.  She did not seek ER attention.  She had another episode this morning.  She has taken nitroglycerin for these episodes with some relief.  EKG today shows chronic inferior infarct, ST changes are improved compared with November 2023 EKG.  Blood pressure normal in the office today.  Of note she also reports intermittent heart racing and palpitations.      Patient Active Problem List    Diagnosis Date Noted    Renal artery stenosis 02/22/2024     Note  "Last Updated: 3/6/2024     3/6/2024: Renal angiogram revealed significant 80% stenosis of the proximal left renal artery with a 60 mmHg pressure gradient across the stenosis, status post angioplasty and stent placement with a 5.0 x 18 mm Herculink bare-metal stent.  Ostial right renal artery 40-50% stenosis, without pressure gradient.      Coronary artery disease involving native coronary artery of native heart with unstable angina pectoris 09/20/2023     Note Last Updated: 9/20/2023 9/20/2023: LHC at [facility], proximal %  with mature left-to-right collaterals from the LAD, small diffusely diseased left circumflex, LAD mild-20% luminal irregularities.  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 12/09/2019    Cutaneous abscess of abdominal wall 05/21/2018    CLL (chronic lymphocytic leukemia) 02/20/2018    Carotid stenosis, right, s/p stent 8/23/17 08/23/2017    Type 2 diabetes mellitus with complication, with long-term current use of insulin 08/23/2017    HTN (hypertension) 08/23/2017    HLD (hyperlipidemia) 08/23/2017    Cerebrovascular accident (CVA) 08/14/2017    Aneurysm 08/14/2017    CLL (chronic lymphocytic leukemia) 08/04/2017    Encounter for consultation 07/06/2017     Note Last Updated: 7/6/2017     Referred for Vascular Abnormality         Allergies   Allergen Reactions    Codeine Shortness Of Breath    Rituxan [Rituximab] Shortness Of Breath and Other (See Comments)     \"THROAT RAWNESS; FELT LIKE MY THROAT WAS CLOSING UP\"    Penicillins Other (See Comments)     \"UNKNOWN CHILDHOOD ALLERGY\"       Social History     Socioeconomic History    Marital " status:      Spouse name: denies    Number of children: 3    Years of education: some college    Highest education level: Some college, no degree   Tobacco Use    Smoking status: Every Day     Current packs/day: 0.50     Average packs/day: 0.5 packs/day for 35.0 years (17.5 ttl pk-yrs)     Types: Cigarettes     Passive exposure: Current    Smokeless tobacco: Never   Vaping Use    Vaping status: Never Used   Substance and Sexual Activity    Alcohol use: Yes     Comment: occasional use    Drug use: Not Currently     Types: Marijuana    Sexual activity: Not Currently       Family History   Problem Relation Age of Onset    Hypertension Mother     Lung cancer Father 72    Hypertension Father     Heart disease Father     Cancer Father     Diabetes Father     Other Brother 46        MI    Heart disease Brother     Breast cancer Paternal Aunt     Colon cancer Maternal Grandfather     Other Cousin         CLL (dx 46 yo)       Current Medications:    Current Outpatient Medications:     albuterol (PROVENTIL HFA;VENTOLIN HFA) 108 (90 Base) MCG/ACT inhaler, Inhale 2 puffs As Needed for Wheezing., Disp: , Rfl:     aspirin 81 MG EC tablet, Take 1 tablet by mouth Daily., Disp: 30 tablet, Rfl: 5    clopidogrel (PLAVIX) 75 MG tablet, Take 1 tablet by mouth Daily., Disp: 90 tablet, Rfl: 3    furosemide (LASIX) 40 MG tablet, Take 1 tablet by mouth Daily., Disp: 30 tablet, Rfl: 11    gabapentin (NEURONTIN) 800 MG tablet, Take 1 tablet by mouth 4 (Four) Times a Day., Disp: , Rfl:     ibrutinib (IMBRUVICA) 420 MG tablet tablet, Take 1 tablet by mouth Daily., Disp: 28 tablet, Rfl: 5    Ibuprofen (ADVIL PO), Take 400 mg by mouth 3 (Three) Times a Day. LIQUIGELS, Disp: , Rfl:     isosorbide mononitrate (IMDUR) 60 MG 24 hr tablet, Take 1 tablet by mouth Daily., Disp: 90 tablet, Rfl: 3    lisinopril (PRINIVIL,ZESTRIL) 20 MG tablet, Take 1 tablet by mouth Every Morning., Disp: 30 tablet, Rfl: 11    metoprolol tartrate (LOPRESSOR) 25 MG  tablet, Take 1 tablet by mouth Every 12 (Twelve) Hours. (Patient taking differently: Take 1 tablet by mouth As Needed.), Disp: 120 tablet, Rfl: 5    naloxone (Narcan) 4 MG/0.1ML nasal spray, 2 Sprays per nasal for opioid emergency.  May repeat in 10 minutes and call for emergency response., Disp: , Rfl:     nitrofurantoin, macrocrystal-monohydrate, (MACROBID) 100 MG capsule, 1 capsule., Disp: , Rfl:     nitroglycerin (NITROSTAT) 0.4 MG SL tablet, Place 1 tablet under the tongue Every 5 (Five) Minutes As Needed for Chest Pain (Systolic BP Greater Than 100). Take no more than 3 doses in 15 minutes., Disp: 75 tablet, Rfl: 12    oxyCODONE ER (oxyCONTIN) 20 MG 12 hr tablet, Take 1 tablet by mouth Every 12 (Twelve) Hours., Disp: , Rfl:     oxyCODONE-acetaminophen (PERCOCET)  MG per tablet, Take 1 tablet by mouth Every 6 (Six) Hours As Needed for Moderate Pain., Disp: , Rfl:     Ozempic, 0.25 or 0.5 MG/DOSE, 2 MG/3ML solution pen-injector, INJECT 0.25 MG SUBCUTANEOUSLY ONCE A WEEK, Disp: , Rfl:     potassium chloride (K-TAB) 20 MEQ tablet controlled-release ER tablet, Take 1 tablet by mouth Daily., Disp: 30 tablet, Rfl: 11    rosuvastatin (CRESTOR) 40 MG tablet, Take 1 tablet by mouth Every Night., Disp: 30 tablet, Rfl: 0    CBD oil (cannabidiol) capsule, Take 1 each by mouth Daily. (Patient not taking: Reported on 6/20/2024), Disp: , Rfl:     potassium chloride (KLOR-CON M20) 20 MEQ CR tablet, Take 1 tablet by mouth Daily., Disp: , Rfl:     sulfamethoxazole-trimethoprim (BACTRIM DS,SEPTRA DS) 800-160 MG per tablet, Take 1 tablet by mouth Take As Directed. (Patient not taking: Reported on 6/20/2024), Disp: , Rfl:     valACYclovir (VALTREX) 500 MG tablet, Take 1 tablet by mouth Daily. (Patient not taking: Reported on 6/20/2024), Disp: , Rfl:      Review of Systems   Cardiovascular:  Positive for chest pain, irregular heartbeat, leg swelling and palpitations.   Neurological:  Positive for dizziness.       Vitals:     "06/20/24 1340   BP: 99/65   BP Location: Left arm   Patient Position: Sitting   Pulse: 89   SpO2: 97%   Weight: 55.3 kg (122 lb)   Height: 162.6 cm (64\")       Physical Exam  Constitutional:       Appearance: Normal appearance.   Neck:      Vascular: No carotid bruit (Bilateral carotid bruits).   Cardiovascular:      Rate and Rhythm: Normal rate and regular rhythm.      Heart sounds: Murmur (1/6) heard.   Pulmonary:      Effort: Pulmonary effort is normal.   Musculoskeletal:      Right lower leg: Edema present.      Left lower leg: Edema present.      Comments: 1+ bilaterally   Neurological:      Mental Status: She is alert.         Diagnostic Data:    ECG 12 Lead    Date/Time: 6/20/2024 2:38 PM  Performed by: Ned Flores MD    Authorized by: Ned Flores MD  Comparison: compared with previous ECG from 11/29/2023  Rhythm: sinus rhythm  Rate: normal  BPM: 84  Q waves: III, aVF and II    QRS axis: left  Other findings: non-specific ST-T wave changes  Comments: ST changes improved compared with November.  Chronic inferior infarct.        Lab Results   Component Value Date    TRIG 128 03/06/2024    HDL 39 (L) 03/06/2024     Lab Results   Component Value Date    GLUCOSE 129 (H) 03/06/2024    BUN 12 03/06/2024    CREATININE 0.90 03/06/2024     (L) 03/06/2024    K 3.7 03/06/2024    CL 95 (L) 03/06/2024    CO2 22.0 03/06/2024     Lab Results   Component Value Date    HGBA1C 6.50 (H) 10/16/2023     Lab Results   Component Value Date    WBC 7.76 03/06/2024    HGB 12.4 03/06/2024    HCT 37.6 03/06/2024     (L) 03/06/2024       Assessment:  No diagnosis found.    Plan:    CAD  Heart catheterization September 2023.  RCA , diffusely diseased circumflex, no obstructive LAD disease  Having escalating angina/unstable angina.  Will proceed with heart catheterization to evaluate for any LAD disease.  She would be a bypass candidate in that case  Continue aspirin Plavix and atorvastatin.  Add " Ranexa 500 mg twice daily  Previous carotid artery stenting  Angiogram August 2023: Patent right carotid stent, less than 50% left stenosis, no intracerebral ICA aneurysm  Follows with Dr. Ackerman in July with repeat duplex  PAD/mesenteric ischemia  Kissing iliac stents October 2023 Dr. Alcantara  Superior mesenteric artery stent October 2023.  Patent on last angiogram  HTN  Status post right renal artery stenting March 2024.  Blood pressure trend appears improved  HLD  Lipids from October 2023 total cholesterol 108, HDL 48, LDL 41, triglycerides 103  She is on appropriate statin therapy  CLL  Ibrutinib  No Holter monitors available today.  Will plan to set her up with 1 when she comes for heart catheterization to screen for atrial fibrillation.  Lower extremity edema            Lasix 40 mg daily  Normal EF on last echocardiogram    Patient does not have current advance care plan      Ned Flores MD [facility]

## 2024-06-20 NOTE — PROGRESS NOTES
[facility]  Follow Up Visit  Melania Mae  1965    VISIT DATE:  06/20/24    PCP:   Gillian Harrison, APRN  [address] [facility] [address]          CC:  Resistant hypertension and Chest Pain      Problem List:  Diabetes  Tobacco use  Hypertension  Hyperlipidemia  Non-Hodgkin's lymphoma/CLL  Initial treatment 2011 for 6 months possible anthracycline   On Ibrutinib  Previous carotid stent (right)  PAD  Bilateral iliac stents October 2023  Superior mesenteric artery stent October 2023  CAD  Heart catheterization September 2023: RCA , LAD 20% stenosis, nondominant diffusely diseased circumflex, OM1 80% stenosis, small vessel  Medical management recommended  Echo September 2023: EF 61 to 65% mild MR, normal estimated PA pressure  9.  KYMBERLY     80% ostial/proximal left renal artery stenosis status post angioplasty and stent placement with a 5.0 x 18 mm Herculink bare-metal stent.    Right renal artery ostial 40-50% stenosis with no significant gradient.    Widely patent superior mesenteric artery stent with 20% ISR.         History of Present Illness:  Melania Mae  Is a 59 y.o. female with pertinent cardiac history detailed above.  Patient underwent recent renal artery stenting.  She still does note some intermittent labile blood pressures but overall I think the trend looks improved.  Unfortunately she has been having issues with worsening chest pain.  She had an episode that woke her from sleep that was fairly severe.  She did not seek ER attention.  She had another episode this morning.  She has taken nitroglycerin for these episodes with some relief.  EKG today shows chronic inferior infarct, ST changes are improved compared with November 2023 EKG.  Blood pressure normal in the office today.  Of note she also reports intermittent heart racing and palpitations.      Patient Active Problem List    Diagnosis Date Noted    Renal artery stenosis 02/22/2024     Note  "Last Updated: 3/6/2024     3/6/2024: Renal angiogram revealed significant 80% stenosis of the proximal left renal artery with a 60 mmHg pressure gradient across the stenosis, status post angioplasty and stent placement with a 5.0 x 18 mm Herculink bare-metal stent.  Ostial right renal artery 40-50% stenosis, without pressure gradient.      Coronary artery disease involving native coronary artery of native heart with unstable angina pectoris 09/20/2023     Note Last Updated: 9/20/2023 9/20/2023: LHC at [facility], proximal %  with mature left-to-right collaterals from the LAD, small diffusely diseased left circumflex, LAD mild-20% luminal irregularities.  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 12/09/2019    Cutaneous abscess of abdominal wall 05/21/2018    CLL (chronic lymphocytic leukemia) 02/20/2018    Carotid stenosis, right, s/p stent 8/23/17 08/23/2017    Type 2 diabetes mellitus with complication, with long-term current use of insulin 08/23/2017    HTN (hypertension) 08/23/2017    HLD (hyperlipidemia) 08/23/2017    Cerebrovascular accident (CVA) 08/14/2017    Aneurysm 08/14/2017    CLL (chronic lymphocytic leukemia) 08/04/2017    Encounter for consultation 07/06/2017     Note Last Updated: 7/6/2017     Referred for Vascular Abnormality         Allergies   Allergen Reactions    Codeine Shortness Of Breath    Rituxan [Rituximab] Shortness Of Breath and Other (See Comments)     \"THROAT RAWNESS; FELT LIKE MY THROAT WAS CLOSING UP\"    Penicillins Other (See Comments)     \"UNKNOWN CHILDHOOD ALLERGY\"       Social History     Socioeconomic History    Marital " status:      Spouse name: denies    Number of children: 3    Years of education: some college    Highest education level: Some college, no degree   Tobacco Use    Smoking status: Every Day     Current packs/day: 0.50     Average packs/day: 0.5 packs/day for 35.0 years (17.5 ttl pk-yrs)     Types: Cigarettes     Passive exposure: Current    Smokeless tobacco: Never   Vaping Use    Vaping status: Never Used   Substance and Sexual Activity    Alcohol use: Yes     Comment: occasional use    Drug use: Not Currently     Types: Marijuana    Sexual activity: Not Currently       Family History   Problem Relation Age of Onset    Hypertension Mother     Lung cancer Father 72    Hypertension Father     Heart disease Father     Cancer Father     Diabetes Father     Other Brother 46        MI    Heart disease Brother     Breast cancer Paternal Aunt     Colon cancer Maternal Grandfather     Other Cousin         CLL (dx 46 yo)       Current Medications:    Current Outpatient Medications:     albuterol (PROVENTIL HFA;VENTOLIN HFA) 108 (90 Base) MCG/ACT inhaler, Inhale 2 puffs As Needed for Wheezing., Disp: , Rfl:     aspirin 81 MG EC tablet, Take 1 tablet by mouth Daily., Disp: 30 tablet, Rfl: 5    clopidogrel (PLAVIX) 75 MG tablet, Take 1 tablet by mouth Daily., Disp: 90 tablet, Rfl: 3    furosemide (LASIX) 40 MG tablet, Take 1 tablet by mouth Daily., Disp: 30 tablet, Rfl: 11    gabapentin (NEURONTIN) 800 MG tablet, Take 1 tablet by mouth 4 (Four) Times a Day., Disp: , Rfl:     ibrutinib (IMBRUVICA) 420 MG tablet tablet, Take 1 tablet by mouth Daily., Disp: 28 tablet, Rfl: 5    Ibuprofen (ADVIL PO), Take 400 mg by mouth 3 (Three) Times a Day. LIQUIGELS, Disp: , Rfl:     isosorbide mononitrate (IMDUR) 60 MG 24 hr tablet, Take 1 tablet by mouth Daily., Disp: 90 tablet, Rfl: 3    lisinopril (PRINIVIL,ZESTRIL) 20 MG tablet, Take 1 tablet by mouth Every Morning., Disp: 30 tablet, Rfl: 11    metoprolol tartrate (LOPRESSOR) 25 MG  tablet, Take 1 tablet by mouth Every 12 (Twelve) Hours. (Patient taking differently: Take 1 tablet by mouth As Needed.), Disp: 120 tablet, Rfl: 5    naloxone (Narcan) 4 MG/0.1ML nasal spray, 2 Sprays per nasal for opioid emergency.  May repeat in 10 minutes and call for emergency response., Disp: , Rfl:     nitrofurantoin, macrocrystal-monohydrate, (MACROBID) 100 MG capsule, 1 capsule., Disp: , Rfl:     nitroglycerin (NITROSTAT) 0.4 MG SL tablet, Place 1 tablet under the tongue Every 5 (Five) Minutes As Needed for Chest Pain (Systolic BP Greater Than 100). Take no more than 3 doses in 15 minutes., Disp: 75 tablet, Rfl: 12    oxyCODONE ER (oxyCONTIN) 20 MG 12 hr tablet, Take 1 tablet by mouth Every 12 (Twelve) Hours., Disp: , Rfl:     oxyCODONE-acetaminophen (PERCOCET)  MG per tablet, Take 1 tablet by mouth Every 6 (Six) Hours As Needed for Moderate Pain., Disp: , Rfl:     Ozempic, 0.25 or 0.5 MG/DOSE, 2 MG/3ML solution pen-injector, INJECT 0.25 MG SUBCUTANEOUSLY ONCE A WEEK, Disp: , Rfl:     potassium chloride (K-TAB) 20 MEQ tablet controlled-release ER tablet, Take 1 tablet by mouth Daily., Disp: 30 tablet, Rfl: 11    rosuvastatin (CRESTOR) 40 MG tablet, Take 1 tablet by mouth Every Night., Disp: 30 tablet, Rfl: 0    CBD oil (cannabidiol) capsule, Take 1 each by mouth Daily. (Patient not taking: Reported on 6/20/2024), Disp: , Rfl:     potassium chloride (KLOR-CON M20) 20 MEQ CR tablet, Take 1 tablet by mouth Daily., Disp: , Rfl:     sulfamethoxazole-trimethoprim (BACTRIM DS,SEPTRA DS) 800-160 MG per tablet, Take 1 tablet by mouth Take As Directed. (Patient not taking: Reported on 6/20/2024), Disp: , Rfl:     valACYclovir (VALTREX) 500 MG tablet, Take 1 tablet by mouth Daily. (Patient not taking: Reported on 6/20/2024), Disp: , Rfl:      Review of Systems   Cardiovascular:  Positive for chest pain, irregular heartbeat, leg swelling and palpitations.   Neurological:  Positive for dizziness.       Vitals:     "06/20/24 1340   BP: 99/65   BP Location: Left arm   Patient Position: Sitting   Pulse: 89   SpO2: 97%   Weight: 55.3 kg (122 lb)   Height: 162.6 cm (64\")       Physical Exam  Constitutional:       Appearance: Normal appearance.   Neck:      Vascular: No carotid bruit (Bilateral carotid bruits).   Cardiovascular:      Rate and Rhythm: Normal rate and regular rhythm.      Heart sounds: Murmur (1/6) heard.   Pulmonary:      Effort: Pulmonary effort is normal.   Musculoskeletal:      Right lower leg: Edema present.      Left lower leg: Edema present.      Comments: 1+ bilaterally   Neurological:      Mental Status: She is alert.         Diagnostic Data:    ECG 12 Lead    Date/Time: 6/20/2024 2:38 PM  Performed by: Ned Flores MD    Authorized by: Ned Flores MD  Comparison: compared with previous ECG from 11/29/2023  Rhythm: sinus rhythm  Rate: normal  BPM: 84  Q waves: III, aVF and II    QRS axis: left  Other findings: non-specific ST-T wave changes  Comments: ST changes improved compared with November.  Chronic inferior infarct.        Lab Results   Component Value Date    TRIG 128 03/06/2024    HDL 39 (L) 03/06/2024     Lab Results   Component Value Date    GLUCOSE 129 (H) 03/06/2024    BUN 12 03/06/2024    CREATININE 0.90 03/06/2024     (L) 03/06/2024    K 3.7 03/06/2024    CL 95 (L) 03/06/2024    CO2 22.0 03/06/2024     Lab Results   Component Value Date    HGBA1C 6.50 (H) 10/16/2023     Lab Results   Component Value Date    WBC 7.76 03/06/2024    HGB 12.4 03/06/2024    HCT 37.6 03/06/2024     (L) 03/06/2024       Assessment:  No diagnosis found.    Plan:    CAD  Heart catheterization September 2023.  RCA , diffusely diseased circumflex, no obstructive LAD disease  Having escalating angina/unstable angina.  Will proceed with heart catheterization to evaluate for any LAD disease.  She would be a bypass candidate in that case  Continue aspirin Plavix and atorvastatin.  Add " Ranexa 500 mg twice daily  Previous carotid artery stenting  Angiogram August 2023: Patent right carotid stent, less than 50% left stenosis, no intracerebral ICA aneurysm  Follows with Dr. Ackerman in July with repeat duplex  PAD/mesenteric ischemia  Kissing iliac stents October 2023 Dr. Alcantara  Superior mesenteric artery stent October 2023.  Patent on last angiogram  HTN  Status post right renal artery stenting March 2024.  Blood pressure trend appears improved  HLD  Lipids from October 2023 total cholesterol 108, HDL 48, LDL 41, triglycerides 103  She is on appropriate statin therapy  CLL  Ibrutinib  No Holter monitors available today.  Will plan to set her up with 1 when she comes for heart catheterization to screen for atrial fibrillation.  Lower extremity edema            Lasix 40 mg daily  Normal EF on last echocardiogram    Patient does not have current advance care plan      Ned Flores MD [facility]

## 2024-06-21 PROBLEM — I20.0 UNSTABLE ANGINA: Status: ACTIVE | Noted: 2024-06-20

## 2024-06-27 ENCOUNTER — PREP FOR SURGERY (OUTPATIENT)
Dept: OTHER | Facility: HOSPITAL | Age: 59
End: 2024-06-27
Payer: MEDICARE

## 2024-06-27 RX ORDER — SODIUM CHLORIDE 0.9 % (FLUSH) 0.9 %
10 SYRINGE (ML) INJECTION EVERY 12 HOURS SCHEDULED
Status: CANCELLED | OUTPATIENT
Start: 2024-06-27

## 2024-06-27 RX ORDER — ASPIRIN 81 MG/1
324 TABLET, CHEWABLE ORAL ONCE
Status: CANCELLED | OUTPATIENT
Start: 2024-06-27 | End: 2024-06-27

## 2024-06-27 RX ORDER — SODIUM CHLORIDE 9 MG/ML
40 INJECTION, SOLUTION INTRAVENOUS AS NEEDED
Status: CANCELLED | OUTPATIENT
Start: 2024-06-27

## 2024-06-27 RX ORDER — ACETAMINOPHEN 325 MG/1
650 TABLET ORAL EVERY 4 HOURS PRN
Status: CANCELLED | OUTPATIENT
Start: 2024-06-27

## 2024-06-27 RX ORDER — NITROGLYCERIN 0.4 MG/1
0.4 TABLET SUBLINGUAL
Status: CANCELLED | OUTPATIENT
Start: 2024-06-27

## 2024-06-27 RX ORDER — ASPIRIN 81 MG/1
81 TABLET ORAL DAILY
Status: CANCELLED | OUTPATIENT
Start: 2024-06-28

## 2024-06-27 RX ORDER — SODIUM CHLORIDE 0.9 % (FLUSH) 0.9 %
10 SYRINGE (ML) INJECTION AS NEEDED
Status: CANCELLED | OUTPATIENT
Start: 2024-06-27

## 2024-06-28 ENCOUNTER — HOSPITAL ENCOUNTER (OUTPATIENT)
Facility: HOSPITAL | Age: 59
Setting detail: HOSPITAL OUTPATIENT SURGERY
Discharge: HOME | End: 2024-06-28
Attending: INTERNAL MEDICINE | Admitting: INTERNAL MEDICINE
Payer: MEDICARE

## 2024-06-28 ENCOUNTER — APPOINTMENT (OUTPATIENT)
Dept: CARDIOLOGY | Facility: HOSPITAL | Age: 59
End: 2024-06-28
Payer: MEDICARE

## 2024-06-28 VITALS
OXYGEN SATURATION: 97 % | BODY MASS INDEX: 20.89 KG/M2 | TEMPERATURE: 97.4 F | HEART RATE: 85 BPM | DIASTOLIC BLOOD PRESSURE: 93 MMHG | SYSTOLIC BLOOD PRESSURE: 182 MMHG | WEIGHT: 122.36 LBS | RESPIRATION RATE: 16 BRPM | HEIGHT: 64 IN

## 2024-06-28 DIAGNOSIS — I20.0 UNSTABLE ANGINA: ICD-10-CM

## 2024-06-28 LAB
ALBUMIN SERPL-MCNC: 3.7 G/DL (ref 3.5–5.2)
ALBUMIN/GLOB SERPL: 1.9 G/DL
ALP SERPL-CCNC: 61 U/L (ref 39–117)
ALT SERPL W P-5'-P-CCNC: 7 U/L (ref 1–33)
ANION GAP SERPL CALCULATED.3IONS-SCNC: 9 MMOL/L (ref 5–15)
AST SERPL-CCNC: 12 U/L (ref 1–32)
BH CV MID RIGHT ICA HIDDEN LRR: 1 CM
BH CV VAS CAROTID RIGHT DISTAL STENT EDV: 53.7 CM/S
BH CV VAS CAROTID RIGHT DISTAL STENT PSV: 135 CM/S
BH CV VAS CAROTID RIGHT DISTAL TO STENT NATIVE VESSEL E: 56 CM/S
BH CV VAS CAROTID RIGHT DISTAL TO STENT PSV: 104 CM/S
BH CV VAS CAROTID RIGHT MID STENT EDV: 56.3 CM/S
BH CV VAS CAROTID RIGHT MID STENT PSV: 142 CM/S
BH CV VAS CAROTID RIGHT PROXIMAL STENT EDV: 39.1 CM/S
BH CV VAS CAROTID RIGHT PROXIMAL STENT PSV: 96.3 CM/S
BH CV VAS CAROTID RIGHT STENT NATIVE VESSEL PROXIMAL EDV: 39.8 CM/S
BH CV VAS CAROTID RIGHT STENT NATIVE VESSEL PROXIMAL PS: 90.7 CM/S
BH CV XLRA MEAS LEFT DIST CCA EDV: 98.8 CM/SEC
BH CV XLRA MEAS LEFT DIST CCA PSV: 395 CM/SEC
BH CV XLRA MEAS LEFT DIST ICA EDV: 80 CM/SEC
BH CV XLRA MEAS LEFT DIST ICA PSV: 148 CM/SEC
BH CV XLRA MEAS LEFT ICA/CCA RATIO: 3.26
BH CV XLRA MEAS LEFT MID CCA EDV: 39.2 CM/SEC
BH CV XLRA MEAS LEFT MID CCA PSV: 122 CM/SEC
BH CV XLRA MEAS LEFT MID ICA EDV: 101 CM/SEC
BH CV XLRA MEAS LEFT MID ICA PSV: 244 CM/SEC
BH CV XLRA MEAS LEFT PROX CCA EDV: 29.8 CM/SEC
BH CV XLRA MEAS LEFT PROX CCA PSV: 115 CM/SEC
BH CV XLRA MEAS LEFT PROX ECA PSV: 249 CM/SEC
BH CV XLRA MEAS LEFT PROX ICA EDV: 155 CM/SEC
BH CV XLRA MEAS LEFT PROX ICA PSV: 365 CM/SEC
BH CV XLRA MEAS LEFT PROX SCLA PSV: 218 CM/SEC
BH CV XLRA MEAS LEFT VERTEBRAL A EDV: 54.1 CM/SEC
BH CV XLRA MEAS LEFT VERTEBRAL A PSV: 181 CM/SEC
BH CV XLRA MEAS RIGHT DIST CCA EDV: 39 CM/SEC
BH CV XLRA MEAS RIGHT DIST CCA PSV: 108 CM/SEC
BH CV XLRA MEAS RIGHT DIST ICA EDV: 103 CM/SEC
BH CV XLRA MEAS RIGHT DIST ICA PSV: 263 CM/SEC
BH CV XLRA MEAS RIGHT ICA/CCA RATIO: 1.74
BH CV XLRA MEAS RIGHT MID CCA EDV: 32.1 CM/SEC
BH CV XLRA MEAS RIGHT MID CCA PSV: 101 CM/SEC
BH CV XLRA MEAS RIGHT MID ICA EDV: 74.4 CM/SEC
BH CV XLRA MEAS RIGHT MID ICA PSV: 176 CM/SEC
BH CV XLRA MEAS RIGHT PROX CCA EDV: 45.1 CM/SEC
BH CV XLRA MEAS RIGHT PROX CCA PSV: 174 CM/SEC
BH CV XLRA MEAS RIGHT PROX ECA EDV: 85.6 CM/SEC
BH CV XLRA MEAS RIGHT PROX ECA PSV: 481 CM/SEC
BH CV XLRA MEAS RIGHT PROX ICA EDV: 61 CM/SEC
BH CV XLRA MEAS RIGHT PROX ICA PSV: 150 CM/SEC
BH CV XLRA MEAS RIGHT PROX SCLA PSV: 351 CM/SEC
BH CV XLRA MEAS RIGHT VERTEBRAL A PSV: 82.3 CM/SEC
BH CVPROX RIGHT ICA HIDDEN LRR: 1 CM
BILIRUB SERPL-MCNC: 0.3 MG/DL (ref 0–1.2)
BUN SERPL-MCNC: 14 MG/DL (ref 8–23)
BUN/CREAT SERPL: 12.6 (ref 7–25)
CALCIUM SPEC-SCNC: 8.5 MG/DL (ref 8.6–10.5)
CHLORIDE SERPL-SCNC: 98 MMOL/L (ref 98–107)
CHOLEST SERPL-MCNC: 170 MG/DL (ref 0–200)
CO2 SERPL-SCNC: 24 MMOL/L (ref 22–29)
CREAT BLDA-MCNC: 1.2 MG/DL (ref 0.6–1.3)
CREAT SERPL-MCNC: 1.11 MG/DL (ref 0.57–1)
DEPRECATED RDW RBC AUTO: 46.8 FL (ref 37–54)
EGFRCR SERPLBLD CKD-EPI 2021: 57 ML/MIN/1.73
ERYTHROCYTE [DISTWIDTH] IN BLOOD BY AUTOMATED COUNT: 13.5 % (ref 12.3–15.4)
GLOBULIN UR ELPH-MCNC: 2 GM/DL
GLUCOSE SERPL-MCNC: 117 MG/DL (ref 65–99)
HCT VFR BLD AUTO: 35.3 % (ref 34–46.6)
HDLC SERPL-MCNC: 51 MG/DL (ref 40–60)
HGB BLD-MCNC: 11.8 G/DL (ref 12–15.9)
LDLC SERPL CALC-MCNC: 101 MG/DL (ref 0–100)
LDLC/HDLC SERPL: 1.95 {RATIO}
MCH RBC QN AUTO: 31.6 PG (ref 26.6–33)
MCHC RBC AUTO-ENTMCNC: 33.4 G/DL (ref 31.5–35.7)
MCV RBC AUTO: 94.4 FL (ref 79–97)
PLATELET # BLD AUTO: 166 10*3/MM3 (ref 140–450)
PMV BLD AUTO: 10.7 FL (ref 6–12)
POTASSIUM SERPL-SCNC: 3.9 MMOL/L (ref 3.5–5.2)
PROT SERPL-MCNC: 5.7 G/DL (ref 6–8.5)
QT INTERVAL: 402 MS
QTC INTERVAL: 478 MS
RBC # BLD AUTO: 3.74 10*6/MM3 (ref 3.77–5.28)
RIGHT ARM BP: NORMAL MMHG
SODIUM SERPL-SCNC: 131 MMOL/L (ref 136–145)
TRIGL SERPL-MCNC: 98 MG/DL (ref 0–150)
VLDLC SERPL-MCNC: 18 MG/DL (ref 5–40)
WBC NRBC COR # BLD AUTO: 4.53 10*3/MM3 (ref 3.4–10.8)

## 2024-06-28 PROCEDURE — 92978 ENDOLUMINL IVUS OCT C 1ST: CPT | Performed by: INTERNAL MEDICINE

## 2024-06-28 PROCEDURE — 93005 ELECTROCARDIOGRAM TRACING: CPT | Performed by: INTERNAL MEDICINE

## 2024-06-28 PROCEDURE — 25010000002 MIDAZOLAM PER 1 MG: Performed by: INTERNAL MEDICINE

## 2024-06-28 PROCEDURE — 93880 EXTRACRANIAL BILAT STUDY: CPT

## 2024-06-28 PROCEDURE — 25010000002 HEPARIN (PORCINE) PER 1000 UNITS: Performed by: INTERNAL MEDICINE

## 2024-06-28 PROCEDURE — 80061 LIPID PANEL: CPT | Performed by: PHYSICIAN ASSISTANT

## 2024-06-28 PROCEDURE — 25010000002 NICARDIPINE 2.5 MG/ML SOLUTION: Performed by: INTERNAL MEDICINE

## 2024-06-28 PROCEDURE — 93458 L HRT ARTERY/VENTRICLE ANGIO: CPT | Performed by: INTERNAL MEDICINE

## 2024-06-28 PROCEDURE — 85027 COMPLETE CBC AUTOMATED: CPT | Performed by: PHYSICIAN ASSISTANT

## 2024-06-28 PROCEDURE — 25810000003 SODIUM CHLORIDE 0.9 % SOLUTION: Performed by: PHYSICIAN ASSISTANT

## 2024-06-28 PROCEDURE — 80053 COMPREHEN METABOLIC PANEL: CPT | Performed by: PHYSICIAN ASSISTANT

## 2024-06-28 PROCEDURE — 93880 EXTRACRANIAL BILAT STUDY: CPT | Performed by: INTERNAL MEDICINE

## 2024-06-28 PROCEDURE — 25810000003 SODIUM CHLORIDE 0.9 % SOLUTION: Performed by: INTERNAL MEDICINE

## 2024-06-28 PROCEDURE — 82565 ASSAY OF CREATININE: CPT

## 2024-06-28 PROCEDURE — 25010000002 FENTANYL CITRATE (PF) 50 MCG/ML SOLUTION: Performed by: INTERNAL MEDICINE

## 2024-06-28 PROCEDURE — C1769 GUIDE WIRE: HCPCS | Performed by: INTERNAL MEDICINE

## 2024-06-28 PROCEDURE — 25510000001 IOPAMIDOL PER 1 ML: Performed by: INTERNAL MEDICINE

## 2024-06-28 PROCEDURE — C1753 CATH, INTRAVAS ULTRASOUND: HCPCS | Performed by: INTERNAL MEDICINE

## 2024-06-28 PROCEDURE — C1894 INTRO/SHEATH, NON-LASER: HCPCS | Performed by: INTERNAL MEDICINE

## 2024-06-28 PROCEDURE — C1887 CATHETER, GUIDING: HCPCS | Performed by: INTERNAL MEDICINE

## 2024-06-28 PROCEDURE — 25010000002 LIDOCAINE 1 % SOLUTION: Performed by: INTERNAL MEDICINE

## 2024-06-28 RX ORDER — SODIUM CHLORIDE 9 MG/ML
1 INJECTION, SOLUTION INTRAVENOUS CONTINUOUS
Status: ACTIVE | OUTPATIENT
Start: 2024-06-28 | End: 2024-06-28

## 2024-06-28 RX ORDER — NITROGLYCERIN 400 UG/1
SPRAY ORAL
Status: DISCONTINUED | OUTPATIENT
Start: 2024-06-28 | End: 2024-06-28 | Stop reason: HOSPADM

## 2024-06-28 RX ORDER — NICARDIPINE HCL-0.9% SOD CHLOR 1 MG/10 ML
SYRINGE (ML) INTRAVENOUS
Status: DISCONTINUED | OUTPATIENT
Start: 2024-06-28 | End: 2024-06-28 | Stop reason: HOSPADM

## 2024-06-28 RX ORDER — FENTANYL CITRATE 50 UG/ML
INJECTION, SOLUTION INTRAMUSCULAR; INTRAVENOUS
Status: DISCONTINUED | OUTPATIENT
Start: 2024-06-28 | End: 2024-06-28 | Stop reason: HOSPADM

## 2024-06-28 RX ORDER — ACETAMINOPHEN 325 MG/1
650 TABLET ORAL EVERY 4 HOURS PRN
Status: DISCONTINUED | OUTPATIENT
Start: 2024-06-28 | End: 2024-06-28 | Stop reason: HOSPADM

## 2024-06-28 RX ORDER — NITROGLYCERIN 0.4 MG/1
0.4 TABLET SUBLINGUAL
Status: DISCONTINUED | OUTPATIENT
Start: 2024-06-28 | End: 2024-06-28 | Stop reason: HOSPADM

## 2024-06-28 RX ORDER — SODIUM CHLORIDE 0.9 % (FLUSH) 0.9 %
10 SYRINGE (ML) INJECTION AS NEEDED
Status: DISCONTINUED | OUTPATIENT
Start: 2024-06-28 | End: 2024-06-28 | Stop reason: HOSPADM

## 2024-06-28 RX ORDER — SODIUM CHLORIDE 0.9 % (FLUSH) 0.9 %
10 SYRINGE (ML) INJECTION EVERY 12 HOURS SCHEDULED
Status: DISCONTINUED | OUTPATIENT
Start: 2024-06-28 | End: 2024-06-28 | Stop reason: HOSPADM

## 2024-06-28 RX ORDER — ASPIRIN 81 MG/1
81 TABLET ORAL DAILY
Status: DISCONTINUED | OUTPATIENT
Start: 2024-06-29 | End: 2024-06-28 | Stop reason: HOSPADM

## 2024-06-28 RX ORDER — SODIUM CHLORIDE 9 MG/ML
40 INJECTION, SOLUTION INTRAVENOUS AS NEEDED
Status: DISCONTINUED | OUTPATIENT
Start: 2024-06-28 | End: 2024-06-28 | Stop reason: HOSPADM

## 2024-06-28 RX ORDER — ASPIRIN 81 MG/1
324 TABLET, CHEWABLE ORAL ONCE
Status: COMPLETED | OUTPATIENT
Start: 2024-06-28 | End: 2024-06-28

## 2024-06-28 RX ORDER — MIDAZOLAM HYDROCHLORIDE 1 MG/ML
INJECTION INTRAMUSCULAR; INTRAVENOUS
Status: DISCONTINUED | OUTPATIENT
Start: 2024-06-28 | End: 2024-06-28 | Stop reason: HOSPADM

## 2024-06-28 RX ORDER — HEPARIN SODIUM 1000 [USP'U]/ML
INJECTION, SOLUTION INTRAVENOUS; SUBCUTANEOUS
Status: DISCONTINUED | OUTPATIENT
Start: 2024-06-28 | End: 2024-06-28 | Stop reason: HOSPADM

## 2024-06-28 RX ORDER — LIDOCAINE HYDROCHLORIDE 10 MG/ML
INJECTION, SOLUTION INFILTRATION; PERINEURAL
Status: DISCONTINUED | OUTPATIENT
Start: 2024-06-28 | End: 2024-06-28 | Stop reason: HOSPADM

## 2024-06-28 RX ADMIN — ASPIRIN 324 MG: 81 TABLET, CHEWABLE ORAL at 07:31

## 2024-06-28 RX ADMIN — SODIUM CHLORIDE 165.9 ML: 900 INJECTION, SOLUTION INTRAVENOUS at 07:32

## 2024-06-28 NOTE — INTERVAL H&P NOTE
H&P reviewed. The patient was examined and there are no changes to the H&P.      The risks, benefits, and alternative options of cardiac catheterization were discussed with the patient.  The risks include death, MI, stroke, infection, vascular injury requiring surgical repair and/or blood transfusion, coronary dissection, renal dysfunction/failure, allergic reaction, emergent CABG.  If PCI is needed there is a 1-2% risk of emergent CABG.  The patient is agreeable for cardiac catheterization, possible PCI or CABG. Plan is to proceed with cardiac catheterization and possible PCI.     Martín Tobin MD, FACC, [facility]  Interventional Cardiology  06/28/24  07:53 EDT

## 2024-06-28 NOTE — Clinical Note
A 6 fr sheath was successfully inserted with ultrasound guidance into the right ulnar artery and right ulnar artery.

## 2024-06-28 NOTE — Clinical Note
Allergies reviewed.  H&P note has been confirmed for the patient. Procedural consent has been signed.  Staff has reviewed the patient's labs. ear pain and jaw pain

## 2024-06-28 NOTE — Clinical Note
Hemostasis started on the right ulnar artery. R-Band was used in achieving hemostasis. Radial compression device applied to vessel. Hemostasis achieved successfully.

## 2024-08-12 ENCOUNTER — DOCUMENTATION (OUTPATIENT)
Dept: CARDIAC REHAB | Facility: HOSPITAL | Age: 59
End: 2024-08-12
Payer: MEDICARE

## 2024-08-15 ENCOUNTER — DOCUMENTATION (OUTPATIENT)
Dept: NEUROSURGERY | Facility: CLINIC | Age: 59
End: 2024-08-15
Payer: MEDICARE

## 2024-08-15 DIAGNOSIS — I65.23 CAROTID STENOSIS, ASYMPTOMATIC, BILATERAL: Primary | ICD-10-CM

## 2024-08-15 NOTE — PROGRESS NOTES
I was able to follow-up with Ms. Mae today via telephone.  She has just recently been discharged from  after having a multivessel CABG, and quite understandably did not feel like coming in to the office.  She is well-known to the neurointerventional service, having undergone a right carotid stent in 2017.  She has been followed for asymptomatic left carotid disease (not hemodynamically significant).  She has had several carotid duplex examinations this year, and I was able to review these, and discussed the findings with her today via telephone (at her request).    She is understandably quite weak following her CABG, but gives no symptoms of a recent stroke or TIA.  Carotid duplex examinations from [facility] and  were reviewed examinations from [facility] and  were reviewed, along with her corresponding radiologic report, with the most recent being on 7/19/2024.  This is compared to a prior study from 1/20/2021.  Her right carotid stent is widely patent.  There is a mild degree of (nonhemodynamically significant) stenosis within the right internal carotid artery (distal to the stent), with peak velocities of 231/80 cm/s, and an ICA/CCA ratio of 2.6 (was 141/38 cm/s).  There is stable, nonhemodynamically significant (less than 70%) stenosis involving the left carotid vasculature with peak velocities of 269/101 cm/s, with an ICA/CCA ratio of 2.4 (was 285/72 cm/s).    I discussed these findings in detail with Ms. Mae, and again given her recent CABG, and asymptomatic nature, we will continue aggressive medical management.  She remains on a dual antiplatelet/statin regimen.  Her most recent hemoglobin A1c was 5.4.  She does continue to smoke, but is cut back substantially.    I plan on seeing her back in 12 months time with a carotid duplex, to ensure stability and exclude any progression of disease that might necessitate further treatment/intervention.  During the interim, she  will contact our office in/or call 911 if she experiences any stroke or TIA-like symptoms.

## 2024-08-21 ENCOUNTER — TELEPHONE (OUTPATIENT)
Dept: CARDIAC REHAB | Facility: HOSPITAL | Age: 59
End: 2024-08-21
Payer: MEDICARE

## 2024-08-21 NOTE — TELEPHONE ENCOUNTER
Staff spoke with pt regarding cardiac rehab referral. Pt stated that she isn't interested at this time due to her sons not having the time each week to bring her. She said that she would talk to them and if they are able to bring her and she does decide to attend that she will give us a call back to get scheduled.

## 2024-11-13 NOTE — PROGRESS NOTES
[facility] Cardiology  Follow Up Visit  Melania Mae  1965    VISIT DATE:  11/14/24    PCP:   Gillian Harrison, APRN  [address] [facility] [address]          CC:  Unstable angina      Problem List:  Diabetes  Tobacco use  Hypertension  Hyperlipidemia  Non-Hodgkin's lymphoma/CLL  Initial treatment 2011 for 6 months possible anthracycline   On Ibrutinib  Previous carotid stent (right)  PAD  Bilateral iliac stents October 2023  Superior mesenteric artery stent October 2023  CAD  Heart catheterization September 2023: RCA , LAD 20% stenosis, nondominant diffusely diseased circumflex, OM1 80% stenosis, small vessel  Medical management recommended  Echo September 2023: EF 61 to 65% mild MR, normal estimated PA pressure  Heart catheterization June 2024: Left main stenosis 65%, RCA , small nondominant circumflex  Bypass surgery [facility] August 2024.  CABG x 3 LIMA-LAD, SVG-OM1, SVG-PDA  Echo EF 60% with LVH  9.  KYMBERLY              80% ostial/proximal left renal artery stenosis status post angioplasty and stent placement with a 5.0 x 18 mm Herculink bare-metal stent.    Right renal artery ostial 40-50% stenosis with no significant gradient.    Widely patent superior mesenteric artery stent with 20% ISR.      History of Present Illness:  Melania Mae  Is a 59 y.o. female with pertinent cardiac history detailed above.  Patient is now status post bypass surgery x 3 at .  She states since the procedure she has felt intermittent episodes of palpitations.  There was no atrial fibrillation commented on during her inpatient stay.  She does state her angina is improved since bypass surgery.  Also states her blood pressure has been lower since the procedure.  No longer taking lisinopril.  Just on metoprolol.  She does continue to struggle with lower extremity edema.  She does have a normal EF and takes daily Lasix.  States it seems to flareup for a few days every couple  weeks.      Patient Active Problem List    Diagnosis Date Noted    Unstable angina 06/20/2024    Renal artery stenosis 02/22/2024     Note Last Updated: 3/6/2024     3/6/2024: Renal angiogram revealed significant 80% stenosis of the proximal left renal artery with a 60 mmHg pressure gradient across the stenosis, status post angioplasty and stent placement with a 5.0 x 18 mm Herculink bare-metal stent.  Ostial right renal artery 40-50% stenosis, without pressure gradient.      Coronary artery disease involving native coronary artery of native heart with unstable angina pectoris 09/20/2023     Note Last Updated: 6/28/2024 9/20/2023: LHC at [facility], proximal %  with mature left-to-right collaterals from the LAD, small diffusely diseased left circumflex, LAD mild-20% luminal irregularities.  LVEDP 23 mmHg, LVEF 55-60%  6/28/2024: LHC at [facility], ostial left main 65% stenosis by IVUS, with significant pressure waveform ventricularization with engagement of 5 Argentine catheter and 6 Argentine guide.  LAD supplies significant collaterals to the RCA , circumflex is small nondominant and diffusely diseased.  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 12/09/2019    Cutaneous abscess of abdominal wall 05/21/2018    CLL (chronic lymphocytic leukemia) 02/20/2018    Carotid stenosis, right, s/p stent 8/23/17 08/23/2017    Type 2 diabetes mellitus with complication, with long-term current use of insulin 08/23/2017    HTN (hypertension) 08/23/2017    HLD (hyperlipidemia) 08/23/2017    Cerebrovascular accident (CVA) 08/14/2017    Aneurysm 08/14/2017    CLL (chronic lymphocytic leukemia)  "08/04/2017    Encounter for consultation 07/06/2017     Note Last Updated: 7/6/2017     Referred for Vascular Abnormality         Allergies   Allergen Reactions    Codeine Shortness Of Breath    Rituxan [Rituximab] Shortness Of Breath and Other (See Comments)     \"THROAT RAWNESS; FELT LIKE MY THROAT WAS CLOSING UP\"    Methocarbamol Rash    Penicillins Other (See Comments)     \"UNKNOWN CHILDHOOD ALLERGY\"       Social History     Socioeconomic History    Marital status:      Spouse name: denies    Number of children: 3    Years of education: some college    Highest education level: Some college, no degree   Tobacco Use    Smoking status: Every Day     Current packs/day: 0.50     Average packs/day: 0.5 packs/day for 35.0 years (17.5 ttl pk-yrs)     Types: Cigarettes     Passive exposure: Current    Smokeless tobacco: Never   Vaping Use    Vaping status: Never Used   Substance and Sexual Activity    Alcohol use: Yes     Comment: once every year or two.    Drug use: Not Currently     Types: Marijuana    Sexual activity: Not Currently       Family History   Problem Relation Age of Onset    Hypertension Mother     Lung cancer Father 72    Hypertension Father     Heart disease Father     Cancer Father     Diabetes Father     Other Brother 46        MI    Heart disease Brother     Breast cancer Paternal Aunt     Colon cancer Maternal Grandfather     Other Cousin         CLL (dx 48 yo)       Current Medications:    Current Outpatient Medications:     albuterol (PROVENTIL HFA;VENTOLIN HFA) 108 (90 Base) MCG/ACT inhaler, Inhale 2 puffs As Needed for Wheezing., Disp: , Rfl:     aspirin 81 MG EC tablet, Take 1 tablet by mouth Daily., Disp: 30 tablet, Rfl: 5    clopidogrel (PLAVIX) 75 MG tablet, Take 1 tablet by mouth Daily., Disp: 90 tablet, Rfl: 3    furosemide (LASIX) 40 MG tablet, Take 1 tablet by mouth Daily., Disp: 30 tablet, Rfl: 11    gabapentin (NEURONTIN) 800 MG tablet, Take 1 tablet by mouth 4 (Four) Times a " Day., Disp: , Rfl:     ibrutinib (IMBRUVICA) 420 MG tablet tablet, Take 1 tablet by mouth Daily., Disp: 28 tablet, Rfl: 5    Ibuprofen (ADVIL PO), Take 400 mg by mouth 3 (Three) Times a Day. LIQUIGELS, Disp: , Rfl:     metoprolol tartrate (LOPRESSOR) 25 MG tablet, Take 1 tablet by mouth Every 12 (Twelve) Hours. (Patient taking differently: Take 1 tablet by mouth 2 (Two) Times a Day.), Disp: 120 tablet, Rfl: 5    naloxone (Narcan) 4 MG/0.1ML nasal spray, 2 Sprays per nasal for opioid emergency.  May repeat in 10 minutes and call for emergency response., Disp: , Rfl:     nitrofurantoin, macrocrystal-monohydrate, (MACROBID) 100 MG capsule, Take 1 capsule by mouth 2 (Two) Times a Day., Disp: , Rfl:     nitroglycerin (NITROSTAT) 0.4 MG SL tablet, Place 1 tablet under the tongue Every 5 (Five) Minutes As Needed for Chest Pain (Systolic BP Greater Than 100). Take no more than 3 doses in 15 minutes., Disp: 75 tablet, Rfl: 12    OXYCODONE-ACETAMINOPHEN PO, Take 15 mg by mouth 4 (Four) Times a Day., Disp: , Rfl:     Ozempic, 0.25 or 0.5 MG/DOSE, 2 MG/3ML solution pen-injector, Inject 0.25 mg under the skin into the appropriate area as directed 1 (One) Time Per Week., Disp: , Rfl:     potassium chloride (K-TAB) 20 MEQ tablet controlled-release ER tablet, Take 1 tablet by mouth Daily., Disp: 30 tablet, Rfl: 11    ranolazine (Ranexa) 500 MG 12 hr tablet, Take 1 tablet by mouth 2 (Two) Times a Day., Disp: 60 tablet, Rfl: 11    rosuvastatin (CRESTOR) 40 MG tablet, Take 1 tablet by mouth Every Night., Disp: 30 tablet, Rfl: 0    dapagliflozin Propanediol 10 MG tablet, Take 10 mg by mouth Daily., Disp: 30 tablet, Rfl: 11    oxyCODONE ER (oxyCONTIN) 20 MG 12 hr tablet, Take 1 tablet by mouth Every 12 (Twelve) Hours. (Patient not taking: Reported on 11/14/2024), Disp: , Rfl:      Review of Systems   Cardiovascular:  Positive for irregular heartbeat, leg swelling and palpitations. Negative for chest pain.       Vitals:    11/14/24 0943  "  BP: 122/76   BP Location: Left arm   Patient Position: Sitting   Pulse: 80   SpO2: 97%   Weight: 59.7 kg (131 lb 9.6 oz)   Height: 162.6 cm (64\")       Physical Exam  Constitutional:       Appearance: Normal appearance.   Neck:      Vascular: Carotid bruit (Bilateral) present.   Cardiovascular:      Rate and Rhythm: Normal rate.   Pulmonary:      Effort: Pulmonary effort is normal.      Breath sounds: Normal breath sounds.   Musculoskeletal:      Right lower leg: Edema present.      Left lower leg: Edema present.      Comments: 2+ lower extremity   Neurological:      Mental Status: She is alert.         Diagnostic Data:  Procedures  Lab Results   Component Value Date    TRIG 98 06/28/2024    HDL 51 06/28/2024     Lab Results   Component Value Date    GLUCOSE 155 (H) 07/31/2024    BUN 14 06/28/2024    CREATININE 1.20 06/28/2024     07/29/2024    K 3.9 07/31/2024     07/31/2024    CO2 24.0 06/28/2024     Lab Results   Component Value Date    HGBA1C 5.4 07/18/2024     Lab Results   Component Value Date    WBC 5.88 08/27/2024    HGB 10.6 (L) 08/27/2024    HCT 34.4 08/27/2024     08/27/2024       Assessment:   Diagnosis Plan   1. Palpitations  Holter Monitor - 72 Hour Up To 15 Days      2. Coronary artery disease involving native coronary artery of native heart with unstable angina pectoris  Lipid Panel    Comprehensive Metabolic Panel      3. Primary hypertension        4. Hyperlipidemia, unspecified hyperlipidemia type        5. Carotid stenosis, right, s/p stent 8/23/17        6. Acute on chronic heart failure with preserved ejection fraction (HFpEF)            Plan:    CAD status post CABG  Bypass surgery [facility] August 2024.  CABG x 3 LIMA-LAD, SVG-OM1, SVG-PDA  Echo EF 60% with LVH  On ASA, plavix,  crestor  Ranexa  BID  Angina improved  Previous carotid artery stenting  Angiogram August 2023: Patent right carotid stent, less than 50% left stenosis, no intracerebral ICA " aneurysm  Duplex July 2024: Right with patent stent and some ISR, stenosis greater than 70% left carotid stenosis  Dr. Ackerman has reviewed and continues to follow-up  PAD/mesenteric ischemia  [place] iliac stents October 2023 Dr. Alcantara  Superior mesenteric artery stent October 2023.  Patent on last angiogram  HTN  Status post right renal artery stenting March 2024.    Now off lisinopril  On metoprolol only  HLD  Lipids from July 2024: Total cholesterol 200, HDL 52,  3  Repeat lipids at  [place], Discussed additional medication possibly PCSK9 inhibitor if LDL above goal  CLL  Ibrutinib  No Holter monitors available today.  Will plan to set her up with 1 when she comes for heart catheterization to screen for atrial fibrillation.  Lower extremity edema            Lasix 40 mg daily  Normal EF on last echocardiogram  Farxiga 10 mg daily.  Counseled on side effects  7.  Palpitations: No postop A-fib documented.  Will place 2-week monitor today for evaluation    ACP discussion was held with the patient during this visit. Patient does not have an advance directive, declines further assistance.      Ned Flores MD [facility]

## 2024-11-14 ENCOUNTER — OFFICE VISIT (OUTPATIENT)
Dept: CARDIOLOGY | Facility: CLINIC | Age: 59
End: 2024-11-14
Payer: MEDICARE

## 2024-11-14 VITALS
DIASTOLIC BLOOD PRESSURE: 76 MMHG | HEIGHT: 64 IN | HEART RATE: 80 BPM | BODY MASS INDEX: 22.47 KG/M2 | OXYGEN SATURATION: 97 % | WEIGHT: 131.6 LBS | SYSTOLIC BLOOD PRESSURE: 122 MMHG

## 2024-11-14 DIAGNOSIS — I10 PRIMARY HYPERTENSION: ICD-10-CM

## 2024-11-14 DIAGNOSIS — E78.5 HYPERLIPIDEMIA, UNSPECIFIED HYPERLIPIDEMIA TYPE: ICD-10-CM

## 2024-11-14 DIAGNOSIS — R00.2 PALPITATIONS: Primary | ICD-10-CM

## 2024-11-14 DIAGNOSIS — I50.33 ACUTE ON CHRONIC HEART FAILURE WITH PRESERVED EJECTION FRACTION (HFPEF): ICD-10-CM

## 2024-11-14 DIAGNOSIS — I65.21 CAROTID STENOSIS, RIGHT: ICD-10-CM

## 2024-11-14 DIAGNOSIS — I25.110 CORONARY ARTERY DISEASE INVOLVING NATIVE CORONARY ARTERY OF NATIVE HEART WITH UNSTABLE ANGINA PECTORIS: ICD-10-CM

## 2024-11-14 RX ORDER — DAPAGLIFLOZIN 10 MG/1
10 TABLET, FILM COATED ORAL DAILY
Qty: 30 TABLET | Refills: 11 | Status: SHIPPED | OUTPATIENT
Start: 2024-11-14

## 2024-12-09 ENCOUNTER — TRANSCRIBE ORDERS (OUTPATIENT)
Dept: ADMINISTRATIVE | Facility: HOSPITAL | Age: 59
End: 2024-12-09
Payer: MEDICARE

## 2024-12-09 DIAGNOSIS — C91.10 CLL (CHRONIC LYMPHOCYTIC LEUKEMIA): Primary | ICD-10-CM

## 2024-12-16 ENCOUNTER — TELEPHONE (OUTPATIENT)
Dept: CARDIOLOGY | Facility: CLINIC | Age: 59
End: 2024-12-16
Payer: MEDICARE

## 2024-12-16 NOTE — TELEPHONE ENCOUNTER
Looks like we do not have the results back yet.  Will make yesenia she gets an update as soon as I have them.

## 2024-12-16 NOTE — TELEPHONE ENCOUNTER
Patient called and left VM asking is the results from her Holter monitor were ready.     Called patient and let her know I will check with NSK and see if he has had a chance to look at them. Patient verbalizes understanding and is agreeable.     Jacqui Billingsley RN for Ned Flores MD

## 2024-12-26 ENCOUNTER — TELEPHONE (OUTPATIENT)
Dept: CARDIOLOGY | Facility: CLINIC | Age: 59
End: 2024-12-26
Payer: MEDICARE

## 2024-12-26 NOTE — TELEPHONE ENCOUNTER
Called patient and let her know Dr. Flores reviewed her Holter monitor results and everything looks normal. Per Dr. Flores No abnormal heart rhythms- all good news from his standpoint.     Patient verbalizes understanding. No further questions.

## 2025-01-13 ENCOUNTER — HOSPITAL ENCOUNTER (OUTPATIENT)
Dept: CT IMAGING | Facility: HOSPITAL | Age: 60
Discharge: HOME | End: 2025-01-13
Payer: MEDICAID

## 2025-01-13 DIAGNOSIS — C91.10 CLL (CHRONIC LYMPHOCYTIC LEUKEMIA): ICD-10-CM

## 2025-01-13 LAB — CREAT BLDA-MCNC: 1.6 MG/DL (ref 0.6–1.3)

## 2025-01-13 PROCEDURE — 70490 CT SOFT TISSUE NECK W/O DYE: CPT

## 2025-01-13 PROCEDURE — 71250 CT THORAX DX C-: CPT

## 2025-01-13 PROCEDURE — 74176 CT ABD & PELVIS W/O CONTRAST: CPT | Performed by: RADIOLOGY

## 2025-01-13 PROCEDURE — 74176 CT ABD & PELVIS W/O CONTRAST: CPT

## 2025-01-13 PROCEDURE — 82565 ASSAY OF CREATININE: CPT

## 2025-01-13 PROCEDURE — 70490 CT SOFT TISSUE NECK W/O DYE: CPT | Performed by: RADIOLOGY

## 2025-01-14 PROCEDURE — 71250 CT THORAX DX C-: CPT | Performed by: RADIOLOGY

## 2025-02-27 ENCOUNTER — TRANSCRIBE ORDERS (OUTPATIENT)
Dept: WOUND CARE | Facility: HOSPITAL | Age: 60
End: 2025-02-27
Payer: MEDICARE

## 2025-02-27 DIAGNOSIS — L97.521 NON-PRESSURE CHRONIC ULCER OF OTHER PART OF LEFT FOOT LIMITED TO BREAKDOWN OF SKIN: Primary | ICD-10-CM

## 2025-02-28 DIAGNOSIS — I65.21 CAROTID STENOSIS, RIGHT: ICD-10-CM

## 2025-02-28 RX ORDER — POTASSIUM CHLORIDE 1500 MG/1
20 TABLET, EXTENDED RELEASE ORAL DAILY
Qty: 30 TABLET | Refills: 5 | Status: SHIPPED | OUTPATIENT
Start: 2025-02-28

## 2025-02-28 RX ORDER — LISINOPRIL 20 MG/1
20 TABLET ORAL EVERY MORNING
Qty: 30 TABLET | Refills: 0 | OUTPATIENT
Start: 2025-02-28

## 2025-02-28 RX ORDER — CLOPIDOGREL BISULFATE 75 MG/1
TABLET ORAL
Qty: 30 TABLET | Refills: 5 | Status: SHIPPED | OUTPATIENT
Start: 2025-02-28

## 2025-02-28 RX ORDER — FUROSEMIDE 40 MG/1
TABLET ORAL
Qty: 30 TABLET | Refills: 5 | Status: SHIPPED | OUTPATIENT
Start: 2025-02-28

## 2025-02-28 NOTE — TELEPHONE ENCOUNTER
Last appt 11/14/24. Next appt 4/17/25. Last labs 9/11/24-were faxed from pcp office.    Lisinopril was d/c at 11/14/24 OV.    Refilled meds except Lisinopril.

## 2025-03-19 ENCOUNTER — TRANSCRIBE ORDERS (OUTPATIENT)
Dept: ADMINISTRATIVE | Facility: HOSPITAL | Age: 60
End: 2025-03-19
Payer: MEDICARE

## 2025-03-19 DIAGNOSIS — C91.12 CHRONIC LYMPHOCYTIC LEUKEMIA OF B-CELL TYPE IN RELAPSE: Primary | ICD-10-CM

## 2025-03-31 NOTE — PROGRESS NOTES
Renetta Sara Celina called to request a refill on her medication.      Last office visit : 1/23/2025   Next office visit : Visit date not found     Requested Prescriptions     Pending Prescriptions Disp Refills    metoprolol succinate (TOPROL XL) 25 MG extended release tablet [Pharmacy Med Name: METOPROLOL SUCCINATE ER 25MG TB24] 90 tablet 1     Sig: TAKE ONE TABLET ONCE A DAY            Octavia Mckinney MA   "NAME: KALI STILL   DOS: 2018  : 1965  PCP: Gillian Harrison APRN    Chief Complaint:    Chief Complaint   Patient presents with   • Carotid Artery Disease     s/p Right ICA stent 17       History of Present Illness:  53 y.o. female known to the neuro interventional service, having recently undergone right carotid angioplasty/stent placement on 2017 for asymptomatic, high-grade carotid stenosis.  Her past medical history is notable for prior multilevel ACDF/neck surgery, as well as leukemia, and thus she was deemed a \"high surgical risk\" for endarterectomy.  She presented on 2017 and underwent diagnostic angiography, confirming the presence of a high-grade (greater than 80%) right carotid stenosis.  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  Abdominal aortogram demonstrated a \"small aorta\" syndrome with diffuse narrowing of the distal aorta and iliofemoral vessels, but without focal lesion. She underwent angioplasty/stent placement for her right carotid stenosis, restoring a near normal caliber lumen and resulting in improved perfusion to the right cerebral hemisphere.  She made an unremarkable recovery in the intensive care unit and was discharged home on 2017 at her neurologic baseline.     She presents today for follow-up of her carotid artery disease.  While she denies any focal neurologic symptoms concerning for recurrent stroke or TIA, specifically no unilateral weakness/numbness, no speech or vision changes; she does complain of some chest tightness and chest pain over the past couple of weeks.  She is still on a dual antiplatelet regimen, but is also still smoking.    Past Medical History:  Past Medical History:   Diagnosis Date   • Aneurysm (CMS/HCC)     Cavernous left ICA aneurysm   • Asthma    • Carotid artery occlusion    • Chronic back pain    • CLL (chronic lymphocytic " leukemia) (CMS/[facility])     OCTOBER OF 2012   • COPD (chronic obstructive pulmonary disease) (CMS/[facility])    • Degenerative arthritis    • Depression    • Diabetes mellitus (CMS/[facility])     type 2   • Hyperlipidemia    • Hypertension    • Lymphadenopathy    • Non Hodgkin's lymphoma (CMS/[facility])        Past Surgical History:  Past Surgical History:   Procedure Laterality Date   • APPENDECTOMY     • BACK SURGERY      2002 (work accident) c-spine surgery 4/14   • CAROTID STENT Right 08/23/2017   • CEREBRAL ANGIOGRAM N/A 8/23/2017    Diagnostic Carotid/Cerebral Angiogram   • CHOLECYSTECTOMY     • HYSTERECTOMY      for endometriosis/ovarian bleed   • NECK SURGERY     • OTHER SURGICAL HISTORY      pac insertion and removal   • TONSILLECTOMY         Review of Systems:        Review of Systems   Constitutional: Positive for activity change and fatigue. Negative for appetite change, chills, diaphoresis, fever and unexpected weight change.   HENT: Negative for congestion, dental problem, drooling, ear discharge, ear pain, facial swelling, hearing loss, mouth sores, nosebleeds, postnasal drip, rhinorrhea, sinus pressure, sneezing, sore throat, tinnitus, trouble swallowing and voice change.    Eyes: Negative for photophobia, pain, discharge, redness, itching and visual disturbance.   Respiratory: Negative for apnea, cough, choking, chest tightness, shortness of breath, wheezing and stridor.    Cardiovascular: Positive for chest pain and palpitations. Negative for leg swelling.   Gastrointestinal: Negative for abdominal distention, abdominal pain, anal bleeding, blood in stool, constipation, diarrhea, nausea, rectal pain and vomiting.   Endocrine: Positive for polydipsia and polyuria. Negative for cold intolerance, heat intolerance and polyphagia.   Genitourinary: Positive for dyspareunia and vaginal pain. Negative for decreased urine volume, difficulty urinating, dysuria, enuresis, flank pain, frequency, genital sores, hematuria and  urgency.   Musculoskeletal: Positive for arthralgias, back pain, neck pain and neck stiffness. Negative for gait problem, joint swelling and myalgias.   Skin: Negative for color change, pallor, rash and wound.   Allergic/Immunologic: Positive for environmental allergies. Negative for food allergies and immunocompromised state.   Neurological: Positive for tremors, weakness, numbness and headaches. Negative for dizziness, seizures, syncope, facial asymmetry, speech difficulty and light-headedness.   Hematological: Positive for adenopathy. Bruises/bleeds easily.   Psychiatric/Behavioral: Positive for decreased concentration and sleep disturbance. Negative for agitation, behavioral problems, confusion, dysphoric mood, hallucinations, self-injury and suicidal ideas. The patient is nervous/anxious. The patient is not hyperactive.         Medications    Current Outpatient Prescriptions:   •  albuterol (PROVENTIL HFA;VENTOLIN HFA) 108 (90 Base) MCG/ACT inhaler, Inhale 2 puffs As Needed for Wheezing., Disp: , Rfl:   •  ALPRAZolam (XANAX) 0.5 MG tablet, Take 0.5 mg by mouth 3 (Three) Times a Day As Needed for Anxiety., Disp: , Rfl:   •  aspirin 81 MG EC tablet, Take 1 tablet by mouth Daily., Disp: 30 tablet, Rfl: 5  •  azithromycin (ZITHROMAX) 250 MG tablet, Take 2 tablets the first day, then 1 tablet daily for 4 days., Disp: 6 tablet, Rfl: 0  •  B-D UF III MINI PEN NEEDLES 31G X 5 MM misc, , Disp: , Rfl:   •  Brexpiprazole (REXULTI) 1 MG tablet, Take  by mouth Daily., Disp: , Rfl:   •  clopidogrel (PLAVIX) 75 MG tablet, Take 1 tablet by mouth Daily., Disp: 30 tablet, Rfl: 5  •  fluconazole (DIFLUCAN) 100 MG tablet, Take 2 tabs on day 1 then 1 tab for days 2-5, then stop, Disp: 6 tablet, Rfl: 0  •  fluticasone-salmeterol (ADVAIR) 100-50 MCG/DOSE DISKUS, Inhale 1 puff As Needed., Disp: , Rfl:   •  gabapentin (NEURONTIN) 800 MG tablet, Take 800 mg by mouth 4 (Four) Times a Day., Disp: , Rfl:   •  ibrutinib (IMBRUVICA) 420 MG  "tablet tablet, Take 1 tablet by mouth Daily., Disp: 28 tablet, Rfl: 5  •  Ibuprofen (ADVIL PO), Take 400 mg by mouth 3 (Three) Times a Day. LIQUIGELS, Disp: , Rfl:   •  Insulin Aspart (NOVOLOG FLEXPEN SC), Inject 8-12 Units under the skin 3 (Three) Times a Day Before Meals. PER SLIDING SCALE, Disp: , Rfl:   •  Insulin Detemir (LEVEMIR FLEXPEN SC), Inject 8-12 Units under the skin Every Night. SLIDING SCALE, Disp: , Rfl:   •  levETIRAcetam (KEPPRA) 1000 MG tablet, Take 1,000 mg by mouth 2 (Two) Times a Day., Disp: , Rfl:   •  lisinopril (PRINIVIL,ZESTRIL) 20 MG tablet, Take 20 mg by mouth Every Morning., Disp: , Rfl:   •  loratadine (CLARITIN) 10 MG tablet, Take 10 mg by mouth Every Night., Disp: , Rfl:   •  nicotine (NICODERM CQ) 14 MG/24HR patch, Place 1 patch on the skin Daily., Disp: 21 patch, Rfl: 1  •  ondansetron (ZOFRAN) 8 MG tablet, Take 1 tablet by mouth Every 8 (Eight) Hours As Needed for Nausea or Vomiting., Disp: 90 tablet, Rfl: 6  •  oxyCODONE-acetaminophen (PERCOCET)  MG per tablet, Take 1 tablet by mouth Every 6 (Six) Hours As Needed for Moderate Pain ., Disp: , Rfl:   •  prochlorperazine (COMPAZINE) 10 MG tablet, Take 1 tablet by mouth Every 6 (Six) Hours As Needed for Nausea or Vomiting., Disp: 60 tablet, Rfl: 3  •  sertraline (ZOLOFT) 100 MG tablet, Take 100 mg by mouth Every Night., Disp: , Rfl:   •  sulfamethoxazole-trimethoprim (BACTRIM) 400-80 MG tablet, Take 1 tablet by mouth Daily., Disp: 30 tablet, Rfl: 3  •  valACYclovir (VALTREX) 500 MG tablet, Take 1 tablet by mouth Daily., Disp: 30 tablet, Rfl: 3  No current facility-administered medications for this visit.     Allergies:  Allergies   Allergen Reactions   • Codeine Other (See Comments)     \"UNKNOW  CHILDHOOD REACTION\"   • Penicillins Other (See Comments)     \"UNKNOWN CHILDHOOD ALLERGY\"   • Rituxan [Rituximab] Other (See Comments)     \"THROAT RAWNESS; FELT LIKE MY THROAT WAS CLOSING UP\"       Social Hx:  Social History   Substance " Use Topics   • Smoking status: Current Every Day Smoker     Packs/day: 0.50     Years: 35.00     Types: Cigarettes   • Smokeless tobacco: Never Used      Comment: 0.5-1 PPD   • Alcohol use No       Family Hx:  Family History   Problem Relation Age of Onset   • Lung cancer Father 72   • Hypertension Father    • Heart disease Father    • Cancer Father    • Diabetes Father    • Other Brother 46        MI   • Heart disease Brother    • Breast cancer Paternal Aunt    • Colon cancer Maternal Grandfather    • Other Cousin         CLL (dx 48 yo)   • Hypertension Mother        Review of Imaging:  Rotted duplex and CT angiogram of the head dated 9/24/2018 were reviewed along with their corresponding radiologic reports.  Comparison is made to prior catheter angiogram dated 8/23/2017 and prior carotid duplex dated 7/13/2017.    Her right carotid stent remains widely patent, without recurrent hemodynamically significant stenosis.  Peak velocities within the right carotid vasculature are 216/70 cm/s, with an ICA/CCA ratio of 1.7 (previously was 542/193, ratio 5.0).  There has been some progression of disease involving her left carotid vasculature, with now peak velocities of 302/123 cm/s, with an ICA/CCA ratio of 1.8 (was 273/89 cm/s, ratio 1.5).    The CT Angiogram of the head demonstrates a stable appearance of a 6 mm cavernous segment left internal carotid cerebral aneurysm.  There is no large vessel occlusion or significant intracranial stenosis.    Physical Examination:  Vitals:    09/24/18 1356   BP: 140/80   Temp: 98 °F (36.7 °C)        General Appearance:   Well developed, well nourished, well groomed, alert, and cooperative.  Cardiovascular: Regular rate and rhythm.  Bilateral carotid bruits (right greater than left).    Neurological examination:  Neurologic Exam     Mental Status   Oriented to person, place, and time.   Speech: speech is normal   Level of consciousness: alert    Cranial Nerves   Cranial nerves II  through XII intact.     Motor Exam     Strength   Strength 5/5 throughout.     Sensory Exam   Light touch normal.     Gait, Coordination, and Reflexes     Gait  Gait: normal      Diagnoses/Plan:    Ms. Mae is a 53 y.o. female status post right carotid angioplasty/stent placement, for asymptomatic high-grade stenosis in 2017.  She's done well from a neurologic standpoint, with no focal deficits or complicating feature.   carotid duplex today demonstrates widely patent right carotid stent, without recurrent hemodynamically significant stenosis.  She does demonstrate some probable progression of disease involving her left carotid vasculature, and I will plan on seeing her back in 3 months time with carotid duplex to assess for any further progression that might necessitate further treatment/intervention.  She does have advanced peripheral vascular disease, as well as nonhemodynamically significant left carotid disease, and thus I think she would benefit from continued dual antiplatelet regimen.     The importance of smoking cessation was discussed at length with the patient/patient's family.  Specifically, the risk of progression and recurrence of cerebrovascular disease (i.e. stroke, aneurysm) despite medical therapy/intervention with continued smoking. The patient/patient's family expressed an understanding of this risk.    In regards to her chest pain and shortness of breath, she does appear to be an quite a bit of distress in clinic today, and given her advanced peripheral vascular disease/carotid disease, combined with ongoing tobacco use, she is at high risk for having coronary artery disease as well.  I think it would be prudent for her to go to the emergency department for further evaluation, and she is agreeable to this.  Please note that she is able to undergo any and all procedures/interventions deemed necessary from a cerebrovascular standpoint.

## 2025-05-01 ENCOUNTER — OFFICE VISIT (OUTPATIENT)
Dept: CARDIOLOGY | Facility: CLINIC | Age: 60
End: 2025-05-01
Payer: MEDICARE

## 2025-05-01 ENCOUNTER — TRANSCRIBE ORDERS (OUTPATIENT)
Dept: CARDIOLOGY | Facility: HOSPITAL | Age: 60
End: 2025-05-01
Payer: MEDICARE

## 2025-05-01 VITALS
SYSTOLIC BLOOD PRESSURE: 118 MMHG | HEART RATE: 82 BPM | OXYGEN SATURATION: 98 % | HEIGHT: 64 IN | WEIGHT: 123 LBS | DIASTOLIC BLOOD PRESSURE: 56 MMHG | BODY MASS INDEX: 21 KG/M2

## 2025-05-01 DIAGNOSIS — R60.0 EDEMA OF RIGHT LOWER LEG: Primary | ICD-10-CM

## 2025-05-01 DIAGNOSIS — I25.110 CORONARY ARTERY DISEASE INVOLVING NATIVE CORONARY ARTERY OF NATIVE HEART WITH UNSTABLE ANGINA PECTORIS: ICD-10-CM

## 2025-05-01 DIAGNOSIS — I65.21 CAROTID STENOSIS, RIGHT: ICD-10-CM

## 2025-05-01 DIAGNOSIS — I10 PRIMARY HYPERTENSION: ICD-10-CM

## 2025-05-01 DIAGNOSIS — E78.5 HYPERLIPIDEMIA, UNSPECIFIED HYPERLIPIDEMIA TYPE: ICD-10-CM

## 2025-05-01 PROCEDURE — 99214 OFFICE O/P EST MOD 30 MIN: CPT | Performed by: INTERNAL MEDICINE

## 2025-05-01 PROCEDURE — 3074F SYST BP LT 130 MM HG: CPT | Performed by: INTERNAL MEDICINE

## 2025-05-01 PROCEDURE — 3078F DIAST BP <80 MM HG: CPT | Performed by: INTERNAL MEDICINE

## 2025-05-01 RX ORDER — METOPROLOL TARTRATE 25 MG/1
25 TABLET, FILM COATED ORAL 2 TIMES DAILY
Qty: 60 TABLET | Refills: 11 | Status: SHIPPED | OUTPATIENT
Start: 2025-05-01 | End: 2025-05-01

## 2025-05-01 RX ORDER — VENETOCLAX 100 MG/1
100 TABLET, FILM COATED ORAL DAILY
COMMUNITY
Start: 2025-02-26

## 2025-05-01 RX ORDER — METOPROLOL TARTRATE 25 MG/1
12.5 TABLET, FILM COATED ORAL 2 TIMES DAILY
Qty: 60 TABLET | Refills: 11 | Status: SHIPPED | OUTPATIENT
Start: 2025-05-01

## 2025-05-01 RX ORDER — ALPRAZOLAM 1 MG/1
1 TABLET ORAL NIGHTLY PRN
COMMUNITY
Start: 2025-04-09

## 2025-05-01 RX ORDER — SULFAMETHOXAZOLE AND TRIMETHOPRIM 800; 160 MG/1; MG/1
2 TABLET ORAL 2 TIMES DAILY
COMMUNITY
Start: 2025-04-30

## 2025-05-01 NOTE — PROGRESS NOTES
[facility] Cardiology  Follow Up Visit  Melania Mae  1965    VISIT DATE:  05/01/25    PCP:   Claudia Celeste, APRN  [address] [place] [address] [place] [id number]          CC:  No chief complaint on file.      Problem List:  Diabetes  Tobacco use  Hypertension  Hyperlipidemia  Non-Hodgkin's lymphoma/CLL  Initial treatment 2011 for 6 months possible anthracycline   On Ibrutinib  Previous carotid stent (right)  PAD  Bilateral iliac stents October 2023  Superior mesenteric artery stent October 2023  CAD  Heart catheterization September 2023: RCA , LAD 20% stenosis, nondominant diffusely diseased circumflex, OM1 80% stenosis, small vessel  Medical management recommended  Echo September 2023: EF 61 to 65% mild MR, normal estimated PA pressure  Heart catheterization June 2024: Left main stenosis 65%, RCA , small nondominant circumflex  Bypass surgery [facility] August 2024.  CABG x 3 LIMA-LAD, SVG-OM1, SVG-PDA  Echo EF 60% with LVH  9.  KYMBERLY              80% ostial/proximal left renal artery stenosis status post angioplasty and stent placement with a 5.0 x 18 mm Herculink bare-metal stent.    Right renal artery ostial 40-50% stenosis with no significant gradient.    Widely patent superior mesenteric artery stent with 20% ISR.          History of Present Illness:  Melania Mae  Is a 60 y.o. female with pertinent cardiac history detailed above.  She is status post CABG.  CP improved.  RLE has been swollen and she has been treated for antibiotics over the last month.  It is not improving significantly.  She does have a history of PAD but she has a good 2+ palpable pulse.  She has no left lower extremity edema.  She has not had any duplex to check for clot.  BP is normal.  She has routine scheduled follow-up for her carotid duplex planned.  She is normotensive.  She is due for repeat lipids which we discussed    Discussed trying to arrange for a duplex of the lower  extremities in [place] today or tomorrow      Patient Active Problem List    Diagnosis Date Noted    Unstable angina 06/20/2024    Renal artery stenosis 02/22/2024     Note Last Updated: 3/6/2024     3/6/2024: Renal angiogram revealed significant 80% stenosis of the proximal left renal artery with a 60 mmHg pressure gradient across the stenosis, status post angioplasty and stent placement with a 5.0 x 18 mm Herculink bare-metal stent.  Ostial right renal artery 40-50% stenosis, without pressure gradient.      Coronary artery disease involving native coronary artery of native heart with unstable angina pectoris 09/20/2023     Note Last Updated: 6/28/2024 9/20/2023: LHC at [facility], proximal %  with mature left-to-right collaterals from the LAD, small diffusely diseased left circumflex, LAD mild-20% luminal irregularities.  LVEDP 23 mmHg, LVEF 55-60%  6/28/2024: LHC at [facility], ostial left main 65% stenosis by IVUS, with significant pressure waveform ventricularization with engagement of 5 [place]ian catheter and 6 [place]ian guide.  LAD supplies significant collaterals to the RCA , circumflex is small nondominant and diffusely diseased.  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 12/09/2019    Cutaneous abscess of abdominal wall 05/21/2018    CLL (chronic lymphocytic leukemia) 02/20/2018    Carotid stenosis, right, s/p stent 8/23/17 08/23/2017    Type 2 diabetes mellitus with complication, with long-term current use of insulin 08/23/2017    HTN (hypertension) 08/23/2017    HLD (hyperlipidemia) 08/23/2017    Cerebrovascular accident (CVA) 08/14/2017    Aneurysm 08/14/2017    CLL  "(chronic lymphocytic leukemia) 08/04/2017    Encounter for consultation 07/06/2017     Note Last Updated: 7/6/2017     Referred for Vascular Abnormality         Allergies   Allergen Reactions    Codeine Shortness Of Breath    Rituxan [Rituximab] Shortness Of Breath and Other (See Comments)     \"THROAT RAWNESS; FELT LIKE MY THROAT WAS CLOSING UP\"    Methocarbamol Rash    Penicillins Other (See Comments)     \"UNKNOWN CHILDHOOD ALLERGY\"       Social History     Socioeconomic History    Marital status:      Spouse name: denies    Number of children: 3    Years of education: some college    Highest education level: Some college, no degree   Tobacco Use    Smoking status: Every Day     Current packs/day: 0.50     Average packs/day: 0.5 packs/day for 35.0 years (17.5 ttl pk-yrs)     Types: Cigarettes     Passive exposure: Current    Smokeless tobacco: Never   Vaping Use    Vaping status: Never Used   Substance and Sexual Activity    Alcohol use: Yes     Comment: once every year or two.    Drug use: Not Currently     Types: Marijuana    Sexual activity: Not Currently       Family History   Problem Relation Age of Onset    Hypertension Mother     Lung cancer Father 72    Hypertension Father     Heart disease Father     Cancer Father     Diabetes Father     Other Brother 46        MI    Heart disease Brother     Breast cancer Paternal Aunt     Colon cancer Maternal Grandfather     Other Cousin         CLL (dx 46 yo)       Current Medications:    Current Outpatient Medications:     albuterol (PROVENTIL HFA;VENTOLIN HFA) 108 (90 Base) MCG/ACT inhaler, Inhale 2 puffs As Needed for Wheezing., Disp: , Rfl:     aspirin 81 MG EC tablet, Take 1 tablet by mouth Daily., Disp: 30 tablet, Rfl: 5    clopidogrel (PLAVIX) 75 MG tablet, take 1 tablet by mouth every day for circulation, Disp: 30 tablet, Rfl: 5    dapagliflozin Propanediol 10 MG tablet, Take 10 mg by mouth Daily., Disp: 30 tablet, Rfl: 11    furosemide (LASIX) 40 MG " tablet, take 1 tablet by mouth every day for fluid, Disp: 30 tablet, Rfl: 5    gabapentin (NEURONTIN) 800 MG tablet, Take 1 tablet by mouth 4 (Four) Times a Day., Disp: , Rfl:     ibrutinib (IMBRUVICA) 420 MG tablet tablet, Take 1 tablet by mouth Daily., Disp: 28 tablet, Rfl: 5    Ibuprofen (ADVIL PO), Take 400 mg by mouth 3 (Three) Times a Day. LIQUIGELS, Disp: , Rfl:     metoprolol tartrate (LOPRESSOR) 25 MG tablet, Take 1 tablet by mouth Every 12 (Twelve) Hours. (Patient taking differently: Take 1 tablet by mouth 2 (Two) Times a Day.), Disp: 120 tablet, Rfl: 5    naloxone (Narcan) 4 MG/0.1ML nasal spray, 2 Sprays per nasal for opioid emergency.  May repeat in 10 minutes and call for emergency response., Disp: , Rfl:     nitrofurantoin, macrocrystal-monohydrate, (MACROBID) 100 MG capsule, Take 1 capsule by mouth 2 (Two) Times a Day., Disp: , Rfl:     nitroglycerin (NITROSTAT) 0.4 MG SL tablet, Place 1 tablet under the tongue Every 5 (Five) Minutes As Needed for Chest Pain (Systolic BP Greater Than 100). Take no more than 3 doses in 15 minutes., Disp: 75 tablet, Rfl: 12    oxyCODONE ER (oxyCONTIN) 20 MG 12 hr tablet, Take 1 tablet by mouth Every 12 (Twelve) Hours. (Patient not taking: Reported on 11/14/2024), Disp: , Rfl:     OXYCODONE-ACETAMINOPHEN PO, Take 15 mg by mouth 4 (Four) Times a Day., Disp: , Rfl:     Ozempic, 0.25 or 0.5 MG/DOSE, 2 MG/3ML solution pen-injector, Inject 0.25 mg under the skin into the appropriate area as directed 1 (One) Time Per Week., Disp: , Rfl:     potassium chloride (K-TAB) 20 MEQ tablet controlled-release ER tablet, Take 1 tablet by mouth Daily., Disp: 30 tablet, Rfl: 11    potassium chloride (KLOR-CON M20) 20 MEQ CR tablet, take 1 tablet by mouth every day, Disp: 30 tablet, Rfl: 5    ranolazine (Ranexa) 500 MG 12 hr tablet, Take 1 tablet by mouth 2 (Two) Times a Day., Disp: 60 tablet, Rfl: 11    rosuvastatin (CRESTOR) 40 MG tablet, Take 1 tablet by mouth Every Night., Disp: 30  tablet, Rfl: 0     Review of Systems   Cardiovascular:  Negative for chest pain and dyspnea on exertion.   Respiratory:  Negative for shortness of breath.    Skin:  Positive for poor wound healing.       There were no vitals filed for this visit.    Physical Exam  Constitutional:       Appearance: Normal appearance.   Neck:      Vascular: Carotid bruit (Bilateral) present.   Cardiovascular:      Rate and Rhythm: Normal rate and regular rhythm.      Pulses: Normal pulses.   Pulmonary:      Effort: Pulmonary effort is normal.   Musculoskeletal:      Right lower leg: Edema (2+ lower extremity edema on the right) present.   Neurological:      Mental Status: She is alert.         Diagnostic Data:  Procedures  Lab Results   Component Value Date    TRIG 98 06/28/2024    HDL 51 06/28/2024     Lab Results   Component Value Date    GLUCOSE 155 (H) 07/31/2024    BUN 14 06/28/2024    CREATININE 1.60 (H) 01/13/2025     07/29/2024    K 3.9 07/31/2024     07/31/2024    CO2 24.0 06/28/2024     Lab Results   Component Value Date    HGBA1C 5.4 07/18/2024     Lab Results   Component Value Date    WBC 5.88 08/27/2024    HGB 10.6 (L) 08/27/2024    HCT 34.4 08/27/2024     08/27/2024       Assessment:  No diagnosis found.    Plan:    CAD status post CABG  Bypass surgery [facility] August 2024.  CABG x 3 LIMA-LAD, SVG-OM1, SVG-PDA  Echo EF 60% with LVH  On ASA, plavix,  crestor  Ranexa  BID  Angina improved  Previous carotid artery stenting  Angiogram August 2023: Patent right carotid stent, less than 50% left stenosis, no intracerebral ICA aneurysm  Duplex July 2024: Right with patent stent and some ISR, stenosis greater than 70% left carotid stenosis  Follow-up scheduled for August 2025  PAD/mesenteric ischemia  Kissing iliac stents October 2023 Dr. Alcantara  Superior mesenteric artery stent October 2023.  Patent on last angiogram  HTN  Status post right renal artery stenting March 2024.    BP  controlled  HLD  Lipids from July 2024: Total cholesterol 200, HDL 52,    Repeat lipids Discussed additional medication possibly PCSK9 inhibitor if LDL above goal.  Repeat ordered for [facility]  CLL  follows with Dr. Jorge  Lower extremity edema            Lasix 40 mg daily  No left lower extremity swelling  Normal EF on last echocardiogram  Farxiga 10 mg daily.  Counseled on side effects  7.  Palpitations: No postop A-fib documented.  Holter monitor placed last visit with no A-fib.  Benign study  8.  Swelling and pain right lower extremity   Has been going on for 1 month.  Treated for cellulitis   Has good palpable arterial pulse   Will schedule urgent venous duplex of lower extremity        ACP discussion was held with the patient during this visit. Patient does not have an advance directive, declines further assistance.      Ned Flores MD [facility]

## 2025-05-01 NOTE — TELEPHONE ENCOUNTER
Patient pharmacy called for clarification on metoprolol order. Prescription resent- pharmacy verified they have updated prescription for patient with updated sig Metoprolol 12.5 mg (0.5 tablet) BID per NSK.

## 2025-05-08 ENCOUNTER — TRANSCRIBE ORDERS (OUTPATIENT)
Dept: ADMINISTRATIVE | Facility: HOSPITAL | Age: 60
End: 2025-05-08
Payer: MEDICARE

## 2025-05-08 DIAGNOSIS — M79.661 BILATERAL CALF PAIN: Primary | ICD-10-CM

## 2025-05-08 DIAGNOSIS — L03.115 CELLULITIS OF RIGHT FOOT: ICD-10-CM

## 2025-05-08 DIAGNOSIS — M79.662 BILATERAL CALF PAIN: Primary | ICD-10-CM

## 2025-05-11 ENCOUNTER — HOSPITAL ENCOUNTER (EMERGENCY)
Facility: HOSPITAL | Age: 60
Discharge: HOME | End: 2025-05-11
Attending: EMERGENCY MEDICINE | Admitting: EMERGENCY MEDICINE
Payer: MEDICARE

## 2025-05-11 ENCOUNTER — APPOINTMENT (OUTPATIENT)
Dept: CT IMAGING | Facility: HOSPITAL | Age: 60
End: 2025-05-11
Payer: MEDICARE

## 2025-05-11 VITALS
BODY MASS INDEX: 21 KG/M2 | RESPIRATION RATE: 16 BRPM | TEMPERATURE: 97.8 F | HEART RATE: 99 BPM | OXYGEN SATURATION: 96 % | DIASTOLIC BLOOD PRESSURE: 65 MMHG | HEIGHT: 64 IN | SYSTOLIC BLOOD PRESSURE: 130 MMHG | WEIGHT: 123 LBS

## 2025-05-11 DIAGNOSIS — K59.00 CONSTIPATION, UNSPECIFIED CONSTIPATION TYPE: Primary | ICD-10-CM

## 2025-05-11 LAB
ALBUMIN SERPL-MCNC: 4.3 G/DL (ref 3.5–5.2)
ALBUMIN/GLOB SERPL: 1.5 G/DL
ALP SERPL-CCNC: 95 U/L (ref 39–117)
ALT SERPL W P-5'-P-CCNC: 17 U/L (ref 1–33)
ANION GAP SERPL CALCULATED.3IONS-SCNC: 11.7 MMOL/L (ref 5–15)
AST SERPL-CCNC: 15 U/L (ref 1–32)
BASOPHILS # BLD AUTO: 0.02 10*3/MM3 (ref 0–0.2)
BASOPHILS NFR BLD AUTO: 0.3 % (ref 0–1.5)
BILIRUB SERPL-MCNC: 0.5 MG/DL (ref 0–1.2)
BUN SERPL-MCNC: 18 MG/DL (ref 8–23)
BUN/CREAT SERPL: 17.1 (ref 7–25)
CALCIUM SPEC-SCNC: 9.6 MG/DL (ref 8.6–10.5)
CHLORIDE SERPL-SCNC: 98 MMOL/L (ref 98–107)
CO2 SERPL-SCNC: 22.3 MMOL/L (ref 22–29)
CREAT SERPL-MCNC: 1.05 MG/DL (ref 0.57–1)
DEPRECATED RDW RBC AUTO: 51.9 FL (ref 37–54)
EGFRCR SERPLBLD CKD-EPI 2021: 60.6 ML/MIN/1.73
EOSINOPHIL # BLD AUTO: 0.09 10*3/MM3 (ref 0–0.4)
EOSINOPHIL NFR BLD AUTO: 1.4 % (ref 0.3–6.2)
ERYTHROCYTE [DISTWIDTH] IN BLOOD BY AUTOMATED COUNT: 13 % (ref 12.3–15.4)
GLOBULIN UR ELPH-MCNC: 2.9 GM/DL
GLUCOSE SERPL-MCNC: 186 MG/DL (ref 65–99)
HCT VFR BLD AUTO: 44 % (ref 34–46.6)
HGB BLD-MCNC: 14.4 G/DL (ref 12–15.9)
IMM GRANULOCYTES # BLD AUTO: 0.02 10*3/MM3 (ref 0–0.05)
IMM GRANULOCYTES NFR BLD AUTO: 0.3 % (ref 0–0.5)
LYMPHOCYTES # BLD AUTO: 0.42 10*3/MM3 (ref 0.7–3.1)
LYMPHOCYTES NFR BLD AUTO: 6.7 % (ref 19.6–45.3)
MCH RBC QN AUTO: 35.2 PG (ref 26.6–33)
MCHC RBC AUTO-ENTMCNC: 32.7 G/DL (ref 31.5–35.7)
MCV RBC AUTO: 107.6 FL (ref 79–97)
MONOCYTES # BLD AUTO: 0.41 10*3/MM3 (ref 0.1–0.9)
MONOCYTES NFR BLD AUTO: 6.6 % (ref 5–12)
NEUTROPHILS NFR BLD AUTO: 5.29 10*3/MM3 (ref 1.7–7)
NEUTROPHILS NFR BLD AUTO: 84.7 % (ref 42.7–76)
NRBC BLD AUTO-RTO: 0 /100 WBC (ref 0–0.2)
PLATELET # BLD AUTO: 198 10*3/MM3 (ref 140–450)
PMV BLD AUTO: 10.1 FL (ref 6–12)
POTASSIUM SERPL-SCNC: 4.1 MMOL/L (ref 3.5–5.2)
PROT SERPL-MCNC: 7.2 G/DL (ref 6–8.5)
RBC # BLD AUTO: 4.09 10*6/MM3 (ref 3.77–5.28)
SODIUM SERPL-SCNC: 132 MMOL/L (ref 136–145)
WBC NRBC COR # BLD AUTO: 6.25 10*3/MM3 (ref 3.4–10.8)

## 2025-05-11 PROCEDURE — 96372 THER/PROPH/DIAG INJ SC/IM: CPT

## 2025-05-11 PROCEDURE — 80053 COMPREHEN METABOLIC PANEL: CPT | Performed by: NURSE PRACTITIONER

## 2025-05-11 PROCEDURE — 74176 CT ABD & PELVIS W/O CONTRAST: CPT | Performed by: RADIOLOGY

## 2025-05-11 PROCEDURE — 36415 COLL VENOUS BLD VENIPUNCTURE: CPT

## 2025-05-11 PROCEDURE — 74176 CT ABD & PELVIS W/O CONTRAST: CPT

## 2025-05-11 PROCEDURE — 25510000002 DIATRIZOATE MEGLUMINE & SODIUM PER 1 ML: Performed by: NURSE PRACTITIONER

## 2025-05-11 PROCEDURE — 25010000002 METHYLNALTREXONE 12 MG/0.6ML SOLUTION: Performed by: NURSE PRACTITIONER

## 2025-05-11 PROCEDURE — 85025 COMPLETE CBC W/AUTO DIFF WBC: CPT | Performed by: NURSE PRACTITIONER

## 2025-05-11 PROCEDURE — 99284 EMERGENCY DEPT VISIT MOD MDM: CPT

## 2025-05-11 RX ORDER — MAG HYDROX/ALUMINUM HYD/SIMETH 400-400-40
1 SUSPENSION, ORAL (FINAL DOSE FORM) ORAL AS NEEDED
Qty: 50 EACH | Refills: 0 | Status: CANCELLED | OUTPATIENT
Start: 2025-05-11

## 2025-05-11 RX ORDER — DIATRIZOATE MEGLUMINE AND DIATRIZOATE SODIUM 660; 100 MG/ML; MG/ML
60 SOLUTION ORAL; RECTAL ONCE
Status: COMPLETED | OUTPATIENT
Start: 2025-05-11 | End: 2025-05-11

## 2025-05-11 RX ADMIN — METHYLNALTREXONE BROMIDE 6 MG: 12 INJECTION, SOLUTION SUBCUTANEOUS at 14:15

## 2025-05-11 RX ADMIN — DIATRIZOATE MEGLUMINE AND DIATRIZOATE SODIUM 60 ML: 660; 100 LIQUID ORAL; RECTAL at 15:41

## 2025-05-11 NOTE — DISCHARGE INSTRUCTIONS

## 2025-05-11 NOTE — ED PROVIDER NOTES
"Subjective   History of Present Illness    Patient is a 60-year-old female who presents today for constipation.  Patient reports she has a longstanding history of constipation and states that she has not had a bowel movement in about 2 weeks.  Patient reports she has been taking at lactulose for the last several days.  Patient reports some nausea but denies vomiting.  Patient denies diarrhea.  Patient denies hematochezia or hematemesis.        Review of Systems   Constitutional: Negative.    HENT: Negative.     Eyes: Negative.    Respiratory: Negative.     Cardiovascular: Negative.    Gastrointestinal: Negative.    Endocrine: Negative.    Genitourinary: Negative.    Musculoskeletal: Negative.    Skin: Negative.    Allergic/Immunologic: Negative.    Neurological: Negative.    Hematological: Negative.    Psychiatric/Behavioral: Negative.         Past Medical History:   Diagnosis Date    Aneurysm 8/223/17    Cavernous left ICA aneurysm    Asthma     Carotid artery occlusion     Chronic back pain     CLL (chronic lymphocytic leukemia)     OCTOBER OF 2012    COPD (chronic obstructive pulmonary disease)     Degenerative arthritis     Depression     Diabetes mellitus     type 2    H/O blood clots     ovary    Heart murmur     Hyperlipidemia     Hypertension     Lymphadenopathy     Non Hodgkin's lymphoma     PVD (peripheral vascular disease)     Urinary frequency     UTI (urinary tract infection) 12/13/2023       Allergies   Allergen Reactions    Codeine Shortness Of Breath    Rituxan [Rituximab] Shortness Of Breath and Other (See Comments)     \"THROAT RAWNESS; FELT LIKE MY THROAT WAS CLOSING UP\"    Methocarbamol Rash    Penicillins Other (See Comments)     \"UNKNOWN CHILDHOOD ALLERGY\"       Past Surgical History:   Procedure Laterality Date    AORTOGRAM N/A 10/18/2023    Procedure: AORTAGRAM, BILATERAL COMMON ILIAC ARTERY STENTS, SUPERIOR MESENTERIC  ARTERY STENT;  Surgeon: Filiberto Alcantara MD;  Location: [facility];  " Service: Vascular;  Laterality: N/A;  109 mGy  4 MIN 06 SECS  40mL CONTRAST    APPENDECTOMY      ARTERIOGRAM N/A 3/6/2024    Procedure: Renal Arteriogram;  Surgeon: Martín Tobin MD;  Location:  [facility];  Service: Cardiovascular;  Laterality: N/A;  bilateral KYMBERLY,  PAD with bilateral iliac stents, SMA stent    BACK SURGERY      2002 (work accident) c-spine surgery 4/14    CARDIAC CATHETERIZATION N/A 09/20/2023    Procedure: Left Heart Cath;  Surgeon: Martín Tobin MD;  Location:  [facility];  Service: Cardiovascular;  Laterality: N/A;    CARDIAC CATHETERIZATION N/A 3/6/2024    Procedure: Stent BMS peripheral;  Surgeon: Martín Tobin MD;  Location:  [facility];  Service: Cardiovascular;  Laterality: N/A;    CARDIAC CATHETERIZATION N/A 6/28/2024    Procedure: Left Heart Cath;  Surgeon: Martín Tobin MD;  Location:  [facility];  Service: Cardiology;  Laterality: N/A;    CAROTID STENT Right 08/23/2017    CEREBRAL ANGIOGRAM N/A 08/23/2017    Diagnostic Carotid/Cerebral Angiogram    CHOLECYSTECTOMY      COLONOSCOPY      HYSTERECTOMY      for endometriosis/ovarian bleed    INTERVENTIONAL RADIOLOGY PROCEDURE N/A 08/02/2023    Procedure: Abdominal Aortagram with Runoff;  Surgeon: Jeffry Ackerman MD;  Location:  [facility];  Service: Interventional Radiology;  Laterality: N/A;    INTERVENTIONAL RADIOLOGY PROCEDURE Bilateral 08/02/2023    Procedure: Carotid Cerebral Angiogram;  Surgeon: Jeffry Ackerman MD;  Location:  [facility];  Service: Interventional Radiology;  Laterality: Bilateral;    INTERVENTIONAL RADIOLOGY PROCEDURE N/A 6/28/2024    Procedure: Intravascular Ultrasound;  Surgeon: Martín Tobin MD;  Location:  [facility];  Service: Cardiology;  Laterality: N/A;    LUMBAR FUSION      NECK SURGERY      Neck hardware    OOPHORECTOMY Left     OTHER SURGICAL HISTORY      pac  insertion and removal    TONSILLECTOMY         Family History   Problem Relation Age of Onset    Hypertension Mother     Lung cancer Father 72    Hypertension Father     Heart disease Father     Cancer Father     Diabetes Father     Other Brother 46        MI    Heart disease Brother     Breast cancer Paternal Aunt     Colon cancer Maternal Grandfather     Other Cousin         CLL (dx 46 yo)       Social History     Socioeconomic History    Marital status:      Spouse name: denies    Number of children: 3    Years of education: some college    Highest education level: Some college, no degree   Tobacco Use    Smoking status: Every Day     Current packs/day: 0.50     Average packs/day: 0.5 packs/day for 35.0 years (17.5 ttl pk-yrs)     Types: Cigarettes     Passive exposure: Current    Smokeless tobacco: Never   Vaping Use    Vaping status: Never Used   Substance and Sexual Activity    Alcohol use: Yes     Comment: once every year or two.    Drug use: Not Currently     Types: Marijuana    Sexual activity: Not Currently           Objective   Physical Exam  Vitals and nursing note reviewed.   Constitutional:       Appearance: She is well-developed.   HENT:      Head: Normocephalic.      Right Ear: External ear normal.      Left Ear: External ear normal.   Eyes:      Conjunctiva/sclera: Conjunctivae normal.      Pupils: Pupils are equal, round, and reactive to light.   Cardiovascular:      Rate and Rhythm: Normal rate and regular rhythm.      Heart sounds: Normal heart sounds.   Pulmonary:      Effort: Pulmonary effort is normal.      Breath sounds: Normal breath sounds.   Abdominal:      General: Bowel sounds are normal.      Palpations: Abdomen is soft.   Musculoskeletal:         General: Normal range of motion.      Cervical back: Normal range of motion and neck supple.   Skin:     General: Skin is warm and dry.      Capillary Refill: Capillary refill takes less than 2 seconds.   Neurological:      Mental  Status: She is alert and oriented to person, place, and time.   Psychiatric:         Behavior: Behavior normal.         Thought Content: Thought content normal.         Procedures           ED Course                                           Results for orders placed or performed during the hospital encounter of 05/11/25   Comprehensive Metabolic Panel    Collection Time: 05/11/25 12:06 PM    Specimen: Arm, Right; Blood   Result Value Ref Range    Glucose 186 (H) 65 - 99 mg/dL    BUN 18 8 - 23 mg/dL    Creatinine 1.05 (H) 0.57 - 1.00 mg/dL    Sodium 132 (L) 136 - 145 mmol/L    Potassium 4.1 3.5 - 5.2 mmol/L    Chloride 98 98 - 107 mmol/L    CO2 22.3 22.0 - 29.0 mmol/L    Calcium 9.6 8.6 - 10.5 mg/dL    Total Protein 7.2 6.0 - 8.5 g/dL    Albumin 4.3 3.5 - 5.2 g/dL    ALT (SGPT) 17 1 - 33 U/L    AST (SGOT) 15 1 - 32 U/L    Alkaline Phosphatase 95 39 - 117 U/L    Total Bilirubin 0.5 0.0 - 1.2 mg/dL    Globulin 2.9 gm/dL    A/G Ratio 1.5 g/dL    BUN/Creatinine Ratio 17.1 7.0 - 25.0    Anion Gap 11.7 5.0 - 15.0 mmol/L    eGFR 60.6 >60.0 mL/min/1.73   CBC Auto Differential    Collection Time: 05/11/25 12:06 PM    Specimen: Arm, Right; Blood   Result Value Ref Range    WBC 6.25 3.40 - 10.80 10*3/mm3    RBC 4.09 3.77 - 5.28 10*6/mm3    Hemoglobin 14.4 12.0 - 15.9 g/dL    Hematocrit 44.0 34.0 - 46.6 %    .6 (H) 79.0 - 97.0 fL    MCH 35.2 (H) 26.6 - 33.0 pg    MCHC 32.7 31.5 - 35.7 g/dL    RDW 13.0 12.3 - 15.4 %    RDW-SD 51.9 37.0 - 54.0 fl    MPV 10.1 6.0 - 12.0 fL    Platelets 198 140 - 450 10*3/mm3    Neutrophil % 84.7 (H) 42.7 - 76.0 %    Lymphocyte % 6.7 (L) 19.6 - 45.3 %    Monocyte % 6.6 5.0 - 12.0 %    Eosinophil % 1.4 0.3 - 6.2 %    Basophil % 0.3 0.0 - 1.5 %    Immature Grans % 0.3 0.0 - 0.5 %    Neutrophils, Absolute 5.29 1.70 - 7.00 10*3/mm3    Lymphocytes, Absolute 0.42 (L) 0.70 - 3.10 10*3/mm3    Monocytes, Absolute 0.41 0.10 - 0.90 10*3/mm3    Eosinophils, Absolute 0.09 0.00 - 0.40 10*3/mm3     Basophils, Absolute 0.02 0.00 - 0.20 10*3/mm3    Immature Grans, Absolute 0.02 0.00 - 0.05 10*3/mm3    nRBC 0.0 0.0 - 0.2 /100 WBC     CT Abdomen Pelvis Without Contrast   Final Result       1. Mesenteric adenopathy, similar to the previous exam       2. Extensive amount of stool with colonic distention       3. Distention of the urinary bladder       4. Extensive atherosclerotic vascular disease                                   This report was finalized on 5/11/2025 12:48 PM by Dr. Kerwin Keith MD.                        Medical Decision Making  MDM:    Escalation of care including admission/observation considered    - Discussions of management with other providers:  None    - Discussed/reviewed with Radiology regarding test interpretation    - Independent interpretation: None    - Additional patient history obtained from: None    - Review of external non-ED record (if available):  Prior Inpt record, Office record, Outpt record, Prior Outpt labs, PCP record, Outside ED record, Other    - Chronic conditions affecting care: See HPI and medical Hx.    - Social Determinants of health significantly affecting care:  None        Medical Decision Making Discussion:    Patient is a 60-year-old female who presents today for constipation.  Patient reports she has a longstanding history of constipation and states that she has not had a bowel movement in about 2 weeks.  Patient reports she has been taking at lactulose for the last several days.  Patient reports some nausea but denies vomiting.  Patient denies diarrhea.  Patient denies hematochezia or hematemesis.      The patient has been given very strict return precautions to return to the emergency department should there be any acute change or worsening of their condition.  I have explained my findings and the patient has expressed understanding to me.  I explained that the work-up performed in the ED has been based on the specific complaint and concern, as the nature of  emergency medicine is complaint driven and they understand that new symptoms may arise.  I have told them that, should there be any new symptoms, worsening or changing symptoms, a new work-up may be indicated that they are encouraged to return to the emergency department or promptly contact their primary care physician. We have employed a shared decision-making process as the discussion of their disposition.  The patient has been educated as to the nature of the visit, the tests and work-up performed and the findings from today's visit. At this time, there does not appear to be any acute emergent process that necessitates admission to the hospital, however, the patient understands that this can change unexpectedly. At this time, the patient is stable for discharge home and agrees to follow-up with her primary care physician in the next 24 to 48 hours or earlier should they be able to obtain an appointment.    The patient was counseled regarding diagnostic results and treatment plan and patient has indicated understanding of these instructions.      Problems Addressed:  Constipation, unspecified constipation type: complicated acute illness or injury    Amount and/or Complexity of Data Reviewed  Labs: ordered. Decision-making details documented in ED Course.  Radiology: ordered. Decision-making details documented in ED Course.    Risk  Prescription drug management.        Final diagnoses:   Constipation, unspecified constipation type       ED Disposition  ED Disposition       ED Disposition   Discharge    Condition   Stable    Comment   --               Claudia Celeste, APRN  [facility]   [address] [address] [phone]    Schedule an appointment as soon as possible for a visit   For further evaluation         Medication List      No changes were made to your prescriptions during this visit.            Andrea Valverde, APRN  05/11/25 [id number]

## 2025-05-19 ENCOUNTER — HOSPITAL ENCOUNTER (EMERGENCY)
Facility: HOSPITAL | Age: 60
Discharge: HOME | End: 2025-05-19
Attending: STUDENT IN AN ORGANIZED HEALTH CARE EDUCATION/TRAINING PROGRAM
Payer: MEDICARE

## 2025-05-19 ENCOUNTER — APPOINTMENT (OUTPATIENT)
Dept: CT IMAGING | Facility: HOSPITAL | Age: 60
End: 2025-05-19
Payer: MEDICARE

## 2025-05-19 VITALS
DIASTOLIC BLOOD PRESSURE: 80 MMHG | HEART RATE: 92 BPM | BODY MASS INDEX: 20.49 KG/M2 | SYSTOLIC BLOOD PRESSURE: 149 MMHG | RESPIRATION RATE: 18 BRPM | HEIGHT: 64 IN | OXYGEN SATURATION: 100 % | TEMPERATURE: 98.1 F | WEIGHT: 120 LBS

## 2025-05-19 DIAGNOSIS — K59.00 CONSTIPATION, UNSPECIFIED CONSTIPATION TYPE: Primary | ICD-10-CM

## 2025-05-19 PROCEDURE — 99284 EMERGENCY DEPT VISIT MOD MDM: CPT

## 2025-05-19 PROCEDURE — 25010000002 MIDAZOLAM PER 1 MG

## 2025-05-19 PROCEDURE — 74176 CT ABD & PELVIS W/O CONTRAST: CPT

## 2025-05-19 PROCEDURE — 96374 THER/PROPH/DIAG INJ IV PUSH: CPT

## 2025-05-19 RX ORDER — MIDAZOLAM HYDROCHLORIDE 1 MG/ML
2 INJECTION, SOLUTION INTRAMUSCULAR; INTRAVENOUS ONCE
Status: COMPLETED | OUTPATIENT
Start: 2025-05-19 | End: 2025-05-19

## 2025-05-19 RX ORDER — POLYETHYLENE GLYCOL 3350 17 G/17G
17 POWDER, FOR SOLUTION ORAL DAILY
Qty: 510 G | Refills: 0 | Status: SHIPPED | OUTPATIENT
Start: 2025-05-19 | End: 2025-05-23

## 2025-05-19 RX ORDER — MIDAZOLAM HYDROCHLORIDE 1 MG/ML
INJECTION, SOLUTION INTRAMUSCULAR; INTRAVENOUS
Status: COMPLETED
Start: 2025-05-19 | End: 2025-05-19

## 2025-05-19 RX ADMIN — MIDAZOLAM 2 MG: 1 INJECTION INTRAMUSCULAR; INTRAVENOUS at 15:39

## 2025-05-19 RX ADMIN — MIDAZOLAM HYDROCHLORIDE 2 MG: 1 INJECTION, SOLUTION INTRAMUSCULAR; INTRAVENOUS at 15:48

## 2025-05-19 NOTE — ED NOTES
Dr. Kaiser at bedside explaining risks and benefits of disimpaction (procedure to manually remove trapped stool from the rectum.) Patient verbalizes understanding and agreeable. Consent obtained at this time.

## 2025-05-20 ENCOUNTER — HOSPITAL ENCOUNTER (EMERGENCY)
Facility: HOSPITAL | Age: 60
Discharge: HOME | End: 2025-05-20
Attending: STUDENT IN AN ORGANIZED HEALTH CARE EDUCATION/TRAINING PROGRAM | Admitting: STUDENT IN AN ORGANIZED HEALTH CARE EDUCATION/TRAINING PROGRAM
Payer: MEDICARE

## 2025-05-20 VITALS
OXYGEN SATURATION: 100 % | WEIGHT: 120 LBS | BODY MASS INDEX: 20.49 KG/M2 | HEIGHT: 64 IN | DIASTOLIC BLOOD PRESSURE: 75 MMHG | RESPIRATION RATE: 16 BRPM | HEART RATE: 85 BPM | SYSTOLIC BLOOD PRESSURE: 128 MMHG | TEMPERATURE: 98 F

## 2025-05-20 DIAGNOSIS — K59.03 DRUG-INDUCED CONSTIPATION: Primary | ICD-10-CM

## 2025-05-20 PROCEDURE — 96372 THER/PROPH/DIAG INJ SC/IM: CPT

## 2025-05-20 PROCEDURE — 96375 TX/PRO/DX INJ NEW DRUG ADDON: CPT

## 2025-05-20 PROCEDURE — 96374 THER/PROPH/DIAG INJ IV PUSH: CPT

## 2025-05-20 PROCEDURE — 99284 EMERGENCY DEPT VISIT MOD MDM: CPT

## 2025-05-20 PROCEDURE — 25010000002 METHYLNALTREXONE 12 MG/0.6ML SOLUTION: Performed by: STUDENT IN AN ORGANIZED HEALTH CARE EDUCATION/TRAINING PROGRAM

## 2025-05-20 PROCEDURE — 25010000002 PROPOFOL 10 MG/ML EMULSION: Performed by: STUDENT IN AN ORGANIZED HEALTH CARE EDUCATION/TRAINING PROGRAM

## 2025-05-20 PROCEDURE — 25010000002 MIDAZOLAM PER 1 MG: Performed by: STUDENT IN AN ORGANIZED HEALTH CARE EDUCATION/TRAINING PROGRAM

## 2025-05-20 PROCEDURE — 25010000002 HYDROMORPHONE 1 MG/ML SOLUTION: Performed by: STUDENT IN AN ORGANIZED HEALTH CARE EDUCATION/TRAINING PROGRAM

## 2025-05-20 RX ORDER — ONDANSETRON 4 MG/1
4 TABLET, FILM COATED ORAL EVERY 8 HOURS PRN
Qty: 30 TABLET | Refills: 0 | Status: SHIPPED | OUTPATIENT
Start: 2025-05-20

## 2025-05-20 RX ORDER — AMOXICILLIN 250 MG
1 CAPSULE ORAL 2 TIMES DAILY
Qty: 60 TABLET | Refills: 0 | Status: SHIPPED | OUTPATIENT
Start: 2025-05-20 | End: 2025-06-19

## 2025-05-20 RX ORDER — LACTULOSE 10 G/15ML
30 SOLUTION ORAL EVERY 8 HOURS PRN
Qty: 946 ML | Refills: 0 | Status: SHIPPED | OUTPATIENT
Start: 2025-05-20

## 2025-05-20 RX ORDER — MIDAZOLAM HYDROCHLORIDE 1 MG/ML
2 INJECTION, SOLUTION INTRAMUSCULAR; INTRAVENOUS ONCE
Status: COMPLETED | OUTPATIENT
Start: 2025-05-20 | End: 2025-05-20

## 2025-05-20 RX ORDER — LACTULOSE 10 G/15ML
30 SOLUTION ORAL ONCE
Status: COMPLETED | OUTPATIENT
Start: 2025-05-20 | End: 2025-05-20

## 2025-05-20 RX ORDER — PROPOFOL 10 MG/ML
100 VIAL (ML) INTRAVENOUS ONCE
Status: COMPLETED | OUTPATIENT
Start: 2025-05-20 | End: 2025-05-20

## 2025-05-20 RX ORDER — POLYETHYLENE GLYCOL 3350 17 G/17G
17 POWDER, FOR SOLUTION ORAL 2 TIMES DAILY
Qty: 60 PACKET | Refills: 0 | Status: SHIPPED | OUTPATIENT
Start: 2025-05-20 | End: 2025-06-19

## 2025-05-20 RX ADMIN — MIDAZOLAM HYDROCHLORIDE 2 MG: 1 INJECTION, SOLUTION INTRAMUSCULAR; INTRAVENOUS at 13:13

## 2025-05-20 RX ADMIN — HYDROMORPHONE HYDROCHLORIDE 1 MG: 1 INJECTION, SOLUTION INTRAMUSCULAR; INTRAVENOUS; SUBCUTANEOUS at 12:56

## 2025-05-20 RX ADMIN — METHYLNALTREXONE BROMIDE 4 MG: 12 INJECTION, SOLUTION SUBCUTANEOUS at 12:34

## 2025-05-20 RX ADMIN — PROPOFOL 100 MG: 10 INJECTION, EMULSION INTRAVENOUS at 13:51

## 2025-05-20 RX ADMIN — LACTULOSE 30 G: 20 SOLUTION ORAL at 12:34

## 2025-05-20 NOTE — ED PROVIDER NOTES
"Subjective   History of Present Illness  Patient is a 60-year-old female with history significant for CLL, non-Hodgkin's lymphoma who comes to the ER with reports of constipation.  She has been seen on 511 and 519 for the same symptoms.  Yesterday all of her labs were normal and a CT noted significant fecal impaction.  She was attempted to be disimpacted which was unsuccessful with an enema and then requested to be discharged.  Still has not had a bowel movement.  Takes opiates at home.      Review of Systems   Constitutional:  Negative for chills, fatigue and fever.   HENT:  Negative for ear pain, sinus pain and sore throat.    Respiratory:  Negative for cough, chest tightness, shortness of breath and wheezing.    Cardiovascular:  Negative for chest pain, palpitations and leg swelling.   Gastrointestinal:  Positive for abdominal pain, constipation and nausea. Negative for diarrhea and vomiting.   Genitourinary:  Negative for dysuria, hematuria and urgency.   Musculoskeletal:  Negative for arthralgias and myalgias.   Neurological:  Negative for dizziness, syncope and light-headedness.   Psychiatric/Behavioral:  Negative for confusion.        Past Medical History:   Diagnosis Date    Aneurysm 8/223/17    Cavernous left ICA aneurysm    Asthma     Carotid artery occlusion     Chronic back pain     CLL (chronic lymphocytic leukemia)     OCTOBER OF 2012    COPD (chronic obstructive pulmonary disease)     Degenerative arthritis     Depression     Diabetes mellitus     type 2    H/O blood clots     ovary    Heart murmur     Hyperlipidemia     Hypertension     Lymphadenopathy     Non Hodgkin's lymphoma     PVD (peripheral vascular disease)     Urinary frequency     UTI (urinary tract infection) 12/13/2023       Allergies   Allergen Reactions    Codeine Shortness Of Breath    Rituxan [Rituximab] Shortness Of Breath and Other (See Comments)     \"THROAT RAWNESS; FELT LIKE MY THROAT WAS CLOSING UP\"    Methocarbamol Rash    " "Penicillins Other (See Comments)     \"UNKNOWN CHILDHOOD ALLERGY\"       Past Surgical History:   Procedure Laterality Date    AORTOGRAM N/A 10/18/2023    Procedure: AORTAGRAM, BILATERAL COMMON ILIAC ARTERY STENTS, SUPERIOR MESENTERIC  ARTERY STENT;  Surgeon: Filiberto Alcantara MD;  Location: [facility];  Service: Vascular;  Laterality: N/A;  109 mGy  4 MIN 06 SECS  40mL CONTRAST    APPENDECTOMY      ARTERIOGRAM N/A 3/6/2024    Procedure: Renal Arteriogram;  Surgeon: Martín Tobin MD;  Location:  [facility];  Service: Cardiovascular;  Laterality: N/A;  bilateral KYMBERLY,  PAD with bilateral iliac stents, SMA stent    BACK SURGERY      2002 (work accident) c-spine surgery 4/14    CARDIAC CATHETERIZATION N/A 09/20/2023    Procedure: Left Heart Cath;  Surgeon: Martín Tobin MD;  Location:  [facility];  Service: Cardiovascular;  Laterality: N/A;    CARDIAC CATHETERIZATION N/A 3/6/2024    Procedure: Stent BMS peripheral;  Surgeon: Martín Tobin MD;  Location:  [facility];  Service: Cardiovascular;  Laterality: N/A;    CARDIAC CATHETERIZATION N/A 6/28/2024    Procedure: Left Heart Cath;  Surgeon: Martín Tobin MD;  Location:  [facility];  Service: Cardiology;  Laterality: N/A;    CAROTID STENT Right 08/23/2017    CEREBRAL ANGIOGRAM N/A 08/23/2017    Diagnostic Carotid/Cerebral Angiogram    CHOLECYSTECTOMY      COLONOSCOPY      HYSTERECTOMY      for endometriosis/ovarian bleed    INTERVENTIONAL RADIOLOGY PROCEDURE N/A 08/02/2023    Procedure: Abdominal Aortagram with Runoff;  Surgeon: Jeffry Ackerman MD;  Location:  [facility];  Service: Interventional Radiology;  Laterality: N/A;    INTERVENTIONAL RADIOLOGY PROCEDURE Bilateral 08/02/2023    Procedure: Carotid Cerebral Angiogram;  Surgeon: Jeffry Ackerman MD;  Location:  [facility];  Service: Interventional Radiology;  Laterality: Bilateral;    " INTERVENTIONAL RADIOLOGY PROCEDURE N/A 6/28/2024    Procedure: Intravascular Ultrasound;  Surgeon: Martín Tobin MD;  Location: [facility] INVASIVE LOCATION;  Service: Cardiology;  Laterality: N/A;    LUMBAR FUSION      NECK SURGERY      Neck hardware    OOPHORECTOMY Left     OTHER SURGICAL HISTORY      pac insertion and removal    TONSILLECTOMY         Family History   Problem Relation Age of Onset    Hypertension Mother     Lung cancer Father 72    Hypertension Father     Heart disease Father     Cancer Father     Diabetes Father     Other Brother 46        MI    Heart disease Brother     Breast cancer Paternal Aunt     Colon cancer Maternal Grandfather     Other Cousin         CLL (dx 46 yo)       Social History     Socioeconomic History    Marital status:      Spouse name: denies    Number of children: 3    Years of education: some college    Highest education level: Some college, no degree   Tobacco Use    Smoking status: Every Day     Current packs/day: 0.50     Average packs/day: 0.5 packs/day for 35.0 years (17.5 ttl pk-yrs)     Types: Cigarettes     Passive exposure: Current    Smokeless tobacco: Never   Vaping Use    Vaping status: Never Used   Substance and Sexual Activity    Alcohol use: Yes     Comment: once every year or two.    Drug use: Not Currently     Types: Marijuana    Sexual activity: Not Currently           Objective   Physical Exam  Vitals and nursing note reviewed. Exam conducted with a chaperone present.   Constitutional:       Appearance: Normal appearance. She is normal weight.   HENT:      Head: Normocephalic and atraumatic.      Nose: Nose normal.      Mouth/Throat:      Mouth: Mucous membranes are moist.      Pharynx: Oropharynx is clear.   Eyes:      Extraocular Movements: Extraocular movements intact.      Conjunctiva/sclera: Conjunctivae normal.      Pupils: Pupils are equal, round, and reactive to light.   Cardiovascular:      Rate and Rhythm: Normal rate and regular  rhythm.      Pulses: Normal pulses.      Heart sounds: Normal heart sounds.   Pulmonary:      Effort: Pulmonary effort is normal.      Breath sounds: Normal breath sounds.   Abdominal:      General: Bowel sounds are normal.      Palpations: Abdomen is soft.   Musculoskeletal:         General: Normal range of motion.      Cervical back: Normal range of motion and neck supple.   Skin:     General: Skin is warm and dry.      Capillary Refill: Capillary refill takes less than 2 seconds.   Neurological:      General: No focal deficit present.      Mental Status: She is alert and oriented to person, place, and time.   Psychiatric:         Mood and Affect: Mood normal.         Behavior: Behavior normal.         Procedural Sedation    Date/Time: 5/20/2025 2:25 PM    Performed by: Jeffry Jorge DO  Authorized by: Jeffry Jorge DO    Consent:     Consent obtained:  Verbal and written    Consent given by:  Patient    Risks, benefits, and alternatives were discussed: yes      Risks discussed:  Vomiting, dysrhythmia, allergic reaction, prolonged hypoxia resulting in organ damage, prolonged sedation necessitating reversal, respiratory compromise necessitating ventilatory assistance and intubation, inadequate sedation and nausea    Alternatives discussed:  Anxiolysis  Universal protocol:     Procedure explained and questions answered to patient or proxy's satisfaction: yes      Relevant documents present and verified: yes      Test results available: yes      Imaging studies available: yes      Required blood products, implants, devices, and special equipment available: yes      Site/side marked: yes      Immediately prior to procedure, a time out was called: yes      Patient identity confirmed:  Verbally with patient and arm band  Indications:     Sedation is required to allow for: Bedside disimpaction.    Procedure necessitating sedation performed by:  Physician performing sedation    Intended level of  sedation:  Moderate  Pre-sedation assessment:     NPO status caution: unable to specify NPO status      ASA classification: class 2 - patient with mild systemic disease      Mouth opening:  3 or more finger widths    Thyromental distance:  4 finger widths    Mallampati score:  III - soft palate, base of uvula visible    Neck mobility: normal      Pre-sedation assessments completed and reviewed: airway patency, anesthesia/sedation history, cardiovascular function, hydration status, mental status, nausea/vomiting, pain level, respiratory function and temperature      History of difficult intubation: no    Immediate pre-procedure details:     Reassessment: Patient reassessed immediately prior to procedure      Reviewed: vital signs and relevant labs/tests      Verified: bag valve mask available    Procedure details (see MAR for exact dosages):     Preoxygenation:  Nasal cannula    Sedation:  Propofol    Analgesia:  None (Propofol)    Intra-procedure monitoring:  Blood pressure monitoring, cardiac monitor and continuous pulse oximetry    Intra-procedure events: none    Post-procedure details:     Post-sedation assessment completed:  5/20/2025 2:27 PM    Attendance: Constant attendance by certified staff until patient recovered      Recovery: Patient returned to pre-procedure baseline      Estimated blood loss (see I/O flowsheets): no      Complications:  None    Post-sedation assessments completed and reviewed: airway patency, cardiovascular function, hydration status, mental status, nausea/vomiting, pain level, respiratory function and temperature      Specimens recovered:  None    Patient is stable for discharge or admission: yes      Procedure completion:  Tolerated well, no immediate complications             ED Course                                                       Medical Decision Making  --No reason to obtain labs or imaging given these were just performed yesterday  --Subcu relistor, lactulose, bedside  disimpaction and enema--> given her discomfort, had to be consciously sedated but disimpacted at bedside and constipation resolved  --d/c on daily po movantik    Problems Addressed:  Drug-induced constipation: complicated acute illness or injury    Risk  Prescription drug management.        Final diagnoses:   Drug-induced constipation       ED Disposition  ED Disposition       ED Disposition   Discharge    Condition   Stable    Comment   --               Claudia Celeste, APRN  [address] [place] [address] [place] [id number] [phone]    In 1 week           Medication List        New Prescriptions      lactulose 10 GM/15ML solution  Commonly known as: CHRONULAC  Take 45 mL by mouth Every 8 (Eight) Hours As Needed (constipation).     Naloxegol Oxalate 25 MG tablet  Commonly known as: Movantik  Take 1 tablet by mouth Every Morning.     ondansetron 4 MG tablet  Commonly known as: Zofran  Take 1 tablet by mouth Every 8 (Eight) Hours As Needed for Vomiting or Nausea.     sennosides-docusate 8.6-50 MG per tablet  Commonly known as: PERICOLACE  Take 1 tablet by mouth 2 (Two) Times a Day for 30 days.            Changed      * polyethylene glycol 17 GM/SCOOP powder  Commonly known as: MIRALAX  Take 17 g by mouth Daily for 30 days.  What changed: Another medication with the same name was added. Make sure you understand how and when to take each.     * polyethylene glycol 17 g packet  Commonly known as: MIRALAX  Take 17 g by mouth 2 (Two) Times a Day for 30 days.  What changed: You were already taking a medication with the same name, and this prescription was added. Make sure you understand how and when to take each.           * This list has 2 medication(s) that are the same as other medications prescribed for you. Read the directions carefully, and ask your doctor or other care provider to review them with you.                   Where to Get Your Medications        These medications were sent to [facility]  [facility] [address] - [phone]  - [phone] FX  [address], [facility] [address]      Phone: [phone]   lactulose 10 GM/15ML solution  Naloxegol Oxalate 25 MG tablet  ondansetron 4 MG tablet  polyethylene glycol 17 g packet  sennosides-docusate 8.6-50 MG per tablet            Jeffry Jorge,   05/20/25 [id number]

## 2025-05-23 ENCOUNTER — APPOINTMENT (OUTPATIENT)
Dept: CT IMAGING | Facility: HOSPITAL | Age: 60
End: 2025-05-23
Payer: MEDICARE

## 2025-05-23 ENCOUNTER — HOSPITAL ENCOUNTER (OUTPATIENT)
Facility: HOSPITAL | Age: 60
Discharge: HOME | End: 2025-05-26
Admitting: SURGERY
Payer: MEDICARE

## 2025-05-23 DIAGNOSIS — K59.00 OBSTIPATION: Primary | ICD-10-CM

## 2025-05-23 DIAGNOSIS — K59.03 DRUG-INDUCED CONSTIPATION: ICD-10-CM

## 2025-05-23 DIAGNOSIS — E87.6 HYPOKALEMIA: ICD-10-CM

## 2025-05-23 LAB
ALBUMIN SERPL-MCNC: 4.4 G/DL (ref 3.5–5.2)
ALBUMIN/GLOB SERPL: 1.8 G/DL
ALP SERPL-CCNC: 87 U/L (ref 39–117)
ALT SERPL W P-5'-P-CCNC: 18 U/L (ref 1–33)
ANION GAP SERPL CALCULATED.3IONS-SCNC: 19.7 MMOL/L (ref 5–15)
ANION GAP SERPL CALCULATED.3IONS-SCNC: 21.3 MMOL/L (ref 5–15)
AST SERPL-CCNC: 35 U/L (ref 1–32)
BASOPHILS # BLD AUTO: 0.02 10*3/MM3 (ref 0–0.2)
BASOPHILS # BLD AUTO: 0.02 10*3/MM3 (ref 0–0.2)
BASOPHILS # BLD AUTO: 0.05 10*3/MM3 (ref 0–0.2)
BASOPHILS NFR BLD AUTO: 0.3 % (ref 0–1.5)
BASOPHILS NFR BLD AUTO: 0.3 % (ref 0–1.5)
BASOPHILS NFR BLD AUTO: 0.5 % (ref 0–1.5)
BILIRUB SERPL-MCNC: 0.5 MG/DL (ref 0–1.2)
BUN SERPL-MCNC: 20 MG/DL (ref 8–23)
BUN SERPL-MCNC: 22 MG/DL (ref 8–23)
BUN/CREAT SERPL: 21.8 (ref 7–25)
BUN/CREAT SERPL: 23.3 (ref 7–25)
CALCIUM SPEC-SCNC: 8.6 MG/DL (ref 8.6–10.5)
CALCIUM SPEC-SCNC: 9.4 MG/DL (ref 8.6–10.5)
CHLORIDE SERPL-SCNC: 94 MMOL/L (ref 98–107)
CHLORIDE SERPL-SCNC: 98 MMOL/L (ref 98–107)
CO2 SERPL-SCNC: 18.3 MMOL/L (ref 22–29)
CO2 SERPL-SCNC: 19.7 MMOL/L (ref 22–29)
CREAT SERPL-MCNC: 0.86 MG/DL (ref 0.57–1)
CREAT SERPL-MCNC: 1.01 MG/DL (ref 0.57–1)
D-LACTATE SERPL-SCNC: 1.3 MMOL/L (ref 0.5–2)
DEPRECATED RDW RBC AUTO: 46.2 FL (ref 37–54)
DEPRECATED RDW RBC AUTO: 46.7 FL (ref 37–54)
DEPRECATED RDW RBC AUTO: 47 FL (ref 37–54)
EGFRCR SERPLBLD CKD-EPI 2021: 63.5 ML/MIN/1.73
EGFRCR SERPLBLD CKD-EPI 2021: 77 ML/MIN/1.73
EOSINOPHIL # BLD AUTO: 0.01 10*3/MM3 (ref 0–0.4)
EOSINOPHIL # BLD AUTO: 0.02 10*3/MM3 (ref 0–0.4)
EOSINOPHIL # BLD AUTO: 0.03 10*3/MM3 (ref 0–0.4)
EOSINOPHIL NFR BLD AUTO: 0.1 % (ref 0.3–6.2)
EOSINOPHIL NFR BLD AUTO: 0.3 % (ref 0.3–6.2)
EOSINOPHIL NFR BLD AUTO: 0.3 % (ref 0.3–6.2)
ERYTHROCYTE [DISTWIDTH] IN BLOOD BY AUTOMATED COUNT: 12.3 % (ref 12.3–15.4)
ERYTHROCYTE [DISTWIDTH] IN BLOOD BY AUTOMATED COUNT: 12.3 % (ref 12.3–15.4)
ERYTHROCYTE [DISTWIDTH] IN BLOOD BY AUTOMATED COUNT: 12.4 % (ref 12.3–15.4)
GLOBULIN UR ELPH-MCNC: 2.5 GM/DL
GLUCOSE SERPL-MCNC: 121 MG/DL (ref 65–99)
GLUCOSE SERPL-MCNC: 139 MG/DL (ref 65–99)
HCT VFR BLD AUTO: 46.3 % (ref 34–46.6)
HCT VFR BLD AUTO: 46.8 % (ref 34–46.6)
HCT VFR BLD AUTO: 50.5 % (ref 34–46.6)
HGB BLD-MCNC: 16 G/DL (ref 12–15.9)
HGB BLD-MCNC: 16 G/DL (ref 12–15.9)
HGB BLD-MCNC: 17 G/DL (ref 12–15.9)
HOLD SPECIMEN: NORMAL
HOLD SPECIMEN: NORMAL
IMM GRANULOCYTES # BLD AUTO: 0.02 10*3/MM3 (ref 0–0.05)
IMM GRANULOCYTES # BLD AUTO: 0.03 10*3/MM3 (ref 0–0.05)
IMM GRANULOCYTES # BLD AUTO: 0.04 10*3/MM3 (ref 0–0.05)
IMM GRANULOCYTES NFR BLD AUTO: 0.3 % (ref 0–0.5)
IMM GRANULOCYTES NFR BLD AUTO: 0.4 % (ref 0–0.5)
IMM GRANULOCYTES NFR BLD AUTO: 0.4 % (ref 0–0.5)
LIPASE SERPL-CCNC: 18 U/L (ref 13–60)
LYMPHOCYTES # BLD AUTO: 0.58 10*3/MM3 (ref 0.7–3.1)
LYMPHOCYTES # BLD AUTO: 0.61 10*3/MM3 (ref 0.7–3.1)
LYMPHOCYTES # BLD AUTO: 0.87 10*3/MM3 (ref 0.7–3.1)
LYMPHOCYTES NFR BLD AUTO: 11.9 % (ref 19.6–45.3)
LYMPHOCYTES NFR BLD AUTO: 6.6 % (ref 19.6–45.3)
LYMPHOCYTES NFR BLD AUTO: 7.9 % (ref 19.6–45.3)
MAGNESIUM SERPL-MCNC: 2.1 MG/DL (ref 1.6–2.4)
MAGNESIUM SERPL-MCNC: 2.2 MG/DL (ref 1.6–2.4)
MCH RBC QN AUTO: 34.1 PG (ref 26.6–33)
MCH RBC QN AUTO: 34.8 PG (ref 26.6–33)
MCH RBC QN AUTO: 34.9 PG (ref 26.6–33)
MCHC RBC AUTO-ENTMCNC: 33.7 G/DL (ref 31.5–35.7)
MCHC RBC AUTO-ENTMCNC: 34.2 G/DL (ref 31.5–35.7)
MCHC RBC AUTO-ENTMCNC: 34.6 G/DL (ref 31.5–35.7)
MCV RBC AUTO: 101.1 FL (ref 79–97)
MCV RBC AUTO: 101.4 FL (ref 79–97)
MCV RBC AUTO: 101.7 FL (ref 79–97)
MONOCYTES # BLD AUTO: 0.38 10*3/MM3 (ref 0.1–0.9)
MONOCYTES # BLD AUTO: 0.57 10*3/MM3 (ref 0.1–0.9)
MONOCYTES # BLD AUTO: 0.66 10*3/MM3 (ref 0.1–0.9)
MONOCYTES NFR BLD AUTO: 5.2 % (ref 5–12)
MONOCYTES NFR BLD AUTO: 6.2 % (ref 5–12)
MONOCYTES NFR BLD AUTO: 9 % (ref 5–12)
NEUTROPHILS NFR BLD AUTO: 5.71 10*3/MM3 (ref 1.7–7)
NEUTROPHILS NFR BLD AUTO: 6.3 10*3/MM3 (ref 1.7–7)
NEUTROPHILS NFR BLD AUTO: 7.96 10*3/MM3 (ref 1.7–7)
NEUTROPHILS NFR BLD AUTO: 78.1 % (ref 42.7–76)
NEUTROPHILS NFR BLD AUTO: 86 % (ref 42.7–76)
NEUTROPHILS NFR BLD AUTO: 86.2 % (ref 42.7–76)
NRBC BLD AUTO-RTO: 0 /100 WBC (ref 0–0.2)
PLATELET # BLD AUTO: 201 10*3/MM3 (ref 140–450)
PLATELET # BLD AUTO: 206 10*3/MM3 (ref 140–450)
PLATELET # BLD AUTO: 212 10*3/MM3 (ref 140–450)
PMV BLD AUTO: 10.2 FL (ref 6–12)
PMV BLD AUTO: 10.2 FL (ref 6–12)
PMV BLD AUTO: 9.9 FL (ref 6–12)
POTASSIUM SERPL-SCNC: 2.8 MMOL/L (ref 3.5–5.2)
POTASSIUM SERPL-SCNC: 3.4 MMOL/L (ref 3.5–5.2)
PROT SERPL-MCNC: 6.9 G/DL (ref 6–8.5)
RBC # BLD AUTO: 4.58 10*6/MM3 (ref 3.77–5.28)
RBC # BLD AUTO: 4.6 10*6/MM3 (ref 3.77–5.28)
RBC # BLD AUTO: 4.98 10*6/MM3 (ref 3.77–5.28)
SODIUM SERPL-SCNC: 135 MMOL/L (ref 136–145)
SODIUM SERPL-SCNC: 136 MMOL/L (ref 136–145)
WBC NRBC COR # BLD AUTO: 7.31 10*3/MM3 (ref 3.4–10.8)
WBC NRBC COR # BLD AUTO: 7.31 10*3/MM3 (ref 3.4–10.8)
WBC NRBC COR # BLD AUTO: 9.26 10*3/MM3 (ref 3.4–10.8)
WHOLE BLOOD HOLD COAG: NORMAL
WHOLE BLOOD HOLD SPECIMEN: NORMAL

## 2025-05-23 PROCEDURE — 83735 ASSAY OF MAGNESIUM: CPT

## 2025-05-23 PROCEDURE — 25010000002 METHYLNALTREXONE 12 MG/0.6ML SOLUTION

## 2025-05-23 PROCEDURE — 25010000002 POTASSIUM CHLORIDE 10 MEQ/100ML SOLUTION

## 2025-05-23 PROCEDURE — 83735 ASSAY OF MAGNESIUM: CPT | Performed by: FAMILY MEDICINE

## 2025-05-23 PROCEDURE — 74177 CT ABD & PELVIS W/CONTRAST: CPT | Performed by: RADIOLOGY

## 2025-05-23 PROCEDURE — 74177 CT ABD & PELVIS W/CONTRAST: CPT

## 2025-05-23 PROCEDURE — 96365 THER/PROPH/DIAG IV INF INIT: CPT

## 2025-05-23 PROCEDURE — 96375 TX/PRO/DX INJ NEW DRUG ADDON: CPT

## 2025-05-23 PROCEDURE — 85025 COMPLETE CBC W/AUTO DIFF WBC: CPT | Performed by: FAMILY MEDICINE

## 2025-05-23 PROCEDURE — 25810000003 SODIUM CHLORIDE 0.9 % SOLUTION

## 2025-05-23 PROCEDURE — G0378 HOSPITAL OBSERVATION PER HR: HCPCS

## 2025-05-23 PROCEDURE — 25810000003 SODIUM CHLORIDE 0.9 % SOLUTION: Performed by: FAMILY MEDICINE

## 2025-05-23 PROCEDURE — 96361 HYDRATE IV INFUSION ADD-ON: CPT

## 2025-05-23 PROCEDURE — 85025 COMPLETE CBC W/AUTO DIFF WBC: CPT

## 2025-05-23 PROCEDURE — 80053 COMPREHEN METABOLIC PANEL: CPT

## 2025-05-23 PROCEDURE — 83690 ASSAY OF LIPASE: CPT

## 2025-05-23 PROCEDURE — 99223 1ST HOSP IP/OBS HIGH 75: CPT

## 2025-05-23 PROCEDURE — 83605 ASSAY OF LACTIC ACID: CPT

## 2025-05-23 PROCEDURE — 25510000001 IOPAMIDOL 61 % SOLUTION: Performed by: EMERGENCY MEDICINE

## 2025-05-23 PROCEDURE — 36415 COLL VENOUS BLD VENIPUNCTURE: CPT

## 2025-05-23 PROCEDURE — 99285 EMERGENCY DEPT VISIT HI MDM: CPT

## 2025-05-23 PROCEDURE — 25010000002 KETOROLAC TROMETHAMINE PER 15 MG

## 2025-05-23 PROCEDURE — 25010000002 METOCLOPRAMIDE PER 10 MG

## 2025-05-23 PROCEDURE — 96366 THER/PROPH/DIAG IV INF ADDON: CPT

## 2025-05-23 PROCEDURE — 96372 THER/PROPH/DIAG INJ SC/IM: CPT

## 2025-05-23 RX ORDER — METOPROLOL TARTRATE 25 MG/1
12.5 TABLET, FILM COATED ORAL 2 TIMES DAILY
Status: DISCONTINUED | OUTPATIENT
Start: 2025-05-24 | End: 2025-05-26 | Stop reason: HOSPADM

## 2025-05-23 RX ORDER — ONDANSETRON 4 MG/1
4 TABLET, ORALLY DISINTEGRATING ORAL EVERY 8 HOURS PRN
Status: DISCONTINUED | OUTPATIENT
Start: 2025-05-23 | End: 2025-05-26 | Stop reason: HOSPADM

## 2025-05-23 RX ORDER — METOCLOPRAMIDE HYDROCHLORIDE 5 MG/ML
10 INJECTION INTRAMUSCULAR; INTRAVENOUS ONCE
Status: COMPLETED | OUTPATIENT
Start: 2025-05-23 | End: 2025-05-23

## 2025-05-23 RX ORDER — TRAMADOL HYDROCHLORIDE 50 MG/1
100 TABLET ORAL ONCE
Status: COMPLETED | OUTPATIENT
Start: 2025-05-23 | End: 2025-05-23

## 2025-05-23 RX ORDER — SODIUM CHLORIDE 9 MG/ML
100 INJECTION, SOLUTION INTRAVENOUS CONTINUOUS
Status: ACTIVE | OUTPATIENT
Start: 2025-05-23 | End: 2025-05-24

## 2025-05-23 RX ORDER — ALLOPURINOL 100 MG/1
100 TABLET ORAL EVERY 8 HOURS
COMMUNITY

## 2025-05-23 RX ORDER — SODIUM CHLORIDE 9 MG/ML
40 INJECTION, SOLUTION INTRAVENOUS AS NEEDED
Status: DISCONTINUED | OUTPATIENT
Start: 2025-05-23 | End: 2025-05-26 | Stop reason: HOSPADM

## 2025-05-23 RX ORDER — POTASSIUM CHLORIDE 1500 MG/1
40 TABLET, EXTENDED RELEASE ORAL ONCE
Status: COMPLETED | OUTPATIENT
Start: 2025-05-23 | End: 2025-05-23

## 2025-05-23 RX ORDER — IOPAMIDOL 612 MG/ML
100 INJECTION, SOLUTION INTRAVASCULAR
Status: COMPLETED | OUTPATIENT
Start: 2025-05-23 | End: 2025-05-23

## 2025-05-23 RX ORDER — RANOLAZINE 500 MG/1
500 TABLET, EXTENDED RELEASE ORAL 2 TIMES DAILY
Status: DISCONTINUED | OUTPATIENT
Start: 2025-05-24 | End: 2025-05-26 | Stop reason: HOSPADM

## 2025-05-23 RX ORDER — AMOXICILLIN 250 MG
1 CAPSULE ORAL 2 TIMES DAILY
Status: DISCONTINUED | OUTPATIENT
Start: 2025-05-24 | End: 2025-05-26 | Stop reason: HOSPADM

## 2025-05-23 RX ORDER — CETIRIZINE HYDROCHLORIDE 10 MG/1
10 TABLET ORAL DAILY
Status: DISCONTINUED | OUTPATIENT
Start: 2025-05-24 | End: 2025-05-26 | Stop reason: HOSPADM

## 2025-05-23 RX ORDER — OXYCODONE HYDROCHLORIDE 20 MG/1
20 TABLET ORAL 4 TIMES DAILY
COMMUNITY
End: 2025-05-26 | Stop reason: HOSPADM

## 2025-05-23 RX ORDER — OXYCODONE HYDROCHLORIDE 15 MG/1
15 TABLET ORAL
COMMUNITY
End: 2025-05-26 | Stop reason: HOSPADM

## 2025-05-23 RX ORDER — LACTULOSE 10 G/15ML
30 SOLUTION ORAL 3 TIMES DAILY
Status: DISCONTINUED | OUTPATIENT
Start: 2025-05-23 | End: 2025-05-26 | Stop reason: HOSPADM

## 2025-05-23 RX ORDER — ASPIRIN 81 MG/1
81 TABLET ORAL DAILY
Status: DISCONTINUED | OUTPATIENT
Start: 2025-05-24 | End: 2025-05-26 | Stop reason: HOSPADM

## 2025-05-23 RX ORDER — POTASSIUM CHLORIDE 1500 MG/1
20 TABLET, EXTENDED RELEASE ORAL DAILY
Status: DISCONTINUED | OUTPATIENT
Start: 2025-05-24 | End: 2025-05-26 | Stop reason: HOSPADM

## 2025-05-23 RX ORDER — POTASSIUM CHLORIDE 1500 MG/1
40 TABLET, EXTENDED RELEASE ORAL EVERY 4 HOURS
Status: COMPLETED | OUTPATIENT
Start: 2025-05-23 | End: 2025-05-23

## 2025-05-23 RX ORDER — LORATADINE 10 MG/1
10 TABLET ORAL DAILY
COMMUNITY

## 2025-05-23 RX ORDER — CLOPIDOGREL BISULFATE 75 MG/1
75 TABLET ORAL DAILY
Status: DISCONTINUED | OUTPATIENT
Start: 2025-05-24 | End: 2025-05-26 | Stop reason: HOSPADM

## 2025-05-23 RX ORDER — ALPRAZOLAM 0.5 MG
0.5 TABLET ORAL 2 TIMES DAILY
Status: DISCONTINUED | OUTPATIENT
Start: 2025-05-24 | End: 2025-05-24

## 2025-05-23 RX ORDER — POTASSIUM CHLORIDE 7.45 MG/ML
10 INJECTION INTRAVENOUS
Status: COMPLETED | OUTPATIENT
Start: 2025-05-23 | End: 2025-05-23

## 2025-05-23 RX ORDER — SODIUM CHLORIDE 0.9 % (FLUSH) 0.9 %
10 SYRINGE (ML) INJECTION EVERY 12 HOURS SCHEDULED
Status: DISCONTINUED | OUTPATIENT
Start: 2025-05-23 | End: 2025-05-26 | Stop reason: HOSPADM

## 2025-05-23 RX ORDER — SODIUM CHLORIDE 0.9 % (FLUSH) 0.9 %
10 SYRINGE (ML) INJECTION AS NEEDED
Status: DISCONTINUED | OUTPATIENT
Start: 2025-05-23 | End: 2025-05-26 | Stop reason: HOSPADM

## 2025-05-23 RX ORDER — GABAPENTIN 300 MG/1
600 CAPSULE ORAL 4 TIMES DAILY PRN
Status: DISCONTINUED | OUTPATIENT
Start: 2025-05-23 | End: 2025-05-26 | Stop reason: HOSPADM

## 2025-05-23 RX ORDER — FUROSEMIDE 40 MG/1
40 TABLET ORAL DAILY
Status: DISCONTINUED | OUTPATIENT
Start: 2025-05-24 | End: 2025-05-26 | Stop reason: HOSPADM

## 2025-05-23 RX ORDER — OXYCODONE HYDROCHLORIDE 10 MG/1
10 TABLET ORAL EVERY 6 HOURS PRN
Status: DISCONTINUED | OUTPATIENT
Start: 2025-05-23 | End: 2025-05-26 | Stop reason: HOSPADM

## 2025-05-23 RX ORDER — KETOROLAC TROMETHAMINE 30 MG/ML
15 INJECTION, SOLUTION INTRAMUSCULAR; INTRAVENOUS ONCE
Status: COMPLETED | OUTPATIENT
Start: 2025-05-23 | End: 2025-05-23

## 2025-05-23 RX ADMIN — POTASSIUM CHLORIDE 40 MEQ: 1500 TABLET, EXTENDED RELEASE ORAL at 15:12

## 2025-05-23 RX ADMIN — KETOROLAC TROMETHAMINE 15 MG: 30 INJECTION, SOLUTION INTRAMUSCULAR; INTRAVENOUS at 05:50

## 2025-05-23 RX ADMIN — METOCLOPRAMIDE 10 MG: 5 INJECTION, SOLUTION INTRAMUSCULAR; INTRAVENOUS at 05:50

## 2025-05-23 RX ADMIN — SODIUM CHLORIDE 100 ML/HR: 9 INJECTION, SOLUTION INTRAVENOUS at 15:12

## 2025-05-23 RX ADMIN — METHYLNALTREXONE BROMIDE 8 MG: 12 INJECTION, SOLUTION SUBCUTANEOUS at 05:30

## 2025-05-23 RX ADMIN — POTASSIUM CHLORIDE 10 MEQ: 7.46 INJECTION, SOLUTION INTRAVENOUS at 08:37

## 2025-05-23 RX ADMIN — ALPRAZOLAM 0.5 MG: 0.5 TABLET ORAL at 23:55

## 2025-05-23 RX ADMIN — SODIUM CHLORIDE 1000 ML: 9 INJECTION, SOLUTION INTRAVENOUS at 05:49

## 2025-05-23 RX ADMIN — TRAMADOL HYDROCHLORIDE 100 MG: 50 TABLET, COATED ORAL at 05:53

## 2025-05-23 RX ADMIN — IOPAMIDOL 60 ML: 612 INJECTION, SOLUTION INTRAVENOUS at 08:50

## 2025-05-23 RX ADMIN — OXYCODONE HYDROCHLORIDE 10 MG: 10 TABLET ORAL at 23:55

## 2025-05-23 RX ADMIN — LACTULOSE 30 G: 20 SOLUTION ORAL at 15:12

## 2025-05-23 RX ADMIN — POTASSIUM CHLORIDE 40 MEQ: 1500 TABLET, EXTENDED RELEASE ORAL at 09:49

## 2025-05-23 RX ADMIN — LACTULOSE 30 G: 20 SOLUTION ORAL at 20:24

## 2025-05-23 RX ADMIN — DOCUSATE SODIUM 50MG AND SENNOSIDES 8.6MG 1 TABLET: 8.6; 5 TABLET, FILM COATED ORAL at 23:55

## 2025-05-23 RX ADMIN — Medication 10 ML: at 15:13

## 2025-05-23 RX ADMIN — POTASSIUM CHLORIDE 10 MEQ: 7.46 INJECTION, SOLUTION INTRAVENOUS at 06:54

## 2025-05-23 RX ADMIN — Medication 10 ML: at 21:06

## 2025-05-23 RX ADMIN — RANOLAZINE 500 MG: 500 TABLET, FILM COATED, EXTENDED RELEASE ORAL at 23:55

## 2025-05-23 RX ADMIN — METOPROLOL TARTRATE 12.5 MG: 25 TABLET, FILM COATED ORAL at 23:56

## 2025-05-23 RX ADMIN — POTASSIUM CHLORIDE 40 MEQ: 1500 TABLET, EXTENDED RELEASE ORAL at 20:24

## 2025-05-23 RX ADMIN — POTASSIUM CHLORIDE 10 MEQ: 7.46 INJECTION, SOLUTION INTRAVENOUS at 05:57

## 2025-05-23 NOTE — H&P
[facility]  History & Physical    Patient Identification:  Name:  Melania Mae  Age:  60 y.o.  Sex:  female  :  1965  MRN:  [id number]   Visit Number:  [id number]  Admit Date: 2025   Primary Care Physician:  Claudia Celeste APRN    Subjective     Chief complaint: Abdominal pain    History of presenting illness:      Melania Mae is a 60 y.o. female who presented for further evaluation of abdominal pain, constipation. She was seen and examined in the ED with no family present at the bedside. She is chronically ill and uncomfortable appearing. She reported that it has been about three weeks since she has had a bowel movement. She denies prior history of significant constipation. She does take opioids chronically for pain. She states over the past two weeks she has developed worsening abdominal pain, distention and tenderness. For the past two days she has lacked and appetite and developed nausea with vomiting. She has tried multiple OTC laxatives including miralax and lactulose without any success. She has attempted to use enemas as well but has had trouble using these correctly due to associated pain and hard stool.   She reports history of CLL and non-hodgkins lymphoma but has not been on chemotherapy for the past month due to recent infection.   Further ROS unrevealing, see below.     Past medical history is significant for CLL, Hx CVA, carotid stenosis s/p stent, T2DM, HTN, HLD, PVD, CAD, COPD, non-Hodgkin's lymphoma    Upon arrival to the ED, vital signs were temp 98.4, heart rate 101, respiration 17, /67, SpO2 98% on RA.  On laboratory workup her potassium was 2.8, anion gap elevated at 21.3 with bicarb 19.7.  No leukocytosis or left shift.  Hemoglobin is elevated at 17.0 with hematocrit 50.5.  CT abdomen and pelvis showed large amount of colonic stool including large amount within the rectal vault.    Known Emergency  Department medications received prior to my evaluation included Isovue, Toradol, methylnaltrexone, Reglan, potassium chloride, 1 L normal saline, tramadol.   Emergency Department Room location at the time of my evaluation was 113.     ---------------------------------------------------------------------------------------------------------------------   Review of Systems   Constitutional:  Positive for appetite change and fatigue. Negative for chills and fever.   HENT:  Negative for congestion and rhinorrhea.    Respiratory:  Negative for cough and shortness of breath.    Cardiovascular:  Negative for chest pain and leg swelling.   Gastrointestinal:  Positive for abdominal distention, abdominal pain, constipation, nausea and vomiting.   Genitourinary:  Negative for difficulty urinating and dysuria.   Musculoskeletal:  Negative for arthralgias and myalgias.   Skin:  Negative for rash and wound.   Neurological:  Negative for dizziness and light-headedness.        ---------------------------------------------------------------------------------------------------------------------   Past Medical History:   Diagnosis Date    Aneurysm 8/223/17    Cavernous left ICA aneurysm    Asthma     Carotid artery occlusion     Chronic back pain     CLL (chronic lymphocytic leukemia)     OCTOBER OF 2012    COPD (chronic obstructive pulmonary disease)     Degenerative arthritis     Depression     Diabetes mellitus     type 2    H/O blood clots     ovary    Heart murmur     Hyperlipidemia     Hypertension     Lymphadenopathy     Non Hodgkin's lymphoma     PVD (peripheral vascular disease)     Urinary frequency     UTI (urinary tract infection) 12/13/2023     Past Surgical History:   Procedure Laterality Date    AORTOGRAM N/A 10/18/2023    Procedure: AORTAGRAM, BILATERAL COMMON ILIAC ARTERY STENTS, SUPERIOR MESENTERIC  ARTERY STENT;  Surgeon: Filiberto Alcantara MD;  Location: [facility];  Service: Vascular;  Laterality: N/A;  109 mGy  4  MIN 06 SECS  40mL CONTRAST    APPENDECTOMY      ARTERIOGRAM N/A 3/6/2024    Procedure: Renal Arteriogram;  Surgeon: Martín Tobin MD;  Location:  [facility];  Service: Cardiovascular;  Laterality: N/A;  bilateral KYMBERLY,  PAD with bilateral iliac stents, SMA stent    BACK SURGERY      2002 (work accident) c-spine surgery 4/14    CARDIAC CATHETERIZATION N/A 09/20/2023    Procedure: Left Heart Cath;  Surgeon: Martín Tobin MD;  Location:  [facility];  Service: Cardiovascular;  Laterality: N/A;    CARDIAC CATHETERIZATION N/A 3/6/2024    Procedure: Stent BMS peripheral;  Surgeon: Martín Tobin MD;  Location:  [facility];  Service: Cardiovascular;  Laterality: N/A;    CARDIAC CATHETERIZATION N/A 6/28/2024    Procedure: Left Heart Cath;  Surgeon: Martín Tobin MD;  Location:  [facility];  Service: Cardiology;  Laterality: N/A;    CAROTID STENT Right 08/23/2017    CEREBRAL ANGIOGRAM N/A 08/23/2017    Diagnostic Carotid/Cerebral Angiogram    CHOLECYSTECTOMY      COLONOSCOPY      HYSTERECTOMY      for endometriosis/ovarian bleed    INTERVENTIONAL RADIOLOGY PROCEDURE N/A 08/02/2023    Procedure: Abdominal Aortagram with Runoff;  Surgeon: Jeffry Ackerman MD;  Location:  [facility];  Service: Interventional Radiology;  Laterality: N/A;    INTERVENTIONAL RADIOLOGY PROCEDURE Bilateral 08/02/2023    Procedure: Carotid Cerebral Angiogram;  Surgeon: Jeffry Ackerman MD;  Location:  [facility];  Service: Interventional Radiology;  Laterality: Bilateral;    INTERVENTIONAL RADIOLOGY PROCEDURE N/A 6/28/2024    Procedure: Intravascular Ultrasound;  Surgeon: Martín Tobin MD;  Location:  [facility];  Service: Cardiology;  Laterality: N/A;    LUMBAR FUSION      NECK SURGERY      Neck hardware    OOPHORECTOMY Left     OTHER SURGICAL HISTORY      pac insertion and removal    TONSILLECTOMY        Family History   Problem Relation Age of Onset    Hypertension Mother     Lung cancer Father 72    Hypertension Father     Heart disease Father     Cancer Father     Diabetes Father     Other Brother 46        MI    Heart disease Brother     Breast cancer Paternal Aunt     Colon cancer Maternal Grandfather     Other Cousin         CLL (dx 46 yo)     Social History     Socioeconomic History    Marital status:      Spouse name: denies    Number of children: 3    Years of education: some college    Highest education level: Some college, no degree   Tobacco Use    Smoking status: Every Day     Current packs/day: 0.50     Average packs/day: 0.5 packs/day for 35.0 years (17.5 ttl pk-yrs)     Types: Cigarettes     Passive exposure: Current    Smokeless tobacco: Never   Vaping Use    Vaping status: Never Used   Substance and Sexual Activity    Alcohol use: Yes     Comment: once every year or two.    Drug use: Not Currently     Types: Marijuana    Sexual activity: Not Currently     ---------------------------------------------------------------------------------------------------------------------   Allergies:  Codeine, Rituxan [rituximab], Methocarbamol, and Penicillins  ---------------------------------------------------------------------------------------------------------------------   Home medications:    Medications below are reported home medications pulling from within the system; at this time, these medications have not been reconciled unless otherwise specified and are in the verification process for further verifcation as current home medications.  (Not in a hospital admission)      Hospital Scheduled Meds:          Current listed hospital scheduled medications may not yet reflect those currently placed in orders that are signed and held awaiting patient's arrival to floor.   ---------------------------------------------------------------------------------------------------------------------      Objective     Vital Signs:  Temp:  [98.4 °F (36.9 °C)] 98.4 °F (36.9 °C)  Heart Rate:  [] 92  Resp:  [17] 17  BP: (113-199)/(59-90) 148/67      05/23/25  0219   Weight: 54.4 kg (120 lb)     Body mass index is 20.6 kg/m².  ---------------------------------------------------------------------------------------------------------------------       Physical Exam  Vitals and nursing note reviewed.   Constitutional:       General: She is not in acute distress.  HENT:      Head: Normocephalic and atraumatic.   Eyes:      Extraocular Movements: Extraocular movements intact.   Cardiovascular:      Rate and Rhythm: Normal rate.   Pulmonary:      Effort: Pulmonary effort is normal. No respiratory distress.   Abdominal:      Tenderness: There is abdominal tenderness.   Musculoskeletal:      Right lower leg: No edema.      Left lower leg: No edema.   Skin:     General: Skin is warm and dry.      Coloration: Skin is pale.   Neurological:      Mental Status: She is alert. Mental status is at baseline.   Psychiatric:         Mood and Affect: Mood normal.         Behavior: Behavior normal.         ---------------------------------------------------------------------------------------------------------------------  EKG:      ---------------------------------------------------------------------------------------------------------------------   Results from last 7 days   Lab Units 05/23/25  0342   LACTATE mmol/L 1.3   WBC 10*3/mm3 9.26   HEMOGLOBIN g/dL 17.0*   HEMATOCRIT % 50.5*   MCV fL 101.4*   MCHC g/dL 33.7   PLATELETS 10*3/mm3 212         Results from last 7 days   Lab Units 05/23/25  0342   SODIUM mmol/L 135*   POTASSIUM mmol/L 2.8*   MAGNESIUM mg/dL 2.2   CHLORIDE mmol/L 94*   CO2 mmol/L 19.7*   BUN mg/dL 22   CREATININE mg/dL 1.01*   CALCIUM mg/dL 9.4   GLUCOSE mg/dL 139*   ALBUMIN g/dL 4.4   BILIRUBIN mg/dL 0.5   ALK PHOS U/L 87   AST (SGOT) U/L 35*   ALT (SGPT) U/L 18   Estimated Creatinine Clearance: 50.2 mL/min  "(A) (by C-G formula based on SCr of 1.01 mg/dL (H)).  No results found for: \"AMMONIA\"          Lab Results   Component Value Date    HGBA1C 5.4 07/18/2024     Lab Results   Component Value Date    TSH 0.447 10/16/2023     No results found for: \"PREGTESTUR\", \"PREGSERUM\", \"HCG\", \"HCGQUANT\"  Pain Management Panel  More data may exist         Latest Ref Rng & Units 12/5/2023 11/29/2023   Pain Management Panel   Amphetamine, Urine Qual Negative Negative  Negative    Barbiturates Screen, Urine Negative Negative  Negative    Benzodiazepine Screen, Urine Negative Negative  Negative    Buprenorphine, Screen, Urine Negative Negative  Negative    Cocaine Screen, Urine Negative Negative  Negative    Fentanyl, Urine Negative Negative  Negative    Methadone Screen , Urine Negative Negative  Negative    Methamphetamine, Ur Negative Negative  Negative      No results found for: \"BLOODCX\"  No results found for: \"URINECX\"  No results found for: \"WOUNDCX\"  No results found for: \"STOOLCX\"      ---------------------------------------------------------------------------------------------------------------------  Imaging Results (Last 7 Days)       Procedure Component Value Units Date/Time    CT Abdomen Pelvis With Contrast [[id number]] Collected: 05/23/25 0908     Updated: 05/23/25 0912    Narrative:      EXAM:    CT Abdomen and Pelvis With Intravenous Contrast     EXAM DATE:    5/23/2025 9:08 AM     CLINICAL HISTORY:    Unable to defecate with significant impaction noted on previous CT.     TECHNIQUE:    Axial computed tomography images of the abdomen and pelvis with  intravenous contrast.  Sagittal and coronal reformatted images were  created and reviewed.  This CT exam was performed using one or more of  the following dose reduction techniques:  automated exposure control,  adjustment of the mA and/or kV according to patient size, and/or use of  iterative reconstruction technique.     COMPARISON:    No relevant prior studies " "available.     FINDINGS:    Lung bases:  Unremarkable as visualized.  No mass.  No consolidation.      ABDOMEN:    Liver:  Unremarkable as visualized.  No mass.    Gallbladder and bile ducts:  Cholecystectomy clips.  No ductal  dilation.    Pancreas:  Unremarkable as visualized.  No mass.  No ductal dilation.    Spleen:  Unremarkable as visualized.  No splenomegaly.    Adrenals:  Unremarkable as visualized.  No mass.    Kidneys and ureters:  Unremarkable as visualized.  No solid mass.  No  hydronephrosis.    Stomach and bowel:  Large amount of colonic stool including large  amount within the rectal vault.  No mucosal thickening.      PELVIS:    Appendix:  No findings to suggest acute appendicitis.    Bladder:  Unremarkable as visualized.  No mass.    Reproductive:  Unremarkable as visualized.      ABDOMEN and PELVIS:    Intraperitoneal space:  Unremarkable as visualized.  No free air.  No  significant fluid collection.    Bones/joints:  Degenerative disc disease throughout the lumbar spine.   Degenerative facet arthropathy throughout the lumbar spine, most  prominent in the lower lumbar spine.  No acute fracture.  No  dislocation.    Soft tissues:  Unremarkable as visualized.    Vasculature:  Atherosclerotic disease.  The SMA shows a stent  proximally with extensive calcific plaque noted more distally.  No  abdominal aortic aneurysm.    Lymph nodes:  Unremarkable as visualized.  No enlarged lymph nodes.       Impression:      1.  Large amount of colonic stool including large amount within the  rectal vault. Not markedly different.  2.  The SMA shows a stent proximally with extensive calcific plaque  noted more distally.  3.  Degenerative changes lumbar spine as described.      This report was finalized on 5/23/2025 9:10 AM by Dr. Fazal Theodore MD.               Cultures:  No results found for: \"BLOODCX\", \"URINECX\", \"WOUNDCX\", \"MRSACX\", \"RESPCX\", \"STOOLCX\"    Last echocardiogram:  Results for orders placed during the " hospital encounter of 09/20/23    Adult Transthoracic Echo Complete W/ Cont if Necessary Per Protocol    Interpretation Summary    Left ventricular systolic function is normal. Calculated left ventricular EF = 62.4% Left ventricular ejection fraction appears to be 61 - 65%.    Left ventricular wall thickness is consistent with mild concentric hypertrophy.    Left ventricular diastolic function was normal.    There is mild calcification of the aortic valve.    Estimated right ventricular systolic pressure from tricuspid regurgitation is normal (<35 mmHg). Calculated right ventricular systolic pressure from tricuspid regurgitation is 12 mmHg.    Mild mitral valve regurgitation.          I have personally reviewed the above radiology images and read the final radiology report on 05/23/25  ---------------------------------------------------------------------------------------------------------------------  Assessment / Plan     Active Hospital Problems    Diagnosis  POA    **Constipation [K59.00]  Yes       ASSESSMENT/PLAN:    Abdominal pain with nausea and vomiting, suspect due to obstipation  Patient presented to the ED secondary to abdominal pain worsening x 2 weeks, no BM in the past 3 weeks and has developed anorexia, nausea and vomiting over the past 2 days.  Reports has tried multiple laxatives, enemas without success.  Has been on hypertensive at times in the ED and suspect due to discomfort.  CT of the abdomen and pelvis shows large amount of colonic stool including large amount within the rectal vault.  She was given Relistor in the ED  Will admit for further workup and management  Continue normal saline at 100 mL/h  Soapsuds enema is ordered  Will schedule lactulose 3 times daily  Monitor for response  Continue supportive care measures, pain regimen if indicated    Hypokalemia  Continue to monitor and replace electrolytes as needed per protocol    Chronic:  CLL  Non-Hodgkin's lymphoma  Hx CVA  Carotid  stenosis s/p stent  CAD s/p three-vessel CABG  T2DM  HTN  HLD  Continue home medication regimen as indicated  Monitor vital signs closely  Will cover with SSI while inpatient if needed    ----------  -DVT prophylaxis: SCDs  -Activity: Ad ronald.  -Expected length of stay: Less than 2 midnights  -Disposition pending further clinical course    High risk secondary to obstipation, hypokalemia    Code Status and Medical Interventions: CPR (Attempt to Resuscitate); Full Support   Ordered at: 05/23/25 1010     Code Status (Patient has no pulse and is not breathing):    CPR (Attempt to Resuscitate)     Medical Interventions (Patient has pulse or is breathing):    Full Support       Faisal Mobley PA-C   05/23/25  10:43 EDT

## 2025-05-23 NOTE — PLAN OF CARE
Goal Outcome Evaluation:  Plan of Care Reviewed With: patient           Outcome Evaluation: New admit vss explained about not leaving floor to smoke and the need for bowel medication vss will continue plan of care

## 2025-05-23 NOTE — NURSING NOTE
Patient educated on the importance of importance of letting me finish the enema said she just can't right now and educated her on why she has not received any pain medication at this time because home meds are not restarted at this time and the danger related to not having a bowel movement

## 2025-05-23 NOTE — ED PROVIDER NOTES
Subjective   History of Present Illness  Patients presents with a prolonged history of being unable to defaecate. She has tried multiple laxatives and enemas. Previously she has had to be manually disimpacted by medical staff and is asking to be sedated today for further manual disimpaction.  Patient is in significant distress and states she has ongoing generalised abdo pain with nausea and occasional episodes of vomiting. The patient is currently suing opioids to manage chronic cancer pain. She says her abdomen is becoming more distended. She, obviously, states that her appetite and activities have been severely affected      Review of Systems   Constitutional:  Positive for activity change, appetite change and fatigue. Negative for chills, diaphoresis and fever.   HENT: Negative.     Respiratory: Negative.     Cardiovascular: Negative.    Gastrointestinal:  Positive for abdominal distention, abdominal pain, constipation, nausea and vomiting. Negative for anal bleeding, blood in stool and diarrhea.   Genitourinary: Negative.    Musculoskeletal: Negative.    Skin: Negative.    Neurological: Negative.    Psychiatric/Behavioral:  Positive for agitation and dysphoric mood. Negative for confusion and hallucinations. The patient is nervous/anxious.        Past Medical History:   Diagnosis Date    Aneurysm 8/223/17    Cavernous left ICA aneurysm    Asthma     Carotid artery occlusion     Chronic back pain     CLL (chronic lymphocytic leukemia)     OCTOBER OF 2012    COPD (chronic obstructive pulmonary disease)     Degenerative arthritis     Depression     Diabetes mellitus     type 2    H/O blood clots     ovary    Heart murmur     History of transfusion     Hyperlipidemia     Hypertension     Lymphadenopathy     Non Hodgkin's lymphoma     PVD (peripheral vascular disease)     Urinary frequency     UTI (urinary tract infection) 12/13/2023       Allergies   Allergen Reactions    Codeine Shortness Of Breath    Rituxan  "[Rituximab] Shortness Of Breath and Other (See Comments)     \"THROAT RAWNESS; FELT LIKE MY THROAT WAS CLOSING UP\"    Methocarbamol Rash    Penicillins Other (See Comments)     \"UNKNOWN CHILDHOOD ALLERGY\"       Past Surgical History:   Procedure Laterality Date    AORTOGRAM N/A 10/18/2023    Procedure: AORTAGRAM, BILATERAL COMMON ILIAC ARTERY STENTS, SUPERIOR MESENTERIC  ARTERY STENT;  Surgeon: Filiberto Alcantara MD;  Location:  [facility];  Service: Vascular;  Laterality: N/A;  109 mGy  4 MIN 06 SECS  40mL CONTRAST    APPENDECTOMY      ARTERIOGRAM N/A 3/6/2024    Procedure: Renal Arteriogram;  Surgeon: Martín Tobin MD;  Location:  [facility];  Service: Cardiovascular;  Laterality: N/A;  bilateral KYMBERLY,  PAD with bilateral iliac stents, SMA stent    BACK SURGERY      2002 (work accident) c-spine surgery 4/14    CARDIAC CATHETERIZATION N/A 09/20/2023    Procedure: Left Heart Cath;  Surgeon: Martín Tobin MD;  Location:  [facility];  Service: Cardiovascular;  Laterality: N/A;    CARDIAC CATHETERIZATION N/A 3/6/2024    Procedure: Stent BMS peripheral;  Surgeon: Martín Tobin MD;  Location:  [facility];  Service: Cardiovascular;  Laterality: N/A;    CARDIAC CATHETERIZATION N/A 6/28/2024    Procedure: Left Heart Cath;  Surgeon: Martín Tobin MD;  Location:  [facility];  Service: Cardiology;  Laterality: N/A;    CAROTID STENT Right 08/23/2017    CEREBRAL ANGIOGRAM N/A 08/23/2017    Diagnostic Carotid/Cerebral Angiogram    CHOLECYSTECTOMY      COLONOSCOPY      HYSTERECTOMY      for endometriosis/ovarian bleed    INTERVENTIONAL RADIOLOGY PROCEDURE N/A 08/02/2023    Procedure: Abdominal Aortagram with Runoff;  Surgeon: Jeffry Ackerman MD;  Location:  [facility];  Service: Interventional Radiology;  Laterality: N/A;    INTERVENTIONAL RADIOLOGY PROCEDURE Bilateral 08/02/2023    Procedure: Carotid Cerebral Angiogram;  " Surgeon: Jeffry Ackerman MD;  Location:  [facility];  Service: Interventional Radiology;  Laterality: Bilateral;    INTERVENTIONAL RADIOLOGY PROCEDURE N/A 6/28/2024    Procedure: Intravascular Ultrasound;  Surgeon: Martín Tobin MD;  Location:  [facility];  Service: Cardiology;  Laterality: N/A;    LUMBAR FUSION      NECK SURGERY      Neck hardware    OOPHORECTOMY Left     OTHER SURGICAL HISTORY      pac insertion and removal    TONSILLECTOMY         Family History   Problem Relation Age of Onset    Hypertension Mother     Lung cancer Father 72    Hypertension Father     Heart disease Father     Cancer Father     Diabetes Father     Other Brother 46        MI    Heart disease Brother     Breast cancer Paternal Aunt     Colon cancer Maternal Grandfather     Other Cousin         CLL (dx 46 yo)       Social History     Socioeconomic History    Marital status:      Spouse name: denies    Number of children: 3    Years of education: some college    Highest education level: Some college, no degree   Tobacco Use    Smoking status: Every Day     Current packs/day: 0.50     Average packs/day: 0.5 packs/day for 35.0 years (17.5 ttl pk-yrs)     Types: Cigarettes     Passive exposure: Current    Smokeless tobacco: Never   Vaping Use    Vaping status: Never Used   Substance and Sexual Activity    Alcohol use: Yes     Comment: once every year or two.    Drug use: Not Currently     Types: Marijuana    Sexual activity: Not Currently           Objective   Physical Exam  Vitals and nursing note reviewed.   Constitutional:       General: She is in acute distress.      Appearance: She is ill-appearing. She is not toxic-appearing or diaphoretic.   HENT:      Head: Normocephalic and atraumatic.      Mouth/Throat:      Mouth: Mucous membranes are dry.      Pharynx: Oropharynx is clear. No oropharyngeal exudate or posterior oropharyngeal erythema.   Cardiovascular:      Rate and Rhythm:  Normal rate and regular rhythm.   Abdominal:      General: Bowel sounds are decreased. There is distension.      Palpations: Abdomen is soft. There is no mass.      Tenderness: There is generalized abdominal tenderness. There is no guarding or rebound.      Hernia: No hernia is present.   Musculoskeletal:      Right lower leg: No edema.      Left lower leg: No edema.   Skin:     General: Skin is warm.      Coloration: Skin is pale. Skin is not jaundiced.      Findings: No bruising, erythema or rash.   Neurological:      General: No focal deficit present.      Mental Status: She is alert and oriented to person, place, and time. Mental status is at baseline.      Cranial Nerves: No cranial nerve deficit.   Psychiatric:         Attention and Perception: Attention and perception normal.         Mood and Affect: Mood is anxious. Mood is not depressed or elated. Affect is tearful. Affect is not labile, blunt, flat, angry or inappropriate.         Speech: Speech normal.         Behavior: Behavior normal. Behavior is cooperative.         Thought Content: Thought content normal. Thought content is not paranoid or delusional. Thought content does not include homicidal or suicidal ideation.         Cognition and Memory: Cognition and memory normal. Cognition is not impaired. Memory is not impaired.         Judgment: Judgment is impulsive. Judgment is not inappropriate.         Procedures           ED Course  ED Course as of 05/27/25 1108   Fri May 23, 2025   0402 Hemoglobin(!): 17.0 [RA]   0527 Potassium(!): 2.8  Replacement started [RA]   0527 CO2(!): 19.7 [RA]   0527 Chloride(!): 94 [RA]   0527 Sodium(!): 135 [RA]   0527 Creatinine(!): 1.01 [RA]   0528 Anion Gap(!): 21.3 [RA]   0528 Hemoglobin(!): 17.0 [RA]   0949 I assumed patient's care from Dr. Anand this morning at shift change.  Please refer to his documentation for details.  I entered the room to find the patient sitting on a bedside commode with only very minimal  passage of stool.  Abdomen is soft and nontender to palpation throughout.  Hospitalist paged hospitalist paged. [CM]   1008 Case discussed with Dr. Rosa.  He is admitting patient to the hospitalist service.  Electronically signed by Robbie Rizo MD, 05/23/25, 10:03 AM EDT.   [CM]      ED Course User Index  [CM] Robbie Rizo MD  [RA] Martín Anand MD                                                       Medical Decision Making  Problems Addressed:  Hypokalemia: complicated acute illness or injury  Obstipation: complicated acute illness or injury    Amount and/or Complexity of Data Reviewed  Labs: ordered. Decision-making details documented in ED Course.  Radiology: ordered.    Risk  Prescription drug management.  Decision regarding hospitalization.        Final diagnoses:   Obstipation   Hypokalemia       ED Disposition  ED Disposition       ED Disposition   Decision to Admit    Condition   --    Comment   Level of Care: Med/Surg [1]   Diagnosis: Constipation [002576]   Admitting Physician: MARTHA ROSA [[id number]]                 Claudia Celeste, APRMIS  [address]  [place] [address]  [phone]      Please, schedule an appointment for patient to follow-up with her PCP in 5 to 7 days.    Gillian Harrison, APRN  [address] [id number] [phone]      Please, schedule an appointment for patient to follow-up with her PCP in 5 to 7 days.         Medication List        New Prescriptions      nicotine 21 MG/24HR patch  Commonly known as: NICODERM CQ  Place 1 patch on the skin as directed by provider Daily.            Stop      oxyCODONE 15 MG immediate release tablet  Commonly known as: ROXICODONE     oxyCODONE 20 MG tablet  Commonly known as: ROXICODONE               Where to Get Your Medications        These medications were sent to [facility], [place] - [address] - [phone] [facility] [phone]   [address], [facility] [address]       Phone: [phone]   nicotine 21 MG/24HR patch            Martín Anand MD  05/27/25 2539

## 2025-05-24 ENCOUNTER — APPOINTMENT (OUTPATIENT)
Dept: GENERAL RADIOLOGY | Facility: HOSPITAL | Age: 60
End: 2025-05-24
Payer: MEDICARE

## 2025-05-24 LAB
ANION GAP SERPL CALCULATED.3IONS-SCNC: 16 MMOL/L (ref 5–15)
BUN SERPL-MCNC: 18 MG/DL (ref 8–23)
BUN/CREAT SERPL: 19.8 (ref 7–25)
CALCIUM SPEC-SCNC: 9.3 MG/DL (ref 8.6–10.5)
CHLORIDE SERPL-SCNC: 104 MMOL/L (ref 98–107)
CO2 SERPL-SCNC: 20 MMOL/L (ref 22–29)
CREAT SERPL-MCNC: 0.91 MG/DL (ref 0.57–1)
EGFRCR SERPLBLD CKD-EPI 2021: 71.9 ML/MIN/1.73
GLUCOSE SERPL-MCNC: 161 MG/DL (ref 65–99)
MAGNESIUM SERPL-MCNC: 2.4 MG/DL (ref 1.6–2.4)
POTASSIUM SERPL-SCNC: 4 MMOL/L (ref 3.5–5.2)
SODIUM SERPL-SCNC: 140 MMOL/L (ref 136–145)

## 2025-05-24 PROCEDURE — 74018 RADEX ABDOMEN 1 VIEW: CPT

## 2025-05-24 PROCEDURE — 74018 RADEX ABDOMEN 1 VIEW: CPT | Performed by: RADIOLOGY

## 2025-05-24 PROCEDURE — 25810000003 SODIUM CHLORIDE 0.9 % SOLUTION: Performed by: FAMILY MEDICINE

## 2025-05-24 PROCEDURE — G0378 HOSPITAL OBSERVATION PER HR: HCPCS

## 2025-05-24 PROCEDURE — 99232 SBSQ HOSP IP/OBS MODERATE 35: CPT | Performed by: FAMILY MEDICINE

## 2025-05-24 RX ORDER — ALPRAZOLAM 0.5 MG
0.5 TABLET ORAL 2 TIMES DAILY
Status: DISCONTINUED | OUTPATIENT
Start: 2025-05-24 | End: 2025-05-26 | Stop reason: HOSPADM

## 2025-05-24 RX ORDER — NICOTINE 21 MG/24HR
1 PATCH, TRANSDERMAL 24 HOURS TRANSDERMAL
Status: DISCONTINUED | OUTPATIENT
Start: 2025-05-24 | End: 2025-05-26 | Stop reason: HOSPADM

## 2025-05-24 RX ADMIN — ASPIRIN 81 MG: 81 TABLET, DELAYED RELEASE ORAL at 09:18

## 2025-05-24 RX ADMIN — Medication 10 ML: at 09:19

## 2025-05-24 RX ADMIN — ALPRAZOLAM 0.5 MG: 0.5 TABLET ORAL at 13:11

## 2025-05-24 RX ADMIN — POTASSIUM CHLORIDE 20 MEQ: 1500 TABLET, EXTENDED RELEASE ORAL at 09:18

## 2025-05-24 RX ADMIN — EMPAGLIFLOZIN 25 MG: 25 TABLET, FILM COATED ORAL at 09:18

## 2025-05-24 RX ADMIN — FUROSEMIDE 40 MG: 40 TABLET ORAL at 09:18

## 2025-05-24 RX ADMIN — CLOPIDOGREL BISULFATE 75 MG: 75 TABLET, FILM COATED ORAL at 09:18

## 2025-05-24 RX ADMIN — RANOLAZINE 500 MG: 500 TABLET, FILM COATED, EXTENDED RELEASE ORAL at 21:58

## 2025-05-24 RX ADMIN — LACTULOSE 30 G: 20 SOLUTION ORAL at 21:58

## 2025-05-24 RX ADMIN — RANOLAZINE 500 MG: 500 TABLET, FILM COATED, EXTENDED RELEASE ORAL at 09:34

## 2025-05-24 RX ADMIN — SODIUM CHLORIDE 100 ML/HR: 9 INJECTION, SOLUTION INTRAVENOUS at 01:35

## 2025-05-24 RX ADMIN — OXYCODONE HYDROCHLORIDE 10 MG: 10 TABLET ORAL at 05:33

## 2025-05-24 RX ADMIN — NICOTINE 1 PATCH: 21 PATCH TRANSDERMAL at 15:43

## 2025-05-24 RX ADMIN — Medication 10 ML: at 21:58

## 2025-05-24 RX ADMIN — ALPRAZOLAM 0.5 MG: 0.5 TABLET ORAL at 23:14

## 2025-05-24 RX ADMIN — DOCUSATE SODIUM 50MG AND SENNOSIDES 8.6MG 1 TABLET: 8.6; 5 TABLET, FILM COATED ORAL at 09:34

## 2025-05-24 RX ADMIN — OXYCODONE HYDROCHLORIDE 10 MG: 10 TABLET ORAL at 21:58

## 2025-05-24 RX ADMIN — LACTULOSE 30 G: 20 SOLUTION ORAL at 15:06

## 2025-05-24 RX ADMIN — LACTULOSE 30 G: 20 SOLUTION ORAL at 09:19

## 2025-05-24 RX ADMIN — DOCUSATE SODIUM 50MG AND SENNOSIDES 8.6MG 1 TABLET: 8.6; 5 TABLET, FILM COATED ORAL at 21:59

## 2025-05-24 RX ADMIN — METOPROLOL TARTRATE 12.5 MG: 25 TABLET, FILM COATED ORAL at 09:34

## 2025-05-24 RX ADMIN — CETIRIZINE HYDROCHLORIDE 10 MG: 10 TABLET, FILM COATED ORAL at 09:18

## 2025-05-24 RX ADMIN — OXYCODONE HYDROCHLORIDE 10 MG: 10 TABLET ORAL at 12:00

## 2025-05-24 RX ADMIN — SODIUM CHLORIDE 100 ML/HR: 9 INJECTION, SOLUTION INTRAVENOUS at 13:13

## 2025-05-24 NOTE — PLAN OF CARE
Goal Outcome Evaluation:  Plan of Care Reviewed With: patient        Progress: improving  Outcome Evaluation: Pt resting in bed at this time. VSS on RA. Pt ambulated in room and hallways this shift. PRN pain medication given as ordered. Pt did have small bowel movements this shift. Refusing fleet enema at this time. No concerns or requests. Will continue plan of care.

## 2025-05-24 NOTE — PROGRESS NOTES
[facility] HOSPITALIST PROGRESS NOTE     Patient Identification:  Name:  Melania Mae  Age:  60 y.o.  Sex:  female  :  1965  MRN:  [id number]  Visit Number:  [id number]  ROOM: [address] [place] [address]     Primary Care Provider:  Claudia Celeste APRN     Date of Admission: 2025    Length of stay in inpatient status:  0    Subjective     Chief Compliant:    Chief Complaint   Patient presents with    Abdominal Pain     History of Presenting Illness:      Objective     Current Hospital Meds:  ALPRAZolam, 0.5 mg, Oral, BID  aspirin, 81 mg, Oral, Daily  cetirizine, 10 mg, Oral, Daily  clopidogrel, 75 mg, Oral, Daily  empagliflozin, 25 mg, Oral, Daily  furosemide, 40 mg, Oral, Daily  lactulose, 30 g, Oral, TID  metoprolol tartrate, 12.5 mg, Oral, BID  potassium chloride, 20 mEq, Oral, Daily  ranolazine, 500 mg, Oral, BID  sennosides-docusate, 1 tablet, Oral, BID  sodium chloride, 10 mL, Intravenous, Q12H    sodium chloride, 100 mL/hr, Last Rate: 100 mL/hr (25 0833)      Current Antimicrobial Therapy:  Anti-Infectives (From admission, onward)      None          Current Diuretic Therapy:  Diuretics (From admission, onward)      Ordered     Dose/Rate Route Frequency Start Stop    25 6984  furosemide (LASIX) tablet 40 mg        Ordering Provider: Elpidio Park MD    40 mg Oral Daily 25 0900            ----------------------------------------------------------------------------------------------------------------------  Vital Signs:  Temp:  [97.7 °F (36.5 °C)-98.7 °F (37.1 °C)] 97.7 °F (36.5 °C)  Heart Rate:  [76-99] 76  Resp:  [16-18] 18  BP: (111-149)/(54-70) 130/63  SpO2:  [88 %-99 %] 88 %  on   ;   Device (Oxygen Therapy): room air  Body mass index is 18.23 kg/m².    Wt Readings from Last 3 Encounters:   25 48.2 kg (106 lb 3.2 oz)   25 54.4 kg (120 lb)   25 54.4 kg (120 lb)     Intake & Output (last 3 days)          0701   0700   0701 05/23 0700 05/23 0701 05/24 0700 05/24 0701 05/25 0700    P.O.   120     I.V. (mL/kg)    1768.1 (36.7)    Total Intake(mL/kg)   120 (2.5) 1768.1 (36.7)    Net   +120 +1768.1            Urine Unmeasured Occurrence   4 x     Stool Unmeasured Occurrence   2 x           Diet: Liquid; Clear Liquid; Fluid Consistency: Thin (IDDSI 0)  ----------------------------------------------------------------------------------------------------------------------  Physical Exam  Constitutional:  Well-developed and well-nourished.  No acute distress.      HENT:  Head:  Normocephalic and atraumatic.  Mouth:  Moist mucous membranes.    Eyes:  Conjunctivae and EOM are normal. No scleral icterus.    Neck:  Neck supple.  No JVD present.    Cardiovascular:  Normal rate, regular rhythm and normal heart sounds with no murmur.  Pulmonary/Chest:  No respiratory distress, no wheezes, no crackles, with normal breath sounds and good air movement.  Abdominal:  Soft. No distension and no tenderness.   Musculoskeletal:  No tenderness, and no deformity.  No red or swollen joints anywhere.    Neurological:  Alert and oriented to person, place, and time.  No cranial nerve deficit.    Skin:  Skin is warm and dry. No rash noted. No pallor.   Peripheral vascular:  No clubbing, no cyanosis, no edema.  Psychiatric: Appropriate mood and affect  :     ----------------------------------------------------------------------------------------------------------------------  Tele:    ----------------------------------------------------------------------------------------------------------------------  LABS:    CBC and coagulation:  Results from last 7 days   Lab Units 05/23/25  2309 05/23/25  1231 05/23/25  0342   LACTATE mmol/L  --   --  1.3   WBC 10*3/mm3 7.31 7.31 9.26   HEMOGLOBIN g/dL 16.0* 16.0* 17.0*   HEMATOCRIT % 46.3 46.8* 50.5*   MCV fL 101.1* 101.7* 101.4*   MCHC g/dL 34.6 34.2 33.7   PLATELETS 10*3/mm3 201 206 212     Acid/base balance:     "  Renal and electrolytes:  Results from last 7 days   Lab Units 05/23/25  2309 05/23/25  1231 05/23/25  0342   SODIUM mmol/L 140 136 135*   POTASSIUM mmol/L 4.0 3.4* 2.8*   MAGNESIUM mg/dL 2.4 2.1 2.2   CHLORIDE mmol/L 104 98 94*   CO2 mmol/L 20.0* 18.3* 19.7*   BUN mg/dL 18 20 22   CREATININE mg/dL 0.91 0.86 1.01*   CALCIUM mg/dL 9.3 8.6 9.4   GLUCOSE mg/dL 161* 121* 139*     Estimated Creatinine Clearance: 49.4 mL/min (by C-G formula based on SCr of 0.91 mg/dL).    Liver and pancreatic function:  Results from last 7 days   Lab Units 05/23/25  0342   ALBUMIN g/dL 4.4   BILIRUBIN mg/dL 0.5   ALK PHOS U/L 87   AST (SGOT) U/L 35*   ALT (SGPT) U/L 18   LIPASE U/L 18     Endocrine function:  Lab Results   Component Value Date    HGBA1C 5.4 07/18/2024     Point of care bedside glucose levels:      Glucose levels from the [facility]:  Results from last 7 days   Lab Units 05/23/25  2309 05/23/25  1231 05/23/25  0342   GLUCOSE mg/dL 161* 121* 139*     Lab Results   Component Value Date    TSH 0.447 10/16/2023     Cardiac:        Cultures:  Lab Results   Component Value Date    COLORU Yellow 12/05/2023    CLARITYU Turbid (A) 12/05/2023    PHUR 6.0 12/05/2023    GLUCOSEU Negative 12/05/2023    KETONESU Trace (A) 12/05/2023    BLOODU Large (3+) (A) 12/05/2023    NITRITEU Negative 12/05/2023    LEUKOCYTESUR Moderate (2+) (A) 12/05/2023    BILIRUBINUR Negative 12/05/2023    UROBILINOGEN 0.2 E.U./dL 12/05/2023    RBCUA 6-10 (A) 12/05/2023    WBCUA Too Numerous to Count (A) 12/05/2023    BACTERIA 4+ (A) 12/05/2023     Microbiology Results (last 10 days)       ** No results found for the last 240 hours. **            No results found for: \"PREGTESTUR\", \"PREGSERUM\", \"HCG\", \"HCGQUANT\"  Pain Management Panel  More data may exist         Latest Ref Rng & Units 12/5/2023 11/29/2023   Pain Management Panel   Amphetamine, Urine Qual Negative Negative  Negative    Barbiturates Screen, Urine Negative Negative  Negative    Benzodiazepine Screen, " Urine Negative Negative  Negative    Buprenorphine, Screen, Urine Negative Negative  Negative    Cocaine Screen, Urine Negative Negative  Negative    Fentanyl, Urine Negative Negative  Negative    Methadone Screen , Urine Negative Negative  Negative    Methamphetamine, Ur Negative Negative  Negative        I have personally looked at the labs and they are summarized above.  ----------------------------------------------------------------------------------------------------------------------  Detailed radiology reports for the last 24 hours:    Imaging Results (Last 24 Hours)       ** No results found for the last 24 hours. **          I have personally looked at the radiology images and I have read the available preliminary report.    Assessment & Plan    Severe constipation  Abdominal pain with nausea and vomiting  -At this point the patient is not allowing us to do any enemas or suppositories that may help her have a bowel movement  - After receiving medication in the ED with manual disimpaction the patient will only allow this to happen under those circumstances  - For this very reason I will consult surgery.  Truly appreciate any help that they can provide  - Continue current pain management regimen    Hypokalemia  - Patient will be placed electrolyte replacement protocol    Chronic conditions:  CLL  Non-Hodgkin's lymphoma  History of CVA  Carotid artery disease  - I will review and reconcile patient's home medications once those are available    VTE Prophylaxis:   Active VTE Prophylaxis  Mechanical:        Start        05/23/25 1221  Maintain Sequential Compression Device  Continuous                          Select Pharmacologic VTE Prophylaxis if Desired & Appropriate     The patient is high risk due to the following diagnoses/reasons: Severe constipation    Disposition: Discharge after bowel movement    Anthoyn Rosa DO  [facility]  05/24/25  11:47 EDT

## 2025-05-24 NOTE — CONSULTS
Consulting physician:  Dr. Meléndez    Referring physician: Hospitalist    Date of consultation: 05/24/25     Chief complaint constipation      Subjective     Patient is a 60 y.o. female who presented to the ED with severe constipation and inability to have a bowel movement in over 2 weeks.  The patient has been taking high-dose narcotics for a long time but states she has never had a problem with constipation until she recently started Ozempic.  CT scan confirmed fecal impaction.  Patient refused enema on the floor.  Patient has had multiple bowel movements today after CT with oral contrast but KUB still shows large amount of residual stool in the colon.      Review of Systems  Review of Systems -this is a for decreased appetite  Psychological ROS: negative for - behavioral disorder  Ophthalmic ROS: negative for - dry eyes  ENT ROS: negative for - vertigo or vocal changes  Hematological and Lymphatic ROS: negative for - swollen lymph nodes, DVT, PE.   Respiratory ROS: negative for - sputum changes or stridor.  Cardiovascular ROS: negative for - irregular heartbeat or murmur  Gastrointestinal ROS: Positive for distention and constipation  Genitourinary ROS: negative for - hematuria or incontinence  Musculoskeletal ROS: negative for - gait disturbance      History  Past Medical History:   Diagnosis Date    Aneurysm 8/223/17    Cavernous left ICA aneurysm    Asthma     Carotid artery occlusion     Chronic back pain     CLL (chronic lymphocytic leukemia)     OCTOBER OF 2012    COPD (chronic obstructive pulmonary disease)     Degenerative arthritis     Depression     Diabetes mellitus     type 2    H/O blood clots     ovary    Heart murmur     History of transfusion     Hyperlipidemia     Hypertension     Lymphadenopathy     Non Hodgkin's lymphoma     PVD (peripheral vascular disease)     Urinary frequency     UTI (urinary tract infection) 12/13/2023     Past Surgical History:   Procedure Laterality Date    AORTOGRAM N/A  10/18/2023    Procedure: AORTAGRAM, BILATERAL COMMON ILIAC ARTERY STENTS, SUPERIOR MESENTERIC  ARTERY STENT;  Surgeon: Filiberto Alcantara MD;  Location:  [facility];  Service: Vascular;  Laterality: N/A;  109 mGy  4 MIN 06 SECS  40mL CONTRAST    APPENDECTOMY      ARTERIOGRAM N/A 3/6/2024    Procedure: Renal Arteriogram;  Surgeon: Martín Tobin MD;  Location: [facility];  Service: Cardiovascular;  Laterality: N/A;  bilateral KYMBERLY,  PAD with bilateral iliac stents, SMA stent    BACK SURGERY      2002 (work accident) c-spine surgery 4/14    CARDIAC CATHETERIZATION N/A 09/20/2023    Procedure: Left Heart Cath;  Surgeon: Martín Tobin MD;  Location: [facility];  Service: Cardiovascular;  Laterality: N/A;    CARDIAC CATHETERIZATION N/A 3/6/2024    Procedure: Stent BMS peripheral;  Surgeon: Martín Tobin MD;  Location: [facility];  Service: Cardiovascular;  Laterality: N/A;    CARDIAC CATHETERIZATION N/A 6/28/2024    Procedure: Left Heart Cath;  Surgeon: Martín Tobin MD;  Location: [facility];  Service: Cardiology;  Laterality: N/A;    CAROTID STENT Right 08/23/2017    CEREBRAL ANGIOGRAM N/A 08/23/2017    Diagnostic Carotid/Cerebral Angiogram    CHOLECYSTECTOMY      COLONOSCOPY      HYSTERECTOMY      for endometriosis/ovarian bleed    INTERVENTIONAL RADIOLOGY PROCEDURE N/A 08/02/2023    Procedure: Abdominal Aortagram with Runoff;  Surgeon: Jeffry Ackerman MD;  Location: [facility];  Service: Interventional Radiology;  Laterality: N/A;    INTERVENTIONAL RADIOLOGY PROCEDURE Bilateral 08/02/2023    Procedure: Carotid Cerebral Angiogram;  Surgeon: Jeffry Ackerman MD;  Location: [facility];  Service: Interventional Radiology;  Laterality: Bilateral;    INTERVENTIONAL RADIOLOGY PROCEDURE N/A 6/28/2024    Procedure: Intravascular Ultrasound;  Surgeon: Martín Tobin MD;  Location: [facility];  Service: Cardiology;  Laterality: N/A;    LUMBAR FUSION      NECK SURGERY      Neck hardware    OOPHORECTOMY Left     OTHER SURGICAL HISTORY      pac insertion and removal    TONSILLECTOMY       Family History   Problem Relation Age of Onset    Hypertension Mother     Lung cancer Father 72    Hypertension Father     Heart disease Father     Cancer Father     Diabetes Father     Other Brother 46        MI    Heart disease Brother     Breast cancer Paternal Aunt     Colon cancer Maternal Grandfather     Other Cousin         CLL (dx 46 yo)     Social History     Tobacco Use    Smoking status: Every Day     Current packs/day: 0.50     Average packs/day: 0.5 packs/day for 35.0 years (17.5 ttl pk-yrs)     Types: Cigarettes     Passive exposure: Current    Smokeless tobacco: Never   Vaping Use    Vaping status: Never Used   Substance Use Topics    Alcohol use: Yes     Comment: once every year or two.    Drug use: Not Currently     Types: Marijuana     Medications Prior to Admission   Medication Sig Dispense Refill Last Dose/Taking    allopurinol (ZYLOPRIM) 100 MG tablet Take 1 tablet by mouth Every 8 (Eight) Hours.   Past Month    ALPRAZolam (XANAX) 1 MG tablet Take 0.5 tablets by mouth 2 (Two) Times a Day.   5/22/2025    aspirin 81 MG EC tablet Take 1 tablet by mouth Daily. 30 tablet 5 5/22/2025 Morning    clopidogrel (PLAVIX) 75 MG tablet take 1 tablet by mouth every day for circulation 30 tablet 5 5/22/2025    dapagliflozin Propanediol 10 MG tablet Take 10 mg by mouth Daily. 30 tablet 11 5/22/2025    furosemide (LASIX) 40 MG tablet take 1 tablet by mouth every day for fluid 30 tablet 5 5/22/2025    gabapentin (NEURONTIN) 800 MG tablet Take 1 tablet by mouth 4 (Four) Times a Day As Needed (nerve pain).   5/22/2025    loratadine (Claritin) 10 MG tablet Take 1 tablet by mouth Daily.   5/22/2025    Methylnaltrexone Bromide (RELISTOR) 150 MG tablet Take 3 tablets by mouth Daily.   5/22/2025    metoprolol  tartrate (LOPRESSOR) 25 MG tablet Take 0.5 tablets by mouth 2 (Two) Times a Day. 60 tablet 11 5/22/2025    oxyCODONE (ROXICODONE) 15 MG immediate release tablet Take 1 tablet by mouth 5 (Five) Times a Day.   5/22/2025    potassium chloride (KLOR-CON M20) 20 MEQ CR tablet take 1 tablet by mouth every day 30 tablet 5 5/22/2025    ranolazine (Ranexa) 500 MG 12 hr tablet Take 1 tablet by mouth 2 (Two) Times a Day. 60 tablet 11 5/22/2025    lactulose (CHRONULAC) 10 GM/15ML solution Take 45 mL by mouth Every 8 (Eight) Hours As Needed (constipation). 946 mL 0     Naloxegol Oxalate (Movantik) 25 MG tablet Take 1 tablet by mouth Every Morning. 30 tablet 0     ondansetron (Zofran) 4 MG tablet Take 1 tablet by mouth Every 8 (Eight) Hours As Needed for Vomiting or Nausea. 30 tablet 0     oxyCODONE (ROXICODONE) 20 MG tablet Take 1 tablet by mouth 4 (Four) Times a Day.       polyethylene glycol (MIRALAX) 17 g packet Take 17 g by mouth 2 (Two) Times a Day for 30 days. 60 packet 0     Semaglutide, 1 MG/DOSE, (OZEMPIC) 4 MG/3ML solution pen-injector Inject 1 mg under the skin into the appropriate area as directed 1 (One) Time Per Week.   5/21/2025    sennosides-docusate (senna-docusate sodium) 8.6-50 MG per tablet Take 1 tablet by mouth 2 (Two) Times a Day for 30 days. 60 tablet 0      Allergies:  Codeine, Rituxan [rituximab], Methocarbamol, and Penicillins    Objective     Vital Signs  Temp:  [97.7 °F (36.5 °C)-98.7 °F (37.1 °C)] 97.8 °F (36.6 °C)  Heart Rate:  [69-99] 69  Resp:  [14-18] 16  BP: (111-149)/(56-70) 133/64    Physical Exam:  General:  This is a WD WN thin female in no acute distress  Vital signs: Stable, afebrile  HEENT exam:  WNL. Sclerae are anicteric.  EOMI  Neck:  Supple, FROM.  No JVD.  Trachea midline  Lungs:  Respiratory effort normal. Auscultation: Clear, without wheezes, rhonchi, rales  Heart:  Regular rate and rhythm, without murmur, gallop, rub.  No pedal edema  Abdomen: Distended colon hard stool mass  "palpable in left lower quadrant.  Musculoskeletal:  Muscle strength/tone is normal.    Psych:  Alert, oriented x 3.  Mood and affect are appropriate  Skin:  Warm with good turgor.  Without rash or lesion  Extremities:  Examination of the extremities revealed no cyanosis, clubbing or edema.    Results Review:       Results from last 7 days   Lab Units 05/23/25 2309 05/23/25  1231 05/23/25  0342   LACTATE mmol/L  --   --  1.3   WBC 10*3/mm3 7.31 7.31 9.26   HEMOGLOBIN g/dL 16.0* 16.0* 17.0*   HEMATOCRIT % 46.3 46.8* 50.5*   PLATELETS 10*3/mm3 201 206 212         Results from last 7 days   Lab Units 05/23/25 2309 05/23/25  1231 05/23/25  0342   SODIUM mmol/L 140 136 135*   POTASSIUM mmol/L 4.0 3.4* 2.8*   MAGNESIUM mg/dL 2.4 2.1 2.2   CHLORIDE mmol/L 104 98 94*   CO2 mmol/L 20.0* 18.3* 19.7*   BUN mg/dL 18 20 22   CREATININE mg/dL 0.91 0.86 1.01*   CALCIUM mg/dL 9.3 8.6 9.4   GLUCOSE mg/dL 161* 121* 139*   ALBUMIN g/dL  --   --  4.4   BILIRUBIN mg/dL  --   --  0.5   ALK PHOS U/L  --   --  87   AST (SGOT) U/L  --   --  35*   ALT (SGPT) U/L  --   --  18   Estimated Creatinine Clearance: 49.4 mL/min (by C-G formula based on SCr of 0.91 mg/dL).  No results found for: \"AMMONIA\"      No results found for: \"BLOODCX\"  No results found for: \"URINECX\"  No results found for: \"WOUNDCX\"  No results found for: \"STOOLCX\"    Imaging:  Imaging Results (Last 24 Hours)       Procedure Component Value Units Date/Time    XR Abdomen KUB [[id number]] Resulted: 05/24/25 1400     Updated: 05/24/25 1402            CT scan and KUB reviewed.      Impression:    Constipation with fecal impaction       Plan:  Disimpaction under anesthesia      Discussion:  Patient advised that she needs to reduce her smoking prior to the procedure as she has been off the floor the majority of the time outside smoking.    Francy Meléndez MD  05/24/25  15:57 EDT    Time: Time spent:    Please note that portions of this note were completed with a voice recognition " program.

## 2025-05-24 NOTE — PLAN OF CARE
Goal Outcome Evaluation:              Outcome Evaluation: Pt is in bed at this time. VSS on RA. Pt ambulated outside several times this shift. Nicotine patch now in place. PRN pain meds given per MAR. Pt has had a few small BM. Surgery consulted this shift. No acute changes or complaints at this time. Will continue POC.

## 2025-05-25 ENCOUNTER — ANESTHESIA (OUTPATIENT)
Dept: PERIOP | Facility: HOSPITAL | Age: 60
End: 2025-05-25
Payer: MEDICARE

## 2025-05-25 ENCOUNTER — ANESTHESIA EVENT (OUTPATIENT)
Dept: PERIOP | Facility: HOSPITAL | Age: 60
End: 2025-05-25
Payer: MEDICARE

## 2025-05-25 LAB
ANION GAP SERPL CALCULATED.3IONS-SCNC: 11.8 MMOL/L (ref 5–15)
BASOPHILS # BLD AUTO: 0.04 10*3/MM3 (ref 0–0.2)
BASOPHILS NFR BLD AUTO: 0.6 % (ref 0–1.5)
BUN SERPL-MCNC: 9 MG/DL (ref 8–23)
BUN/CREAT SERPL: 11.4 (ref 7–25)
CALCIUM SPEC-SCNC: 8.9 MG/DL (ref 8.6–10.5)
CHLORIDE SERPL-SCNC: 105 MMOL/L (ref 98–107)
CO2 SERPL-SCNC: 20.2 MMOL/L (ref 22–29)
CREAT SERPL-MCNC: 0.79 MG/DL (ref 0.57–1)
DEPRECATED RDW RBC AUTO: 49.3 FL (ref 37–54)
EGFRCR SERPLBLD CKD-EPI 2021: 85.2 ML/MIN/1.73
EOSINOPHIL # BLD AUTO: 0.15 10*3/MM3 (ref 0–0.4)
EOSINOPHIL NFR BLD AUTO: 2.4 % (ref 0.3–6.2)
ERYTHROCYTE [DISTWIDTH] IN BLOOD BY AUTOMATED COUNT: 12.6 % (ref 12.3–15.4)
GLUCOSE SERPL-MCNC: 137 MG/DL (ref 65–99)
HCT VFR BLD AUTO: 46.4 % (ref 34–46.6)
HGB BLD-MCNC: 15.1 G/DL (ref 12–15.9)
IMM GRANULOCYTES # BLD AUTO: 0.02 10*3/MM3 (ref 0–0.05)
IMM GRANULOCYTES NFR BLD AUTO: 0.3 % (ref 0–0.5)
LYMPHOCYTES # BLD AUTO: 1.21 10*3/MM3 (ref 0.7–3.1)
LYMPHOCYTES NFR BLD AUTO: 19 % (ref 19.6–45.3)
MAGNESIUM SERPL-MCNC: 2.3 MG/DL (ref 1.6–2.4)
MCH RBC QN AUTO: 33.9 PG (ref 26.6–33)
MCHC RBC AUTO-ENTMCNC: 32.5 G/DL (ref 31.5–35.7)
MCV RBC AUTO: 104.3 FL (ref 79–97)
MONOCYTES # BLD AUTO: 0.63 10*3/MM3 (ref 0.1–0.9)
MONOCYTES NFR BLD AUTO: 9.9 % (ref 5–12)
NEUTROPHILS NFR BLD AUTO: 4.31 10*3/MM3 (ref 1.7–7)
NEUTROPHILS NFR BLD AUTO: 67.8 % (ref 42.7–76)
NRBC BLD AUTO-RTO: 0 /100 WBC (ref 0–0.2)
PLATELET # BLD AUTO: 156 10*3/MM3 (ref 140–450)
PMV BLD AUTO: 10.3 FL (ref 6–12)
POTASSIUM SERPL-SCNC: 4.1 MMOL/L (ref 3.5–5.2)
RBC # BLD AUTO: 4.45 10*6/MM3 (ref 3.77–5.28)
SODIUM SERPL-SCNC: 137 MMOL/L (ref 136–145)
WBC NRBC COR # BLD AUTO: 6.36 10*3/MM3 (ref 3.4–10.8)

## 2025-05-25 PROCEDURE — 63710000001 METOPROLOL TARTRATE 25 MG TABLET: Performed by: SURGERY

## 2025-05-25 PROCEDURE — 25010000002 FAMOTIDINE (PF) 20 MG/2ML SOLUTION: Performed by: NURSE ANESTHETIST, CERTIFIED REGISTERED

## 2025-05-25 PROCEDURE — 96361 HYDRATE IV INFUSION ADD-ON: CPT

## 2025-05-25 PROCEDURE — G0378 HOSPITAL OBSERVATION PER HR: HCPCS

## 2025-05-25 PROCEDURE — A9270 NON-COVERED ITEM OR SERVICE: HCPCS | Performed by: SURGERY

## 2025-05-25 PROCEDURE — 63710000001 ALPRAZOLAM 0.5 MG TABLET: Performed by: SURGERY

## 2025-05-25 PROCEDURE — 63710000001 LACTULOSE 10 GM/15ML SOLUTION: Performed by: SURGERY

## 2025-05-25 PROCEDURE — 45915 REMOVE RECTAL OBSTRUCTION: CPT | Performed by: SURGERY

## 2025-05-25 PROCEDURE — 25010000002 PROPOFOL 200 MG/20ML EMULSION: Performed by: NURSE ANESTHETIST, CERTIFIED REGISTERED

## 2025-05-25 PROCEDURE — 80048 BASIC METABOLIC PNL TOTAL CA: CPT | Performed by: FAMILY MEDICINE

## 2025-05-25 PROCEDURE — 63710000001 SENNOSIDES-DOCUSATE 8.6-50 MG TABLET: Performed by: SURGERY

## 2025-05-25 PROCEDURE — 83735 ASSAY OF MAGNESIUM: CPT | Performed by: FAMILY MEDICINE

## 2025-05-25 PROCEDURE — 25010000002 ONDANSETRON PER 1 MG: Performed by: NURSE ANESTHETIST, CERTIFIED REGISTERED

## 2025-05-25 PROCEDURE — 25810000003 LACTATED RINGERS PER 1000 ML: Performed by: NURSE ANESTHETIST, CERTIFIED REGISTERED

## 2025-05-25 PROCEDURE — 25010000002 FENTANYL CITRATE (PF) 50 MCG/ML SOLUTION: Performed by: NURSE ANESTHETIST, CERTIFIED REGISTERED

## 2025-05-25 PROCEDURE — 25010000002 SUCCINYLCHOLINE PER 20 MG: Performed by: NURSE ANESTHETIST, CERTIFIED REGISTERED

## 2025-05-25 PROCEDURE — 63710000001 OXYCODONE 10 MG TABLET: Performed by: SURGERY

## 2025-05-25 PROCEDURE — 85025 COMPLETE CBC W/AUTO DIFF WBC: CPT | Performed by: FAMILY MEDICINE

## 2025-05-25 PROCEDURE — 63710000001 RANOLAZINE 500 MG TABLET SUSTAINED-RELEASE 12 HOUR: Performed by: SURGERY

## 2025-05-25 PROCEDURE — C2627 CATH, SUPRAPUBIC/CYSTOSCOPIC: HCPCS | Performed by: SURGERY

## 2025-05-25 RX ORDER — SODIUM CHLORIDE 9 MG/ML
40 INJECTION, SOLUTION INTRAVENOUS AS NEEDED
Status: DISCONTINUED | OUTPATIENT
Start: 2025-05-25 | End: 2025-05-25 | Stop reason: HOSPADM

## 2025-05-25 RX ORDER — IPRATROPIUM BROMIDE AND ALBUTEROL SULFATE 2.5; .5 MG/3ML; MG/3ML
3 SOLUTION RESPIRATORY (INHALATION) ONCE AS NEEDED
Status: DISCONTINUED | OUTPATIENT
Start: 2025-05-25 | End: 2025-05-25 | Stop reason: HOSPADM

## 2025-05-25 RX ORDER — ONDANSETRON 2 MG/ML
INJECTION INTRAMUSCULAR; INTRAVENOUS AS NEEDED
Status: DISCONTINUED | OUTPATIENT
Start: 2025-05-25 | End: 2025-05-25 | Stop reason: SURG

## 2025-05-25 RX ORDER — SODIUM CHLORIDE, SODIUM LACTATE, POTASSIUM CHLORIDE, CALCIUM CHLORIDE 600; 310; 30; 20 MG/100ML; MG/100ML; MG/100ML; MG/100ML
125 INJECTION, SOLUTION INTRAVENOUS ONCE
Status: DISCONTINUED | OUTPATIENT
Start: 2025-05-25 | End: 2025-05-25 | Stop reason: HOSPADM

## 2025-05-25 RX ORDER — ONDANSETRON 2 MG/ML
4 INJECTION INTRAMUSCULAR; INTRAVENOUS AS NEEDED
Status: DISCONTINUED | OUTPATIENT
Start: 2025-05-25 | End: 2025-05-25 | Stop reason: HOSPADM

## 2025-05-25 RX ORDER — SODIUM CHLORIDE 0.9 % (FLUSH) 0.9 %
10 SYRINGE (ML) INJECTION EVERY 12 HOURS SCHEDULED
Status: DISCONTINUED | OUTPATIENT
Start: 2025-05-25 | End: 2025-05-25 | Stop reason: HOSPADM

## 2025-05-25 RX ORDER — SODIUM CHLORIDE, SODIUM LACTATE, POTASSIUM CHLORIDE, CALCIUM CHLORIDE 600; 310; 30; 20 MG/100ML; MG/100ML; MG/100ML; MG/100ML
INJECTION, SOLUTION INTRAVENOUS CONTINUOUS PRN
Status: DISCONTINUED | OUTPATIENT
Start: 2025-05-25 | End: 2025-05-25 | Stop reason: SURG

## 2025-05-25 RX ORDER — SUCCINYLCHOLINE CHLORIDE 20 MG/ML
INJECTION INTRAMUSCULAR; INTRAVENOUS AS NEEDED
Status: DISCONTINUED | OUTPATIENT
Start: 2025-05-25 | End: 2025-05-25 | Stop reason: SURG

## 2025-05-25 RX ORDER — FENTANYL CITRATE 50 UG/ML
INJECTION, SOLUTION INTRAMUSCULAR; INTRAVENOUS AS NEEDED
Status: DISCONTINUED | OUTPATIENT
Start: 2025-05-25 | End: 2025-05-25 | Stop reason: SURG

## 2025-05-25 RX ORDER — FAMOTIDINE 10 MG/ML
INJECTION, SOLUTION INTRAVENOUS AS NEEDED
Status: DISCONTINUED | OUTPATIENT
Start: 2025-05-25 | End: 2025-05-25 | Stop reason: SURG

## 2025-05-25 RX ORDER — PROPOFOL 10 MG/ML
INJECTION, EMULSION INTRAVENOUS AS NEEDED
Status: DISCONTINUED | OUTPATIENT
Start: 2025-05-25 | End: 2025-05-25 | Stop reason: SURG

## 2025-05-25 RX ORDER — MIDAZOLAM HYDROCHLORIDE 1 MG/ML
1 INJECTION, SOLUTION INTRAMUSCULAR; INTRAVENOUS
Status: DISCONTINUED | OUTPATIENT
Start: 2025-05-25 | End: 2025-05-25 | Stop reason: HOSPADM

## 2025-05-25 RX ORDER — SODIUM CHLORIDE, SODIUM LACTATE, POTASSIUM CHLORIDE, CALCIUM CHLORIDE 600; 310; 30; 20 MG/100ML; MG/100ML; MG/100ML; MG/100ML
100 INJECTION, SOLUTION INTRAVENOUS ONCE AS NEEDED
Status: DISCONTINUED | OUTPATIENT
Start: 2025-05-25 | End: 2025-05-25 | Stop reason: HOSPADM

## 2025-05-25 RX ORDER — SODIUM CHLORIDE 0.9 % (FLUSH) 0.9 %
10 SYRINGE (ML) INJECTION AS NEEDED
Status: DISCONTINUED | OUTPATIENT
Start: 2025-05-25 | End: 2025-05-25 | Stop reason: HOSPADM

## 2025-05-25 RX ORDER — FENTANYL CITRATE 50 UG/ML
50 INJECTION, SOLUTION INTRAMUSCULAR; INTRAVENOUS
Status: DISCONTINUED | OUTPATIENT
Start: 2025-05-25 | End: 2025-05-25 | Stop reason: HOSPADM

## 2025-05-25 RX ADMIN — Medication 10 ML: at 09:56

## 2025-05-25 RX ADMIN — FENTANYL CITRATE 100 MCG: 50 INJECTION INTRAMUSCULAR; INTRAVENOUS at 12:03

## 2025-05-25 RX ADMIN — RANOLAZINE 500 MG: 500 TABLET, FILM COATED, EXTENDED RELEASE ORAL at 09:56

## 2025-05-25 RX ADMIN — EMPAGLIFLOZIN 25 MG: 25 TABLET, FILM COATED ORAL at 09:54

## 2025-05-25 RX ADMIN — RANOLAZINE 500 MG: 500 TABLET, FILM COATED, EXTENDED RELEASE ORAL at 21:06

## 2025-05-25 RX ADMIN — LACTULOSE 30 G: 20 SOLUTION ORAL at 09:54

## 2025-05-25 RX ADMIN — ONDANSETRON 4 MG: 2 INJECTION INTRAMUSCULAR; INTRAVENOUS at 11:43

## 2025-05-25 RX ADMIN — FAMOTIDINE 20 MG: 10 INJECTION, SOLUTION INTRAVENOUS at 11:43

## 2025-05-25 RX ADMIN — DOCUSATE SODIUM 50MG AND SENNOSIDES 8.6MG 1 TABLET: 8.6; 5 TABLET, FILM COATED ORAL at 21:06

## 2025-05-25 RX ADMIN — ALPRAZOLAM 0.5 MG: 0.5 TABLET ORAL at 23:19

## 2025-05-25 RX ADMIN — METOPROLOL TARTRATE 12.5 MG: 25 TABLET, FILM COATED ORAL at 21:06

## 2025-05-25 RX ADMIN — NICOTINE 1 PATCH: 21 PATCH TRANSDERMAL at 09:54

## 2025-05-25 RX ADMIN — FENTANYL CITRATE 100 MCG: 50 INJECTION INTRAMUSCULAR; INTRAVENOUS at 11:43

## 2025-05-25 RX ADMIN — DOCUSATE SODIUM 50MG AND SENNOSIDES 8.6MG 1 TABLET: 8.6; 5 TABLET, FILM COATED ORAL at 09:55

## 2025-05-25 RX ADMIN — ASPIRIN 81 MG: 81 TABLET, DELAYED RELEASE ORAL at 09:55

## 2025-05-25 RX ADMIN — Medication 10 ML: at 21:08

## 2025-05-25 RX ADMIN — PROPOFOL 100 MG: 10 INJECTION, EMULSION INTRAVENOUS at 11:47

## 2025-05-25 RX ADMIN — OXYCODONE HYDROCHLORIDE 10 MG: 10 TABLET ORAL at 21:05

## 2025-05-25 RX ADMIN — FUROSEMIDE 40 MG: 40 TABLET ORAL at 09:55

## 2025-05-25 RX ADMIN — LACTULOSE 30 G: 20 SOLUTION ORAL at 21:05

## 2025-05-25 RX ADMIN — POTASSIUM CHLORIDE 20 MEQ: 1500 TABLET, EXTENDED RELEASE ORAL at 09:55

## 2025-05-25 RX ADMIN — METOPROLOL TARTRATE 12.5 MG: 25 TABLET, FILM COATED ORAL at 09:54

## 2025-05-25 RX ADMIN — LACTULOSE 30 G: 20 SOLUTION ORAL at 16:54

## 2025-05-25 RX ADMIN — OXYCODONE HYDROCHLORIDE 10 MG: 10 TABLET ORAL at 09:59

## 2025-05-25 RX ADMIN — SODIUM CHLORIDE, POTASSIUM CHLORIDE, SODIUM LACTATE AND CALCIUM CHLORIDE: 600; 310; 30; 20 INJECTION, SOLUTION INTRAVENOUS at 11:43

## 2025-05-25 RX ADMIN — CETIRIZINE HYDROCHLORIDE 10 MG: 10 TABLET, FILM COATED ORAL at 09:54

## 2025-05-25 RX ADMIN — SUCCINYLCHOLINE CHLORIDE 100 MG: 20 INJECTION, SOLUTION INTRAMUSCULAR; INTRAVENOUS at 11:47

## 2025-05-25 RX ADMIN — CLOPIDOGREL BISULFATE 75 MG: 75 TABLET, FILM COATED ORAL at 09:55

## 2025-05-25 NOTE — ANESTHESIA PROCEDURE NOTES
Airway  Reason: elective    Date/Time: 5/25/2025 11:47 AM  Airway not difficult    General Information and Staff    Patient location during procedure: OR  CRNA/CAA: Neli Rodriguez CRNA    Indications and Patient Condition  Indications for airway management: airway protection    Preoxygenated: yes  MILS maintained throughout    Mask difficulty assessment: 0 - not attempted    Final Airway Details    Final airway type: endotracheal airway      Successful airway: ETT  Cuffed: yes   Successful intubation technique: direct laryngoscopy and RSI  Endotracheal tube insertion site: oral  Blade: High  Blade size: 2  ETT size (mm): 7.5  Cormack-Lehane Classification: grade I - full view of glottis  Placement verified by: chest auscultation   Measured from: lips  ETT/EBT  to lips (cm): 21  Number of attempts at approach: 1  Assessment: lips, teeth, and gum same as pre-op and atraumatic intubation

## 2025-05-25 NOTE — ANESTHESIA PREPROCEDURE EVALUATION
Anesthesia Evaluation     no history of anesthetic complications:   NPO Solid Status: > 8 hours  NPO Liquid Status: > 8 hours           Airway   Mallampati: II  TM distance: >3 FB  Neck ROM: full  No difficulty expected  Dental    (+) upper dentures    Pulmonary - normal exam   (+) a smoker Current, COPD, asthma,  Cardiovascular - normal exam    (+) hypertension, valvular problems/murmurs, CAD, CABG >6 Months, angina, PVD, hyperlipidemia,  carotid artery disease      Neuro/Psych  (+) CVA, psychiatric history Depression  GI/Hepatic/Renal/Endo    (+) diabetes mellitus    Musculoskeletal     Abdominal  - normal exam    Bowel sounds: normal.   Substance History      OB/GYN          Other   arthritis, blood dyscrasia,   history of cancer                      Anesthesia Plan    ASA 3     general and MAC     intravenous induction     Anesthetic plan, risks, benefits, and alternatives have been provided, discussed and informed consent has been obtained with: patient.        CODE STATUS:    Code Status (Patient has no pulse and is not breathing): CPR (Attempt to Resuscitate)  Medical Interventions (Patient has pulse or is breathing): Full Support

## 2025-05-25 NOTE — ANESTHESIA POSTPROCEDURE EVALUATION
Patient: Melania Mae    Procedure Summary       Date: 05/25/25 Room / Location: [facility] OR  /  [facility] OR    Anesthesia Start: 1143 Anesthesia Stop: 1244    Procedure: DISIMPACTION (Rectum) Diagnosis:       Drug-induced constipation      (Drug-induced constipation [K59.03])    Surgeons: Francy Meléndez MD Provider: Ignacio Aviles MD    Anesthesia Type: general, MAC ASA Status: 3            Anesthesia Type: general, MAC    Vitals  Vitals Value Taken Time   /69 05/25/25 13:10   Temp 98.7 °F (37.1 °C) 05/25/25 13:10   Pulse 77 05/25/25 13:10   Resp 20 05/25/25 13:10   SpO2 98 % 05/25/25 13:10           Post Anesthesia Care and Evaluation    Patient location during evaluation: bedside  Patient participation: complete - patient participated  Level of consciousness: awake and alert  Pain score: 1  Pain management: adequate    Airway patency: patent  Anesthetic complications: No anesthetic complications  PONV Status: none  Cardiovascular status: acceptable  Respiratory status: acceptable  Hydration status: acceptable

## 2025-05-25 NOTE — PLAN OF CARE
Goal Outcome Evaluation:  Plan of Care Reviewed With: patient        Patient resting in bed during shift. VSS on room air. Patient ambulated independently in room during shift. Patient complained of pain, PRN meds given. NPO at midnight this shift. No acute changes noted at this time. Will continue with plan of care.

## 2025-05-25 NOTE — OP NOTE
Fecal disimpaction, rigid sigmoidoscopy    Pre-op: Constipation    Post-op:  Same    Surgeon:  Francy Meléndez M.D., F.A.C.S.    Assistant:  None    Anesthesia:  general      Indications: Constipation       Procedure Details   After obtaining informed consent and receiving preoperative antibiotics and with venous compression boots in place, the patient was taken to the operating room and placed under anesthesia.  Patient was placed in lithotomy position.  Digital rectal examination demonstrated a moderate amount of stool in the vault.  This was manually disimpacted.  However there was a large amount of stool higher up throughout the sigmoid colon.  Rigid sigmoid scope was introduced.  Using the suction device attempt was made to break up the hard stool higher up and to suction out a lot of the stool.  Scope was advanced to approximately 15 cm.    Findings:         Estimated Blood Loss:  Minimal    Blood Administered: none           Drains: none           Specimens: None     Grafts and Implants: No       Complications:  none           Disposition: PACU - hemodynamically stable.           Condition: stable

## 2025-05-26 ENCOUNTER — READMISSION MANAGEMENT (OUTPATIENT)
Dept: CALL CENTER | Facility: HOSPITAL | Age: 60
End: 2025-05-26
Payer: MEDICARE

## 2025-05-26 VITALS
HEART RATE: 85 BPM | HEIGHT: 64 IN | SYSTOLIC BLOOD PRESSURE: 103 MMHG | DIASTOLIC BLOOD PRESSURE: 50 MMHG | WEIGHT: 106.2 LBS | TEMPERATURE: 97.6 F | BODY MASS INDEX: 18.13 KG/M2 | RESPIRATION RATE: 20 BRPM | OXYGEN SATURATION: 96 %

## 2025-05-26 PROCEDURE — 63710000001 SENNOSIDES-DOCUSATE 8.6-50 MG TABLET: Performed by: SURGERY

## 2025-05-26 PROCEDURE — 63710000001 CETIRIZINE 10 MG TABLET: Performed by: SURGERY

## 2025-05-26 PROCEDURE — A9270 NON-COVERED ITEM OR SERVICE: HCPCS | Performed by: SURGERY

## 2025-05-26 PROCEDURE — 63710000001 NICOTINE 21 MG/24HR PATCH 24 HOUR: Performed by: SURGERY

## 2025-05-26 PROCEDURE — 99239 HOSP IP/OBS DSCHRG MGMT >30: CPT | Performed by: INTERNAL MEDICINE

## 2025-05-26 PROCEDURE — 63710000001 EMPAGLIFLOZIN 25 MG TABLET: Performed by: SURGERY

## 2025-05-26 PROCEDURE — 63710000001 RANOLAZINE 500 MG TABLET SUSTAINED-RELEASE 12 HOUR: Performed by: SURGERY

## 2025-05-26 PROCEDURE — 63710000001 ASPIRIN 81 MG TABLET DELAYED-RELEASE: Performed by: SURGERY

## 2025-05-26 PROCEDURE — 63710000001 ALPRAZOLAM 0.5 MG TABLET: Performed by: SURGERY

## 2025-05-26 PROCEDURE — 63710000001 OXYCODONE 10 MG TABLET: Performed by: SURGERY

## 2025-05-26 PROCEDURE — 63710000001 LACTULOSE 10 GM/15ML SOLUTION: Performed by: SURGERY

## 2025-05-26 PROCEDURE — 63710000001 CLOPIDOGREL 75 MG TABLET: Performed by: SURGERY

## 2025-05-26 PROCEDURE — 63710000001 METOPROLOL TARTRATE 25 MG TABLET: Performed by: SURGERY

## 2025-05-26 PROCEDURE — 63710000001 POTASSIUM CHLORIDE 20 MEQ TABLET CONTROLLED-RELEASE: Performed by: SURGERY

## 2025-05-26 PROCEDURE — G0378 HOSPITAL OBSERVATION PER HR: HCPCS

## 2025-05-26 PROCEDURE — 63710000001 FUROSEMIDE 40 MG TABLET: Performed by: SURGERY

## 2025-05-26 RX ORDER — NICOTINE 21 MG/24HR
1 PATCH, TRANSDERMAL 24 HOURS TRANSDERMAL
Qty: 30 EACH | Refills: 0 | Status: SHIPPED | OUTPATIENT
Start: 2025-05-27

## 2025-05-26 RX ADMIN — CLOPIDOGREL BISULFATE 75 MG: 75 TABLET, FILM COATED ORAL at 09:31

## 2025-05-26 RX ADMIN — RANOLAZINE 500 MG: 500 TABLET, FILM COATED, EXTENDED RELEASE ORAL at 09:30

## 2025-05-26 RX ADMIN — Medication 10 ML: at 09:32

## 2025-05-26 RX ADMIN — FUROSEMIDE 40 MG: 40 TABLET ORAL at 09:30

## 2025-05-26 RX ADMIN — CETIRIZINE HYDROCHLORIDE 10 MG: 10 TABLET, FILM COATED ORAL at 09:30

## 2025-05-26 RX ADMIN — ALPRAZOLAM 0.5 MG: 0.5 TABLET ORAL at 11:26

## 2025-05-26 RX ADMIN — ASPIRIN 81 MG: 81 TABLET, DELAYED RELEASE ORAL at 09:32

## 2025-05-26 RX ADMIN — NICOTINE 1 PATCH: 21 PATCH TRANSDERMAL at 09:32

## 2025-05-26 RX ADMIN — POTASSIUM CHLORIDE 20 MEQ: 1500 TABLET, EXTENDED RELEASE ORAL at 09:30

## 2025-05-26 RX ADMIN — LACTULOSE 30 G: 20 SOLUTION ORAL at 09:28

## 2025-05-26 RX ADMIN — METOPROLOL TARTRATE 12.5 MG: 25 TABLET, FILM COATED ORAL at 09:30

## 2025-05-26 RX ADMIN — OXYCODONE HYDROCHLORIDE 10 MG: 10 TABLET ORAL at 06:40

## 2025-05-26 RX ADMIN — EMPAGLIFLOZIN 25 MG: 25 TABLET, FILM COATED ORAL at 09:30

## 2025-05-26 RX ADMIN — DOCUSATE SODIUM 50MG AND SENNOSIDES 8.6MG 1 TABLET: 8.6; 5 TABLET, FILM COATED ORAL at 09:30

## 2025-05-26 NOTE — DISCHARGE SUMMARY
[facility] DISCHARGE SUMMARY      Date of Admission: 5/23/2025    Date of Discharge:  05/26/25    PCP: Claudia Celeste APRN    Admission Diagnosis: Constipation    Discharge Diagnosis:     Constipation:  - likely secondary to narcotic use.  -  Status post surgical fecal disimpaction with sigmoidoscopy and resolution.  -  Patient advised to limit her narcotic use.      COPD, not in exacerbation.      Depression, stable.      Diabetes mellitus type 2 with fingerstick glucose controlled.      Hyperlipidemia     Hypertension     History of CLL:  - outpatient follow-up with oncologist advised.      History of non-Hodgkin's lymphoma:  - outpatient follow-up with her oncologist advised.    Procedures Performed:   Rigid sigmoidoscopy with fecal disimpaction.     Consults:   Consults       Date and Time Order Name Status Description    5/24/2025 10:01 AM Inpatient General Surgery Consult Completed     5/23/2025  9:49 AM Hospitalist (on-call MD unless specified)                History of Present Illness:  Melania Mae is a 60 y.o. female who presented to [facility] ED with CC of   Chief Complaint   Patient presents with    Abdominal Pain          Hospital Course  Melania Mae is a 60-year-old female with history significant for chronic back pain on narcotics, history of CLL, COPD, depression, diabetes mellitus type 2, hyperlipidemia, hypertension, among other comorbidities who had presented to the emergency room with abdominal pain and constipation.  She reported having had her last bowel movement about 3 weeks prior to this admission.  Since then, she has been having worsening abdominal pain, distention and tenderness.  She also reported loss of appetite as well as nausea and vomiting.  She had tried multiple over-the-counter bowel regimen to no avail.  She also tried MiraLax and lactulose without help.  She attempted to use enemas but was not able to use it correctly due to associated  abdominal pain and hard stool.  Patient then had a CT abdomen and pelvis done on admission which revealed large amount of colonic stool including large amount within the rectal vault.  The SMA showed a stent proximally with extensive calcific plaque noted more distally.  Patient was then admitted under the hospitalist service.  She was started on IV fluids and also ordered for soapsuds enema and continued on lactulose.This was after manual disimpaction was tried in the emergency room to no avail.  She was also placed on supportive care.  Her workup on admission revealed hypokalemia and she was repleted with potassium.  She also had mild metabolic acidosis which improved some prior to discharge.  Despite all the aforementioned interventions including enema, patient was unable to move her bowel.  Therefore, General surgery was consulted for their help.  She was then seen in consult by General surgery and was taken in for surgical fecal disimpaction using rigid sigmoidoscopy which was done yesterday.  Then afterwards, she had lots of bowel movement by herself including today.  As of today, her abdominal pain and distention had resolved.  She was initially started on clear liquids and by this afternoon, she was requesting for solid diet which she had this afternoon and tolerated well.  Therefore, plan was made to discharge her home.    Patient was seen and evaluated by me on the day of discharge.  She denied having any chest pain, shortness of breath, fever or chills.  She was deemed to have maximized her hospital stay and plan was made to discharge home.  She was advised to follow-up with her PCP in 5-7 days.  She will also need to follow-up with her oncologist for continued treatment of her CLL and Non-Hodgkin's ymphoma.    Pertinent Test Results:    Condition on Discharge:  Stable    Vital Signs  Vitals:    05/26/25 1458   BP: 103/50   Pulse: 85   Resp: 20   Temp: 97.6 °F (36.4 °C)   SpO2:        Physical  Exam:  Vital signs reviewed  General: Alert, awake, oriented X3, in no acute distress  HEENT: Normocephalic, atraumatic, anicteric, PERRLA, no ear discharge, oral mucosa moist  Neck: Soft and supple with no JVD  Respiratory: Clear to auscultation bilaterally  Cardiovascular: S1, S2, appreciated, no murmur  Abdomen: Nondistended, nontender, bowel sounds appreciated  Genitourinary: No suprapubic tenderness and no CVA tenderness  Extremities: No bipedal edema with distal pulses palpable bilaterally  Musculoskeletal: No joint swelling or joint tenderness noted  Skin: No rashes noted  Neuro: Nonfocal examination  Psych: Mood and affect appropriate  Discharge Disposition:   home      Discharge Medications:     Discharge Medications        New Medications        Instructions Start Date   nicotine 21 MG/24HR patch  Commonly known as: NICODERM CQ   1 patch, Transdermal, Every 24 Hours Scheduled   Start Date: May 27, 2025            Continue These Medications        Instructions Start Date   allopurinol 100 MG tablet  Commonly known as: ZYLOPRIM   100 mg, Oral, Every 8 Hours      ALPRAZolam 1 MG tablet  Commonly known as: XANAX   0.5 mg, Oral, 2 Times Daily      aspirin 81 MG EC tablet   81 mg, Oral, Daily      Claritin 10 MG tablet  Generic drug: loratadine   10 mg, Oral, Daily      clopidogrel 75 MG tablet  Commonly known as: PLAVIX   take 1 tablet by mouth every day for circulation      dapagliflozin Propanediol 10 MG tablet   10 mg, Oral, Daily      furosemide 40 MG tablet  Commonly known as: LASIX   take 1 tablet by mouth every day for fluid      gabapentin 800 MG tablet  Commonly known as: NEURONTIN   800 mg, Oral, 4 Times Daily PRN      lactulose 10 GM/15ML solution  Commonly known as: CHRONULAC   30 g, Oral, Every 8 Hours PRN      Methylnaltrexone Bromide 150 MG tablet  Commonly known as: RELISTOR   3 tablets, Oral, Daily      metoprolol tartrate 25 MG tablet  Commonly known as: LOPRESSOR   12.5 mg, Oral, 2 Times  Daily      Naloxegol Oxalate 25 MG tablet  Commonly known as: Movantik   25 mg, Oral, Every Morning      ondansetron 4 MG tablet  Commonly known as: Zofran   4 mg, Oral, Every 8 Hours PRN      polyethylene glycol 17 g packet  Commonly known as: MIRALAX   17 g, Oral, 2 Times Daily      potassium chloride 20 MEQ CR tablet  Commonly known as: KLOR-CON M20   20 mEq, Oral, Daily      ranolazine 500 MG 12 hr tablet  Commonly known as: Ranexa   500 mg, Oral, 2 Times Daily      Semaglutide (1 MG/DOSE) 4 MG/3ML solution pen-injector  Commonly known as: OZEMPIC   1 mg, Subcutaneous, Weekly      sennosides-docusate 8.6-50 MG per tablet  Commonly known as: PERICOLACE   1 tablet, Oral, 2 Times Daily             Stop These Medications      oxyCODONE 15 MG immediate release tablet  Commonly known as: ROXICODONE     oxyCODONE 20 MG tablet  Commonly known as: ROXICODONE                Discharge Diet:   diabetic diet  Dietary Orders (From admission, onward)       Start     Ordered    05/26/25 1305  Diet: Cardiac; Healthy Heart (2-3 Na+); Fluid Consistency: Thin (IDDSI 0)  Diet Effective Now        References:    Diet Order Definitions   Question Answer Comment   Diets: Cardiac    Cardiac Diet: Healthy Heart (2-3 Na+)    Fluid Consistency: Thin (IDDSI 0)        05/26/25 1304                    Activity at Discharge:  activity as tolerated    Follow-up Appointments:   Follow-up Information       Claudia Celeste, APRMIS .    Specialty: Family Medicine  Contact information:  [address]  [facility] [address] [phone]               Gillian Harrison, APRN .    Specialty: Family Medicine  Contact information:  [address]  [facility] [address] [phone]                           Your Scheduled Appointments      Aug 07, 2025 1:45 PM  Follow Up with Ned Flores MD  [facility] CARDIOLOGY [facility]) [address] [facility] [address]  [phone]   -Bring photo ID, insurance card, and  list of medications to appointment  -If testing was completed outside of [facility] then patient must bring images on a disc  -Copay will be collected at time of appointment         Aug 11, 2025 10:30 AM  CAROTID VAS ULTRASOUND VISIT with OP CRISTY VASC CART RM 6  [facility] ([place]) [address]  [facility] [address]  [phone]   Exam time is 1 hour.         Aug 11, 2025 1:00 PM  Follow Up with Lori Zayas PA-C  [facility] ([place]) [address]  [facility] [id number] [phone]   Bring any new external medical records and films, along with current insurance information                  Test Results Pending at Discharge:       The ASCVD Risk score (Jessy AGUIRRE, et al., 2019) failed to calculate for the following reasons:    Risk score cannot be calculated because patient has a medical history suggesting prior/existing ASCVD      Skinny Gimenez DO  05/26/25  16:19 EDT      Time: Greater than 30 minutes spent on this discharge.

## 2025-05-26 NOTE — PROGRESS NOTES
Nutrition Services    Patient Name:  Melania Mae  YOB: 1965  MRN: [id number]  Admit Date:  5/23/2025    Malnutrition Severity Assessment      Patient meets criteria for : Severe Malnutrition  Malnutrition Type (Last 8 Hours)       Malnutrition Severity Assessment       Row Name 05/26/25 1344       Malnutrition Severity Assessment    Malnutrition Type Chronic Disease - Related Malnutrition      Row Name 05/26/25 1344       Insufficient Energy Intake     Insufficient Energy Intake Findings Severe    Insufficient Energy Intake  <75% of est. energy requirement for > or equal to 1 month      Row Name 05/26/25 1344       Unintentional Weight Loss     Unintentional Weight Loss Findings Severe    Unintentional Weight Loss  Weight loss greater than 10% in six months      Row Name 05/26/25 1344       Muscle Loss    Loss of Muscle Mass Findings Severe    [place] Severe - deep hollowing/scooping, lack of muscle to touch, facial bones well defined    Clavicle Bone Region Severe - protruding prominent bone    Acromion Bone Region Severe - squared shoulders, bones, and acromion process protrusion prominent    Scapular Bone Region Severe - prominent bones, depressions easily visible between ribs, scapula, spine, shoulders    Dorsal Hand Region Severe - prominent depression    Patellar Region Severe - prominent bone, square looking, very little muscle definition    Anterior Thigh Region Severe - line/depression along thigh, obviously thin    Posterior Calf Region Moderate - some roundness, slight firmness      Row Name 05/26/25 134       Fat Loss    Subcutaneous Fat Loss Findings Severe    Orbital Region  Severe - pronounced hollowness/depression, dark circles, loose saggy skin    Upper Arm Region Severe - mostly skin, very little space between folds, fingers touch    Thoracic & Lumbar Region Severe - ribs visible with prominent depressions, iliac crest very prominent      Row Name 05/26/25 1348        Criteria Met (Must meet criteria for severity in at least 2 of these categories: M Wasting, Fat Loss, Fluid, Secondary Signs, Wt. Status, Intake)    Patient meets criteria for  Severe Malnutrition                         Electronically signed by:  Katie Christianson RD  05/26/25 13:51 EDT

## 2025-05-26 NOTE — PLAN OF CARE
Goal Outcome Evaluation:  Plan of Care Reviewed With: patient        Progress: no change  Outcome Evaluation: Patient resting in bed during shift. VSS on room air. Patient ambulated independently during shift. Patient complained of pain, PRN meds given. Patient had multiple formed stools during shift. No acute changes noted at this time. Will continue with plan of care.

## 2025-05-26 NOTE — PLAN OF CARE
Goal Outcome Evaluation:  Plan of Care Reviewed With: patient        Progress: no change  Outcome Evaluation: Pt has ambulated outside of room. VSS on RA. Pt had multiple BM this shift. Diet advanced, tolerating well. No acute changes. Will continue POC.

## 2025-05-26 NOTE — OUTREACH NOTE
Prep Survey      Flowsheet Row Responses   [facility] patient discharged from? [facility]   Is LACE score < 7 ? No   Eligibility Readm Mgmt   Discharge diagnosis Constipation, DISIMPACTION under anesthesia   Does the patient have one of the following disease processes/diagnoses(primary or secondary)? Other   Does the patient have Home health ordered? No   Is there a DME ordered? No   Prep survey completed? Yes            Gunjan FREITAS - Registered Nurse

## 2025-05-27 ENCOUNTER — TELEPHONE (OUTPATIENT)
Dept: MEDSURG UNIT | Facility: HOSPITAL | Age: 60
End: 2025-05-27
Payer: MEDICARE

## 2025-06-06 ENCOUNTER — READMISSION MANAGEMENT (OUTPATIENT)
Dept: CALL CENTER | Facility: HOSPITAL | Age: 60
End: 2025-06-06
Payer: MEDICARE

## 2025-06-06 NOTE — OUTREACH NOTE
Medical Week 2 Survey      Flowsheet Row Responses   [facility] patient discharged from? [facility]   Does the patient have one of the following disease processes/diagnoses(primary or secondary)? Other   Week 2 attempt successful? Yes   Call start time 1016   Discharge diagnosis Constipation, DISIMPACTION under anesthesia   Call end time 1018   Meds reviewed with patient/caregiver? Yes   Is the patient having any side effects they believe may be caused by any medication additions or changes? No   Does the patient have all medications ordered at discharge? Yes   Is the patient taking all medications as directed (includes completed medication regime)? Yes   Does the patient have a primary care provider?  Yes   Has the patient kept scheduled appointments due by today? Yes   Psychosocial issues? No   What is the patient's perception of their health status since discharge? Improving   Is the patient/caregiver able to teach back the hierarchy of who to call/visit for symptoms/problems? PCP, Specialist, Home health nurse, Urgent Care, ED, 911 Yes   Additional teach back comments States she is doing better and no more issues with constipation.   Week 2 Call Completed? Yes   Graduated Yes   Graduated/Revoked comments Call was brief with no questions or needs at this time.   Call end time 1018            Jo Ann GHOSH - Licensed Nurse

## 2025-06-17 RX ORDER — RANOLAZINE 500 MG/1
TABLET, EXTENDED RELEASE ORAL
Qty: 60 TABLET | Refills: 11 | Status: SHIPPED | OUTPATIENT
Start: 2025-06-17

## 2025-06-29 VITALS
DIASTOLIC BLOOD PRESSURE: 76 MMHG | BODY MASS INDEX: 18.44 KG/M2 | RESPIRATION RATE: 20 BRPM | SYSTOLIC BLOOD PRESSURE: 177 MMHG | HEIGHT: 64 IN | OXYGEN SATURATION: 95 % | TEMPERATURE: 98.3 F | HEART RATE: 92 BPM | WEIGHT: 108 LBS

## 2025-06-29 PROCEDURE — 99283 EMERGENCY DEPT VISIT LOW MDM: CPT

## 2025-06-30 ENCOUNTER — HOSPITAL ENCOUNTER (EMERGENCY)
Facility: HOSPITAL | Age: 60
Discharge: HOME | End: 2025-06-30
Admitting: EMERGENCY MEDICINE
Payer: MEDICARE
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
## 2025-06-30 PROCEDURE — 90715 TDAP VACCINE 7 YRS/> IM: CPT | Performed by: PHYSICIAN ASSISTANT

## 2025-06-30 PROCEDURE — 90471 IMMUNIZATION ADMIN: CPT | Performed by: PHYSICIAN ASSISTANT

## 2025-06-30 PROCEDURE — 25010000002 TETANUS-DIPHTH-ACELL PERTUSSIS 5-2.5-18.5 LF-MCG/0.5 SUSPENSION PREFILLED SYRINGE: Performed by: PHYSICIAN ASSISTANT

## 2025-06-30 RX ORDER — DOXYCYCLINE 100 MG/1
100 CAPSULE ORAL 2 TIMES DAILY
Qty: 20 CAPSULE | Refills: 0 | Status: SHIPPED | OUTPATIENT
Start: 2025-06-30

## 2025-06-30 RX ADMIN — TETANUS TOXOID, REDUCED DIPHTHERIA TOXOID AND ACELLULAR PERTUSSIS VACCINE, ADSORBED 0.5 ML: 5; 2.5; 8; 8; 2.5 SUSPENSION INTRAMUSCULAR at 00:18

## 2025-06-30 NOTE — ED PROVIDER NOTES
"Subjective   History of Present Illness  60-year-old female with past medical history of carotid artery occlusion, asthma, chronic lymphocytic leukemia, COPD, degenerative arthritis, depression, diabetes, hyperlipidemia, hypertension, non-Hodgkin's lymphoma, and peripheral vascular disease presents to the emergency room with a left lower extremity wound.  Patient states that her cat was climbing on the porch and missed the porch and grabbed her leg instead.  She states that her cat lives mostly inside.  Patient states she is not up-to-date on tetanus.  She denies any other injuries, complaints, or concerns at this time.    History provided by:  Patient   used: No        Review of Systems   Constitutional: Negative.  Negative for fever.   HENT: Negative.     Respiratory: Negative.     Cardiovascular: Negative.  Negative for chest pain.   Gastrointestinal: Negative.  Negative for abdominal pain.   Endocrine: Negative.    Genitourinary: Negative.  Negative for dysuria.   Skin:  Positive for wound.   Neurological: Negative.    Psychiatric/Behavioral: Negative.     All other systems reviewed and are negative.      Past Medical History:   Diagnosis Date    Aneurysm 8/223/17    Cavernous left ICA aneurysm    Asthma     Carotid artery occlusion     Chronic back pain     CLL (chronic lymphocytic leukemia)     OCTOBER OF 2012    COPD (chronic obstructive pulmonary disease)     Degenerative arthritis     Depression     Diabetes mellitus     type 2    H/O blood clots     ovary    Heart murmur     History of transfusion     Hyperlipidemia     Hypertension     Lymphadenopathy     Non Hodgkin's lymphoma     PVD (peripheral vascular disease)     Urinary frequency     UTI (urinary tract infection) 12/13/2023       Allergies   Allergen Reactions    Codeine Shortness Of Breath    Rituxan [Rituximab] Shortness Of Breath and Other (See Comments)     \"THROAT RAWNESS; FELT LIKE MY THROAT WAS CLOSING UP\"    " "Methocarbamol Rash    Penicillins Other (See Comments)     \"UNKNOWN CHILDHOOD ALLERGY\"       Past Surgical History:   Procedure Laterality Date    AORTOGRAM N/A 10/18/2023    Procedure: AORTAGRAM, BILATERAL COMMON ILIAC ARTERY STENTS, SUPERIOR MESENTERIC  ARTERY STENT;  Surgeon: Filiberto Alcantara MD;  Location: [facility];  Service: Vascular;  Laterality: N/A;  109 mGy  4 MIN 06 SECS  40mL CONTRAST    APPENDECTOMY      ARTERIOGRAM N/A 3/6/2024    Procedure: Renal Arteriogram;  Surgeon: Martín Tobin MD;  Location:  [facility] CATH INVASIVE LOCATION;  Service: Cardiovascular;  Laterality: N/A;  bilateral KYMBERLY,  PAD with bilateral iliac stents, SMA stent    BACK SURGERY      2002 (work accident) c-spine surgery 4/14    CARDIAC CATHETERIZATION N/A 09/20/2023    Procedure: Left Heart Cath;  Surgeon: Martín Tobin MD;  Location: [facility] CATH INVASIVE LOCATION;  Service: Cardiovascular;  Laterality: N/A;    CARDIAC CATHETERIZATION N/A 3/6/2024    Procedure: Stent BMS peripheral;  Surgeon: Martín Tobin MD;  Location:  [facility] CATH INVASIVE LOCATION;  Service: Cardiovascular;  Laterality: N/A;    CARDIAC CATHETERIZATION N/A 6/28/2024    Procedure: Left Heart Cath;  Surgeon: Martín Tobin MD;  Location: [facility] CATH INVASIVE LOCATION;  Service: Cardiology;  Laterality: N/A;    CAROTID STENT Right 08/23/2017    CEREBRAL ANGIOGRAM N/A 08/23/2017    Diagnostic Carotid/Cerebral Angiogram    CHOLECYSTECTOMY      COLONOSCOPY      DISIMPACTION N/A 5/25/2025    Procedure: DISIMPACTION;  Surgeon: Francy Melédnez MD;  Location: [facility] OR;  Service: General;  Laterality: N/A;    HYSTERECTOMY      for endometriosis/ovarian bleed    INTERVENTIONAL RADIOLOGY PROCEDURE N/A 08/02/2023    Procedure: Abdominal Aortagram with Runoff;  Surgeon: Jeffry Ackerman MD;  Location: [facility] CATH INVASIVE LOCATION;  Service: Interventional Radiology;  Laterality: N/A;    INTERVENTIONAL RADIOLOGY PROCEDURE Bilateral 08/02/2023    Procedure: " Carotid Cerebral Angiogram;  Surgeon: Jeffry Ackerman MD;  Location:  [facility] CATH INVASIVE LOCATION;  Service: Interventional Radiology;  Laterality: Bilateral;    INTERVENTIONAL RADIOLOGY PROCEDURE N/A 6/28/2024    Procedure: Intravascular Ultrasound;  Surgeon: Martín Tobin MD;  Location:  [facility] CATH INVASIVE LOCATION;  Service: Cardiology;  Laterality: N/A;    LUMBAR FUSION      NECK SURGERY      Neck hardware    OOPHORECTOMY Left     OTHER SURGICAL HISTORY      pac insertion and removal    TONSILLECTOMY         Family History   Problem Relation Age of Onset    Hypertension Mother     Lung cancer Father 72    Hypertension Father     Heart disease Father     Cancer Father     Diabetes Father     Other Brother 46        MI    Heart disease Brother     Breast cancer Paternal Aunt     Colon cancer Maternal Grandfather     Other Cousin         CLL (dx 48 yo)       Social History     Socioeconomic History    Marital status:      Spouse name: denies    Number of children: 3    Years of education: some college    Highest education level: Some college, no degree   Tobacco Use    Smoking status: Every Day     Current packs/day: 0.50     Average packs/day: 0.5 packs/day for 35.0 years (17.5 ttl pk-yrs)     Types: Cigarettes     Passive exposure: Current    Smokeless tobacco: Never   Vaping Use    Vaping status: Never Used   Substance and Sexual Activity    Alcohol use: Yes     Comment: once every year or two.    Drug use: Not Currently     Types: Marijuana    Sexual activity: Not Currently           Objective   Physical Exam  Vitals and nursing note reviewed.   Constitutional:       General: She is not in acute distress.     Appearance: She is well-developed. She is not diaphoretic.   HENT:      Head: Normocephalic and atraumatic.      Right Ear: External ear normal.      Left Ear: External ear normal.      Nose: Nose normal.   Eyes:      Conjunctiva/sclera: Conjunctivae normal.      Pupils: Pupils are equal,  round, and reactive to light.   Neck:      Vascular: No JVD.      Trachea: No tracheal deviation.   Cardiovascular:      Rate and Rhythm: Normal rate and regular rhythm.      Heart sounds: Normal heart sounds. No murmur heard.  Pulmonary:      Effort: Pulmonary effort is normal. No respiratory distress.      Breath sounds: Normal breath sounds. No wheezing.   Abdominal:      General: Bowel sounds are normal.      Palpations: Abdomen is soft.      Tenderness: There is no abdominal tenderness.   Musculoskeletal:         General: No deformity. Normal range of motion.      Cervical back: Normal range of motion and neck supple.   Skin:     General: Skin is warm and dry.      Coloration: Skin is not pale.      Findings: Abrasion present. No erythema or rash.          Neurological:      Mental Status: She is alert and oriented to person, place, and time.      Cranial Nerves: No cranial nerve deficit.   Psychiatric:         Behavior: Behavior normal.         Thought Content: Thought content normal.         Procedures           ED Course                                                       Medical Decision Making  - Uncomplicated ED course.  -Patient remained hemodynamically stable throughout entire duration of ED course.  -Patient wound cleansed thoroughly by ER tech.  -Bleeding did subside with a pressure dressing therefore clean pressure dressing reapplied.  -Patient received tetanus booster in the ED.  -Patient discharged on doxycycline due to having a penicillin allergy.  -Patient encouraged to return to the emergency room with new or worsening symptoms.    Problems Addressed:  Cat scratch: complicated acute illness or injury    Risk  Prescription drug management.        Final diagnoses:   Cat scratch       ED Disposition  ED Disposition       ED Disposition   Discharge    Condition   Stable    Comment   --               Claudia Celeste, APRN  [address]  [facility] [address] [phone]    In 2  days           Medication List        New Prescriptions      doxycycline 100 MG capsule  Commonly known as: MONODOX  Take 1 capsule by mouth 2 (Two) Times a Day.               Where to Get Your Medications        These medications were sent to [facility], [place] - [address] - [phone] [facility] [phone]   [address], [facility] [address]      Phone: [phone]   doxycycline 100 MG capsule            Dulce Maria Perez, PAWeiC  06/30/25 0131

## 2025-06-30 NOTE — ED NOTES
Undressed wound, cleaned with hibiclens scrub, irrigated with saline flush. Redressed with non-adherent pad, 4x4 gauze, and wrapped with 4in ace wrap. Pt tolerated well.

## 2025-07-02 ENCOUNTER — APPOINTMENT (OUTPATIENT)
Dept: CT IMAGING | Facility: HOSPITAL | Age: 60
End: 2025-07-02
Payer: MEDICARE

## 2025-07-02 ENCOUNTER — HOSPITAL ENCOUNTER (EMERGENCY)
Facility: HOSPITAL | Age: 60
Discharge: HOME | End: 2025-07-03
Attending: STUDENT IN AN ORGANIZED HEALTH CARE EDUCATION/TRAINING PROGRAM | Admitting: STUDENT IN AN ORGANIZED HEALTH CARE EDUCATION/TRAINING PROGRAM
Payer: MEDICARE

## 2025-07-02 DIAGNOSIS — R20.2 PARESTHESIA OF UPPER AND LOWER EXTREMITIES OF BOTH SIDES: Primary | ICD-10-CM

## 2025-07-02 LAB
ALBUMIN SERPL-MCNC: 4.2 G/DL (ref 3.5–5.2)
ALBUMIN/GLOB SERPL: 1.9 G/DL
ALP SERPL-CCNC: 105 U/L (ref 39–117)
ALT SERPL W P-5'-P-CCNC: 15 U/L (ref 1–33)
AMPHET+METHAMPHET UR QL: NEGATIVE
AMPHETAMINES UR QL: NEGATIVE
ANION GAP SERPL CALCULATED.3IONS-SCNC: 12.1 MMOL/L (ref 5–15)
AST SERPL-CCNC: 18 U/L (ref 1–32)
BARBITURATES UR QL SCN: NEGATIVE
BASOPHILS # BLD AUTO: 0.01 10*3/MM3 (ref 0–0.2)
BASOPHILS NFR BLD AUTO: 0.3 % (ref 0–1.5)
BENZODIAZ UR QL SCN: POSITIVE
BILIRUB SERPL-MCNC: 0.2 MG/DL (ref 0–1.2)
BILIRUB UR QL STRIP: NEGATIVE
BUN SERPL-MCNC: 18.5 MG/DL (ref 8–23)
BUN/CREAT SERPL: 13.6 (ref 7–25)
BUPRENORPHINE SERPL-MCNC: NEGATIVE NG/ML
CALCIUM SPEC-SCNC: 8.7 MG/DL (ref 8.6–10.5)
CANNABINOIDS SERPL QL: NEGATIVE
CHLORIDE SERPL-SCNC: 105 MMOL/L (ref 98–107)
CLARITY UR: CLEAR
CO2 SERPL-SCNC: 22.9 MMOL/L (ref 22–29)
COCAINE UR QL: NEGATIVE
COLOR UR: YELLOW
CREAT SERPL-MCNC: 1.36 MG/DL (ref 0.57–1)
DEPRECATED RDW RBC AUTO: 55.8 FL (ref 37–54)
EGFRCR SERPLBLD CKD-EPI 2021: 44.4 ML/MIN/1.73
EOSINOPHIL # BLD AUTO: 0.01 10*3/MM3 (ref 0–0.4)
EOSINOPHIL NFR BLD AUTO: 0.3 % (ref 0.3–6.2)
ERYTHROCYTE [DISTWIDTH] IN BLOOD BY AUTOMATED COUNT: 14.4 % (ref 12.3–15.4)
FENTANYL UR-MCNC: NEGATIVE NG/ML
GLOBULIN UR ELPH-MCNC: 2.2 GM/DL
GLUCOSE SERPL-MCNC: 174 MG/DL (ref 65–99)
GLUCOSE UR STRIP-MCNC: ABNORMAL MG/DL
HCT VFR BLD AUTO: 38.1 % (ref 34–46.6)
HGB BLD-MCNC: 12.3 G/DL (ref 12–15.9)
HGB UR QL STRIP.AUTO: NEGATIVE
HOLD SPECIMEN: NORMAL
HOLD SPECIMEN: NORMAL
IMM GRANULOCYTES # BLD AUTO: 0.01 10*3/MM3 (ref 0–0.05)
IMM GRANULOCYTES NFR BLD AUTO: 0.3 % (ref 0–0.5)
KETONES UR QL STRIP: NEGATIVE
LEUKOCYTE ESTERASE UR QL STRIP.AUTO: NEGATIVE
LYMPHOCYTES # BLD AUTO: 0.74 10*3/MM3 (ref 0.7–3.1)
LYMPHOCYTES NFR BLD AUTO: 22.3 % (ref 19.6–45.3)
MAGNESIUM SERPL-MCNC: 2.3 MG/DL (ref 1.6–2.4)
MCH RBC QN AUTO: 33.8 PG (ref 26.6–33)
MCHC RBC AUTO-ENTMCNC: 32.3 G/DL (ref 31.5–35.7)
MCV RBC AUTO: 104.7 FL (ref 79–97)
METHADONE UR QL SCN: NEGATIVE
MONOCYTES # BLD AUTO: 0.49 10*3/MM3 (ref 0.1–0.9)
MONOCYTES NFR BLD AUTO: 14.8 % (ref 5–12)
NEUTROPHILS NFR BLD AUTO: 2.06 10*3/MM3 (ref 1.7–7)
NEUTROPHILS NFR BLD AUTO: 62 % (ref 42.7–76)
NITRITE UR QL STRIP: NEGATIVE
NRBC BLD AUTO-RTO: 0 /100 WBC (ref 0–0.2)
OPIATES UR QL: NEGATIVE
OXYCODONE UR QL SCN: POSITIVE
PCP UR QL SCN: NEGATIVE
PH UR STRIP.AUTO: 5.5 [PH] (ref 5–8)
PLATELET # BLD AUTO: 91 10*3/MM3 (ref 140–450)
PMV BLD AUTO: 12 FL (ref 6–12)
POTASSIUM SERPL-SCNC: 3.9 MMOL/L (ref 3.5–5.2)
PROT SERPL-MCNC: 6.4 G/DL (ref 6–8.5)
PROT UR QL STRIP: ABNORMAL
RBC # BLD AUTO: 3.64 10*6/MM3 (ref 3.77–5.28)
SODIUM SERPL-SCNC: 140 MMOL/L (ref 136–145)
SP GR UR STRIP: 1.02 (ref 1–1.03)
TRICYCLICS UR QL SCN: NEGATIVE
UROBILINOGEN UR QL STRIP: ABNORMAL
WBC NRBC COR # BLD AUTO: 3.32 10*3/MM3 (ref 3.4–10.8)
WHOLE BLOOD HOLD COAG: NORMAL
WHOLE BLOOD HOLD SPECIMEN: NORMAL

## 2025-07-02 PROCEDURE — 93010 ELECTROCARDIOGRAM REPORT: CPT | Performed by: INTERNAL MEDICINE

## 2025-07-02 PROCEDURE — 80053 COMPREHEN METABOLIC PANEL: CPT | Performed by: NURSE PRACTITIONER

## 2025-07-02 PROCEDURE — 93005 ELECTROCARDIOGRAM TRACING: CPT | Performed by: STUDENT IN AN ORGANIZED HEALTH CARE EDUCATION/TRAINING PROGRAM

## 2025-07-02 PROCEDURE — 70450 CT HEAD/BRAIN W/O DYE: CPT

## 2025-07-02 PROCEDURE — 72131 CT LUMBAR SPINE W/O DYE: CPT | Performed by: RADIOLOGY

## 2025-07-02 PROCEDURE — 99285 EMERGENCY DEPT VISIT HI MDM: CPT

## 2025-07-02 PROCEDURE — 36415 COLL VENOUS BLD VENIPUNCTURE: CPT | Performed by: STUDENT IN AN ORGANIZED HEALTH CARE EDUCATION/TRAINING PROGRAM

## 2025-07-02 PROCEDURE — 72128 CT CHEST SPINE W/O DYE: CPT | Performed by: RADIOLOGY

## 2025-07-02 PROCEDURE — 99284 EMERGENCY DEPT VISIT MOD MDM: CPT

## 2025-07-02 PROCEDURE — 81003 URINALYSIS AUTO W/O SCOPE: CPT | Performed by: NURSE PRACTITIONER

## 2025-07-02 PROCEDURE — 70450 CT HEAD/BRAIN W/O DYE: CPT | Performed by: RADIOLOGY

## 2025-07-02 PROCEDURE — 83735 ASSAY OF MAGNESIUM: CPT | Performed by: NURSE PRACTITIONER

## 2025-07-02 PROCEDURE — 80307 DRUG TEST PRSMV CHEM ANLYZR: CPT | Performed by: NURSE PRACTITIONER

## 2025-07-02 PROCEDURE — 72131 CT LUMBAR SPINE W/O DYE: CPT

## 2025-07-02 PROCEDURE — 72128 CT CHEST SPINE W/O DYE: CPT

## 2025-07-02 PROCEDURE — 72125 CT NECK SPINE W/O DYE: CPT

## 2025-07-02 PROCEDURE — 72125 CT NECK SPINE W/O DYE: CPT | Performed by: RADIOLOGY

## 2025-07-02 PROCEDURE — 85025 COMPLETE CBC W/AUTO DIFF WBC: CPT | Performed by: NURSE PRACTITIONER

## 2025-07-03 ENCOUNTER — APPOINTMENT (OUTPATIENT)
Dept: MRI IMAGING | Facility: HOSPITAL | Age: 60
End: 2025-07-03
Payer: MEDICARE

## 2025-07-03 VITALS
BODY MASS INDEX: 18.44 KG/M2 | HEIGHT: 64 IN | SYSTOLIC BLOOD PRESSURE: 168 MMHG | OXYGEN SATURATION: 92 % | WEIGHT: 108.03 LBS | DIASTOLIC BLOOD PRESSURE: 83 MMHG | TEMPERATURE: 98 F | RESPIRATION RATE: 15 BRPM | HEART RATE: 95 BPM

## 2025-07-03 LAB
QT INTERVAL: 390 MS
QTC INTERVAL: 458 MS

## 2025-07-03 PROCEDURE — 25510000001 GADOPICLENOL 0.5 MMOL/ML SOLUTION: Performed by: STUDENT IN AN ORGANIZED HEALTH CARE EDUCATION/TRAINING PROGRAM

## 2025-07-03 PROCEDURE — 70553 MRI BRAIN STEM W/O & W/DYE: CPT | Performed by: RADIOLOGY

## 2025-07-03 PROCEDURE — 70553 MRI BRAIN STEM W/O & W/DYE: CPT

## 2025-07-03 PROCEDURE — 25810000003 SODIUM CHLORIDE 0.9 % SOLUTION: Performed by: PHYSICIAN ASSISTANT

## 2025-07-03 PROCEDURE — A9573 GADOPICLENOL 0.5 MMOL/ML SOLUTION: HCPCS | Performed by: STUDENT IN AN ORGANIZED HEALTH CARE EDUCATION/TRAINING PROGRAM

## 2025-07-03 PROCEDURE — 25010000002 DEXAMETHASONE PER 1 MG: Performed by: PHYSICIAN ASSISTANT

## 2025-07-03 PROCEDURE — 96374 THER/PROPH/DIAG INJ IV PUSH: CPT

## 2025-07-03 RX ORDER — NICOTINE 21 MG/24HR
1 PATCH, TRANSDERMAL 24 HOURS TRANSDERMAL ONCE
Status: DISCONTINUED | OUTPATIENT
Start: 2025-07-03 | End: 2025-07-03 | Stop reason: HOSPADM

## 2025-07-03 RX ORDER — SODIUM CHLORIDE 0.9 % (FLUSH) 0.9 %
10 SYRINGE (ML) INJECTION AS NEEDED
Status: DISCONTINUED | OUTPATIENT
Start: 2025-07-03 | End: 2025-07-03 | Stop reason: HOSPADM

## 2025-07-03 RX ORDER — DEXAMETHASONE SODIUM PHOSPHATE 10 MG/ML
10 INJECTION, SOLUTION INTRA-ARTICULAR; INTRALESIONAL; INTRAMUSCULAR; INTRAVENOUS; SOFT TISSUE ONCE
Status: COMPLETED | OUTPATIENT
Start: 2025-07-03 | End: 2025-07-03

## 2025-07-03 RX ADMIN — GADOPICLENOL 2 ML: 485.1 INJECTION INTRAVENOUS at 02:27

## 2025-07-03 RX ADMIN — SODIUM CHLORIDE 1000 ML: 9 INJECTION, SOLUTION INTRAVENOUS at 00:49

## 2025-07-03 RX ADMIN — NICOTINE 1 PATCH: 21 PATCH, EXTENDED RELEASE TRANSDERMAL at 00:49

## 2025-07-03 RX ADMIN — DEXAMETHASONE SODIUM PHOSPHATE 10 MG: 10 INJECTION INTRAMUSCULAR; INTRAVENOUS at 00:49

## 2025-07-03 NOTE — ED NOTES
MEDICAL SCREENING:    Reason for Visit: Numbness, neck pain    Patient initially seen in triage.  The patient was advised further evaluation and diagnostic testing will be needed, some of the treatment and testing will be initiated in the lobby in order to begin the process.  The patient will be returned to the waiting area for the time being and possibly be re-assessed by a subsequent ED provider.  The patient will be brought back to the treatment area in as timely manner as possible.      Rosalia Amaral, APRN  07/02/25 0928

## 2025-07-03 NOTE — ED PROVIDER NOTES
Subjective   History of Present Illness  60-year-old female who presents to the ED today for numbness.  She was here on the night of 6/29 because her cat scratched her leg.  She states she was not able to sleep that night so the next morning she went and lay down on the couch for about an hour and a half.  She states when she woke up she was numb from the neck down.  She states this has happened to her before but it usually goes away.  She states the symptoms are improving but they are not gone.  She states she is numb in both of her arms as well as in her bilateral 3rd, 4th and 5th fingers.  She states she is also numb from her waist down to her feet.  She states it is worse in her right leg than her left leg.  She states she did have a couple falls 2 days ago because of all the numbness.  She does use a walker and has for about 7 or 8 months.  She states she has a history of non-Hodgkin's lymphoma and leukemia.  She underwent her monthly chemotherapy infusion today.  She has also had a prior three-vessel CABG and has coronary artery stents.  Patient reports that she has had prior neck surgery.    History provided by:  Patient  Neurologic Problem  The patient's primary symptoms include focal sensory loss. This is a new problem. The current episode started in the past 7 days. The neurological problem developed suddenly. Last Known Well Instant: the morning of 6/30.  The problem has been gradually improving since onset. There was no focality noted. Pertinent negatives include no abdominal pain, auditory change, aura, bladder incontinence, bowel incontinence, chest pain, confusion, diaphoresis, dizziness, fatigue, fever, headaches, light-headedness, nausea, palpitations, shortness of breath, vertigo or vomiting.       Review of Systems   Constitutional: Negative.  Negative for diaphoresis, fatigue and fever.   HENT: Negative.     Eyes: Negative.    Respiratory: Negative.  Negative for shortness of breath.   "  Cardiovascular:  Negative for chest pain and palpitations.   Gastrointestinal:  Negative for abdominal pain, bowel incontinence, nausea and vomiting.   Genitourinary: Negative.  Negative for bladder incontinence.   Musculoskeletal: Negative.    Skin: Negative.    Neurological:  Positive for numbness. Negative for dizziness, vertigo, light-headedness and headaches.   Psychiatric/Behavioral:  Negative for confusion.    All other systems reviewed and are negative.      Past Medical History:   Diagnosis Date    Aneurysm 8/223/17    Cavernous left ICA aneurysm    Asthma     Carotid artery occlusion     Chronic back pain     CLL (chronic lymphocytic leukemia)     OCTOBER OF 2012    COPD (chronic obstructive pulmonary disease)     Degenerative arthritis     Depression     Diabetes mellitus     type 2    H/O blood clots     ovary    Heart murmur     History of transfusion     Hyperlipidemia     Hypertension     Lymphadenopathy     Non Hodgkin's lymphoma     PVD (peripheral vascular disease)     Urinary frequency     UTI (urinary tract infection) 12/13/2023       Allergies   Allergen Reactions    Codeine Shortness Of Breath    Rituxan [Rituximab] Shortness Of Breath and Other (See Comments)     \"THROAT RAWNESS; FELT LIKE MY THROAT WAS CLOSING UP\"    Methocarbamol Rash    Penicillins Other (See Comments)     \"UNKNOWN CHILDHOOD ALLERGY\"       Past Surgical History:   Procedure Laterality Date    AORTOGRAM N/A 10/18/2023    Procedure: AORTAGRAM, BILATERAL COMMON ILIAC ARTERY STENTS, SUPERIOR MESENTERIC  ARTERY STENT;  Surgeon: Filiberto Alcantara MD;  Location:  [facility];  Service: Vascular;  Laterality: N/A;  109 mGy  4 MIN 06 SECS  40mL CONTRAST    APPENDECTOMY      ARTERIOGRAM N/A 3/6/2024    Procedure: Renal Arteriogram;  Surgeon: Martín Tobin MD;  Location:  [facility];  Service: Cardiovascular;  Laterality: N/A;  bilateral KYMBERLY,  PAD with bilateral iliac stents, SMA stent    BACK SURGERY      2002 " (work accident) c-spine surgery 4/14    CARDIAC CATHETERIZATION N/A 09/20/2023    Procedure: Left Heart Cath;  Surgeon: Martín Tobin MD;  Location:  [facility];  Service: Cardiovascular;  Laterality: N/A;    CARDIAC CATHETERIZATION N/A 3/6/2024    Procedure: Stent BMS peripheral;  Surgeon: Martín Tobin MD;  Location:  [facility];  Service: Cardiovascular;  Laterality: N/A;    CARDIAC CATHETERIZATION N/A 6/28/2024    Procedure: Left Heart Cath;  Surgeon: Martín Tobin MD;  Location:  [facility];  Service: Cardiology;  Laterality: N/A;    CAROTID STENT Right 08/23/2017    CEREBRAL ANGIOGRAM N/A 08/23/2017    Diagnostic Carotid/Cerebral Angiogram    CHOLECYSTECTOMY      COLONOSCOPY      DISIMPACTION N/A 5/25/2025    Procedure: DISIMPACTION;  Surgeon: Francy Meléndez MD;  Location:  [facility];  Service: General;  Laterality: N/A;    HYSTERECTOMY      for endometriosis/ovarian bleed    INTERVENTIONAL RADIOLOGY PROCEDURE N/A 08/02/2023    Procedure: Abdominal Aortagram with Runoff;  Surgeon: Jeffry Ackerman MD;  Location:  [facility];  Service: Interventional Radiology;  Laterality: N/A;    INTERVENTIONAL RADIOLOGY PROCEDURE Bilateral 08/02/2023    Procedure: Carotid Cerebral Angiogram;  Surgeon: Jeffry Ackerman MD;  Location:  [facility];  Service: Interventional Radiology;  Laterality: Bilateral;    INTERVENTIONAL RADIOLOGY PROCEDURE N/A 6/28/2024    Procedure: Intravascular Ultrasound;  Surgeon: Martín Tobin MD;  Location:  [facility];  Service: Cardiology;  Laterality: N/A;    LUMBAR FUSION      NECK SURGERY      Neck hardware    OOPHORECTOMY Left     OTHER SURGICAL HISTORY      pac insertion and removal    TONSILLECTOMY         Family History   Problem Relation Age of Onset    Hypertension Mother     Lung cancer Father 72    Hypertension Father     Heart disease Father     Cancer Father      Diabetes Father     Other Brother 46        MI    Heart disease Brother     Breast cancer Paternal Aunt     Colon cancer Maternal Grandfather     Other Cousin         CLL (dx 48 yo)       Social History     Socioeconomic History    Marital status:      Spouse name: denies    Number of children: 3    Years of education: some college    Highest education level: Some college, no degree   Tobacco Use    Smoking status: Every Day     Current packs/day: 0.50     Average packs/day: 0.5 packs/day for 35.0 years (17.5 ttl pk-yrs)     Types: Cigarettes     Passive exposure: Current    Smokeless tobacco: Never   Vaping Use    Vaping status: Never Used   Substance and Sexual Activity    Alcohol use: Yes     Comment: once every year or two.    Drug use: Not Currently     Types: Marijuana    Sexual activity: Not Currently           Objective   Physical Exam  Vitals and nursing note reviewed.   Constitutional:       General: She is not in acute distress.     Appearance: She is ill-appearing (chronically). She is not diaphoretic.      Comments: Patient has been able to ambulate in the ED using her rolling walker   HENT:      Head: Normocephalic and atraumatic.      Right Ear: External ear normal.      Left Ear: External ear normal.      Nose: Nose normal.   Eyes:      General: No visual field deficit.     Conjunctiva/sclera: Conjunctivae normal.      Pupils: Pupils are equal, round, and reactive to light.   Cardiovascular:      Rate and Rhythm: Normal rate and regular rhythm.      Pulses: Normal pulses.      Heart sounds: Normal heart sounds.   Pulmonary:      Effort: Pulmonary effort is normal.      Breath sounds: Normal breath sounds. No wheezing or rhonchi.   Chest:      Chest wall: No tenderness.   Abdominal:      General: Bowel sounds are normal.      Palpations: Abdomen is soft.      Tenderness: There is no abdominal tenderness.   Musculoskeletal:         General: Normal range of motion.      Cervical back: Normal  range of motion and neck supple. No rigidity or tenderness.   Lymphadenopathy:      Cervical: No cervical adenopathy.   Skin:     General: Skin is warm and dry.      Capillary Refill: Capillary refill takes less than 2 seconds.   Neurological:      General: No focal deficit present.      Mental Status: She is alert and oriented to person, place, and time.      GCS: GCS eye subscore is 4. GCS verbal subscore is 5. GCS motor subscore is 6.      Cranial Nerves: Cranial nerves 2-12 are intact. No cranial nerve deficit, dysarthria or facial asymmetry.      Motor: Motor function is intact.      Coordination: Coordination is intact.      Gait: Gait is intact.      Comments: Patient reports decreased sensation on exam to bilateral upper and lower extremities   Psychiatric:         Mood and Affect: Mood normal.         Procedures       Results for orders placed or performed during the hospital encounter of 07/02/25   ECG 12 Lead Other; numbness    Collection Time: 07/02/25  6:25 PM   Result Value Ref Range    QT Interval 390 ms    QTC Interval 458 ms   Comprehensive Metabolic Panel    Collection Time: 07/02/25  6:32 PM    Specimen: Blood   Result Value Ref Range    Glucose 174 (H) 65 - 99 mg/dL    BUN 18.5 8.0 - 23.0 mg/dL    Creatinine 1.36 (H) 0.57 - 1.00 mg/dL    Sodium 140 136 - 145 mmol/L    Potassium 3.9 3.5 - 5.2 mmol/L    Chloride 105 98 - 107 mmol/L    CO2 22.9 22.0 - 29.0 mmol/L    Calcium 8.7 8.6 - 10.5 mg/dL    Total Protein 6.4 6.0 - 8.5 g/dL    Albumin 4.2 3.5 - 5.2 g/dL    ALT (SGPT) 15 1 - 33 U/L    AST (SGOT) 18 1 - 32 U/L    Alkaline Phosphatase 105 39 - 117 U/L    Total Bilirubin 0.2 0.0 - 1.2 mg/dL    Globulin 2.2 gm/dL    A/G Ratio 1.9 g/dL    BUN/Creatinine Ratio 13.6 7.0 - 25.0    Anion Gap 12.1 5.0 - 15.0 mmol/L    eGFR 44.4 (L) >60.0 mL/min/1.73   Magnesium    Collection Time: 07/02/25  6:32 PM    Specimen: Blood   Result Value Ref Range    Magnesium 2.3 1.6 - 2.4 mg/dL   CBC Auto Differential     Collection Time: 07/02/25  6:32 PM    Specimen: Blood   Result Value Ref Range    WBC 3.32 (L) 3.40 - 10.80 10*3/mm3    RBC 3.64 (L) 3.77 - 5.28 10*6/mm3    Hemoglobin 12.3 12.0 - 15.9 g/dL    Hematocrit 38.1 34.0 - 46.6 %    .7 (H) 79.0 - 97.0 fL    MCH 33.8 (H) 26.6 - 33.0 pg    MCHC 32.3 31.5 - 35.7 g/dL    RDW 14.4 12.3 - 15.4 %    RDW-SD 55.8 (H) 37.0 - 54.0 fl    MPV 12.0 6.0 - 12.0 fL    Platelets 91 (L) 140 - 450 10*3/mm3    Neutrophil % 62.0 42.7 - 76.0 %    Lymphocyte % 22.3 19.6 - 45.3 %    Monocyte % 14.8 (H) 5.0 - 12.0 %    Eosinophil % 0.3 0.3 - 6.2 %    Basophil % 0.3 0.0 - 1.5 %    Immature Grans % 0.3 0.0 - 0.5 %    Neutrophils, Absolute 2.06 1.70 - 7.00 10*3/mm3    Lymphocytes, Absolute 0.74 0.70 - 3.10 10*3/mm3    Monocytes, Absolute 0.49 0.10 - 0.90 10*3/mm3    Eosinophils, Absolute 0.01 0.00 - 0.40 10*3/mm3    Basophils, Absolute 0.01 0.00 - 0.20 10*3/mm3    Immature Grans, Absolute 0.01 0.00 - 0.05 10*3/mm3    nRBC 0.0 0.0 - 0.2 /100 WBC   Green Top (Gel)    Collection Time: 07/02/25  6:32 PM   Result Value Ref Range    Extra Tube Hold for add-ons.    Lavender Top    Collection Time: 07/02/25  6:32 PM   Result Value Ref Range    Extra Tube hold for add-on    Gold Top - SST    Collection Time: 07/02/25  6:32 PM   Result Value Ref Range    Extra Tube Hold for add-ons.    Light Blue Top    Collection Time: 07/02/25  6:32 PM   Result Value Ref Range    Extra Tube Hold for add-ons.    Urinalysis With Microscopic If Indicated (No Culture) - Urine, Clean Catch    Collection Time: 07/02/25 11:22 PM    Specimen: Urine, Clean Catch   Result Value Ref Range    Color, UA Yellow Yellow, Straw    Appearance, UA Clear Clear    pH, UA 5.5 5.0 - 8.0    Specific Gravity, UA 1.023 1.005 - 1.030    Glucose, UA >=1000 mg/dL (3+) (A) Negative    Ketones, UA Negative Negative    Bilirubin, UA Negative Negative    Blood, UA Negative Negative    Protein, UA Trace (A) Negative    Leuk Esterase, UA Negative  Negative    Nitrite, UA Negative Negative    Urobilinogen, UA 1.0 E.U./dL 0.2 - 1.0 E.U./dL   Urine Drug Screen - Urine, Clean Catch    Collection Time: 07/02/25 11:22 PM    Specimen: Urine, Clean Catch   Result Value Ref Range    THC, Screen, Urine Negative Negative    Phencyclidine (PCP), Urine Negative Negative    Cocaine Screen, Urine Negative Negative    Methamphetamine, Ur Negative Negative    Opiate Screen Negative Negative    Amphetamine Screen, Urine Negative Negative    Benzodiazepine Screen, Urine Positive (A) Negative    Tricyclic Antidepressants Screen Negative Negative    Methadone Screen, Urine Negative Negative    Barbiturates Screen, Urine Negative Negative    Oxycodone Screen, Urine Positive (A) Negative    Buprenorphine, Screen, Urine Negative Negative   Fentanyl, Urine - Urine, Clean Catch    Collection Time: 07/02/25 11:22 PM    Specimen: Urine, Clean Catch   Result Value Ref Range    Fentanyl, Urine Negative Negative          ED Course  ED Course as of 07/03/25 0441   Wed Jul 02, 2025   1828 ECG 12 Lead Other; numbness  Normal sinus rhythm, rate 83, QTc 458, no acute ST or T wave changes []   Thu Jul 03, 2025   0007 Patient's Kevin was reviewed showing Roxicodone, gabapentin and Xanax at baseline.  Patient is being treated for lymphoma as well as leukemia potential concern for possible metastasis to brain MRI with and without have been ordered.  No acute pathologic findings on CT imaging at this time everything is consistent with chronic degenerative disc disease.  Patient was seen ambulating with a walker and has been utilizing walker for at least 7 months.  Electronically signed by Annalise Cooper DO, 07/03/25, 12:07 AM EDT.   []   4424 Awaiting reading on MRI []   0432 MRI Brain With & Without Contrast  IMPRESSION:     Normal brain volume with preserved gray and white matter  differentiation.  Mild chronic small vessel ischemic type changes in the periventricular  deep white matter.  No  acute intracranial hemorrhage, mass, infarct, or edema.  No restricted diffusion identified to suggest acute or subacute  ischemia.  Mild mucosal thickening in the paranasal sinuses.   [AH]      ED Course User Index  [AH] Monie Ruiz PA  [CW] Jeffry Jorge DO  [LK] Annalise Cooper DO KASPER reviewed by Annalise Cooper DO       Medical Decision Making  60-year-old female who presents to the ED today with numbness to her upper and lower extremities that has been going on for the last 2 days.  CT of the head and the spine showed no acute abnormalities.  She does have a lot of chronic findings in her spine.  MRI of the brain shows no acute abnormalities.  Patient was given IV Decadron.  She has rested throughout her ED stay, never in any distress.  She is able to ambulate with her walker.  She will be discharged home to follow-up outpatient.  Case was discussed with Dr. Cooper who assisted in her care.  She will return to the ED if symptoms change or worsen.    Problems Addressed:  Paresthesia of upper and lower extremities of both sides: complicated acute illness or injury    Amount and/or Complexity of Data Reviewed  Labs: ordered.  Radiology: ordered. Decision-making details documented in ED Course.  ECG/medicine tests: ordered.    Risk  OTC drugs.  Prescription drug management.        Final diagnoses:   Paresthesia of upper and lower extremities of both sides   Electronically signed by MILENA Larios, 07/03/25, 4:05 AM EDT.     ED Disposition  ED Disposition       ED Disposition   Discharge    Condition   Stable    Comment   --               Claudia Celeste, APRN  [address] [place]  [facility] [address] [phone]    Call today           Medication List      No changes were made to your prescriptions during this visit.            Monie Ruiz PA  07/03/25 [id number]

## 2025-07-23 ENCOUNTER — OFFICE VISIT (OUTPATIENT)
Dept: NEUROSURGERY | Facility: CLINIC | Age: 60
End: 2025-07-23
Payer: MEDICARE

## 2025-07-23 VITALS — BODY MASS INDEX: 19.63 KG/M2 | TEMPERATURE: 97.7 F | WEIGHT: 115 LBS | HEIGHT: 64 IN

## 2025-07-23 DIAGNOSIS — R20.1 HYPOESTHESIA: ICD-10-CM

## 2025-07-23 DIAGNOSIS — Z98.1 STATUS POST CERVICAL SPINAL FUSION: Primary | ICD-10-CM

## 2025-07-23 DIAGNOSIS — R53.1 GENERALIZED WEAKNESS: ICD-10-CM

## 2025-07-23 RX ORDER — SULFAMETHOXAZOLE AND TRIMETHOPRIM 800; 160 MG/1; MG/1
TABLET ORAL
COMMUNITY
Start: 2025-06-26

## 2025-07-23 RX ORDER — VENETOCLAX 100 MG/1
TABLET, FILM COATED ORAL
COMMUNITY
Start: 2025-06-25

## 2025-07-23 RX ORDER — OXYCODONE HYDROCHLORIDE 20 MG/1
TABLET ORAL
COMMUNITY
Start: 2025-06-26

## 2025-07-23 NOTE — PROGRESS NOTES
"        [facility]  OFFICE VISIT NOTE        Name: Melania Mae    : 1965     MRN: [id number]     Primary Care Provider: Claudia Celeste APRN    Subjective     Chief Complaint: Neck Pain, Back Pain, and Numbness    History of Present Illness: Melania Mae is a very pleasant 60 y.o. female who is known to the neurosurgical practice as she was a patient of Dr. Huynh with a remote history of a C4-6 ACDF and a more remote history of a left discectomy at L4-5 and L5-S1.  She also sees Dr. Ackerman for her carotid stenosis with a right carotid stent placed in 2017.   She has several chronic health conditions including chronic lymphocytic leukemia, non-Hodgkin's lymphoma, diabetes with peripheral neuropathy (paresthesia not numbness) from the knees down bilaterally, PVD, HTN, and CAD with history of CABG last year.  She has experienced some weight loss and loss of muscle mass since starting Ozempic last year.  She does have COPD and continues to smoke half pack per day.  She has been experiencing episodes of \"numbness from the neck down\" upon wakening which have been resolving within 15 to 20 minutes of sitting up until  when she awoke with this numbness and it did not improve with repositioning.  Of note she was seen the night before in the ED for a cat scratch and slept on the couch that night.  She does endorse having had several falls secondary to the numbness in her lower extremities.  She denies focal saddle anesthesia (although this area is included in her diffuse numbness) and denies urinary or bowel incontinence.  In the ED she did have a brain MRI which was normal and cervical/thoracic/lumbar CT scans which did demonstrate some degenerative changes mild in her neck and moderate in her lumbar spine secondary to facet arthropathy and degenerative disc disease.    The following portions of the patient's history were reviewed and updated as " "appropriate.     Allergies:   Allergies   Allergen Reactions    Codeine Shortness Of Breath    Rituxan [Rituximab] Shortness Of Breath and Other (See Comments)     \"THROAT RAWNESS; FELT LIKE MY THROAT WAS CLOSING UP\"    Penicillins Other (See Comments)     \"UNKNOWN CHILDHOOD ALLERGY\"     Medications:   Current Outpatient Medications:     allopurinol (ZYLOPRIM) 100 MG tablet, Take 1 tablet by mouth Every 8 (Eight) Hours., Disp: , Rfl:     ALPRAZolam (XANAX) 1 MG tablet, Take 0.5 tablets by mouth 2 (Two) Times a Day., Disp: , Rfl:     aspirin 81 MG EC tablet, Take 1 tablet by mouth Daily., Disp: 30 tablet, Rfl: 5    clopidogrel (PLAVIX) 75 MG tablet, take 1 tablet by mouth every day for circulation, Disp: 30 tablet, Rfl: 5    dapagliflozin Propanediol 10 MG tablet, Take 10 mg by mouth Daily., Disp: 30 tablet, Rfl: 11    doxycycline (MONODOX) 100 MG capsule, Take 1 capsule by mouth 2 (Two) Times a Day., Disp: 20 capsule, Rfl: 0    furosemide (LASIX) 40 MG tablet, take 1 tablet by mouth every day for fluid, Disp: 30 tablet, Rfl: 5    gabapentin (NEURONTIN) 800 MG tablet, Take 1 tablet by mouth 4 (Four) Times a Day As Needed (nerve pain)., Disp: , Rfl:     lactulose (CHRONULAC) 10 GM/15ML solution, Take 45 mL by mouth Every 8 (Eight) Hours As Needed (constipation)., Disp: 946 mL, Rfl: 0    loratadine (Claritin) 10 MG tablet, Take 1 tablet by mouth Daily., Disp: , Rfl:     Methylnaltrexone Bromide (RELISTOR) 150 MG tablet, Take 3 tablets by mouth Daily., Disp: , Rfl:     metoprolol tartrate (LOPRESSOR) 25 MG tablet, Take 0.5 tablets by mouth 2 (Two) Times a Day., Disp: 60 tablet, Rfl: 11    Naloxegol Oxalate (Movantik) 25 MG tablet, Take 1 tablet by mouth Every Morning., Disp: 30 tablet, Rfl: 0    ondansetron (Zofran) 4 MG tablet, Take 1 tablet by mouth Every 8 (Eight) Hours As Needed for Vomiting or Nausea., Disp: 30 tablet, Rfl: 0    oxyCODONE (ROXICODONE) 20 MG tablet, , Disp: , Rfl:     potassium chloride (KLOR-CON " M20) 20 MEQ CR tablet, take 1 tablet by mouth every day, Disp: 30 tablet, Rfl: 5    ranolazine (RANEXA) 500 MG 12 hr tablet, take one tablet by mouth two times a day for heart, Disp: 60 tablet, Rfl: 11    sulfamethoxazole-trimethoprim (BACTRIM DS,SEPTRA DS) 800-160 MG per tablet, , Disp: , Rfl:     Venclexta 100 MG chemo tablet, , Disp: , Rfl:   Past Medical History:   Past Medical History:   Diagnosis Date    Aneurysm 8/223/17    Cavernous left ICA aneurysm    Asthma     Carotid artery occlusion     Chronic back pain     CLL (chronic lymphocytic leukemia)     OCTOBER OF 2012    COPD (chronic obstructive pulmonary disease)     Degenerative arthritis     Depression     Diabetes mellitus     type 2    H/O blood clots     ovary    Heart murmur     History of transfusion     Hyperlipidemia     Hypertension     Lymphadenopathy     Non Hodgkin's lymphoma     PVD (peripheral vascular disease)     Urinary frequency     UTI (urinary tract infection) 12/13/2023     Past Surgical History:   Past Surgical History:   Procedure Laterality Date    AORTOGRAM N/A 10/18/2023    Procedure: AORTAGRAM, BILATERAL COMMON ILIAC ARTERY STENTS, SUPERIOR MESENTERIC  ARTERY STENT;  Surgeon: Filiberto Alcantara MD;  Location:  [facility];  Service: Vascular;  Laterality: N/A;  109 mGy  4 MIN 06 SECS  40mL CONTRAST    APPENDECTOMY      ARTERIOGRAM N/A 3/6/2024    Procedure: Renal Arteriogram;  Surgeon: Martín Tobin MD;  Location:  [facility];  Service: Cardiovascular;  Laterality: N/A;  bilateral KYMBERLY,  PAD with bilateral iliac stents, SMA stent    BACK SURGERY      2002 (work accident) c-spine surgery 4/14    CARDIAC CATHETERIZATION N/A 09/20/2023    Procedure: Left Heart Cath;  Surgeon: Martín Tobin MD;  Location:  [facility];  Service: Cardiovascular;  Laterality: N/A;    CARDIAC CATHETERIZATION N/A 3/6/2024    Procedure: Stent BMS peripheral;  Surgeon: Martín Tobin MD;  Location:  [facility];  Service: Cardiovascular;  Laterality: N/A;    CARDIAC CATHETERIZATION N/A 6/28/2024    Procedure: Left Heart Cath;  Surgeon: Martín Tobin MD;  Location:  [facility];  Service: Cardiology;  Laterality: N/A;    CAROTID STENT Right 08/23/2017    CEREBRAL ANGIOGRAM N/A 08/23/2017    Diagnostic Carotid/Cerebral Angiogram    CHOLECYSTECTOMY      COLONOSCOPY      DISIMPACTION N/A 5/25/2025    Procedure: DISIMPACTION;  Surgeon: Francy Meléndez MD;  Location:  [facility];  Service: General;  Laterality: N/A;    HYSTERECTOMY      for endometriosis/ovarian bleed    INTERVENTIONAL RADIOLOGY PROCEDURE N/A 08/02/2023    Procedure: Abdominal Aortagram with Runoff;  Surgeon: Jeffry Ackerman MD;  Location:  [facility];  Service: Interventional Radiology;  Laterality: N/A;    INTERVENTIONAL RADIOLOGY PROCEDURE Bilateral 08/02/2023    Procedure: Carotid Cerebral Angiogram;  Surgeon: Jeffry Ackerman MD;  Location:  [facility];  Service: Interventional Radiology;  Laterality: Bilateral;    INTERVENTIONAL RADIOLOGY PROCEDURE N/A 6/28/2024    Procedure: Intravascular Ultrasound;  Surgeon: Martín Tobin MD;  Location:  [facility];  Service: Cardiology;  Laterality: N/A;    LUMBAR FUSION      NECK SURGERY      Neck hardware    OOPHORECTOMY Left     OTHER SURGICAL HISTORY      pac insertion and removal    TONSILLECTOMY       Social Hx:   Social History     Tobacco Use    Smoking status: Every Day     Current packs/day: 0.50     Average packs/day: 0.5 packs/day for 35.0 years (17.5 ttl pk-yrs)     Types: Cigarettes     Passive exposure: Current    Smokeless tobacco: Never   Vaping Use    Vaping status: Never Used   Substance Use Topics    Alcohol use: Yes     Comment: once every year or two.    Drug use: Not Currently     Types: Marijuana     Family Hx:   Family History   Problem Relation Age of Onset    Hypertension Mother     Lung cancer  Father 72    Hypertension Father     Heart disease Father     Cancer Father     Diabetes Father     Other Brother 46        MI    Heart disease Brother     Breast cancer Paternal Aunt     Colon cancer Maternal Grandfather     Other Cousin         CLL (dx 46 yo)       Review of Systems:        Review of Systems   Constitutional:  Negative for activity change, appetite change, chills, diaphoresis, fatigue, fever and unexpected weight change.   HENT:  Negative for congestion, dental problem, drooling, ear discharge, ear pain, facial swelling, hearing loss, mouth sores, nosebleeds, postnasal drip, rhinorrhea, sinus pressure, sinus pain, sneezing, sore throat, tinnitus, trouble swallowing and voice change.    Eyes:  Negative for photophobia, pain, discharge, redness, itching and visual disturbance.   Respiratory:  Negative for apnea, cough, choking, chest tightness, shortness of breath, wheezing and stridor.    Cardiovascular:  Negative for chest pain, palpitations and leg swelling.   Gastrointestinal:  Negative for abdominal distention, abdominal pain, anal bleeding, blood in stool, constipation, diarrhea, nausea, rectal pain and vomiting.   Endocrine: Negative for cold intolerance, heat intolerance, polydipsia, polyphagia and polyuria.   Genitourinary:  Negative for decreased urine volume, difficulty urinating, dysuria, enuresis, flank pain, frequency, genital sores, hematuria and urgency.   Musculoskeletal:  Positive for arthralgias, back pain, gait problem, myalgias and neck pain. Negative for neck stiffness.   Skin:  Negative for color change, pallor, rash and wound.   Allergic/Immunologic: Negative for environmental allergies, food allergies and immunocompromised state.   Neurological:  Positive for weakness and numbness. Negative for dizziness, tremors, seizures, syncope, facial asymmetry, speech difficulty, light-headedness and headaches.   Hematological:  Negative for adenopathy. Does not bruise/bleed easily.  "  Psychiatric/Behavioral:  Negative for agitation, behavioral problems, confusion, decreased concentration, dysphoric mood, hallucinations, self-injury, sleep disturbance and suicidal ideas. The patient is not nervous/anxious and is not hyperactive.    All other systems reviewed and are negative.     The remaining review of systems is negative as otherwise stated in the HPI.    Objective     Vital Signs: Temp 97.7 °F (36.5 °C) (Infrared)   Ht 162.6 cm (64\")   Wt 52.2 kg (115 lb)   BMI 19.74 kg/m²      Surgical Risk Factors:        BMI: Body mass index is 19.74 kg/m².        Smoking status:   Tobacco Use: High Risk (5/25/2025)    Patient History     Smoking Tobacco Use: Every Day     Smokeless Tobacco Use: Never     Passive Exposure: Current           Last A1C:   unknown        Anticoagulation status:  none    Physical Exam:  Physical Exam  Constitutional:       Appearance: She is ill-appearing and toxic-appearing.   HENT:      Head: Normocephalic and atraumatic.   Eyes:      General: Lids are normal.      Extraocular Movements: Extraocular movements intact.      Pupils: Pupils are equal, round, and reactive to light.   Pulmonary:      Effort: Pulmonary effort is normal.   Musculoskeletal:      Cervical back: Normal range of motion.   Skin:     General: Skin is dry.   Neurological:      Mental Status: She is alert.      Coordination: Romberg sign negative.   Psychiatric:         Mood and Affect: Mood normal.         Speech: Speech normal.         Thought Content: Thought content normal.          Neurological Exam  Mental Status  Alert. Oriented to person, place, time and situation. Recent and remote memory are intact. Speech is normal. Language is fluent with no aphasia. Attention and concentration are normal.    Cranial Nerves  CN II: Visual acuity is normal. Visual fields full to confrontation.  CN III, IV, VI: Extraocular movements intact bilaterally. Normal lids and orbits bilaterally. Pupils equal round and " reactive to light bilaterally.  CN V: Facial sensation is normal.  CN VII: Full and symmetric facial movement.  CN IX, X: Palate elevates symmetrically. Normal gag reflex.  CN XI: Shoulder shrug strength is normal.  CN XII: Tongue midline without atrophy or fasciculations.    Motor  Decreased muscle bulk throughout. Decreased muscle tone. No pronator drift.  Weak but 5/5 strength in knee flexion and dorsi/plantarflexion. Hip flexion is 4/5 bilaterally. BUE 5/5.    Sensory  Light touch is normal in upper and lower extremities. Temperature abnormality: Vibration abnormality:   Temp sensation diminished in BLE, absent in bilat feet  Vibratory sensation absent BLE and diminished in BUE  .    Reflexes  HYPOrelexive throughout. Clonus and hoffmans absent..    Coordination  Right: Finger-to-nose normal. Heel-to-shin abnormality:Left: Finger-to-nose normal. Heel-to-shin abnormality:  Difficult due to weakness in hip flexion and pain. .    Gait   Romberg is absent.  Ambulating with walker, gait is antalgic and cautious as she walks while watching her lower extremities as she states she cannot feel pressure/feedback from weightbearing. Further gait testing deferred..       Independent Review of Diagnostic Studies:    Available for my review today is a brain MRI with and without contrast which is largely normal with preserved gray and white matter differentiation, there are mild chronic small vessel ischemic type changes, no acute hemorrhage/mass/infarct/edema.  No restricted diffusion.   Also available for review are cervical, thoracic, lumbar CT with and without contrast which demonstrate diffuse degenerative changes including disc degeneration and, endplate changes, facet arthropathy.  No acute fractures although there are mild superior endplate compression fractures chronic in nature of T3 and T4.  No lytic or blastic lesions noted.  There is dextroscoliosis centered at the thoracolumbar junction and chronic right lateral  subluxation of L4 and L5 with otherwise normal alignment evidence of prior C4-5 and C5-6 ACDF.   At L4-5 there is some loss of disc spacing, there appears to be disc bulging which is worse at L4-5 and L5-S1 and may contribute to some central canal stenosis.     Diagnoses and all orders for this visit:    1. Status post cervical spinal fusion (Primary)          Status post cervical spinal fusion    Hypoesthesia    Generalized weakness    This is a very complex presentation.  I would like some further testing and to have patient follow-up with neurology as well as with Dr. Rowley in the next couple of weeks.  I think the risk of morbidity without emergent workup or neurosurgical intervention is low given the fact that she has improved somewhat over the past couple of weeks and is not exhibiting long track signs of cervical or thoracic myelopathy nor cauda equina syndrome.  I will order MRI of cervical, thoracic, lumbar spine (without contrast though if there are any space occupying lesions we may follow up with contrast) and EMG/NCS of her upper extremities.  I placed a referral to neurology as well.  Patient is already taking a significant amount of pain medicine including oxycodone 20 mg 4 times a day and gabapentin 800 mg 4 times a day through her pain management provider.  She is currently using a walker to ambulate and insists that as long as she is slow and cautious she feels safe and declines offer of wheelchair despite frequent falls.    Return in about 2 weeks (around 8/6/2025) for with Dr Rowley, Review imaging.    I carefully reviewed the signs/symptoms associated with worsening cervical/thoracic or lumbosacral nerve root compression that would require further urgent/emergent workup, specifically those associated with cervical/thoracic myelopathy and cauda equina. Patient encouraged to contact us if she has any new or worsening symptoms or any concerns.      Any copied data from previous notes included in  the (1) HPI, (2) PE, (3) MDM and/or Assessment and Plan has been reviewed and accurate as of 07/23/25.    Aleshia Jamil PA-C July 23, 2025 15:31 EDT

## 2025-07-24 ENCOUNTER — PATIENT ROUNDING (BHMG ONLY) (OUTPATIENT)
Dept: NEUROSURGERY | Facility: CLINIC | Age: 60
End: 2025-07-24
Payer: MEDICARE

## 2025-07-24 NOTE — PROGRESS NOTES
A MY-CHART MESSAGE HAS BEEN SENT TO THE PATIENT FOR PATIENT ROUNDING WITH [facility].

## 2025-08-07 ENCOUNTER — TELEPHONE (OUTPATIENT)
Dept: CARDIOLOGY | Facility: CLINIC | Age: 60
End: 2025-08-07
Payer: MEDICARE

## 2025-08-27 ENCOUNTER — TRANSCRIBE ORDERS (OUTPATIENT)
Dept: ADMINISTRATIVE | Facility: HOSPITAL | Age: 60
End: 2025-08-27
Payer: MEDICARE

## 2025-08-27 DIAGNOSIS — C91.10 LEUKEMIA, LYMPHOCYTIC, CHRONIC: Primary | ICD-10-CM

## (undated) DEVICE — VIATRAC 14 PLUS PERIPHERAL DILATATION CATHETER 6.0 MM X 30 MM X 135 CM: Brand: VIATRAC

## (undated) DEVICE — APPL CHLORAPREP 26ML CLR

## (undated) DEVICE — PINNACLE INTRODUCER SHEATH: Brand: PINNACLE

## (undated) DEVICE — CVR PROB ULTRASND/TRANSD W/GEL 7X11IN STRL

## (undated) DEVICE — SYR LL BD 10CC

## (undated) DEVICE — SYRINGE, LOCKING, 10CC, STERILE: Brand: BABY GORILLA/GORILLA PLATING SYSTEM

## (undated) DEVICE — STPCK 3/WY HP M/RA W/OFF/HNDL 1050PSI STRL

## (undated) DEVICE — KT INTRO MINISTICK MAX W/GW NITNL/TUNG ECHO STFF 4F 21G 7CM

## (undated) DEVICE — GLV SURG BIOGEL LTX PF 7 1/2

## (undated) DEVICE — CVR HNDL LIGHT RIGID

## (undated) DEVICE — PK ANGIO OR 10

## (undated) DEVICE — ADHS SKIN PREMIERPRO EXOFIN TOPICAL HI/VISC .5ML

## (undated) DEVICE — HI-TORQUE BALANCE MIDDLEWEIGHT GUIDE WIRE W/HYDROCOAT .014 STRAIGHT TIP 3.0 CM X 190 CM: Brand: HI-TORQUE BALANCE MIDDLEWEIGHT

## (undated) DEVICE — GLIDESHEATH SLENDER STAINLESS STEEL KIT: Brand: GLIDESHEATH SLENDER

## (undated) DEVICE — ROTATING HEMOSTATIC VALVE .096": Brand: RHV

## (undated) DEVICE — CATH DIAG EXPO M/ PK 5F FL4/FR4 PIG

## (undated) DEVICE — SYR CONTRL PRESS/LO FIX/M/LL W/THMB/RNG 10ML

## (undated) DEVICE — CATH DIAG EXPO PIG .045IN 5F 110CM

## (undated) DEVICE — CATH DIAG EXPO .056 FR4 6F 100CM

## (undated) DEVICE — LIMB HOLDER, WRIST/ANKLE: Brand: DEROYAL

## (undated) DEVICE — PERIPHERAL SHIELD-ORANGE: Brand: RADPAD

## (undated) DEVICE — CATH OMNI FLUSH 5FR

## (undated) DEVICE — CVR PROB ULTRASND/TRANSD W/GEL 18X120CM STRL

## (undated) DEVICE — NEEDLE,HYPODERM,SAFETY, 22GX1.5: Brand: MEDLINE

## (undated) DEVICE — Device: Brand: DISPOSABLE BULB & BLADDER

## (undated) DEVICE — ADULT, W/LG. BACK PAD, RADIOTRANSPARENT ELEMENT AND LEAD WIRE: Brand: DEFIBRILLATION ELECTRODES

## (undated) DEVICE — DEV INFL MONARCH 25W

## (undated) DEVICE — LEX NEURO ANGIOGRAPHY: Brand: MEDLINE INDUSTRIES, INC.

## (undated) DEVICE — CATH DIAG EXPO .045 FL3  5F 100CM

## (undated) DEVICE — CATH TEMPO 5F BER 100CM: Brand: TEMPO

## (undated) DEVICE — Device: Brand: ASAHI SION BLUE

## (undated) DEVICE — DEV COMPR RADL PRELUDESYNCEZ 30ML 32CM

## (undated) DEVICE — CATH SZ ACCUVU SEG/20CM OMNI .038IN 5F 70CM

## (undated) DEVICE — GOWN SURG ORBIS LVL3 X/LNG XL STRL

## (undated) DEVICE — RADIFOCUS GLIDEWIRE: Brand: GLIDEWIRE

## (undated) DEVICE — INFLATION DEVICE: Brand: ENCORE™ 26

## (undated) DEVICE — MODEL BT2000 P/N 700287-012KIT CONTENTS: MANIFOLD WITH SALINE AND CONTRAST PORTS, SALINE TUBING WITH SPIKE AND HAND SYRINGE, TRANSDUCER: Brand: BT2000 AUTOMATED MANIFOLD KIT

## (undated) DEVICE — LN INJ CONTRST FLXCIL HP F/M LL 1200PSI48

## (undated) DEVICE — GAUZE,SPONGE,4"X4",16PLY,STRL,LF,10/TRAY: Brand: MEDLINE

## (undated) DEVICE — INTRO SHEATH ART/FEM ENGAGE .038 6F12CM

## (undated) DEVICE — GW PERIPH GUIDERIGHT STD/EXCHNG/J/TIP SS 0.035IN 5X260CM

## (undated) DEVICE — SPNG GZ WOVN 4X4IN 12PLY 10/BX STRL

## (undated) DEVICE — GUIDE CATHETER: Brand: MACH1™

## (undated) DEVICE — CATH INTRAVAS ULTRASND EAGLE EYE 2.9FR

## (undated) DEVICE — TR BAND RADIAL ARTERY COMPRESSION DEVICE: Brand: TR BAND

## (undated) DEVICE — CATH DIAG EXPO .045 PIG 5F 100CM

## (undated) DEVICE — Device

## (undated) DEVICE — YANKAUER,BULB TIP,W/O VENT,RIGID,STERILE: Brand: MEDLINE

## (undated) DEVICE — LONG SHEATH: Brand: AXS INFINITY LS

## (undated) DEVICE — LN INJ CONTRST FLXCIL HP BR F/M LL W/ADAPT/ROT 1200PSI 48IN

## (undated) DEVICE — INTRO SHEATH ART/FEM ENGAGE .038 5F12CM

## (undated) DEVICE — ANGIO-SEAL VIP VASCULAR CLOSURE DEVICE: Brand: ANGIO-SEAL

## (undated) DEVICE — PK CATH CARD 10

## (undated) DEVICE — RADIFOCUS GLIDEWIRE ADVANTAGE GUIDEWIRE: Brand: GLIDEWIRE ADVANTAGE

## (undated) DEVICE — ST ACC MICROPUNCTURE .018 TRANSLSS/SS/TP 5F/10CM 21G/7CM

## (undated) DEVICE — EMBOSHIELD NAV6 EMBOLIC PROTECTION SYSTEM 7.2 MM X 190 CM: Brand: EMBOSHIELD  NAV6

## (undated) DEVICE — DESTINATION PERIPHERAL GUIDING SHEATH: Brand: DESTINATION

## (undated) DEVICE — GW INQWIRE FC PTFE STD J/1.5 .035 260

## (undated) DEVICE — SNAP KOVER: Brand: UNBRANDED

## (undated) DEVICE — JELLY,LUBE,STERILE,FLIP TOP,TUBE,4-OZ: Brand: MEDLINE

## (undated) DEVICE — VIATRAC 14 PLUS PERIPHERAL DILATATION CATHETER 4.0 MM X 15 MM X 135 CM: Brand: VIATRAC

## (undated) DEVICE — SKIN PREP TRAY 4 COMPARTM TRAY: Brand: MEDLINE INDUSTRIES, INC.

## (undated) DEVICE — MODEL AT P65, P/N 701554-001KIT CONTENTS: HAND CONTROLLER, 3-WAY HIGH-PRESSURE STOPCOCK WITH ROTATING END AND PREMIUM HIGH-PRESSURE TUBING: Brand: ANGIOTOUCH® KIT

## (undated) DEVICE — TBG STRL INST 4IN 100FT

## (undated) DEVICE — UNDERGLV SURG BIOGEL INDICAT PI SZ8 BLU

## (undated) DEVICE — ST INF PRI SMRTSTE 20DRP 2VLV 24ML 117

## (undated) DEVICE — STPCK LP 1WY RA 200PSI

## (undated) DEVICE — CATH SELCT NEURON SIM 5F 130CM

## (undated) DEVICE — BASIN SET PACK: Brand: MEDLINE INDUSTRIES, INC.

## (undated) DEVICE — CVR PROB ULTRASND/TRANSD W/GEL LNG 18X250CM STRL

## (undated) DEVICE — LIMB HOLDERS: Brand: DEROYAL

## (undated) DEVICE — SYRINGE,TOOMEY,IRRIGATION,70CC,STERILE: Brand: MEDLINE